# Patient Record
Sex: FEMALE | Race: WHITE | Employment: OTHER | URBAN - NONMETROPOLITAN AREA
[De-identification: names, ages, dates, MRNs, and addresses within clinical notes are randomized per-mention and may not be internally consistent; named-entity substitution may affect disease eponyms.]

---

## 2017-01-12 ENCOUNTER — DOCTOR'S OFFICE (OUTPATIENT)
Dept: URBAN - NONMETROPOLITAN AREA CLINIC 1 | Facility: CLINIC | Age: 82
Setting detail: OPHTHALMOLOGY
End: 2017-01-12
Payer: COMMERCIAL

## 2017-01-12 DIAGNOSIS — H35.3221: ICD-10-CM

## 2017-01-12 DIAGNOSIS — H35.052: ICD-10-CM

## 2017-01-12 PROCEDURE — 67028 INJECTION EYE DRUG: CPT | Performed by: OPHTHALMOLOGY

## 2017-01-12 ASSESSMENT — SPHEQUIV_DERIVED
OD_SPHEQUIV: 0.375
OS_SPHEQUIV: -0.125

## 2017-01-12 ASSESSMENT — VISUAL ACUITY
OS_BCVA: 20/40-2
OD_BCVA: 20/40-2

## 2017-01-12 ASSESSMENT — REFRACTION_AUTOREFRACTION
OS_AXIS: 26
OS_CYLINDER: -2.25
OD_SPHERE: +1.00
OS_SPHERE: +1.00
OD_AXIS: 157
OD_CYLINDER: -1.25

## 2017-02-17 ENCOUNTER — DOCTOR'S OFFICE (OUTPATIENT)
Dept: URBAN - NONMETROPOLITAN AREA CLINIC 1 | Facility: CLINIC | Age: 82
Setting detail: OPHTHALMOLOGY
End: 2017-02-17
Payer: COMMERCIAL

## 2017-02-17 DIAGNOSIS — H35.051: ICD-10-CM

## 2017-02-17 DIAGNOSIS — H04.121: ICD-10-CM

## 2017-02-17 DIAGNOSIS — H35.3231: ICD-10-CM

## 2017-02-17 DIAGNOSIS — H40.003: ICD-10-CM

## 2017-02-17 DIAGNOSIS — H35.052: ICD-10-CM

## 2017-02-17 DIAGNOSIS — H04.122: ICD-10-CM

## 2017-02-17 PROCEDURE — 99024 POSTOP FOLLOW-UP VISIT: CPT | Performed by: OPHTHALMOLOGY

## 2017-02-17 PROCEDURE — 92235 FLUORESCEIN ANGRPH MLTIFRAME: CPT | Performed by: OPHTHALMOLOGY

## 2017-02-17 PROCEDURE — 92250 FUNDUS PHOTOGRAPHY W/I&R: CPT | Performed by: OPHTHALMOLOGY

## 2017-02-17 PROCEDURE — 92134 CPTRZ OPH DX IMG PST SGM RTA: CPT | Performed by: OPHTHALMOLOGY

## 2017-02-17 ASSESSMENT — REFRACTION_AUTOREFRACTION
OS_CYLINDER: -2.25
OD_AXIS: 157
OD_CYLINDER: -1.25
OS_AXIS: 26
OS_SPHERE: +1.00
OD_SPHERE: +1.00

## 2017-02-17 ASSESSMENT — REFRACTION_MANIFEST
OD_VA2: 20/
OS_VA3: 20/
OU_VA: 20/
OS_VA2: 20/
OS_VA3: 20/
OD_VA1: 20/
OD_VA3: 20/
OS_VA2: 20/
OS_VA1: 20/
OU_VA: 20/
OS_VA1: 20/
OU_VA: 20/
OD_VA2: 20/
OS_VA1: 20/
OS_VA2: 20/
OD_VA2: 20/
OD_VA1: 20/
OS_VA3: 20/
OD_VA3: 20/
OD_VA1: 20/
OD_VA3: 20/

## 2017-02-17 ASSESSMENT — REFRACTION_CURRENTRX
OD_OVR_VA: 20/
OS_OVR_VA: 20/
OD_OVR_VA: 20/
OS_OVR_VA: 20/
OD_OVR_VA: 20/
OS_OVR_VA: 20/

## 2017-02-17 ASSESSMENT — CONFRONTATIONAL VISUAL FIELD TEST (CVF)
OS_FINDINGS: FULL
OD_FINDINGS: FULL

## 2017-02-17 ASSESSMENT — VISUAL ACUITY
OD_BCVA: 20/30
OS_BCVA: 20/40-2

## 2017-02-17 ASSESSMENT — CORNEAL DYSTROPHY: OD_DYSTROPHY: +VE ARCUS SENILIS

## 2017-02-17 ASSESSMENT — SPHEQUIV_DERIVED
OD_SPHEQUIV: 0.375
OS_SPHEQUIV: -0.125

## 2017-02-20 ENCOUNTER — DOCTOR'S OFFICE (OUTPATIENT)
Dept: URBAN - NONMETROPOLITAN AREA CLINIC 1 | Facility: CLINIC | Age: 82
Setting detail: OPHTHALMOLOGY
End: 2017-02-20
Payer: COMMERCIAL

## 2017-02-20 DIAGNOSIS — H35.3211: ICD-10-CM

## 2017-02-20 DIAGNOSIS — H35.3221: ICD-10-CM

## 2017-02-20 DIAGNOSIS — H35.051: ICD-10-CM

## 2017-02-20 DIAGNOSIS — H35.052: ICD-10-CM

## 2017-02-20 PROCEDURE — 92240 ICG ANGIOGRAPHY I&R UNI/BI: CPT | Performed by: OPHTHALMOLOGY

## 2017-02-20 PROCEDURE — 99024 POSTOP FOLLOW-UP VISIT: CPT | Performed by: OPHTHALMOLOGY

## 2017-02-20 PROCEDURE — 67028 INJECTION EYE DRUG: CPT | Performed by: OPHTHALMOLOGY

## 2017-02-20 ASSESSMENT — REFRACTION_AUTOREFRACTION
OS_CYLINDER: -2.25
OS_SPHERE: +1.00
OS_AXIS: 26
OD_CYLINDER: -1.25
OD_SPHERE: +1.00
OD_AXIS: 157

## 2017-02-20 ASSESSMENT — SPHEQUIV_DERIVED
OD_SPHEQUIV: 0.375
OS_SPHEQUIV: -0.125

## 2017-02-20 ASSESSMENT — VISUAL ACUITY
OS_BCVA: 20/40-2
OD_BCVA: 20/30

## 2017-02-23 ENCOUNTER — DOCTOR'S OFFICE (OUTPATIENT)
Dept: URBAN - NONMETROPOLITAN AREA CLINIC 1 | Facility: CLINIC | Age: 82
Setting detail: OPHTHALMOLOGY
End: 2017-02-23
Payer: COMMERCIAL

## 2017-02-23 DIAGNOSIS — H35.051: ICD-10-CM

## 2017-02-23 DIAGNOSIS — H35.3211: ICD-10-CM

## 2017-02-23 DIAGNOSIS — H35.052: ICD-10-CM

## 2017-02-23 DIAGNOSIS — H35.3221: ICD-10-CM

## 2017-02-23 PROCEDURE — 67220 TREATMENT OF CHOROID LESION: CPT | Performed by: OPHTHALMOLOGY

## 2017-02-23 PROCEDURE — 67028 INJECTION EYE DRUG: CPT | Performed by: OPHTHALMOLOGY

## 2017-02-23 ASSESSMENT — SPHEQUIV_DERIVED
OS_SPHEQUIV: -0.125
OD_SPHEQUIV: 0.375

## 2017-02-23 ASSESSMENT — REFRACTION_AUTOREFRACTION
OD_AXIS: 157
OD_SPHERE: +1.00
OS_CYLINDER: -2.25
OD_CYLINDER: -1.25
OS_AXIS: 26
OS_SPHERE: +1.00

## 2017-02-23 ASSESSMENT — VISUAL ACUITY
OS_BCVA: 20/40-2
OD_BCVA: 20/30

## 2017-03-23 ENCOUNTER — DOCTOR'S OFFICE (OUTPATIENT)
Dept: URBAN - NONMETROPOLITAN AREA CLINIC 1 | Facility: CLINIC | Age: 82
Setting detail: OPHTHALMOLOGY
End: 2017-03-23
Payer: COMMERCIAL

## 2017-03-23 DIAGNOSIS — H35.3231: ICD-10-CM

## 2017-03-23 DIAGNOSIS — H35.051: ICD-10-CM

## 2017-03-23 DIAGNOSIS — H35.052: ICD-10-CM

## 2017-03-23 PROCEDURE — 92134 CPTRZ OPH DX IMG PST SGM RTA: CPT | Performed by: OPHTHALMOLOGY

## 2017-03-23 PROCEDURE — 99024 POSTOP FOLLOW-UP VISIT: CPT | Performed by: OPHTHALMOLOGY

## 2017-03-23 ASSESSMENT — CONFRONTATIONAL VISUAL FIELD TEST (CVF)
OS_FINDINGS: FULL
OD_FINDINGS: FULL

## 2017-03-23 ASSESSMENT — REFRACTION_MANIFEST
OD_VA2: 20/
OS_VA1: 20/
OD_VA2: 20/
OS_VA2: 20/
OS_VA2: 20/
OS_VA3: 20/
OU_VA: 20/
OD_VA1: 20/
OS_VA1: 20/
OD_VA1: 20/
OD_VA3: 20/
OS_VA3: 20/
OD_VA2: 20/
OD_VA3: 20/
OD_VA1: 20/
OS_VA2: 20/
OU_VA: 20/
OD_VA3: 20/
OU_VA: 20/
OS_VA1: 20/
OS_VA3: 20/

## 2017-03-23 ASSESSMENT — REFRACTION_AUTOREFRACTION
OD_AXIS: 157
OS_AXIS: 26
OS_CYLINDER: -2.25
OD_SPHERE: +1.00
OD_CYLINDER: -1.25
OS_SPHERE: +1.00

## 2017-03-23 ASSESSMENT — REFRACTION_CURRENTRX
OD_OVR_VA: 20/
OD_OVR_VA: 20/
OS_OVR_VA: 20/
OD_OVR_VA: 20/

## 2017-03-23 ASSESSMENT — VISUAL ACUITY
OD_BCVA: 20/40+1
OS_BCVA: 20/60+1

## 2017-03-23 ASSESSMENT — SPHEQUIV_DERIVED
OD_SPHEQUIV: 0.375
OS_SPHEQUIV: -0.125

## 2017-03-23 ASSESSMENT — CORNEAL DYSTROPHY: OD_DYSTROPHY: +VE ARCUS SENILIS

## 2017-03-30 ENCOUNTER — DOCTOR'S OFFICE (OUTPATIENT)
Dept: URBAN - NONMETROPOLITAN AREA CLINIC 1 | Facility: CLINIC | Age: 82
Setting detail: OPHTHALMOLOGY
End: 2017-03-30
Payer: COMMERCIAL

## 2017-03-30 DIAGNOSIS — H35.051: ICD-10-CM

## 2017-03-30 DIAGNOSIS — H35.052: ICD-10-CM

## 2017-03-30 DIAGNOSIS — H35.3231: ICD-10-CM

## 2017-03-30 PROCEDURE — 67028 INJECTION EYE DRUG: CPT | Performed by: OPHTHALMOLOGY

## 2017-03-30 PROCEDURE — 99024 POSTOP FOLLOW-UP VISIT: CPT | Performed by: OPHTHALMOLOGY

## 2017-03-30 ASSESSMENT — REFRACTION_AUTOREFRACTION
OS_AXIS: 26
OD_SPHERE: +1.00
OS_SPHERE: +1.00
OD_AXIS: 157
OD_CYLINDER: -1.25
OS_CYLINDER: -2.25

## 2017-03-30 ASSESSMENT — VISUAL ACUITY
OS_BCVA: 20/60+1
OD_BCVA: 20/40+1

## 2017-03-30 ASSESSMENT — SPHEQUIV_DERIVED
OD_SPHEQUIV: 0.375
OS_SPHEQUIV: -0.125

## 2017-04-03 ENCOUNTER — DOCTOR'S OFFICE (OUTPATIENT)
Dept: URBAN - NONMETROPOLITAN AREA CLINIC 1 | Facility: CLINIC | Age: 82
Setting detail: OPHTHALMOLOGY
End: 2017-04-03
Payer: COMMERCIAL

## 2017-04-03 DIAGNOSIS — H35.3231: ICD-10-CM

## 2017-04-03 DIAGNOSIS — H35.052: ICD-10-CM

## 2017-04-03 PROCEDURE — 67028 INJECTION EYE DRUG: CPT | Performed by: OPHTHALMOLOGY

## 2017-04-03 ASSESSMENT — REFRACTION_AUTOREFRACTION
OS_AXIS: 26
OS_SPHERE: +1.00
OD_CYLINDER: -1.25
OS_CYLINDER: -2.25
OD_AXIS: 157
OD_SPHERE: +1.00

## 2017-04-03 ASSESSMENT — VISUAL ACUITY
OD_BCVA: 20/40+1
OS_BCVA: 20/60+1

## 2017-04-03 ASSESSMENT — SPHEQUIV_DERIVED
OS_SPHEQUIV: -0.125
OD_SPHEQUIV: 0.375

## 2017-05-15 ENCOUNTER — DOCTOR'S OFFICE (OUTPATIENT)
Dept: URBAN - NONMETROPOLITAN AREA CLINIC 1 | Facility: CLINIC | Age: 82
Setting detail: OPHTHALMOLOGY
End: 2017-05-15
Payer: COMMERCIAL

## 2017-05-15 DIAGNOSIS — H35.051: ICD-10-CM

## 2017-05-15 DIAGNOSIS — H35.3231: ICD-10-CM

## 2017-05-15 DIAGNOSIS — E11.3293: ICD-10-CM

## 2017-05-15 DIAGNOSIS — H35.052: ICD-10-CM

## 2017-05-15 PROCEDURE — 99024 POSTOP FOLLOW-UP VISIT: CPT | Performed by: OPHTHALMOLOGY

## 2017-05-15 PROCEDURE — 92134 CPTRZ OPH DX IMG PST SGM RTA: CPT | Performed by: OPHTHALMOLOGY

## 2017-05-15 ASSESSMENT — REFRACTION_AUTOREFRACTION
OS_SPHERE: +1.00
OD_CYLINDER: -1.25
OD_SPHERE: +1.00
OS_CYLINDER: -2.25
OD_AXIS: 157
OS_AXIS: 26

## 2017-05-15 ASSESSMENT — VISUAL ACUITY
OD_BCVA: 20/70-1
OS_BCVA: 20/100+1

## 2017-05-15 ASSESSMENT — REFRACTION_MANIFEST
OU_VA: 20/
OS_VA1: 20/
OS_VA3: 20/
OD_VA1: 20/
OS_VA3: 20/
OU_VA: 20/
OD_VA2: 20/
OD_VA2: 20/
OS_VA2: 20/
OS_VA3: 20/
OS_VA1: 20/
OD_VA3: 20/
OU_VA: 20/
OD_VA2: 20/
OS_VA2: 20/
OS_VA2: 20/
OD_VA1: 20/
OD_VA3: 20/
OD_VA3: 20/
OS_VA1: 20/
OD_VA1: 20/

## 2017-05-15 ASSESSMENT — CONFRONTATIONAL VISUAL FIELD TEST (CVF)
OD_FINDINGS: FULL
OS_FINDINGS: FULL

## 2017-05-15 ASSESSMENT — REFRACTION_CURRENTRX
OD_OVR_VA: 20/
OS_OVR_VA: 20/

## 2017-05-15 ASSESSMENT — SPHEQUIV_DERIVED
OS_SPHEQUIV: -0.125
OD_SPHEQUIV: 0.375

## 2017-05-15 ASSESSMENT — CORNEAL DYSTROPHY: OD_DYSTROPHY: +VE ARCUS SENILIS

## 2017-05-18 ENCOUNTER — DOCTOR'S OFFICE (OUTPATIENT)
Dept: URBAN - NONMETROPOLITAN AREA CLINIC 1 | Facility: CLINIC | Age: 82
Setting detail: OPHTHALMOLOGY
End: 2017-05-18
Payer: COMMERCIAL

## 2017-05-18 DIAGNOSIS — E11.3293: ICD-10-CM

## 2017-05-18 DIAGNOSIS — H35.052: ICD-10-CM

## 2017-05-18 DIAGNOSIS — H35.051: ICD-10-CM

## 2017-05-18 DIAGNOSIS — H35.3231: ICD-10-CM

## 2017-05-18 PROCEDURE — 92235 FLUORESCEIN ANGRPH MLTIFRAME: CPT | Performed by: OPHTHALMOLOGY

## 2017-05-18 PROCEDURE — 92250 FUNDUS PHOTOGRAPHY W/I&R: CPT | Performed by: OPHTHALMOLOGY

## 2017-05-18 PROCEDURE — 99024 POSTOP FOLLOW-UP VISIT: CPT | Performed by: OPHTHALMOLOGY

## 2017-05-18 PROCEDURE — 67028 INJECTION EYE DRUG: CPT | Performed by: OPHTHALMOLOGY

## 2017-05-18 ASSESSMENT — REFRACTION_AUTOREFRACTION
OD_AXIS: 157
OD_SPHERE: +1.00
OS_CYLINDER: -2.25
OS_SPHERE: +1.00
OD_CYLINDER: -1.25
OS_AXIS: 26

## 2017-05-18 ASSESSMENT — REFRACTION_MANIFEST
OD_VA3: 20/
OD_VA3: 20/
OU_VA: 20/
OS_VA3: 20/
OS_VA1: 20/
OS_VA2: 20/
OU_VA: 20/
OU_VA: 20/
OS_VA2: 20/
OS_VA1: 20/
OD_VA1: 20/
OD_VA3: 20/
OD_VA2: 20/
OS_VA2: 20/
OS_VA3: 20/
OS_VA3: 20/
OS_VA1: 20/
OD_VA2: 20/
OD_VA2: 20/
OD_VA1: 20/
OD_VA1: 20/

## 2017-05-18 ASSESSMENT — REFRACTION_CURRENTRX
OS_OVR_VA: 20/
OD_OVR_VA: 20/
OD_OVR_VA: 20/
OS_OVR_VA: 20/
OD_OVR_VA: 20/
OS_OVR_VA: 20/

## 2017-05-18 ASSESSMENT — VISUAL ACUITY
OS_BCVA: 20/100
OD_BCVA: 20/70

## 2017-05-18 ASSESSMENT — SPHEQUIV_DERIVED
OS_SPHEQUIV: -0.125
OD_SPHEQUIV: 0.375

## 2017-05-18 ASSESSMENT — CORNEAL DYSTROPHY: OD_DYSTROPHY: +VE ARCUS SENILIS

## 2017-05-22 ENCOUNTER — DOCTOR'S OFFICE (OUTPATIENT)
Dept: URBAN - NONMETROPOLITAN AREA CLINIC 1 | Facility: CLINIC | Age: 82
Setting detail: OPHTHALMOLOGY
End: 2017-05-22
Payer: COMMERCIAL

## 2017-05-22 ENCOUNTER — RX ONLY (RX ONLY)
Age: 82
End: 2017-05-22

## 2017-05-22 DIAGNOSIS — H35.051: ICD-10-CM

## 2017-05-22 DIAGNOSIS — H35.3231: ICD-10-CM

## 2017-05-22 DIAGNOSIS — H35.052: ICD-10-CM

## 2017-05-22 DIAGNOSIS — E11.3293: ICD-10-CM

## 2017-05-22 PROCEDURE — 67028 INJECTION EYE DRUG: CPT | Performed by: OPHTHALMOLOGY

## 2017-05-22 PROCEDURE — 67210 TREATMENT OF RETINAL LESION: CPT | Performed by: OPHTHALMOLOGY

## 2017-05-22 PROCEDURE — 92240 ICG ANGIOGRAPHY I&R UNI/BI: CPT | Performed by: OPHTHALMOLOGY

## 2017-05-22 PROCEDURE — 99024 POSTOP FOLLOW-UP VISIT: CPT | Performed by: OPHTHALMOLOGY

## 2017-05-24 ASSESSMENT — REFRACTION_MANIFEST
OS_VA1: 20/
OS_VA2: 20/
OS_VA1: 20/
OS_VA2: 20/
OD_VA2: 20/
OS_VA3: 20/
OS_VA2: 20/
OU_VA: 20/
OS_VA3: 20/
OD_VA2: 20/
OD_VA1: 20/
OD_VA1: 20/
OD_VA3: 20/
OD_VA3: 20/
OS_VA3: 20/
OS_VA1: 20/
OD_VA2: 20/
OD_VA3: 20/
OU_VA: 20/
OU_VA: 20/
OD_VA1: 20/

## 2017-05-24 ASSESSMENT — REFRACTION_AUTOREFRACTION
OS_CYLINDER: -2.25
OS_AXIS: 26
OD_CYLINDER: -1.25
OD_AXIS: 157
OS_SPHERE: +1.00
OD_SPHERE: +1.00

## 2017-05-24 ASSESSMENT — REFRACTION_CURRENTRX
OD_OVR_VA: 20/
OS_OVR_VA: 20/
OD_OVR_VA: 20/
OS_OVR_VA: 20/
OD_OVR_VA: 20/
OS_OVR_VA: 20/

## 2017-05-24 ASSESSMENT — SPHEQUIV_DERIVED
OD_SPHEQUIV: 0.375
OS_SPHEQUIV: -0.125

## 2017-05-24 ASSESSMENT — VISUAL ACUITY
OS_BCVA: 20/100
OD_BCVA: 20/70

## 2017-06-22 ENCOUNTER — DOCTOR'S OFFICE (OUTPATIENT)
Dept: URBAN - NONMETROPOLITAN AREA CLINIC 1 | Facility: CLINIC | Age: 82
Setting detail: OPHTHALMOLOGY
End: 2017-06-22
Payer: COMMERCIAL

## 2017-06-22 DIAGNOSIS — E11.3293: ICD-10-CM

## 2017-06-22 DIAGNOSIS — H35.051: ICD-10-CM

## 2017-06-22 DIAGNOSIS — H35.052: ICD-10-CM

## 2017-06-22 DIAGNOSIS — H35.3231: ICD-10-CM

## 2017-06-22 PROCEDURE — 92134 CPTRZ OPH DX IMG PST SGM RTA: CPT | Performed by: OPHTHALMOLOGY

## 2017-06-22 PROCEDURE — 99024 POSTOP FOLLOW-UP VISIT: CPT | Performed by: OPHTHALMOLOGY

## 2017-06-22 ASSESSMENT — REFRACTION_MANIFEST
OD_VA3: 20/
OD_VA1: 20/
OS_VA3: 20/
OS_VA1: 20/
OU_VA: 20/
OS_VA2: 20/
OS_VA3: 20/
OD_VA3: 20/
OS_VA2: 20/
OS_VA2: 20/
OS_VA1: 20/
OD_VA2: 20/
OU_VA: 20/
OU_VA: 20/
OS_VA3: 20/
OD_VA3: 20/
OD_VA2: 20/
OD_VA1: 20/
OS_VA1: 20/
OD_VA2: 20/
OD_VA1: 20/

## 2017-06-22 ASSESSMENT — CORNEAL DYSTROPHY: OD_DYSTROPHY: +VE ARCUS SENILIS

## 2017-06-22 ASSESSMENT — REFRACTION_AUTOREFRACTION
OD_SPHERE: +1.00
OD_CYLINDER: -1.25
OS_SPHERE: +1.00
OD_AXIS: 157
OS_CYLINDER: -2.25
OS_AXIS: 26

## 2017-06-22 ASSESSMENT — SPHEQUIV_DERIVED
OS_SPHEQUIV: -0.125
OD_SPHEQUIV: 0.375

## 2017-06-22 ASSESSMENT — REFRACTION_CURRENTRX
OS_OVR_VA: 20/
OD_OVR_VA: 20/
OS_OVR_VA: 20/
OD_OVR_VA: 20/
OS_OVR_VA: 20/
OD_OVR_VA: 20/

## 2017-06-22 ASSESSMENT — VISUAL ACUITY
OS_BCVA: 20/70-2
OD_BCVA: 20/50-1

## 2017-06-22 ASSESSMENT — CONFRONTATIONAL VISUAL FIELD TEST (CVF)
OD_FINDINGS: FULL
OS_FINDINGS: FULL

## 2017-07-10 ENCOUNTER — DOCTOR'S OFFICE (OUTPATIENT)
Dept: URBAN - NONMETROPOLITAN AREA CLINIC 1 | Facility: CLINIC | Age: 82
Setting detail: OPHTHALMOLOGY
End: 2017-07-10
Payer: COMMERCIAL

## 2017-07-10 DIAGNOSIS — H35.3231: ICD-10-CM

## 2017-07-10 DIAGNOSIS — E11.3293: ICD-10-CM

## 2017-07-10 DIAGNOSIS — H35.052: ICD-10-CM

## 2017-07-10 DIAGNOSIS — H35.051: ICD-10-CM

## 2017-07-10 PROCEDURE — 67028 INJECTION EYE DRUG: CPT | Performed by: OPHTHALMOLOGY

## 2017-07-10 PROCEDURE — 99024 POSTOP FOLLOW-UP VISIT: CPT | Performed by: OPHTHALMOLOGY

## 2017-07-10 ASSESSMENT — VISUAL ACUITY
OD_BCVA: 20/50-1
OS_BCVA: 20/70-2

## 2017-07-10 ASSESSMENT — REFRACTION_AUTOREFRACTION
OD_AXIS: 157
OS_AXIS: 26
OS_SPHERE: +1.00
OD_CYLINDER: -1.25
OD_SPHERE: +1.00
OS_CYLINDER: -2.25

## 2017-07-10 ASSESSMENT — SPHEQUIV_DERIVED
OS_SPHEQUIV: -0.125
OD_SPHEQUIV: 0.375

## 2017-07-13 ENCOUNTER — DOCTOR'S OFFICE (OUTPATIENT)
Dept: URBAN - NONMETROPOLITAN AREA CLINIC 1 | Facility: CLINIC | Age: 82
Setting detail: OPHTHALMOLOGY
End: 2017-07-13
Payer: COMMERCIAL

## 2017-07-13 DIAGNOSIS — H35.052: ICD-10-CM

## 2017-07-13 DIAGNOSIS — H35.051: ICD-10-CM

## 2017-07-13 DIAGNOSIS — H35.3231: ICD-10-CM

## 2017-07-13 PROCEDURE — 67028 INJECTION EYE DRUG: CPT | Performed by: OPHTHALMOLOGY

## 2017-07-13 PROCEDURE — 99024 POSTOP FOLLOW-UP VISIT: CPT | Performed by: OPHTHALMOLOGY

## 2017-07-13 ASSESSMENT — REFRACTION_CURRENTRX
OD_OVR_VA: 20/
OS_OVR_VA: 20/
OD_OVR_VA: 20/
OD_OVR_VA: 20/
OS_OVR_VA: 20/
OS_OVR_VA: 20/

## 2017-07-13 ASSESSMENT — REFRACTION_AUTOREFRACTION
OD_CYLINDER: -1.25
OS_CYLINDER: -2.25
OS_SPHERE: +1.00
OS_AXIS: 26
OD_AXIS: 157
OD_SPHERE: +1.00

## 2017-07-13 ASSESSMENT — VISUAL ACUITY
OS_BCVA: 20/70-2
OD_BCVA: 20/50-1

## 2017-07-13 ASSESSMENT — REFRACTION_MANIFEST
OD_VA1: 20/
OU_VA: 20/
OD_VA2: 20/
OD_VA3: 20/
OD_VA1: 20/
OS_VA3: 20/
OS_VA3: 20/
OU_VA: 20/
OD_VA2: 20/
OS_VA1: 20/
OD_VA3: 20/
OD_VA2: 20/
OS_VA2: 20/
OS_VA2: 20/
OU_VA: 20/
OS_VA2: 20/
OS_VA3: 20/
OS_VA1: 20/
OD_VA3: 20/
OD_VA1: 20/
OS_VA1: 20/

## 2017-07-13 ASSESSMENT — SPHEQUIV_DERIVED
OS_SPHEQUIV: -0.125
OD_SPHEQUIV: 0.375

## 2017-08-03 ENCOUNTER — DOCTOR'S OFFICE (OUTPATIENT)
Dept: URBAN - NONMETROPOLITAN AREA CLINIC 1 | Facility: CLINIC | Age: 82
Setting detail: OPHTHALMOLOGY
End: 2017-08-03
Payer: COMMERCIAL

## 2017-08-03 DIAGNOSIS — H35.3231: ICD-10-CM

## 2017-08-03 DIAGNOSIS — H04.123: ICD-10-CM

## 2017-08-03 DIAGNOSIS — H35.053: ICD-10-CM

## 2017-08-03 DIAGNOSIS — E11.3293: ICD-10-CM

## 2017-08-03 PROCEDURE — 99024 POSTOP FOLLOW-UP VISIT: CPT | Performed by: OPHTHALMOLOGY

## 2017-08-03 PROCEDURE — 92134 CPTRZ OPH DX IMG PST SGM RTA: CPT | Performed by: OPHTHALMOLOGY

## 2017-08-03 ASSESSMENT — REFRACTION_MANIFEST
OS_VA3: 20/
OS_VA2: 20/
OD_VA3: 20/
OD_VA2: 20/
OS_VA2: 20/
OD_VA1: 20/
OD_VA2: 20/
OS_VA1: 20/
OU_VA: 20/
OS_VA1: 20/
OD_VA3: 20/
OS_VA1: 20/
OS_VA3: 20/
OD_VA1: 20/
OS_VA2: 20/
OD_VA3: 20/
OD_VA2: 20/
OU_VA: 20/
OS_VA3: 20/
OU_VA: 20/
OD_VA1: 20/

## 2017-08-03 ASSESSMENT — REFRACTION_AUTOREFRACTION
OS_SPHERE: +1.00
OD_CYLINDER: -1.25
OS_CYLINDER: -2.25
OS_AXIS: 26
OD_SPHERE: +1.00
OD_AXIS: 157

## 2017-08-03 ASSESSMENT — REFRACTION_CURRENTRX
OD_OVR_VA: 20/
OS_OVR_VA: 20/
OD_OVR_VA: 20/
OD_OVR_VA: 20/
OS_OVR_VA: 20/
OS_OVR_VA: 20/

## 2017-08-03 ASSESSMENT — CONFRONTATIONAL VISUAL FIELD TEST (CVF)
OS_FINDINGS: FULL
OD_FINDINGS: FULL

## 2017-08-03 ASSESSMENT — SPHEQUIV_DERIVED
OS_SPHEQUIV: -0.125
OD_SPHEQUIV: 0.375

## 2017-08-03 ASSESSMENT — VISUAL ACUITY
OD_BCVA: 20/40-1
OS_BCVA: 20/40

## 2017-08-03 ASSESSMENT — CORNEAL DYSTROPHY: OD_DYSTROPHY: +VE ARCUS SENILIS

## 2017-08-04 ENCOUNTER — DOCTOR'S OFFICE (OUTPATIENT)
Dept: URBAN - NONMETROPOLITAN AREA CLINIC 1 | Facility: CLINIC | Age: 82
Setting detail: OPHTHALMOLOGY
End: 2017-08-04
Payer: COMMERCIAL

## 2017-08-04 DIAGNOSIS — H35.3231: ICD-10-CM

## 2017-08-04 DIAGNOSIS — E11.3293: ICD-10-CM

## 2017-08-04 DIAGNOSIS — H35.053: ICD-10-CM

## 2017-08-04 PROCEDURE — 92240 ICG ANGIOGRAPHY I&R UNI/BI: CPT | Performed by: OPHTHALMOLOGY

## 2017-08-04 ASSESSMENT — REFRACTION_AUTOREFRACTION
OD_CYLINDER: -1.25
OS_CYLINDER: -2.25
OD_SPHERE: +1.00
OS_AXIS: 26
OD_AXIS: 157
OS_SPHERE: +1.00

## 2017-08-04 ASSESSMENT — VISUAL ACUITY
OS_BCVA: 20/40
OD_BCVA: 20/40-1

## 2017-08-04 ASSESSMENT — SPHEQUIV_DERIVED
OS_SPHEQUIV: -0.125
OD_SPHEQUIV: 0.375

## 2017-08-10 ENCOUNTER — DOCTOR'S OFFICE (OUTPATIENT)
Dept: URBAN - NONMETROPOLITAN AREA CLINIC 1 | Facility: CLINIC | Age: 82
Setting detail: OPHTHALMOLOGY
End: 2017-08-10
Payer: COMMERCIAL

## 2017-08-10 DIAGNOSIS — H35.051: ICD-10-CM

## 2017-08-10 DIAGNOSIS — H35.052: ICD-10-CM

## 2017-08-10 PROCEDURE — 67028 INJECTION EYE DRUG: CPT | Performed by: OPHTHALMOLOGY

## 2017-08-10 PROCEDURE — 99024 POSTOP FOLLOW-UP VISIT: CPT | Performed by: OPHTHALMOLOGY

## 2017-08-10 ASSESSMENT — REFRACTION_AUTOREFRACTION
OS_SPHERE: +1.00
OD_AXIS: 157
OD_CYLINDER: -1.25
OD_SPHERE: +1.00
OS_AXIS: 26
OS_CYLINDER: -2.25

## 2017-08-10 ASSESSMENT — VISUAL ACUITY
OD_BCVA: 20/40-1
OS_BCVA: 20/40

## 2017-08-10 ASSESSMENT — SPHEQUIV_DERIVED
OD_SPHEQUIV: 0.375
OS_SPHEQUIV: -0.125

## 2017-08-18 ENCOUNTER — DOCTOR'S OFFICE (OUTPATIENT)
Dept: URBAN - NONMETROPOLITAN AREA CLINIC 1 | Facility: CLINIC | Age: 82
Setting detail: OPHTHALMOLOGY
End: 2017-08-18
Payer: COMMERCIAL

## 2017-08-18 DIAGNOSIS — H35.3221: ICD-10-CM

## 2017-08-18 DIAGNOSIS — H35.052: ICD-10-CM

## 2017-08-18 PROCEDURE — 67028 INJECTION EYE DRUG: CPT | Performed by: OPHTHALMOLOGY

## 2017-08-18 ASSESSMENT — REFRACTION_AUTOREFRACTION
OS_AXIS: 26
OS_CYLINDER: -2.25
OD_AXIS: 157
OD_SPHERE: +1.00
OS_SPHERE: +1.00
OD_CYLINDER: -1.25

## 2017-08-18 ASSESSMENT — VISUAL ACUITY
OS_BCVA: 20/40
OD_BCVA: 20/40-1

## 2017-08-18 ASSESSMENT — SPHEQUIV_DERIVED
OD_SPHEQUIV: 0.375
OS_SPHEQUIV: -0.125

## 2017-09-21 ENCOUNTER — DOCTOR'S OFFICE (OUTPATIENT)
Dept: URBAN - NONMETROPOLITAN AREA CLINIC 1 | Facility: CLINIC | Age: 82
Setting detail: OPHTHALMOLOGY
End: 2017-09-21
Payer: COMMERCIAL

## 2017-09-21 DIAGNOSIS — E11.3293: ICD-10-CM

## 2017-09-21 DIAGNOSIS — H40.003: ICD-10-CM

## 2017-09-21 DIAGNOSIS — H35.053: ICD-10-CM

## 2017-09-21 DIAGNOSIS — H04.123: ICD-10-CM

## 2017-09-21 DIAGNOSIS — H35.3231: ICD-10-CM

## 2017-09-21 PROCEDURE — 92134 CPTRZ OPH DX IMG PST SGM RTA: CPT | Performed by: OPHTHALMOLOGY

## 2017-09-21 PROCEDURE — 92014 COMPRE OPH EXAM EST PT 1/>: CPT | Performed by: OPHTHALMOLOGY

## 2017-09-21 ASSESSMENT — REFRACTION_AUTOREFRACTION
OS_SPHERE: +1.00
OS_AXIS: 26
OD_CYLINDER: -1.25
OD_SPHERE: +1.00
OD_AXIS: 157
OS_CYLINDER: -2.25

## 2017-09-21 ASSESSMENT — REFRACTION_MANIFEST
OD_VA3: 20/
OS_VA3: 20/
OD_VA1: 20/
OD_VA1: 20/
OD_VA2: 20/
OD_VA2: 20/
OS_VA1: 20/
OU_VA: 20/
OD_VA3: 20/
OS_VA3: 20/
OS_VA3: 20/
OS_VA1: 20/
OD_VA3: 20/
OU_VA: 20/
OU_VA: 20/
OS_VA2: 20/
OD_VA2: 20/
OD_VA1: 20/
OS_VA2: 20/
OS_VA2: 20/
OS_VA1: 20/

## 2017-09-21 ASSESSMENT — SPHEQUIV_DERIVED
OD_SPHEQUIV: 0.375
OS_SPHEQUIV: -0.125

## 2017-09-21 ASSESSMENT — VISUAL ACUITY
OD_BCVA: 20/50+2
OS_BCVA: 20/40+2

## 2017-09-21 ASSESSMENT — CONFRONTATIONAL VISUAL FIELD TEST (CVF)
OD_FINDINGS: FULL
OS_FINDINGS: FULL

## 2017-09-21 ASSESSMENT — REFRACTION_CURRENTRX
OS_OVR_VA: 20/
OD_OVR_VA: 20/
OS_OVR_VA: 20/
OD_OVR_VA: 20/
OS_OVR_VA: 20/
OD_OVR_VA: 20/

## 2017-09-21 ASSESSMENT — CORNEAL DYSTROPHY: OD_DYSTROPHY: +VE ARCUS SENILIS

## 2017-09-28 ENCOUNTER — DOCTOR'S OFFICE (OUTPATIENT)
Dept: URBAN - NONMETROPOLITAN AREA CLINIC 1 | Facility: CLINIC | Age: 82
Setting detail: OPHTHALMOLOGY
End: 2017-09-28
Payer: COMMERCIAL

## 2017-09-28 DIAGNOSIS — H35.3211: ICD-10-CM

## 2017-09-28 DIAGNOSIS — H35.051: ICD-10-CM

## 2017-09-28 PROCEDURE — 67028 INJECTION EYE DRUG: CPT | Performed by: OPHTHALMOLOGY

## 2017-09-28 ASSESSMENT — VISUAL ACUITY
OS_BCVA: 20/40+2
OD_BCVA: 20/50+2

## 2017-09-28 ASSESSMENT — SPHEQUIV_DERIVED
OD_SPHEQUIV: 0.375
OS_SPHEQUIV: -0.125

## 2017-09-28 ASSESSMENT — REFRACTION_AUTOREFRACTION
OS_SPHERE: +1.00
OD_AXIS: 157
OD_SPHERE: +1.00
OS_CYLINDER: -2.25
OD_CYLINDER: -1.25
OS_AXIS: 26

## 2017-10-02 ENCOUNTER — DOCTOR'S OFFICE (OUTPATIENT)
Dept: URBAN - NONMETROPOLITAN AREA CLINIC 1 | Facility: CLINIC | Age: 82
Setting detail: OPHTHALMOLOGY
End: 2017-10-02
Payer: COMMERCIAL

## 2017-10-02 DIAGNOSIS — H35.3221: ICD-10-CM

## 2017-10-02 DIAGNOSIS — H35.053: ICD-10-CM

## 2017-10-02 DIAGNOSIS — H35.3231: ICD-10-CM

## 2017-10-02 PROCEDURE — 92235 FLUORESCEIN ANGRPH MLTIFRAME: CPT | Performed by: OPHTHALMOLOGY

## 2017-10-02 PROCEDURE — 92240 ICG ANGIOGRAPHY I&R UNI/BI: CPT | Performed by: OPHTHALMOLOGY

## 2017-10-02 PROCEDURE — 67210 TREATMENT OF RETINAL LESION: CPT | Performed by: OPHTHALMOLOGY

## 2017-10-02 PROCEDURE — 92250 FUNDUS PHOTOGRAPHY W/I&R: CPT | Performed by: OPHTHALMOLOGY

## 2017-10-02 ASSESSMENT — REFRACTION_AUTOREFRACTION
OS_CYLINDER: -2.25
OD_AXIS: 157
OD_SPHERE: +1.00
OS_AXIS: 26
OS_SPHERE: +1.00
OD_CYLINDER: -1.25

## 2017-10-02 ASSESSMENT — SPHEQUIV_DERIVED
OS_SPHEQUIV: -0.125
OD_SPHEQUIV: 0.375

## 2017-10-02 ASSESSMENT — VISUAL ACUITY
OD_BCVA: 20/50
OS_BCVA: 20/40

## 2017-10-06 ENCOUNTER — DOCTOR'S OFFICE (OUTPATIENT)
Dept: URBAN - NONMETROPOLITAN AREA CLINIC 1 | Facility: CLINIC | Age: 82
Setting detail: OPHTHALMOLOGY
End: 2017-10-06
Payer: COMMERCIAL

## 2017-10-06 DIAGNOSIS — H35.3221: ICD-10-CM

## 2017-10-06 PROCEDURE — 67028 INJECTION EYE DRUG: CPT | Performed by: OPHTHALMOLOGY

## 2017-10-06 ASSESSMENT — VISUAL ACUITY
OS_BCVA: 20/40
OD_BCVA: 20/50

## 2017-10-06 ASSESSMENT — REFRACTION_AUTOREFRACTION
OS_SPHERE: +1.00
OD_SPHERE: +1.00
OS_AXIS: 26
OD_AXIS: 157
OD_CYLINDER: -1.25
OS_CYLINDER: -2.25

## 2017-10-06 ASSESSMENT — SPHEQUIV_DERIVED
OS_SPHEQUIV: -0.125
OD_SPHEQUIV: 0.375

## 2017-11-16 ENCOUNTER — DOCTOR'S OFFICE (OUTPATIENT)
Dept: URBAN - NONMETROPOLITAN AREA CLINIC 1 | Facility: CLINIC | Age: 82
Setting detail: OPHTHALMOLOGY
End: 2017-11-16
Payer: COMMERCIAL

## 2017-11-16 DIAGNOSIS — E11.3293: ICD-10-CM

## 2017-11-16 DIAGNOSIS — H43.811: ICD-10-CM

## 2017-11-16 DIAGNOSIS — H35.3231: ICD-10-CM

## 2017-11-16 DIAGNOSIS — H04.122: ICD-10-CM

## 2017-11-16 DIAGNOSIS — H43.812: ICD-10-CM

## 2017-11-16 DIAGNOSIS — H35.051: ICD-10-CM

## 2017-11-16 DIAGNOSIS — H35.052: ICD-10-CM

## 2017-11-16 DIAGNOSIS — H40.003: ICD-10-CM

## 2017-11-16 DIAGNOSIS — H04.121: ICD-10-CM

## 2017-11-16 PROCEDURE — 92134 CPTRZ OPH DX IMG PST SGM RTA: CPT | Performed by: OPHTHALMOLOGY

## 2017-11-16 PROCEDURE — 99024 POSTOP FOLLOW-UP VISIT: CPT | Performed by: OPHTHALMOLOGY

## 2017-11-16 PROCEDURE — 92225 OPHTHALMOSCOPY EXTENDED INITIAL: CPT | Performed by: OPHTHALMOLOGY

## 2017-11-16 ASSESSMENT — REFRACTION_CURRENTRX
OD_OVR_VA: 20/
OS_OVR_VA: 20/
OD_OVR_VA: 20/
OS_OVR_VA: 20/
OD_OVR_VA: 20/
OS_OVR_VA: 20/

## 2017-11-16 ASSESSMENT — REFRACTION_MANIFEST
OD_VA3: 20/
OU_VA: 20/
OS_VA1: 20/
OS_VA2: 20/
OD_VA1: 20/
OS_VA2: 20/
OS_VA1: 20/
OU_VA: 20/
OS_VA3: 20/
OD_VA1: 20/
OU_VA: 20/
OD_VA1: 20/
OD_VA3: 20/
OD_VA2: 20/
OS_VA1: 20/
OD_VA2: 20/
OS_VA3: 20/
OS_VA3: 20/
OD_VA3: 20/
OD_VA2: 20/
OS_VA2: 20/

## 2017-11-16 ASSESSMENT — VISUAL ACUITY
OS_BCVA: 20/50-2
OD_BCVA: 20/50

## 2017-11-16 ASSESSMENT — CONFRONTATIONAL VISUAL FIELD TEST (CVF)
OD_FINDINGS: FULL
OS_FINDINGS: FULL

## 2017-11-16 ASSESSMENT — SPHEQUIV_DERIVED
OD_SPHEQUIV: 0.375
OS_SPHEQUIV: -0.125

## 2017-11-16 ASSESSMENT — REFRACTION_AUTOREFRACTION
OS_CYLINDER: -2.25
OD_AXIS: 157
OD_SPHERE: +1.00
OD_CYLINDER: -1.25
OS_AXIS: 26
OS_SPHERE: +1.00

## 2017-11-16 ASSESSMENT — CORNEAL DYSTROPHY: OD_DYSTROPHY: +VE ARCUS SENILIS

## 2017-11-27 ENCOUNTER — DOCTOR'S OFFICE (OUTPATIENT)
Dept: URBAN - NONMETROPOLITAN AREA CLINIC 1 | Facility: CLINIC | Age: 82
Setting detail: OPHTHALMOLOGY
End: 2017-11-27
Payer: COMMERCIAL

## 2017-11-27 DIAGNOSIS — E11.3293: ICD-10-CM

## 2017-11-27 DIAGNOSIS — H35.3211: ICD-10-CM

## 2017-11-27 DIAGNOSIS — H35.051: ICD-10-CM

## 2017-11-27 DIAGNOSIS — H35.3221: ICD-10-CM

## 2017-11-27 DIAGNOSIS — H35.052: ICD-10-CM

## 2017-11-27 DIAGNOSIS — H04.122: ICD-10-CM

## 2017-11-27 DIAGNOSIS — H43.811: ICD-10-CM

## 2017-11-27 DIAGNOSIS — H40.003: ICD-10-CM

## 2017-11-27 DIAGNOSIS — H43.812: ICD-10-CM

## 2017-11-27 DIAGNOSIS — H04.121: ICD-10-CM

## 2017-11-27 PROCEDURE — 67028 INJECTION EYE DRUG: CPT | Performed by: OPHTHALMOLOGY

## 2017-11-27 PROCEDURE — 99024 POSTOP FOLLOW-UP VISIT: CPT | Performed by: OPHTHALMOLOGY

## 2017-11-27 ASSESSMENT — REFRACTION_AUTOREFRACTION
OS_CYLINDER: -2.25
OS_SPHERE: +1.00
OS_AXIS: 26
OD_SPHERE: +1.00
OD_AXIS: 157
OD_CYLINDER: -1.25

## 2017-11-27 ASSESSMENT — SPHEQUIV_DERIVED
OS_SPHEQUIV: -0.125
OD_SPHEQUIV: 0.375

## 2017-11-27 ASSESSMENT — VISUAL ACUITY
OS_BCVA: 20/50-2
OD_BCVA: 20/50

## 2017-11-30 ENCOUNTER — DOCTOR'S OFFICE (OUTPATIENT)
Dept: URBAN - NONMETROPOLITAN AREA CLINIC 1 | Facility: CLINIC | Age: 82
Setting detail: OPHTHALMOLOGY
End: 2017-11-30
Payer: COMMERCIAL

## 2017-11-30 DIAGNOSIS — H40.003: ICD-10-CM

## 2017-11-30 DIAGNOSIS — H43.812: ICD-10-CM

## 2017-11-30 DIAGNOSIS — H35.051: ICD-10-CM

## 2017-11-30 DIAGNOSIS — H04.121: ICD-10-CM

## 2017-11-30 DIAGNOSIS — H35.3221: ICD-10-CM

## 2017-11-30 DIAGNOSIS — H04.122: ICD-10-CM

## 2017-11-30 DIAGNOSIS — H35.3211: ICD-10-CM

## 2017-11-30 DIAGNOSIS — H35.052: ICD-10-CM

## 2017-11-30 DIAGNOSIS — E11.3293: ICD-10-CM

## 2017-11-30 DIAGNOSIS — H43.811: ICD-10-CM

## 2017-11-30 PROCEDURE — 67028 INJECTION EYE DRUG: CPT | Performed by: OPHTHALMOLOGY

## 2017-11-30 PROCEDURE — 99024 POSTOP FOLLOW-UP VISIT: CPT | Performed by: OPHTHALMOLOGY

## 2017-11-30 ASSESSMENT — REFRACTION_AUTOREFRACTION
OS_CYLINDER: -2.25
OD_SPHERE: +1.00
OD_CYLINDER: -1.25
OS_AXIS: 26
OS_SPHERE: +1.00
OD_AXIS: 157

## 2017-11-30 ASSESSMENT — SPHEQUIV_DERIVED
OS_SPHEQUIV: -0.125
OD_SPHEQUIV: 0.375

## 2017-11-30 ASSESSMENT — VISUAL ACUITY
OD_BCVA: 20/50
OS_BCVA: 20/50-2

## 2017-12-14 ENCOUNTER — DOCTOR'S OFFICE (OUTPATIENT)
Dept: URBAN - NONMETROPOLITAN AREA CLINIC 1 | Facility: CLINIC | Age: 82
Setting detail: OPHTHALMOLOGY
End: 2017-12-14
Payer: COMMERCIAL

## 2017-12-14 DIAGNOSIS — H43.811: ICD-10-CM

## 2017-12-14 DIAGNOSIS — H35.051: ICD-10-CM

## 2017-12-14 DIAGNOSIS — H35.3221: ICD-10-CM

## 2017-12-14 DIAGNOSIS — H43.812: ICD-10-CM

## 2017-12-14 DIAGNOSIS — H40.003: ICD-10-CM

## 2017-12-14 DIAGNOSIS — H35.052: ICD-10-CM

## 2017-12-14 DIAGNOSIS — H35.3211: ICD-10-CM

## 2017-12-14 DIAGNOSIS — H04.121: ICD-10-CM

## 2017-12-14 DIAGNOSIS — E11.3293: ICD-10-CM

## 2017-12-14 DIAGNOSIS — H04.122: ICD-10-CM

## 2017-12-14 PROCEDURE — 92134 CPTRZ OPH DX IMG PST SGM RTA: CPT | Performed by: OPHTHALMOLOGY

## 2017-12-14 PROCEDURE — 99024 POSTOP FOLLOW-UP VISIT: CPT | Performed by: OPHTHALMOLOGY

## 2017-12-14 ASSESSMENT — REFRACTION_MANIFEST
OS_VA2: 20/
OU_VA: 20/
OD_VA1: 20/
OD_VA3: 20/
OS_VA2: 20/
OD_VA1: 20/
OS_VA1: 20/
OS_VA3: 20/
OS_VA3: 20/
OU_VA: 20/
OD_VA2: 20/
OD_VA3: 20/
OD_VA2: 20/
OU_VA: 20/
OD_VA1: 20/
OS_VA3: 20/
OS_VA1: 20/
OS_VA2: 20/
OS_VA1: 20/
OD_VA2: 20/
OD_VA3: 20/

## 2017-12-14 ASSESSMENT — REFRACTION_CURRENTRX
OS_OVR_VA: 20/
OS_OVR_VA: 20/
OD_OVR_VA: 20/
OS_OVR_VA: 20/
OD_OVR_VA: 20/
OD_OVR_VA: 20/

## 2017-12-14 ASSESSMENT — CONFRONTATIONAL VISUAL FIELD TEST (CVF)
OS_FINDINGS: FULL
OD_FINDINGS: FULL

## 2017-12-14 ASSESSMENT — REFRACTION_AUTOREFRACTION
OD_SPHERE: +1.00
OS_CYLINDER: -2.25
OD_AXIS: 157
OS_SPHERE: +1.00
OS_AXIS: 26
OD_CYLINDER: -1.25

## 2017-12-14 ASSESSMENT — CORNEAL DYSTROPHY: OD_DYSTROPHY: +VE ARCUS SENILIS

## 2017-12-14 ASSESSMENT — SPHEQUIV_DERIVED
OD_SPHEQUIV: 0.375
OS_SPHEQUIV: -0.125

## 2017-12-14 ASSESSMENT — VISUAL ACUITY
OD_BCVA: 20/60-1
OS_BCVA: 20/50-1

## 2018-01-18 ENCOUNTER — DOCTOR'S OFFICE (OUTPATIENT)
Dept: URBAN - NONMETROPOLITAN AREA CLINIC 1 | Facility: CLINIC | Age: 83
Setting detail: OPHTHALMOLOGY
End: 2018-01-18
Payer: COMMERCIAL

## 2018-01-18 DIAGNOSIS — H43.811: ICD-10-CM

## 2018-01-18 DIAGNOSIS — H35.3231: ICD-10-CM

## 2018-01-18 DIAGNOSIS — H40.003: ICD-10-CM

## 2018-01-18 DIAGNOSIS — E11.3293: ICD-10-CM

## 2018-01-18 DIAGNOSIS — H43.812: ICD-10-CM

## 2018-01-18 PROCEDURE — 92014 COMPRE OPH EXAM EST PT 1/>: CPT | Performed by: OPHTHALMOLOGY

## 2018-01-18 PROCEDURE — 92235 FLUORESCEIN ANGRPH MLTIFRAME: CPT | Performed by: OPHTHALMOLOGY

## 2018-01-18 PROCEDURE — 92226 OPHTHALMOSCOPY EXT SUBSEQUENT: CPT | Performed by: OPHTHALMOLOGY

## 2018-01-18 PROCEDURE — 92250 FUNDUS PHOTOGRAPHY W/I&R: CPT | Performed by: OPHTHALMOLOGY

## 2018-01-18 ASSESSMENT — REFRACTION_MANIFEST
OS_VA3: 20/
OS_VA1: 20/
OD_VA3: 20/
OU_VA: 20/
OS_VA3: 20/
OS_VA3: 20/
OD_VA1: 20/
OD_VA2: 20/
OS_VA2: 20/
OU_VA: 20/
OS_VA2: 20/
OU_VA: 20/
OS_VA2: 20/
OD_VA2: 20/
OD_VA2: 20/
OD_VA1: 20/
OS_VA1: 20/
OD_VA1: 20/
OD_VA3: 20/
OD_VA3: 20/
OS_VA1: 20/

## 2018-01-18 ASSESSMENT — SPHEQUIV_DERIVED
OD_SPHEQUIV: 0.375
OS_SPHEQUIV: -0.125

## 2018-01-18 ASSESSMENT — REFRACTION_CURRENTRX
OS_OVR_VA: 20/
OD_OVR_VA: 20/
OS_OVR_VA: 20/
OS_OVR_VA: 20/

## 2018-01-18 ASSESSMENT — REFRACTION_AUTOREFRACTION
OS_SPHERE: +1.00
OS_AXIS: 26
OD_AXIS: 157
OD_CYLINDER: -1.25
OD_SPHERE: +1.00
OS_CYLINDER: -2.25

## 2018-01-18 ASSESSMENT — VISUAL ACUITY
OD_BCVA: 20/50
OS_BCVA: 20/50

## 2018-01-18 ASSESSMENT — CONFRONTATIONAL VISUAL FIELD TEST (CVF)
OD_FINDINGS: FULL
OS_FINDINGS: FULL

## 2018-01-18 ASSESSMENT — CORNEAL DYSTROPHY: OD_DYSTROPHY: +VE ARCUS SENILIS

## 2018-01-22 ENCOUNTER — DOCTOR'S OFFICE (OUTPATIENT)
Dept: URBAN - NONMETROPOLITAN AREA CLINIC 1 | Facility: CLINIC | Age: 83
Setting detail: OPHTHALMOLOGY
End: 2018-01-22
Payer: COMMERCIAL

## 2018-01-22 ENCOUNTER — RX ONLY (RX ONLY)
Age: 83
End: 2018-01-22

## 2018-01-22 DIAGNOSIS — H35.3211: ICD-10-CM

## 2018-01-22 DIAGNOSIS — H35.051: ICD-10-CM

## 2018-01-22 PROCEDURE — 67028 INJECTION EYE DRUG: CPT | Performed by: PHYSICIAN ASSISTANT

## 2018-01-25 ENCOUNTER — DOCTOR'S OFFICE (OUTPATIENT)
Dept: URBAN - NONMETROPOLITAN AREA CLINIC 1 | Facility: CLINIC | Age: 83
Setting detail: OPHTHALMOLOGY
End: 2018-01-25
Payer: COMMERCIAL

## 2018-01-25 DIAGNOSIS — H35.052: ICD-10-CM

## 2018-01-25 DIAGNOSIS — H35.3221: ICD-10-CM

## 2018-01-25 PROCEDURE — 67028 INJECTION EYE DRUG: CPT | Performed by: PHYSICIAN ASSISTANT

## 2018-02-22 ENCOUNTER — DOCTOR'S OFFICE (OUTPATIENT)
Dept: URBAN - NONMETROPOLITAN AREA CLINIC 1 | Facility: CLINIC | Age: 83
Setting detail: OPHTHALMOLOGY
End: 2018-02-22
Payer: COMMERCIAL

## 2018-02-22 DIAGNOSIS — H35.3231: ICD-10-CM

## 2018-02-22 DIAGNOSIS — H43.812: ICD-10-CM

## 2018-02-22 DIAGNOSIS — H43.813: ICD-10-CM

## 2018-02-22 DIAGNOSIS — H43.811: ICD-10-CM

## 2018-02-22 DIAGNOSIS — H40.013: ICD-10-CM

## 2018-02-22 DIAGNOSIS — E11.3293: ICD-10-CM

## 2018-02-22 PROCEDURE — 92134 CPTRZ OPH DX IMG PST SGM RTA: CPT | Performed by: OPHTHALMOLOGY

## 2018-02-22 PROCEDURE — 92014 COMPRE OPH EXAM EST PT 1/>: CPT | Performed by: OPHTHALMOLOGY

## 2018-02-22 PROCEDURE — 92226 OPHTHALMOSCOPY EXT SUBSEQUENT: CPT | Performed by: OPHTHALMOLOGY

## 2018-02-22 ASSESSMENT — REFRACTION_CURRENTRX
OS_OVR_VA: 20/
OD_OVR_VA: 20/
OS_OVR_VA: 20/
OD_OVR_VA: 20/
OD_OVR_VA: 20/
OS_OVR_VA: 20/

## 2018-02-22 ASSESSMENT — REFRACTION_MANIFEST
OD_VA3: 20/
OU_VA: 20/
OS_VA1: 20/
OD_VA1: 20/
OD_VA3: 20/
OD_VA1: 20/
OS_VA2: 20/
OS_VA3: 20/
OU_VA: 20/
OD_VA3: 20/
OU_VA: 20/
OD_VA2: 20/
OD_VA2: 20/
OS_VA3: 20/
OS_VA2: 20/
OS_VA2: 20/
OS_VA1: 20/
OD_VA1: 20/
OS_VA3: 20/
OD_VA2: 20/
OS_VA1: 20/

## 2018-02-22 ASSESSMENT — VISUAL ACUITY
OS_BCVA: 20/50
OD_BCVA: 20/40-2

## 2018-02-22 ASSESSMENT — REFRACTION_AUTOREFRACTION
OS_AXIS: 26
OD_CYLINDER: -1.25
OS_CYLINDER: -2.25
OS_SPHERE: +1.00
OD_AXIS: 157
OD_SPHERE: +1.00

## 2018-02-22 ASSESSMENT — CORNEAL DYSTROPHY: OD_DYSTROPHY: +VE ARCUS SENILIS

## 2018-02-22 ASSESSMENT — SPHEQUIV_DERIVED
OD_SPHEQUIV: 0.375
OS_SPHEQUIV: -0.125

## 2018-02-22 ASSESSMENT — CONFRONTATIONAL VISUAL FIELD TEST (CVF)
OS_FINDINGS: FULL
OD_FINDINGS: FULL

## 2018-02-27 ENCOUNTER — DOCTOR'S OFFICE (OUTPATIENT)
Dept: URBAN - NONMETROPOLITAN AREA CLINIC 1 | Facility: CLINIC | Age: 83
Setting detail: OPHTHALMOLOGY
End: 2018-02-27
Payer: COMMERCIAL

## 2018-02-27 DIAGNOSIS — H35.051: ICD-10-CM

## 2018-02-27 DIAGNOSIS — H35.3211: ICD-10-CM

## 2018-02-27 PROCEDURE — 67028 INJECTION EYE DRUG: CPT | Performed by: PHYSICIAN ASSISTANT

## 2018-03-01 ENCOUNTER — DOCTOR'S OFFICE (OUTPATIENT)
Dept: URBAN - NONMETROPOLITAN AREA CLINIC 1 | Facility: CLINIC | Age: 83
Setting detail: OPHTHALMOLOGY
End: 2018-03-01
Payer: COMMERCIAL

## 2018-03-01 DIAGNOSIS — H35.3221: ICD-10-CM

## 2018-03-01 DIAGNOSIS — H35.052: ICD-10-CM

## 2018-03-01 PROCEDURE — 67028 INJECTION EYE DRUG: CPT | Performed by: PHYSICIAN ASSISTANT

## 2018-03-11 ENCOUNTER — RX ONLY (RX ONLY)
Age: 83
End: 2018-03-11

## 2018-03-11 ASSESSMENT — REFRACTION_AUTOREFRACTION
OS_AXIS: 26
OS_SPHERE: +1.00
OD_CYLINDER: -1.25
OD_AXIS: 157
OD_CYLINDER: -1.25
OD_SPHERE: +1.00
OS_AXIS: 26
OS_CYLINDER: -2.25
OD_SPHERE: +1.00
OD_CYLINDER: -1.25
OD_CYLINDER: -1.25
OS_AXIS: 26
OS_AXIS: 26
OD_SPHERE: +1.00
OD_AXIS: 157
OD_SPHERE: +1.00
OS_SPHERE: +1.00
OS_SPHERE: +1.00
OD_AXIS: 157
OS_CYLINDER: -2.25
OS_CYLINDER: -2.25
OD_AXIS: 157
OS_SPHERE: +1.00
OS_CYLINDER: -2.25

## 2018-03-11 ASSESSMENT — VISUAL ACUITY
OS_BCVA: 20/50
OS_BCVA: 20/50
OD_BCVA: 20/40-2
OD_BCVA: 20/50
OS_BCVA: 20/50
OD_BCVA: 20/40-2
OD_BCVA: 20/50
OS_BCVA: 20/50

## 2018-03-11 ASSESSMENT — SPHEQUIV_DERIVED
OS_SPHEQUIV: -0.125
OD_SPHEQUIV: 0.375
OS_SPHEQUIV: -0.125
OD_SPHEQUIV: 0.375
OS_SPHEQUIV: -0.125
OD_SPHEQUIV: 0.375
OS_SPHEQUIV: -0.125
OD_SPHEQUIV: 0.375

## 2018-03-29 ENCOUNTER — DOCTOR'S OFFICE (OUTPATIENT)
Dept: URBAN - NONMETROPOLITAN AREA CLINIC 1 | Facility: CLINIC | Age: 83
Setting detail: OPHTHALMOLOGY
End: 2018-03-29
Payer: COMMERCIAL

## 2018-03-29 DIAGNOSIS — E11.3293: ICD-10-CM

## 2018-03-29 DIAGNOSIS — H04.123: ICD-10-CM

## 2018-03-29 DIAGNOSIS — H40.003: ICD-10-CM

## 2018-03-29 DIAGNOSIS — H43.813: ICD-10-CM

## 2018-03-29 DIAGNOSIS — H35.3231: ICD-10-CM

## 2018-03-29 DIAGNOSIS — H35.053: ICD-10-CM

## 2018-03-29 PROCEDURE — 92014 COMPRE OPH EXAM EST PT 1/>: CPT | Performed by: OPHTHALMOLOGY

## 2018-03-29 PROCEDURE — 92134 CPTRZ OPH DX IMG PST SGM RTA: CPT | Performed by: OPHTHALMOLOGY

## 2018-03-29 PROCEDURE — 92240 ICG ANGIOGRAPHY I&R UNI/BI: CPT | Performed by: OPHTHALMOLOGY

## 2018-03-29 ASSESSMENT — REFRACTION_MANIFEST
OD_VA2: 20/
OS_VA3: 20/
OS_VA3: 20/
OU_VA: 20/
OS_VA2: 20/
OD_VA2: 20/
OD_VA1: 20/
OU_VA: 20/
OS_VA2: 20/
OS_VA2: 20/
OD_VA3: 20/
OD_VA2: 20/
OS_VA3: 20/
OD_VA3: 20/
OD_VA3: 20/
OD_VA1: 20/
OS_VA1: 20/
OU_VA: 20/
OS_VA1: 20/
OS_VA1: 20/
OD_VA1: 20/

## 2018-03-29 ASSESSMENT — REFRACTION_CURRENTRX
OS_OVR_VA: 20/
OD_OVR_VA: 20/
OD_OVR_VA: 20/
OS_OVR_VA: 20/
OS_OVR_VA: 20/
OD_OVR_VA: 20/

## 2018-03-29 ASSESSMENT — REFRACTION_AUTOREFRACTION
OD_CYLINDER: -1.25
OS_AXIS: 26
OD_AXIS: 157
OS_SPHERE: +1.00
OD_SPHERE: +1.00
OS_CYLINDER: -2.25

## 2018-03-29 ASSESSMENT — CORNEAL DYSTROPHY: OD_DYSTROPHY: +VE ARCUS SENILIS

## 2018-03-29 ASSESSMENT — VISUAL ACUITY
OD_BCVA: 20/40-1
OS_BCVA: 20/70-2

## 2018-03-29 ASSESSMENT — CONFRONTATIONAL VISUAL FIELD TEST (CVF)
OD_FINDINGS: FULL
OS_FINDINGS: FULL

## 2018-03-29 ASSESSMENT — SPHEQUIV_DERIVED
OS_SPHEQUIV: -0.125
OD_SPHEQUIV: 0.375

## 2018-04-10 ENCOUNTER — DOCTOR'S OFFICE (OUTPATIENT)
Dept: URBAN - NONMETROPOLITAN AREA CLINIC 1 | Facility: CLINIC | Age: 83
Setting detail: OPHTHALMOLOGY
End: 2018-04-10
Payer: COMMERCIAL

## 2018-04-10 DIAGNOSIS — H35.051: ICD-10-CM

## 2018-04-10 DIAGNOSIS — H35.3211: ICD-10-CM

## 2018-04-10 PROCEDURE — 67028 INJECTION EYE DRUG: CPT | Performed by: PHYSICIAN ASSISTANT

## 2018-04-11 ASSESSMENT — REFRACTION_AUTOREFRACTION
OS_AXIS: 26
OD_SPHERE: +1.00
OD_AXIS: 157
OD_CYLINDER: -1.25
OS_CYLINDER: -2.25
OS_SPHERE: +1.00

## 2018-04-11 ASSESSMENT — SPHEQUIV_DERIVED
OD_SPHEQUIV: 0.375
OS_SPHEQUIV: -0.125

## 2018-04-11 ASSESSMENT — VISUAL ACUITY
OD_BCVA: 20/40-1
OS_BCVA: 20/70-2

## 2018-04-12 ENCOUNTER — DOCTOR'S OFFICE (OUTPATIENT)
Dept: URBAN - NONMETROPOLITAN AREA CLINIC 1 | Facility: CLINIC | Age: 83
Setting detail: OPHTHALMOLOGY
End: 2018-04-12
Payer: COMMERCIAL

## 2018-04-12 DIAGNOSIS — H35.3221: ICD-10-CM

## 2018-04-12 DIAGNOSIS — H35.052: ICD-10-CM

## 2018-04-12 PROCEDURE — 67028 INJECTION EYE DRUG: CPT | Performed by: PHYSICIAN ASSISTANT

## 2018-04-12 ASSESSMENT — VISUAL ACUITY
OD_BCVA: 20/40-1
OS_BCVA: 20/70-2

## 2018-04-12 ASSESSMENT — REFRACTION_AUTOREFRACTION
OD_SPHERE: +1.00
OD_CYLINDER: -1.25
OS_AXIS: 26
OS_SPHERE: +1.00
OD_AXIS: 157
OS_CYLINDER: -2.25

## 2018-04-12 ASSESSMENT — SPHEQUIV_DERIVED
OD_SPHEQUIV: 0.375
OS_SPHEQUIV: -0.125

## 2018-04-26 ENCOUNTER — DOCTOR'S OFFICE (OUTPATIENT)
Dept: URBAN - NONMETROPOLITAN AREA CLINIC 1 | Facility: CLINIC | Age: 83
Setting detail: OPHTHALMOLOGY
End: 2018-04-26
Payer: COMMERCIAL

## 2018-04-26 DIAGNOSIS — H35.053: ICD-10-CM

## 2018-04-26 DIAGNOSIS — H43.813: ICD-10-CM

## 2018-04-26 DIAGNOSIS — H04.121: ICD-10-CM

## 2018-04-26 DIAGNOSIS — H04.123: ICD-10-CM

## 2018-04-26 DIAGNOSIS — H35.3231: ICD-10-CM

## 2018-04-26 DIAGNOSIS — E11.3293: ICD-10-CM

## 2018-04-26 PROCEDURE — 92134 CPTRZ OPH DX IMG PST SGM RTA: CPT | Performed by: OPHTHALMOLOGY

## 2018-04-26 PROCEDURE — 92014 COMPRE OPH EXAM EST PT 1/>: CPT | Performed by: OPHTHALMOLOGY

## 2018-04-26 ASSESSMENT — REFRACTION_AUTOREFRACTION
OD_CYLINDER: -1.25
OS_AXIS: 26
OD_AXIS: 157
OD_SPHERE: +1.00
OS_SPHERE: +1.00
OS_CYLINDER: -2.25

## 2018-04-26 ASSESSMENT — REFRACTION_MANIFEST
OD_VA1: 20/
OU_VA: 20/
OS_VA3: 20/
OD_VA3: 20/
OD_VA2: 20/
OD_VA1: 20/
OD_VA2: 20/
OS_VA2: 20/
OD_VA3: 20/
OD_VA2: 20/
OS_VA2: 20/
OS_VA1: 20/
OS_VA2: 20/
OD_VA1: 20/
OS_VA1: 20/
OU_VA: 20/
OU_VA: 20/
OS_VA1: 20/
OS_VA3: 20/
OD_VA3: 20/
OS_VA3: 20/

## 2018-04-26 ASSESSMENT — VISUAL ACUITY
OD_BCVA: 20/50-2
OS_BCVA: 20/70-1

## 2018-04-26 ASSESSMENT — REFRACTION_CURRENTRX
OD_OVR_VA: 20/
OS_OVR_VA: 20/
OD_OVR_VA: 20/
OD_OVR_VA: 20/

## 2018-04-26 ASSESSMENT — SPHEQUIV_DERIVED
OD_SPHEQUIV: 0.375
OS_SPHEQUIV: -0.125

## 2018-04-26 ASSESSMENT — CORNEAL DYSTROPHY: OD_DYSTROPHY: +VE ARCUS SENILIS

## 2018-04-26 ASSESSMENT — CONFRONTATIONAL VISUAL FIELD TEST (CVF)
OS_FINDINGS: FULL
OD_FINDINGS: FULL

## 2018-05-15 ENCOUNTER — RX ONLY (RX ONLY)
Age: 83
End: 2018-05-15

## 2018-05-15 ENCOUNTER — DOCTOR'S OFFICE (OUTPATIENT)
Dept: URBAN - NONMETROPOLITAN AREA CLINIC 1 | Facility: CLINIC | Age: 83
Setting detail: OPHTHALMOLOGY
End: 2018-05-15
Payer: COMMERCIAL

## 2018-05-15 DIAGNOSIS — H35.3211: ICD-10-CM

## 2018-05-15 DIAGNOSIS — H35.051: ICD-10-CM

## 2018-05-15 PROCEDURE — 67028 INJECTION EYE DRUG: CPT | Performed by: PHYSICIAN ASSISTANT

## 2018-05-15 ASSESSMENT — REFRACTION_AUTOREFRACTION
OS_SPHERE: +1.00
OS_AXIS: 26
OD_CYLINDER: -1.25
OD_SPHERE: +1.00
OD_AXIS: 157
OS_CYLINDER: -2.25

## 2018-05-15 ASSESSMENT — SPHEQUIV_DERIVED
OS_SPHEQUIV: -0.125
OD_SPHEQUIV: 0.375

## 2018-05-15 ASSESSMENT — VISUAL ACUITY
OD_BCVA: 20/50-2
OS_BCVA: 20/70-1

## 2018-05-17 ENCOUNTER — DOCTOR'S OFFICE (OUTPATIENT)
Dept: URBAN - NONMETROPOLITAN AREA CLINIC 1 | Facility: CLINIC | Age: 83
Setting detail: OPHTHALMOLOGY
End: 2018-05-17
Payer: COMMERCIAL

## 2018-05-17 DIAGNOSIS — H35.3221: ICD-10-CM

## 2018-05-17 DIAGNOSIS — H35.052: ICD-10-CM

## 2018-05-17 PROCEDURE — 67028 INJECTION EYE DRUG: CPT | Performed by: PHYSICIAN ASSISTANT

## 2018-05-18 ENCOUNTER — RX ONLY (RX ONLY)
Age: 83
End: 2018-05-18

## 2018-05-18 ASSESSMENT — REFRACTION_AUTOREFRACTION
OS_SPHERE: +1.00
OD_CYLINDER: -1.25
OD_AXIS: 157
OD_SPHERE: +1.00
OS_CYLINDER: -2.25
OS_AXIS: 26

## 2018-05-18 ASSESSMENT — SPHEQUIV_DERIVED
OD_SPHEQUIV: 0.375
OS_SPHEQUIV: -0.125

## 2018-05-18 ASSESSMENT — VISUAL ACUITY
OS_BCVA: 20/70-1
OD_BCVA: 20/50-2

## 2018-05-24 ENCOUNTER — RX ONLY (RX ONLY)
Age: 83
End: 2018-05-24

## 2018-05-24 ENCOUNTER — DOCTOR'S OFFICE (OUTPATIENT)
Dept: URBAN - NONMETROPOLITAN AREA CLINIC 1 | Facility: CLINIC | Age: 83
Setting detail: OPHTHALMOLOGY
End: 2018-05-24
Payer: COMMERCIAL

## 2018-05-24 DIAGNOSIS — H35.053: ICD-10-CM

## 2018-05-24 DIAGNOSIS — H43.811: ICD-10-CM

## 2018-05-24 DIAGNOSIS — H43.813: ICD-10-CM

## 2018-05-24 DIAGNOSIS — E11.3293: ICD-10-CM

## 2018-05-24 DIAGNOSIS — H04.123: ICD-10-CM

## 2018-05-24 DIAGNOSIS — H43.812: ICD-10-CM

## 2018-05-24 DIAGNOSIS — H04.121: ICD-10-CM

## 2018-05-24 DIAGNOSIS — H35.3231: ICD-10-CM

## 2018-05-24 DIAGNOSIS — H04.122: ICD-10-CM

## 2018-05-24 PROCEDURE — 92226 OPHTHALMOSCOPY EXT SUBSEQUENT: CPT | Performed by: OPHTHALMOLOGY

## 2018-05-24 PROCEDURE — 92014 COMPRE OPH EXAM EST PT 1/>: CPT | Performed by: OPHTHALMOLOGY

## 2018-05-24 PROCEDURE — 83861 MICROFLUID ANALY TEARS: CPT | Performed by: OPHTHALMOLOGY

## 2018-05-24 PROCEDURE — 92134 CPTRZ OPH DX IMG PST SGM RTA: CPT | Performed by: OPHTHALMOLOGY

## 2018-05-24 ASSESSMENT — REFRACTION_MANIFEST
OD_VA3: 20/
OD_VA1: 20/
OS_VA2: 20/
OD_VA2: 20/
OS_VA2: 20/
OS_VA3: 20/
OS_VA1: 20/
OS_VA1: 20/
OS_VA2: 20/
OD_VA3: 20/
OD_VA2: 20/
OS_VA3: 20/
OD_VA1: 20/
OS_VA1: 20/
OU_VA: 20/
OU_VA: 20/
OD_VA1: 20/
OD_VA2: 20/
OS_VA3: 20/
OU_VA: 20/
OD_VA3: 20/

## 2018-05-24 ASSESSMENT — CONFRONTATIONAL VISUAL FIELD TEST (CVF)
OS_FINDINGS: FULL
OD_FINDINGS: FULL

## 2018-05-24 ASSESSMENT — REFRACTION_AUTOREFRACTION
OS_AXIS: 26
OD_CYLINDER: -1.25
OD_SPHERE: +1.00
OS_CYLINDER: -2.25
OD_AXIS: 157
OS_SPHERE: +1.00

## 2018-05-24 ASSESSMENT — REFRACTION_CURRENTRX
OS_OVR_VA: 20/
OD_OVR_VA: 20/
OD_OVR_VA: 20/
OS_OVR_VA: 20/
OD_OVR_VA: 20/
OS_OVR_VA: 20/

## 2018-05-24 ASSESSMENT — CORNEAL DYSTROPHY: OD_DYSTROPHY: +VE ARCUS SENILIS

## 2018-05-24 ASSESSMENT — SPHEQUIV_DERIVED
OS_SPHEQUIV: -0.125
OD_SPHEQUIV: 0.375

## 2018-05-24 ASSESSMENT — VISUAL ACUITY
OD_BCVA: 20/50+1
OS_BCVA: 20/50-2

## 2018-06-04 ENCOUNTER — DOCTOR'S OFFICE (OUTPATIENT)
Dept: URBAN - NONMETROPOLITAN AREA CLINIC 1 | Facility: CLINIC | Age: 83
Setting detail: OPHTHALMOLOGY
End: 2018-06-04
Payer: COMMERCIAL

## 2018-06-04 DIAGNOSIS — H35.3231: ICD-10-CM

## 2018-06-04 DIAGNOSIS — H35.3211: ICD-10-CM

## 2018-06-04 PROCEDURE — 92250 FUNDUS PHOTOGRAPHY W/I&R: CPT | Performed by: OPHTHALMOLOGY

## 2018-06-04 PROCEDURE — 92235 FLUORESCEIN ANGRPH MLTIFRAME: CPT | Performed by: OPHTHALMOLOGY

## 2018-06-04 PROCEDURE — 67220 TREATMENT OF CHOROID LESION: CPT | Performed by: OPHTHALMOLOGY

## 2018-06-04 ASSESSMENT — REFRACTION_MANIFEST
OU_VA: 20/
OD_VA1: 20/
OD_VA2: 20/
OD_VA1: 20/
OS_VA3: 20/
OS_VA2: 20/
OS_VA3: 20/
OD_VA2: 20/
OS_VA2: 20/
OU_VA: 20/
OS_VA1: 20/
OU_VA: 20/
OD_VA3: 20/
OS_VA1: 20/
OD_VA3: 20/
OS_VA2: 20/
OD_VA2: 20/
OS_VA3: 20/
OD_VA3: 20/
OD_VA1: 20/
OS_VA1: 20/

## 2018-06-04 ASSESSMENT — VISUAL ACUITY
OS_BCVA: 20/50-2
OD_BCVA: 20/50+1

## 2018-06-04 ASSESSMENT — REFRACTION_CURRENTRX
OD_OVR_VA: 20/
OS_OVR_VA: 20/
OD_OVR_VA: 20/
OS_OVR_VA: 20/
OD_OVR_VA: 20/
OS_OVR_VA: 20/

## 2018-06-04 ASSESSMENT — CONFRONTATIONAL VISUAL FIELD TEST (CVF)
OS_FINDINGS: FULL
OD_FINDINGS: FULL

## 2018-06-04 ASSESSMENT — SPHEQUIV_DERIVED
OS_SPHEQUIV: -0.125
OD_SPHEQUIV: 0.375

## 2018-06-04 ASSESSMENT — REFRACTION_AUTOREFRACTION
OD_SPHERE: +1.00
OS_SPHERE: +1.00
OS_CYLINDER: -2.25
OD_AXIS: 157
OS_AXIS: 26
OD_CYLINDER: -1.25

## 2018-06-25 ENCOUNTER — DOCTOR'S OFFICE (OUTPATIENT)
Dept: URBAN - NONMETROPOLITAN AREA CLINIC 1 | Facility: CLINIC | Age: 83
Setting detail: OPHTHALMOLOGY
End: 2018-06-25
Payer: COMMERCIAL

## 2018-06-25 DIAGNOSIS — H35.3211: ICD-10-CM

## 2018-06-25 DIAGNOSIS — H35.052: ICD-10-CM

## 2018-06-25 DIAGNOSIS — H35.051: ICD-10-CM

## 2018-06-25 DIAGNOSIS — H35.3221: ICD-10-CM

## 2018-06-25 DIAGNOSIS — H35.3231: ICD-10-CM

## 2018-06-25 PROCEDURE — 99024 POSTOP FOLLOW-UP VISIT: CPT | Performed by: PHYSICIAN ASSISTANT

## 2018-06-25 PROCEDURE — 67028 INJECTION EYE DRUG: CPT | Performed by: PHYSICIAN ASSISTANT

## 2018-06-26 ENCOUNTER — RX ONLY (RX ONLY)
Age: 83
End: 2018-06-26

## 2018-06-26 ASSESSMENT — VISUAL ACUITY
OS_BCVA: 20/50-2
OD_BCVA: 20/50+1

## 2018-06-26 ASSESSMENT — SPHEQUIV_DERIVED
OD_SPHEQUIV: 0.375
OS_SPHEQUIV: -0.125

## 2018-06-26 ASSESSMENT — REFRACTION_AUTOREFRACTION
OS_SPHERE: +1.00
OD_SPHERE: +1.00
OD_CYLINDER: -1.25
OS_CYLINDER: -2.25
OS_AXIS: 26
OD_AXIS: 157

## 2018-06-28 ENCOUNTER — DOCTOR'S OFFICE (OUTPATIENT)
Dept: URBAN - NONMETROPOLITAN AREA CLINIC 1 | Facility: CLINIC | Age: 83
Setting detail: OPHTHALMOLOGY
End: 2018-06-28
Payer: COMMERCIAL

## 2018-06-28 DIAGNOSIS — H35.3221: ICD-10-CM

## 2018-06-28 DIAGNOSIS — H35.052: ICD-10-CM

## 2018-06-28 DIAGNOSIS — H35.3231: ICD-10-CM

## 2018-06-28 DIAGNOSIS — H35.051: ICD-10-CM

## 2018-06-28 DIAGNOSIS — H35.3211: ICD-10-CM

## 2018-06-28 PROCEDURE — 67028 INJECTION EYE DRUG: CPT | Performed by: PHYSICIAN ASSISTANT

## 2018-06-28 PROCEDURE — 99024 POSTOP FOLLOW-UP VISIT: CPT | Performed by: PHYSICIAN ASSISTANT

## 2018-06-29 ENCOUNTER — RX ONLY (RX ONLY)
Age: 83
End: 2018-06-29

## 2018-06-29 ASSESSMENT — REFRACTION_AUTOREFRACTION
OD_CYLINDER: -1.25
OS_SPHERE: +1.00
OD_SPHERE: +1.00
OS_CYLINDER: -2.25
OD_AXIS: 157
OS_AXIS: 26

## 2018-06-29 ASSESSMENT — VISUAL ACUITY
OD_BCVA: 20/50+1
OS_BCVA: 20/50-2

## 2018-06-29 ASSESSMENT — SPHEQUIV_DERIVED
OD_SPHEQUIV: 0.375
OS_SPHEQUIV: -0.125

## 2018-08-03 ENCOUNTER — RX ONLY (RX ONLY)
Age: 83
End: 2018-08-03

## 2018-08-03 ENCOUNTER — DOCTOR'S OFFICE (OUTPATIENT)
Dept: URBAN - NONMETROPOLITAN AREA CLINIC 1 | Facility: CLINIC | Age: 83
Setting detail: OPHTHALMOLOGY
End: 2018-08-03
Payer: COMMERCIAL

## 2018-08-03 DIAGNOSIS — H35.3211: ICD-10-CM

## 2018-08-03 DIAGNOSIS — H35.3231: ICD-10-CM

## 2018-08-03 DIAGNOSIS — H43.812: ICD-10-CM

## 2018-08-03 DIAGNOSIS — H43.811: ICD-10-CM

## 2018-08-03 DIAGNOSIS — E11.3293: ICD-10-CM

## 2018-08-03 PROCEDURE — 67028 INJECTION EYE DRUG: CPT | Performed by: OPHTHALMOLOGY

## 2018-08-03 PROCEDURE — 92134 CPTRZ OPH DX IMG PST SGM RTA: CPT | Performed by: OPHTHALMOLOGY

## 2018-08-03 PROCEDURE — 99024 POSTOP FOLLOW-UP VISIT: CPT | Performed by: OPHTHALMOLOGY

## 2018-08-03 PROCEDURE — 92226 OPHTHALMOSCOPY EXT SUBSEQUENT: CPT | Performed by: OPHTHALMOLOGY

## 2018-08-03 ASSESSMENT — REFRACTION_MANIFEST
OS_VA2: 20/
OS_VA1: 20/
OD_VA2: 20/
OS_VA3: 20/
OU_VA: 20/
OU_VA: 20/
OD_VA3: 20/
OD_VA3: 20/
OS_VA1: 20/
OS_VA2: 20/
OS_VA1: 20/
OD_VA2: 20/
OS_VA2: 20/
OD_VA2: 20/
OD_VA1: 20/
OD_VA1: 20/
OS_VA3: 20/
OS_VA3: 20/
OU_VA: 20/
OD_VA3: 20/
OD_VA1: 20/

## 2018-08-03 ASSESSMENT — REFRACTION_AUTOREFRACTION
OD_SPHERE: +1.00
OS_AXIS: 26
OD_CYLINDER: -1.25
OS_CYLINDER: -2.25
OS_SPHERE: +1.00
OD_AXIS: 157

## 2018-08-03 ASSESSMENT — REFRACTION_CURRENTRX
OS_OVR_VA: 20/
OS_OVR_VA: 20/
OD_OVR_VA: 20/
OD_OVR_VA: 20/
OS_OVR_VA: 20/
OD_OVR_VA: 20/

## 2018-08-03 ASSESSMENT — SPHEQUIV_DERIVED
OS_SPHEQUIV: -0.125
OD_SPHEQUIV: 0.375

## 2018-08-03 ASSESSMENT — CONFRONTATIONAL VISUAL FIELD TEST (CVF)
OD_FINDINGS: FULL
OS_FINDINGS: FULL

## 2018-08-03 ASSESSMENT — VISUAL ACUITY
OS_BCVA: 20/50-2
OD_BCVA: 20/40

## 2018-08-03 ASSESSMENT — CORNEAL DYSTROPHY: OD_DYSTROPHY: +VE ARCUS SENILIS

## 2018-08-09 ENCOUNTER — DOCTOR'S OFFICE (OUTPATIENT)
Dept: URBAN - NONMETROPOLITAN AREA CLINIC 1 | Facility: CLINIC | Age: 83
Setting detail: OPHTHALMOLOGY
End: 2018-08-09
Payer: COMMERCIAL

## 2018-08-09 DIAGNOSIS — E11.3293: ICD-10-CM

## 2018-08-09 DIAGNOSIS — H35.3231: ICD-10-CM

## 2018-08-09 DIAGNOSIS — H35.051: ICD-10-CM

## 2018-08-09 DIAGNOSIS — H35.052: ICD-10-CM

## 2018-08-09 DIAGNOSIS — H43.811: ICD-10-CM

## 2018-08-09 DIAGNOSIS — H04.121: ICD-10-CM

## 2018-08-09 DIAGNOSIS — H43.812: ICD-10-CM

## 2018-08-09 DIAGNOSIS — H04.122: ICD-10-CM

## 2018-08-09 DIAGNOSIS — H35.3221: ICD-10-CM

## 2018-08-09 DIAGNOSIS — H35.3211: ICD-10-CM

## 2018-08-09 PROCEDURE — 67028 INJECTION EYE DRUG: CPT | Performed by: OPHTHALMOLOGY

## 2018-08-09 PROCEDURE — 99024 POSTOP FOLLOW-UP VISIT: CPT | Performed by: OPHTHALMOLOGY

## 2018-08-09 PROCEDURE — 92240 ICG ANGIOGRAPHY I&R UNI/BI: CPT | Performed by: OPHTHALMOLOGY

## 2018-08-09 ASSESSMENT — REFRACTION_AUTOREFRACTION
OS_SPHERE: +1.00
OS_CYLINDER: -2.25
OD_AXIS: 157
OS_AXIS: 26
OD_CYLINDER: -1.25
OD_SPHERE: +1.00

## 2018-08-09 ASSESSMENT — SPHEQUIV_DERIVED
OD_SPHEQUIV: 0.375
OS_SPHEQUIV: -0.125

## 2018-08-09 ASSESSMENT — VISUAL ACUITY
OS_BCVA: 20/50-2
OD_BCVA: 20/40

## 2018-08-17 ENCOUNTER — DOCTOR'S OFFICE (OUTPATIENT)
Dept: URBAN - NONMETROPOLITAN AREA CLINIC 1 | Facility: CLINIC | Age: 83
Setting detail: OPHTHALMOLOGY
End: 2018-08-17
Payer: COMMERCIAL

## 2018-08-17 DIAGNOSIS — H04.121: ICD-10-CM

## 2018-08-17 DIAGNOSIS — H35.052: ICD-10-CM

## 2018-08-17 DIAGNOSIS — H35.3231: ICD-10-CM

## 2018-08-17 DIAGNOSIS — H35.051: ICD-10-CM

## 2018-08-17 DIAGNOSIS — H35.3211: ICD-10-CM

## 2018-08-17 DIAGNOSIS — H43.811: ICD-10-CM

## 2018-08-17 DIAGNOSIS — H43.812: ICD-10-CM

## 2018-08-17 DIAGNOSIS — H35.3221: ICD-10-CM

## 2018-08-17 DIAGNOSIS — E11.3293: ICD-10-CM

## 2018-08-17 DIAGNOSIS — H04.122: ICD-10-CM

## 2018-08-17 PROCEDURE — 99024 POSTOP FOLLOW-UP VISIT: CPT | Performed by: OPHTHALMOLOGY

## 2018-08-17 PROCEDURE — 83861 MICROFLUID ANALY TEARS: CPT | Performed by: OPHTHALMOLOGY

## 2018-08-17 ASSESSMENT — REFRACTION_CURRENTRX
OS_OVR_VA: 20/
OD_OVR_VA: 20/
OS_OVR_VA: 20/
OD_OVR_VA: 20/
OS_OVR_VA: 20/
OD_OVR_VA: 20/

## 2018-08-17 ASSESSMENT — REFRACTION_MANIFEST
OD_VA3: 20/
OD_VA1: 20/
OS_VA1: 20/
OD_VA2: 20/
OS_VA3: 20/
OS_VA3: 20/
OU_VA: 20/
OU_VA: 20/
OS_VA2: 20/
OS_VA2: 20/
OD_VA3: 20/
OU_VA: 20/
OD_VA1: 20/
OS_VA1: 20/
OS_VA2: 20/
OS_VA3: 20/
OD_VA1: 20/
OD_VA2: 20/
OD_VA2: 20/
OS_VA1: 20/
OD_VA3: 20/

## 2018-08-17 ASSESSMENT — REFRACTION_AUTOREFRACTION
OS_CYLINDER: -2.25
OS_SPHERE: +1.00
OD_SPHERE: +1.00
OS_AXIS: 26
OD_CYLINDER: -1.25
OD_AXIS: 157

## 2018-08-17 ASSESSMENT — CORNEAL DYSTROPHY: OD_DYSTROPHY: +VE ARCUS SENILIS

## 2018-08-17 ASSESSMENT — VISUAL ACUITY
OS_BCVA: 20/70-1
OD_BCVA: 20/60+1

## 2018-08-17 ASSESSMENT — SPHEQUIV_DERIVED
OS_SPHEQUIV: -0.125
OD_SPHEQUIV: 0.375

## 2018-08-17 ASSESSMENT — CONFRONTATIONAL VISUAL FIELD TEST (CVF)
OD_FINDINGS: FULL
OS_FINDINGS: FULL

## 2018-09-10 ENCOUNTER — RX ONLY (RX ONLY)
Age: 83
End: 2018-09-10

## 2018-09-10 ENCOUNTER — DOCTOR'S OFFICE (OUTPATIENT)
Dept: URBAN - NONMETROPOLITAN AREA CLINIC 1 | Facility: CLINIC | Age: 83
Setting detail: OPHTHALMOLOGY
End: 2018-09-10
Payer: COMMERCIAL

## 2018-09-10 DIAGNOSIS — H35.3211: ICD-10-CM

## 2018-09-10 PROCEDURE — 67028 INJECTION EYE DRUG: CPT | Performed by: PHYSICIAN ASSISTANT

## 2018-09-10 ASSESSMENT — REFRACTION_AUTOREFRACTION
OD_SPHERE: +1.00
OS_AXIS: 26
OS_SPHERE: +1.00
OD_AXIS: 157
OD_CYLINDER: -1.25
OS_CYLINDER: -2.25

## 2018-09-10 ASSESSMENT — VISUAL ACUITY
OS_BCVA: 20/70-1
OD_BCVA: 20/60+1

## 2018-09-10 ASSESSMENT — SPHEQUIV_DERIVED
OD_SPHEQUIV: 0.375
OS_SPHEQUIV: -0.125

## 2018-09-13 ENCOUNTER — DOCTOR'S OFFICE (OUTPATIENT)
Dept: URBAN - NONMETROPOLITAN AREA CLINIC 1 | Facility: CLINIC | Age: 83
Setting detail: OPHTHALMOLOGY
End: 2018-09-13
Payer: COMMERCIAL

## 2018-09-13 DIAGNOSIS — H35.3221: ICD-10-CM

## 2018-09-13 PROCEDURE — 67028 INJECTION EYE DRUG: CPT | Performed by: PHYSICIAN ASSISTANT

## 2018-09-14 ENCOUNTER — RX ONLY (RX ONLY)
Age: 83
End: 2018-09-14

## 2018-09-14 ASSESSMENT — VISUAL ACUITY
OS_BCVA: 20/70-1
OD_BCVA: 20/60+1

## 2018-09-14 ASSESSMENT — REFRACTION_AUTOREFRACTION
OS_CYLINDER: -2.25
OS_AXIS: 26
OD_SPHERE: +1.00
OD_AXIS: 157
OD_CYLINDER: -1.25
OS_SPHERE: +1.00

## 2018-09-14 ASSESSMENT — SPHEQUIV_DERIVED
OS_SPHEQUIV: -0.125
OD_SPHEQUIV: 0.375

## 2018-09-17 ENCOUNTER — DOCTOR'S OFFICE (OUTPATIENT)
Dept: URBAN - NONMETROPOLITAN AREA CLINIC 1 | Facility: CLINIC | Age: 83
Setting detail: OPHTHALMOLOGY
End: 2018-09-17
Payer: COMMERCIAL

## 2018-09-17 ENCOUNTER — RX ONLY (RX ONLY)
Age: 83
End: 2018-09-17

## 2018-09-17 DIAGNOSIS — H43.813: ICD-10-CM

## 2018-09-17 DIAGNOSIS — H35.3231: ICD-10-CM

## 2018-09-17 DIAGNOSIS — H04.123: ICD-10-CM

## 2018-09-17 DIAGNOSIS — H43.811: ICD-10-CM

## 2018-09-17 DIAGNOSIS — H43.812: ICD-10-CM

## 2018-09-17 DIAGNOSIS — H35.053: ICD-10-CM

## 2018-09-17 PROCEDURE — 92014 COMPRE OPH EXAM EST PT 1/>: CPT | Performed by: OPHTHALMOLOGY

## 2018-09-17 PROCEDURE — 92134 CPTRZ OPH DX IMG PST SGM RTA: CPT | Performed by: OPHTHALMOLOGY

## 2018-09-17 PROCEDURE — 92226 OPHTHALMOSCOPY EXT SUBSEQUENT: CPT | Performed by: OPHTHALMOLOGY

## 2018-09-17 ASSESSMENT — REFRACTION_CURRENTRX
OD_OVR_VA: 20/
OS_OVR_VA: 20/
OD_OVR_VA: 20/
OS_OVR_VA: 20/
OS_OVR_VA: 20/
OD_OVR_VA: 20/

## 2018-09-17 ASSESSMENT — REFRACTION_MANIFEST
OU_VA: 20/
OD_VA3: 20/
OD_VA1: 20/
OD_VA3: 20/
OD_VA2: 20/
OS_VA3: 20/
OU_VA: 20/
OD_VA1: 20/
OS_VA1: 20/
OD_VA2: 20/
OS_VA3: 20/
OS_VA1: 20/
OS_VA2: 20/
OS_VA2: 20/

## 2018-09-17 ASSESSMENT — CORNEAL DYSTROPHY: OD_DYSTROPHY: +VE ARCUS SENILIS

## 2018-09-17 ASSESSMENT — VISUAL ACUITY
OS_BCVA: 20/60-2
OD_BCVA: 20/50-1

## 2018-09-17 ASSESSMENT — CONFRONTATIONAL VISUAL FIELD TEST (CVF)
OD_FINDINGS: FULL
OS_FINDINGS: FULL

## 2018-09-17 ASSESSMENT — REFRACTION_AUTOREFRACTION
OD_CYLINDER: -1.25
OS_SPHERE: +1.00
OD_SPHERE: +1.00
OS_CYLINDER: -2.25
OD_AXIS: 157
OS_AXIS: 26

## 2018-09-17 ASSESSMENT — SPHEQUIV_DERIVED
OS_SPHEQUIV: -0.125
OD_SPHEQUIV: 0.375

## 2018-10-05 ENCOUNTER — DOCTOR'S OFFICE (OUTPATIENT)
Dept: URBAN - NONMETROPOLITAN AREA CLINIC 1 | Facility: CLINIC | Age: 83
Setting detail: OPHTHALMOLOGY
End: 2018-10-05
Payer: COMMERCIAL

## 2018-10-05 DIAGNOSIS — H43.812: ICD-10-CM

## 2018-10-05 DIAGNOSIS — H43.811: ICD-10-CM

## 2018-10-05 DIAGNOSIS — H35.3211: ICD-10-CM

## 2018-10-05 DIAGNOSIS — H35.053: ICD-10-CM

## 2018-10-05 DIAGNOSIS — H35.3231: ICD-10-CM

## 2018-10-05 PROCEDURE — 92226 OPHTHALMOSCOPY EXT SUBSEQUENT: CPT | Performed by: OPHTHALMOLOGY

## 2018-10-05 PROCEDURE — 92235 FLUORESCEIN ANGRPH MLTIFRAME: CPT | Performed by: OPHTHALMOLOGY

## 2018-10-05 PROCEDURE — 92250 FUNDUS PHOTOGRAPHY W/I&R: CPT | Performed by: OPHTHALMOLOGY

## 2018-10-05 PROCEDURE — 67220 TREATMENT OF CHOROID LESION: CPT | Performed by: OPHTHALMOLOGY

## 2018-10-05 PROCEDURE — 92014 COMPRE OPH EXAM EST PT 1/>: CPT | Performed by: OPHTHALMOLOGY

## 2018-10-05 ASSESSMENT — REFRACTION_CURRENTRX
OS_OVR_VA: 20/
OD_OVR_VA: 20/

## 2018-10-05 ASSESSMENT — REFRACTION_MANIFEST
OD_VA1: 20/
OD_VA2: 20/
OD_VA1: 20/
OS_VA1: 20/
OU_VA: 20/
OS_VA2: 20/
OD_VA2: 20/
OS_VA2: 20/
OD_VA3: 20/
OS_VA3: 20/
OS_VA3: 20/
OU_VA: 20/
OS_VA1: 20/
OD_VA3: 20/

## 2018-10-05 ASSESSMENT — CONFRONTATIONAL VISUAL FIELD TEST (CVF)
OD_FINDINGS: FULL
OS_FINDINGS: FULL

## 2018-10-05 ASSESSMENT — REFRACTION_AUTOREFRACTION
OD_AXIS: 157
OS_AXIS: 26
OD_CYLINDER: -1.25
OS_SPHERE: +1.00
OD_SPHERE: +1.00
OS_CYLINDER: -2.25

## 2018-10-05 ASSESSMENT — SPHEQUIV_DERIVED
OD_SPHEQUIV: 0.375
OS_SPHEQUIV: -0.125

## 2018-10-05 ASSESSMENT — CORNEAL DYSTROPHY: OD_DYSTROPHY: +VE ARCUS SENILIS

## 2018-10-05 ASSESSMENT — VISUAL ACUITY
OS_BCVA: 20/60-2
OD_BCVA: 20/50-1

## 2018-10-15 ENCOUNTER — DOCTOR'S OFFICE (OUTPATIENT)
Dept: URBAN - NONMETROPOLITAN AREA CLINIC 1 | Facility: CLINIC | Age: 83
Setting detail: OPHTHALMOLOGY
End: 2018-10-15
Payer: COMMERCIAL

## 2018-10-15 DIAGNOSIS — H35.3211: ICD-10-CM

## 2018-10-15 DIAGNOSIS — H35.053: ICD-10-CM

## 2018-10-15 DIAGNOSIS — H35.3231: ICD-10-CM

## 2018-10-15 DIAGNOSIS — H35.051: ICD-10-CM

## 2018-10-15 DIAGNOSIS — H35.3221: ICD-10-CM

## 2018-10-15 PROCEDURE — 67028 INJECTION EYE DRUG: CPT | Performed by: PHYSICIAN ASSISTANT

## 2018-10-15 PROCEDURE — 99024 POSTOP FOLLOW-UP VISIT: CPT | Performed by: PHYSICIAN ASSISTANT

## 2018-10-16 ENCOUNTER — RX ONLY (RX ONLY)
Age: 83
End: 2018-10-16

## 2018-10-16 ASSESSMENT — REFRACTION_MANIFEST
OS_VA1: 20/
OS_VA2: 20/
OS_VA3: 20/
OD_VA3: 20/
OS_VA3: 20/
OS_VA2: 20/
OU_VA: 20/
OD_VA3: 20/
OD_VA1: 20/
OS_VA1: 20/
OU_VA: 20/
OD_VA1: 20/
OD_VA2: 20/
OD_VA2: 20/

## 2018-10-16 ASSESSMENT — REFRACTION_CURRENTRX
OD_OVR_VA: 20/
OS_OVR_VA: 20/
OS_OVR_VA: 20/
OD_OVR_VA: 20/
OD_OVR_VA: 20/
OS_OVR_VA: 20/

## 2018-10-16 ASSESSMENT — REFRACTION_AUTOREFRACTION
OD_AXIS: 157
OD_CYLINDER: -1.25
OS_CYLINDER: -2.25
OS_SPHERE: +1.00
OS_AXIS: 26
OD_SPHERE: +1.00

## 2018-10-16 ASSESSMENT — SPHEQUIV_DERIVED
OS_SPHEQUIV: -0.125
OD_SPHEQUIV: 0.375

## 2018-10-16 ASSESSMENT — VISUAL ACUITY
OD_BCVA: 20/50-1
OS_BCVA: 20/60-2

## 2018-10-19 ENCOUNTER — DOCTOR'S OFFICE (OUTPATIENT)
Dept: URBAN - NONMETROPOLITAN AREA CLINIC 1 | Facility: CLINIC | Age: 83
Setting detail: OPHTHALMOLOGY
End: 2018-10-19
Payer: COMMERCIAL

## 2018-10-19 DIAGNOSIS — H35.3221: ICD-10-CM

## 2018-10-19 DIAGNOSIS — H35.3211: ICD-10-CM

## 2018-10-19 DIAGNOSIS — H35.052: ICD-10-CM

## 2018-10-19 DIAGNOSIS — H35.3231: ICD-10-CM

## 2018-10-19 PROCEDURE — 67028 INJECTION EYE DRUG: CPT | Performed by: PHYSICIAN ASSISTANT

## 2018-10-19 PROCEDURE — 99024 POSTOP FOLLOW-UP VISIT: CPT | Performed by: PHYSICIAN ASSISTANT

## 2018-10-22 ENCOUNTER — RX ONLY (RX ONLY)
Age: 83
End: 2018-10-22

## 2018-10-22 ASSESSMENT — SPHEQUIV_DERIVED
OD_SPHEQUIV: 0.375
OS_SPHEQUIV: -0.125

## 2018-10-22 ASSESSMENT — REFRACTION_AUTOREFRACTION
OS_AXIS: 26
OD_SPHERE: +1.00
OS_CYLINDER: -2.25
OD_AXIS: 157
OS_SPHERE: +1.00
OD_CYLINDER: -1.25

## 2018-10-22 ASSESSMENT — VISUAL ACUITY
OD_BCVA: 20/50-1
OS_BCVA: 20/60-2

## 2018-11-16 ENCOUNTER — DOCTOR'S OFFICE (OUTPATIENT)
Dept: URBAN - NONMETROPOLITAN AREA CLINIC 1 | Facility: CLINIC | Age: 83
Setting detail: OPHTHALMOLOGY
End: 2018-11-16
Payer: COMMERCIAL

## 2018-11-16 DIAGNOSIS — H04.122: ICD-10-CM

## 2018-11-16 DIAGNOSIS — E11.3293: ICD-10-CM

## 2018-11-16 DIAGNOSIS — H40.003: ICD-10-CM

## 2018-11-16 DIAGNOSIS — H43.812: ICD-10-CM

## 2018-11-16 DIAGNOSIS — H35.053: ICD-10-CM

## 2018-11-16 DIAGNOSIS — H43.811: ICD-10-CM

## 2018-11-16 DIAGNOSIS — H04.121: ICD-10-CM

## 2018-11-16 DIAGNOSIS — H35.3231: ICD-10-CM

## 2018-11-16 PROCEDURE — 99024 POSTOP FOLLOW-UP VISIT: CPT | Performed by: OPHTHALMOLOGY

## 2018-11-16 PROCEDURE — 83861 MICROFLUID ANALY TEARS: CPT | Performed by: OPHTHALMOLOGY

## 2018-11-16 PROCEDURE — 92226 OPHTHALMOSCOPY EXT SUBSEQUENT: CPT | Performed by: OPHTHALMOLOGY

## 2018-11-16 PROCEDURE — 92134 CPTRZ OPH DX IMG PST SGM RTA: CPT | Performed by: OPHTHALMOLOGY

## 2018-11-16 ASSESSMENT — REFRACTION_CURRENTRX
OD_OVR_VA: 20/
OD_OVR_VA: 20/
OS_OVR_VA: 20/
OS_OVR_VA: 20/
OD_OVR_VA: 20/
OS_OVR_VA: 20/

## 2018-11-16 ASSESSMENT — REFRACTION_AUTOREFRACTION
OS_AXIS: 26
OD_SPHERE: +1.00
OS_SPHERE: +1.00
OD_CYLINDER: -1.25
OD_AXIS: 157
OS_CYLINDER: -2.25

## 2018-11-16 ASSESSMENT — REFRACTION_MANIFEST
OS_VA1: 20/
OD_VA3: 20/
OS_VA3: 20/
OS_VA1: 20/
OU_VA: 20/
OS_VA2: 20/
OD_VA3: 20/
OD_VA2: 20/
OU_VA: 20/
OS_VA3: 20/
OD_VA1: 20/
OD_VA2: 20/
OS_VA2: 20/
OD_VA1: 20/

## 2018-11-16 ASSESSMENT — CORNEAL DYSTROPHY: OD_DYSTROPHY: +VE ARCUS SENILIS

## 2018-11-16 ASSESSMENT — SPHEQUIV_DERIVED
OD_SPHEQUIV: 0.375
OS_SPHEQUIV: -0.125

## 2018-11-16 ASSESSMENT — VISUAL ACUITY
OD_BCVA: 20/50-2
OS_BCVA: 20/60-2

## 2018-11-16 ASSESSMENT — CONFRONTATIONAL VISUAL FIELD TEST (CVF)
OS_FINDINGS: FULL
OD_FINDINGS: FULL

## 2018-11-19 ENCOUNTER — DOCTOR'S OFFICE (OUTPATIENT)
Dept: URBAN - NONMETROPOLITAN AREA CLINIC 1 | Facility: CLINIC | Age: 83
Setting detail: OPHTHALMOLOGY
End: 2018-11-19
Payer: COMMERCIAL

## 2018-11-19 DIAGNOSIS — H35.3231: ICD-10-CM

## 2018-11-19 DIAGNOSIS — H35.3211: ICD-10-CM

## 2018-11-19 DIAGNOSIS — H35.3221: ICD-10-CM

## 2018-11-19 PROCEDURE — 99024 POSTOP FOLLOW-UP VISIT: CPT | Performed by: OPHTHALMOLOGY

## 2018-11-19 PROCEDURE — 67028 INJECTION EYE DRUG: CPT | Performed by: OPHTHALMOLOGY

## 2018-11-19 ASSESSMENT — VISUAL ACUITY
OS_BCVA: 20/60-2
OD_BCVA: 20/50-2

## 2018-11-19 ASSESSMENT — REFRACTION_AUTOREFRACTION
OD_SPHERE: +1.00
OD_CYLINDER: -1.25
OS_AXIS: 26
OS_SPHERE: +1.00
OS_CYLINDER: -2.25
OD_AXIS: 157

## 2018-11-19 ASSESSMENT — SPHEQUIV_DERIVED
OS_SPHEQUIV: -0.125
OD_SPHEQUIV: 0.375

## 2018-11-26 ENCOUNTER — DOCTOR'S OFFICE (OUTPATIENT)
Dept: URBAN - NONMETROPOLITAN AREA CLINIC 1 | Facility: CLINIC | Age: 83
Setting detail: OPHTHALMOLOGY
End: 2018-11-26
Payer: COMMERCIAL

## 2018-11-26 DIAGNOSIS — H35.3221: ICD-10-CM

## 2018-11-26 DIAGNOSIS — H35.3231: ICD-10-CM

## 2018-11-26 PROCEDURE — 99024 POSTOP FOLLOW-UP VISIT: CPT | Performed by: OPHTHALMOLOGY

## 2018-11-26 PROCEDURE — 67028 INJECTION EYE DRUG: CPT | Performed by: OPHTHALMOLOGY

## 2018-11-26 ASSESSMENT — REFRACTION_MANIFEST
OS_VA3: 20/
OU_VA: 20/
OD_VA3: 20/
OD_VA2: 20/
OS_VA2: 20/
OD_VA1: 20/
OS_VA3: 20/
OS_VA1: 20/
OD_VA2: 20/
OU_VA: 20/
OS_VA1: 20/
OS_VA2: 20/
OD_VA3: 20/
OD_VA1: 20/

## 2018-11-26 ASSESSMENT — REFRACTION_AUTOREFRACTION
OD_SPHERE: +1.00
OS_SPHERE: +1.00
OS_AXIS: 26
OD_AXIS: 157
OD_CYLINDER: -1.25
OS_CYLINDER: -2.25

## 2018-11-26 ASSESSMENT — REFRACTION_CURRENTRX
OD_OVR_VA: 20/
OD_OVR_VA: 20/
OS_OVR_VA: 20/
OD_OVR_VA: 20/
OS_OVR_VA: 20/
OS_OVR_VA: 20/

## 2018-11-26 ASSESSMENT — VISUAL ACUITY
OD_BCVA: 20/50-2
OS_BCVA: 20/60-2

## 2018-11-26 ASSESSMENT — SPHEQUIV_DERIVED
OS_SPHEQUIV: -0.125
OD_SPHEQUIV: 0.375

## 2018-12-17 ENCOUNTER — DOCTOR'S OFFICE (OUTPATIENT)
Dept: URBAN - NONMETROPOLITAN AREA CLINIC 1 | Facility: CLINIC | Age: 83
Setting detail: OPHTHALMOLOGY
End: 2018-12-17
Payer: COMMERCIAL

## 2018-12-17 DIAGNOSIS — H35.3221: ICD-10-CM

## 2018-12-17 DIAGNOSIS — H04.122: ICD-10-CM

## 2018-12-17 DIAGNOSIS — H35.3231: ICD-10-CM

## 2018-12-17 DIAGNOSIS — E11.3293: ICD-10-CM

## 2018-12-17 DIAGNOSIS — H40.003: ICD-10-CM

## 2018-12-17 DIAGNOSIS — H35.053: ICD-10-CM

## 2018-12-17 DIAGNOSIS — H04.121: ICD-10-CM

## 2018-12-17 DIAGNOSIS — H43.811: ICD-10-CM

## 2018-12-17 DIAGNOSIS — H35.3211: ICD-10-CM

## 2018-12-17 DIAGNOSIS — H43.812: ICD-10-CM

## 2018-12-17 PROCEDURE — 99024 POSTOP FOLLOW-UP VISIT: CPT | Performed by: OPHTHALMOLOGY

## 2018-12-17 PROCEDURE — 92226 OPHTHALMOSCOPY EXT SUBSEQUENT: CPT | Performed by: OPHTHALMOLOGY

## 2018-12-17 PROCEDURE — 92134 CPTRZ OPH DX IMG PST SGM RTA: CPT | Performed by: OPHTHALMOLOGY

## 2018-12-17 ASSESSMENT — REFRACTION_CURRENTRX
OS_OVR_VA: 20/
OD_OVR_VA: 20/
OS_OVR_VA: 20/
OS_OVR_VA: 20/
OD_OVR_VA: 20/
OD_OVR_VA: 20/

## 2018-12-17 ASSESSMENT — REFRACTION_MANIFEST
OD_VA2: 20/
OD_VA3: 20/
OU_VA: 20/
OS_VA2: 20/
OU_VA: 20/
OD_VA1: 20/
OS_VA1: 20/
OS_VA2: 20/
OS_VA3: 20/
OD_VA2: 20/
OS_VA3: 20/
OS_VA1: 20/
OD_VA1: 20/
OD_VA3: 20/

## 2018-12-17 ASSESSMENT — REFRACTION_AUTOREFRACTION
OS_AXIS: 26
OS_SPHERE: +1.00
OD_SPHERE: +1.00
OD_CYLINDER: -1.25
OD_AXIS: 157
OS_CYLINDER: -2.25

## 2018-12-17 ASSESSMENT — VISUAL ACUITY
OD_BCVA: 20/40
OS_BCVA: 20/60

## 2018-12-17 ASSESSMENT — CONFRONTATIONAL VISUAL FIELD TEST (CVF)
OS_FINDINGS: FULL
OD_FINDINGS: FULL

## 2018-12-17 ASSESSMENT — SPHEQUIV_DERIVED
OD_SPHEQUIV: 0.375
OS_SPHEQUIV: -0.125

## 2018-12-17 ASSESSMENT — CORNEAL DYSTROPHY: OD_DYSTROPHY: +VE ARCUS SENILIS

## 2018-12-26 ENCOUNTER — DOCTOR'S OFFICE (OUTPATIENT)
Dept: URBAN - NONMETROPOLITAN AREA CLINIC 1 | Facility: CLINIC | Age: 83
Setting detail: OPHTHALMOLOGY
End: 2018-12-26
Payer: COMMERCIAL

## 2018-12-26 DIAGNOSIS — H35.053: ICD-10-CM

## 2018-12-26 DIAGNOSIS — H35.3231: ICD-10-CM

## 2018-12-26 DIAGNOSIS — H35.3221: ICD-10-CM

## 2018-12-26 DIAGNOSIS — H35.3211: ICD-10-CM

## 2018-12-26 PROCEDURE — 99024 POSTOP FOLLOW-UP VISIT: CPT | Performed by: PHYSICIAN ASSISTANT

## 2018-12-26 PROCEDURE — 67028 INJECTION EYE DRUG: CPT | Performed by: PHYSICIAN ASSISTANT

## 2018-12-27 ENCOUNTER — DOCTOR'S OFFICE (OUTPATIENT)
Dept: URBAN - NONMETROPOLITAN AREA CLINIC 1 | Facility: CLINIC | Age: 83
Setting detail: OPHTHALMOLOGY
End: 2018-12-27
Payer: COMMERCIAL

## 2018-12-27 ENCOUNTER — RX ONLY (RX ONLY)
Age: 83
End: 2018-12-27

## 2018-12-27 DIAGNOSIS — H35.3231: ICD-10-CM

## 2018-12-27 DIAGNOSIS — H35.3211: ICD-10-CM

## 2018-12-27 DIAGNOSIS — H35.053: ICD-10-CM

## 2018-12-27 DIAGNOSIS — H35.3221: ICD-10-CM

## 2018-12-27 PROCEDURE — 99024 POSTOP FOLLOW-UP VISIT: CPT | Performed by: PHYSICIAN ASSISTANT

## 2018-12-27 PROCEDURE — 67028 INJECTION EYE DRUG: CPT | Performed by: PHYSICIAN ASSISTANT

## 2018-12-27 ASSESSMENT — REFRACTION_AUTOREFRACTION
OS_CYLINDER: -2.25
OS_AXIS: 26
OD_AXIS: 157
OD_SPHERE: +1.00
OD_CYLINDER: -1.25
OS_SPHERE: +1.00

## 2018-12-27 ASSESSMENT — SPHEQUIV_DERIVED
OS_SPHEQUIV: -0.125
OD_SPHEQUIV: 0.375

## 2018-12-27 ASSESSMENT — VISUAL ACUITY
OD_BCVA: 20/40
OS_BCVA: 20/60

## 2018-12-28 ENCOUNTER — RX ONLY (RX ONLY)
Age: 83
End: 2018-12-28

## 2018-12-28 ASSESSMENT — REFRACTION_AUTOREFRACTION
OD_CYLINDER: -1.25
OS_SPHERE: +1.00
OD_SPHERE: +1.00
OS_CYLINDER: -2.25
OS_AXIS: 26
OD_AXIS: 157

## 2018-12-28 ASSESSMENT — REFRACTION_MANIFEST
OD_VA1: 20/
OD_VA3: 20/
OS_VA3: 20/
OD_VA1: 20/
OU_VA: 20/
OU_VA: 20/
OS_VA3: 20/
OS_VA2: 20/
OD_VA3: 20/
OS_VA1: 20/
OS_VA1: 20/
OS_VA2: 20/
OD_VA2: 20/
OD_VA2: 20/

## 2018-12-28 ASSESSMENT — REFRACTION_CURRENTRX
OD_OVR_VA: 20/
OD_OVR_VA: 20/
OS_OVR_VA: 20/
OS_OVR_VA: 20/
OD_OVR_VA: 20/
OS_OVR_VA: 20/

## 2018-12-28 ASSESSMENT — SPHEQUIV_DERIVED
OS_SPHEQUIV: -0.125
OD_SPHEQUIV: 0.375

## 2018-12-28 ASSESSMENT — VISUAL ACUITY
OS_BCVA: 20/60
OD_BCVA: 20/40

## 2019-01-17 ENCOUNTER — DOCTOR'S OFFICE (OUTPATIENT)
Dept: URBAN - NONMETROPOLITAN AREA CLINIC 1 | Facility: CLINIC | Age: 84
Setting detail: OPHTHALMOLOGY
End: 2019-01-17
Payer: COMMERCIAL

## 2019-01-17 DIAGNOSIS — H35.053: ICD-10-CM

## 2019-01-17 DIAGNOSIS — H43.811: ICD-10-CM

## 2019-01-17 DIAGNOSIS — H35.3231: ICD-10-CM

## 2019-01-17 DIAGNOSIS — H35.3211: ICD-10-CM

## 2019-01-17 DIAGNOSIS — H43.812: ICD-10-CM

## 2019-01-17 DIAGNOSIS — E11.3293: ICD-10-CM

## 2019-01-17 PROCEDURE — 92014 COMPRE OPH EXAM EST PT 1/>: CPT | Performed by: OPHTHALMOLOGY

## 2019-01-17 PROCEDURE — 92226 OPHTHALMOSCOPY EXT SUBSEQUENT: CPT | Performed by: OPHTHALMOLOGY

## 2019-01-17 PROCEDURE — 92134 CPTRZ OPH DX IMG PST SGM RTA: CPT | Performed by: OPHTHALMOLOGY

## 2019-01-17 ASSESSMENT — REFRACTION_AUTOREFRACTION
OD_CYLINDER: -1.25
OD_AXIS: 157
OD_SPHERE: +1.00
OS_AXIS: 26
OS_SPHERE: +1.00
OS_CYLINDER: -2.25

## 2019-01-17 ASSESSMENT — REFRACTION_MANIFEST
OU_VA: 20/
OU_VA: 20/
OD_VA2: 20/
OS_VA1: 20/
OS_VA2: 20/
OS_VA1: 20/
OS_VA3: 20/
OS_VA3: 20/
OD_VA1: 20/
OD_VA3: 20/
OS_VA2: 20/
OD_VA2: 20/
OD_VA3: 20/
OD_VA1: 20/

## 2019-01-17 ASSESSMENT — REFRACTION_CURRENTRX
OS_OVR_VA: 20/
OD_OVR_VA: 20/
OD_OVR_VA: 20/
OS_OVR_VA: 20/
OD_OVR_VA: 20/
OS_OVR_VA: 20/

## 2019-01-17 ASSESSMENT — CONFRONTATIONAL VISUAL FIELD TEST (CVF)
OS_FINDINGS: FULL
OD_FINDINGS: FULL

## 2019-01-17 ASSESSMENT — SPHEQUIV_DERIVED
OD_SPHEQUIV: 0.375
OS_SPHEQUIV: -0.125

## 2019-01-17 ASSESSMENT — VISUAL ACUITY
OS_BCVA: 20/60
OD_BCVA: 20/40

## 2019-01-17 ASSESSMENT — CORNEAL DYSTROPHY: OD_DYSTROPHY: +VE ARCUS SENILIS

## 2019-02-14 ENCOUNTER — DOCTOR'S OFFICE (OUTPATIENT)
Dept: URBAN - NONMETROPOLITAN AREA CLINIC 1 | Facility: CLINIC | Age: 84
Setting detail: OPHTHALMOLOGY
End: 2019-02-14
Payer: COMMERCIAL

## 2019-02-14 DIAGNOSIS — H35.053: ICD-10-CM

## 2019-02-14 DIAGNOSIS — H04.123: ICD-10-CM

## 2019-02-14 DIAGNOSIS — E11.3293: ICD-10-CM

## 2019-02-14 DIAGNOSIS — H35.3231: ICD-10-CM

## 2019-02-14 PROCEDURE — 92235 FLUORESCEIN ANGRPH MLTIFRAME: CPT | Performed by: OPHTHALMOLOGY

## 2019-02-14 PROCEDURE — 92250 FUNDUS PHOTOGRAPHY W/I&R: CPT | Performed by: OPHTHALMOLOGY

## 2019-02-14 PROCEDURE — 92014 COMPRE OPH EXAM EST PT 1/>: CPT | Performed by: OPHTHALMOLOGY

## 2019-02-14 ASSESSMENT — SPHEQUIV_DERIVED
OD_SPHEQUIV: 0.375
OS_SPHEQUIV: -0.125

## 2019-02-14 ASSESSMENT — REFRACTION_CURRENTRX
OS_OVR_VA: 20/
OS_OVR_VA: 20/
OD_OVR_VA: 20/
OS_OVR_VA: 20/

## 2019-02-14 ASSESSMENT — REFRACTION_MANIFEST
OU_VA: 20/
OD_VA1: 20/
OS_VA1: 20/
OD_VA2: 20/
OS_VA1: 20/
OD_VA3: 20/
OS_VA2: 20/
OS_VA3: 20/
OS_VA3: 20/
OS_VA2: 20/
OU_VA: 20/
OD_VA2: 20/
OD_VA1: 20/
OD_VA3: 20/

## 2019-02-14 ASSESSMENT — REFRACTION_AUTOREFRACTION
OS_AXIS: 26
OS_CYLINDER: -2.25
OD_AXIS: 157
OD_SPHERE: +1.00
OD_CYLINDER: -1.25
OS_SPHERE: +1.00

## 2019-02-14 ASSESSMENT — CONFRONTATIONAL VISUAL FIELD TEST (CVF)
OS_FINDINGS: FULL
OD_FINDINGS: FULL

## 2019-02-14 ASSESSMENT — VISUAL ACUITY
OS_BCVA: 20/60
OD_BCVA: 20/40

## 2019-02-14 ASSESSMENT — CORNEAL DYSTROPHY: OD_DYSTROPHY: +VE ARCUS SENILIS

## 2019-02-21 ENCOUNTER — DOCTOR'S OFFICE (OUTPATIENT)
Dept: URBAN - NONMETROPOLITAN AREA CLINIC 1 | Facility: CLINIC | Age: 84
Setting detail: OPHTHALMOLOGY
End: 2019-02-21
Payer: COMMERCIAL

## 2019-02-21 ENCOUNTER — RX ONLY (RX ONLY)
Age: 84
End: 2019-02-21

## 2019-02-21 DIAGNOSIS — H35.3231: ICD-10-CM

## 2019-02-21 DIAGNOSIS — H35.053: ICD-10-CM

## 2019-02-21 DIAGNOSIS — E11.3293: ICD-10-CM

## 2019-02-21 DIAGNOSIS — H35.3211: ICD-10-CM

## 2019-02-21 DIAGNOSIS — H04.123: ICD-10-CM

## 2019-02-21 PROCEDURE — 92240 ICG ANGIOGRAPHY I&R UNI/BI: CPT | Performed by: OPHTHALMOLOGY

## 2019-02-21 PROCEDURE — 67028 INJECTION EYE DRUG: CPT | Performed by: OPHTHALMOLOGY

## 2019-02-21 PROCEDURE — 92014 COMPRE OPH EXAM EST PT 1/>: CPT | Performed by: OPHTHALMOLOGY

## 2019-02-21 ASSESSMENT — REFRACTION_MANIFEST
OU_VA: 20/
OS_VA2: 20/
OU_VA: 20/
OS_VA1: 20/
OS_VA3: 20/
OD_VA2: 20/
OS_VA3: 20/
OD_VA2: 20/
OS_VA2: 20/
OD_VA1: 20/
OD_VA3: 20/
OS_VA1: 20/
OD_VA3: 20/
OD_VA1: 20/

## 2019-02-21 ASSESSMENT — SPHEQUIV_DERIVED
OS_SPHEQUIV: -0.125
OD_SPHEQUIV: 0.375

## 2019-02-21 ASSESSMENT — REFRACTION_CURRENTRX
OD_OVR_VA: 20/
OS_OVR_VA: 20/

## 2019-02-21 ASSESSMENT — REFRACTION_AUTOREFRACTION
OS_CYLINDER: -2.25
OD_SPHERE: +1.00
OS_AXIS: 26
OD_AXIS: 157
OS_SPHERE: +1.00
OD_CYLINDER: -1.25

## 2019-02-21 ASSESSMENT — VISUAL ACUITY
OD_BCVA: 20/40
OS_BCVA: 20/60

## 2019-02-21 ASSESSMENT — CORNEAL DYSTROPHY: OD_DYSTROPHY: +VE ARCUS SENILIS

## 2019-02-21 ASSESSMENT — CONFRONTATIONAL VISUAL FIELD TEST (CVF)
OS_FINDINGS: FULL
OD_FINDINGS: FULL

## 2019-02-28 ENCOUNTER — DOCTOR'S OFFICE (OUTPATIENT)
Dept: URBAN - NONMETROPOLITAN AREA CLINIC 1 | Facility: CLINIC | Age: 84
Setting detail: OPHTHALMOLOGY
End: 2019-02-28
Payer: COMMERCIAL

## 2019-02-28 DIAGNOSIS — H35.3221: ICD-10-CM

## 2019-02-28 DIAGNOSIS — H35.053: ICD-10-CM

## 2019-02-28 PROCEDURE — 67028 INJECTION EYE DRUG: CPT | Performed by: PHYSICIAN ASSISTANT

## 2019-03-05 ENCOUNTER — RX ONLY (RX ONLY)
Age: 84
End: 2019-03-05

## 2019-03-05 ASSESSMENT — VISUAL ACUITY
OD_BCVA: 20/40
OS_BCVA: 20/60

## 2019-03-05 ASSESSMENT — REFRACTION_MANIFEST
OD_VA3: 20/
OS_VA3: 20/
OS_VA2: 20/
OS_VA1: 20/
OU_VA: 20/
OD_VA2: 20/
OD_VA2: 20/
OU_VA: 20/
OD_VA3: 20/
OS_VA2: 20/
OD_VA1: 20/
OD_VA1: 20/
OS_VA1: 20/
OS_VA3: 20/

## 2019-03-05 ASSESSMENT — REFRACTION_CURRENTRX
OS_OVR_VA: 20/
OD_OVR_VA: 20/
OD_OVR_VA: 20/
OS_OVR_VA: 20/
OS_OVR_VA: 20/
OD_OVR_VA: 20/

## 2019-03-05 ASSESSMENT — REFRACTION_AUTOREFRACTION
OD_SPHERE: +1.00
OS_SPHERE: +1.00
OD_CYLINDER: -1.25
OD_AXIS: 157
OS_CYLINDER: -2.25
OS_AXIS: 26

## 2019-03-05 ASSESSMENT — SPHEQUIV_DERIVED
OD_SPHEQUIV: 0.375
OS_SPHEQUIV: -0.125

## 2019-03-21 ENCOUNTER — DOCTOR'S OFFICE (OUTPATIENT)
Dept: URBAN - NONMETROPOLITAN AREA CLINIC 1 | Facility: CLINIC | Age: 84
Setting detail: OPHTHALMOLOGY
End: 2019-03-21
Payer: COMMERCIAL

## 2019-03-21 DIAGNOSIS — H35.3231: ICD-10-CM

## 2019-03-21 DIAGNOSIS — H35.053: ICD-10-CM

## 2019-03-21 DIAGNOSIS — H35.3211: ICD-10-CM

## 2019-03-21 DIAGNOSIS — H35.3221: ICD-10-CM

## 2019-03-21 DIAGNOSIS — E11.3293: ICD-10-CM

## 2019-03-21 PROCEDURE — 92134 CPTRZ OPH DX IMG PST SGM RTA: CPT | Performed by: OPHTHALMOLOGY

## 2019-03-21 PROCEDURE — 92014 COMPRE OPH EXAM EST PT 1/>: CPT | Performed by: OPHTHALMOLOGY

## 2019-03-21 PROCEDURE — 92226 OPHTHALMOSCOPY EXT SUBSEQUENT: CPT | Performed by: OPHTHALMOLOGY

## 2019-03-21 ASSESSMENT — REFRACTION_CURRENTRX
OS_OVR_VA: 20/
OD_OVR_VA: 20/
OD_OVR_VA: 20/
OS_OVR_VA: 20/
OD_OVR_VA: 20/
OS_OVR_VA: 20/

## 2019-03-21 ASSESSMENT — REFRACTION_MANIFEST
OD_VA2: 20/
OD_VA2: 20/
OD_VA3: 20/
OS_VA1: 20/
OU_VA: 20/
OS_VA1: 20/
OD_VA1: 20/
OD_VA3: 20/
OS_VA3: 20/
OS_VA2: 20/
OS_VA3: 20/
OD_VA1: 20/
OS_VA2: 20/
OU_VA: 20/

## 2019-03-21 ASSESSMENT — REFRACTION_AUTOREFRACTION
OS_SPHERE: +1.00
OS_AXIS: 26
OS_CYLINDER: -2.25
OD_AXIS: 157
OD_CYLINDER: -1.25
OD_SPHERE: +1.00

## 2019-03-21 ASSESSMENT — SPHEQUIV_DERIVED
OD_SPHEQUIV: 0.375
OS_SPHEQUIV: -0.125

## 2019-03-21 ASSESSMENT — VISUAL ACUITY
OS_BCVA: 20/100
OD_BCVA: 20/50

## 2019-03-21 ASSESSMENT — CORNEAL DYSTROPHY: OD_DYSTROPHY: +VE ARCUS SENILIS

## 2019-03-21 ASSESSMENT — CONFRONTATIONAL VISUAL FIELD TEST (CVF)
OD_FINDINGS: FULL
OS_FINDINGS: FULL

## 2019-04-01 ENCOUNTER — DOCTOR'S OFFICE (OUTPATIENT)
Dept: URBAN - NONMETROPOLITAN AREA CLINIC 1 | Facility: CLINIC | Age: 84
Setting detail: OPHTHALMOLOGY
End: 2019-04-01
Payer: COMMERCIAL

## 2019-04-01 DIAGNOSIS — H04.123: ICD-10-CM

## 2019-04-01 DIAGNOSIS — E11.3293: ICD-10-CM

## 2019-04-01 DIAGNOSIS — H35.3231: ICD-10-CM

## 2019-04-01 DIAGNOSIS — H35.053: ICD-10-CM

## 2019-04-01 PROCEDURE — 92014 COMPRE OPH EXAM EST PT 1/>: CPT | Performed by: OPHTHALMOLOGY

## 2019-04-01 PROCEDURE — 92250 FUNDUS PHOTOGRAPHY W/I&R: CPT | Performed by: OPHTHALMOLOGY

## 2019-04-01 PROCEDURE — 92235 FLUORESCEIN ANGRPH MLTIFRAME: CPT | Performed by: OPHTHALMOLOGY

## 2019-04-01 ASSESSMENT — REFRACTION_MANIFEST
OU_VA: 20/
OS_VA1: 20/
OS_VA1: 20/
OD_VA2: 20/
OD_VA2: 20/
OS_VA3: 20/
OS_VA2: 20/
OD_VA1: 20/
OD_VA1: 20/
OS_VA3: 20/
OU_VA: 20/
OD_VA3: 20/
OD_VA3: 20/
OS_VA2: 20/

## 2019-04-01 ASSESSMENT — REFRACTION_CURRENTRX
OS_OVR_VA: 20/
OD_OVR_VA: 20/
OD_OVR_VA: 20/
OS_OVR_VA: 20/
OD_OVR_VA: 20/
OS_OVR_VA: 20/

## 2019-04-01 ASSESSMENT — SPHEQUIV_DERIVED
OS_SPHEQUIV: -0.125
OD_SPHEQUIV: 0.375

## 2019-04-01 ASSESSMENT — REFRACTION_AUTOREFRACTION
OD_AXIS: 157
OS_CYLINDER: -2.25
OD_CYLINDER: -1.25
OS_SPHERE: +1.00
OS_AXIS: 26
OD_SPHERE: +1.00

## 2019-04-01 ASSESSMENT — CONFRONTATIONAL VISUAL FIELD TEST (CVF)
OS_FINDINGS: FULL
OD_FINDINGS: FULL

## 2019-04-01 ASSESSMENT — CORNEAL DYSTROPHY: OD_DYSTROPHY: +VE ARCUS SENILIS

## 2019-04-01 ASSESSMENT — VISUAL ACUITY
OS_BCVA: 20/100
OD_BCVA: 20/50

## 2019-04-11 ENCOUNTER — DOCTOR'S OFFICE (OUTPATIENT)
Dept: URBAN - NONMETROPOLITAN AREA CLINIC 1 | Facility: CLINIC | Age: 84
Setting detail: OPHTHALMOLOGY
End: 2019-04-11
Payer: COMMERCIAL

## 2019-04-11 DIAGNOSIS — H35.3211: ICD-10-CM

## 2019-04-11 DIAGNOSIS — H35.053: ICD-10-CM

## 2019-04-11 PROCEDURE — 67028 INJECTION EYE DRUG: CPT | Performed by: PHYSICIAN ASSISTANT

## 2019-04-12 ENCOUNTER — DOCTOR'S OFFICE (OUTPATIENT)
Dept: URBAN - NONMETROPOLITAN AREA CLINIC 1 | Facility: CLINIC | Age: 84
Setting detail: OPHTHALMOLOGY
End: 2019-04-12
Payer: COMMERCIAL

## 2019-04-12 DIAGNOSIS — H35.053: ICD-10-CM

## 2019-04-12 DIAGNOSIS — H35.3221: ICD-10-CM

## 2019-04-12 PROCEDURE — 67028 INJECTION EYE DRUG: CPT | Performed by: PHYSICIAN ASSISTANT

## 2019-04-12 ASSESSMENT — REFRACTION_MANIFEST
OS_VA1: 20/
OU_VA: 20/
OD_VA3: 20/
OS_VA2: 20/
OS_VA3: 20/
OD_VA1: 20/
OD_VA2: 20/
OS_VA1: 20/
OD_VA1: 20/
OU_VA: 20/
OD_VA2: 20/
OD_VA3: 20/
OS_VA3: 20/
OS_VA2: 20/

## 2019-04-12 ASSESSMENT — REFRACTION_CURRENTRX
OD_OVR_VA: 20/
OS_OVR_VA: 20/
OD_OVR_VA: 20/
OS_OVR_VA: 20/
OD_OVR_VA: 20/
OS_OVR_VA: 20/

## 2019-04-12 ASSESSMENT — VISUAL ACUITY
OD_BCVA: 20/50
OS_BCVA: 20/100

## 2019-04-12 ASSESSMENT — REFRACTION_AUTOREFRACTION
OD_SPHERE: +1.00
OS_AXIS: 26
OS_SPHERE: +1.00
OD_AXIS: 157
OS_CYLINDER: -2.25
OD_CYLINDER: -1.25

## 2019-04-12 ASSESSMENT — SPHEQUIV_DERIVED
OD_SPHEQUIV: 0.375
OS_SPHEQUIV: -0.125

## 2019-04-16 ASSESSMENT — REFRACTION_MANIFEST
OD_VA3: 20/
OU_VA: 20/
OS_VA1: 20/
OD_VA2: 20/
OS_VA3: 20/
OD_VA1: 20/
OS_VA3: 20/
OS_VA2: 20/
OS_VA1: 20/
OS_VA2: 20/
OD_VA3: 20/
OU_VA: 20/
OD_VA2: 20/
OD_VA1: 20/

## 2019-04-16 ASSESSMENT — REFRACTION_CURRENTRX
OS_OVR_VA: 20/
OD_OVR_VA: 20/
OD_OVR_VA: 20/
OS_OVR_VA: 20/
OD_OVR_VA: 20/
OS_OVR_VA: 20/

## 2019-04-16 ASSESSMENT — REFRACTION_AUTOREFRACTION
OS_CYLINDER: -2.25
OS_AXIS: 26
OS_SPHERE: +1.00
OD_CYLINDER: -1.25
OD_SPHERE: +1.00
OD_AXIS: 157

## 2019-04-16 ASSESSMENT — VISUAL ACUITY
OD_BCVA: 20/50
OS_BCVA: 20/100

## 2019-04-16 ASSESSMENT — SPHEQUIV_DERIVED
OD_SPHEQUIV: 0.375
OS_SPHEQUIV: -0.125

## 2019-04-22 ENCOUNTER — DOCTOR'S OFFICE (OUTPATIENT)
Dept: URBAN - NONMETROPOLITAN AREA CLINIC 1 | Facility: CLINIC | Age: 84
Setting detail: OPHTHALMOLOGY
End: 2019-04-22
Payer: COMMERCIAL

## 2019-04-22 DIAGNOSIS — H04.122: ICD-10-CM

## 2019-04-22 DIAGNOSIS — H35.053: ICD-10-CM

## 2019-04-22 DIAGNOSIS — H04.121: ICD-10-CM

## 2019-04-22 DIAGNOSIS — H35.3231: ICD-10-CM

## 2019-04-22 DIAGNOSIS — H35.3211: ICD-10-CM

## 2019-04-22 DIAGNOSIS — E11.3293: ICD-10-CM

## 2019-04-22 DIAGNOSIS — H04.123: ICD-10-CM

## 2019-04-22 PROCEDURE — 83861 MICROFLUID ANALY TEARS: CPT | Performed by: OPHTHALMOLOGY

## 2019-04-22 PROCEDURE — 92014 COMPRE OPH EXAM EST PT 1/>: CPT | Performed by: OPHTHALMOLOGY

## 2019-04-22 PROCEDURE — 92134 CPTRZ OPH DX IMG PST SGM RTA: CPT | Performed by: OPHTHALMOLOGY

## 2019-04-22 PROCEDURE — 67220 TREATMENT OF CHOROID LESION: CPT | Performed by: OPHTHALMOLOGY

## 2019-04-22 ASSESSMENT — REFRACTION_MANIFEST
OS_VA3: 20/
OS_VA2: 20/
OS_VA1: 20/
OD_VA2: 20/
OS_VA3: 20/
OU_VA: 20/
OS_VA2: 20/
OD_VA3: 20/
OD_VA2: 20/
OD_VA1: 20/
OD_VA3: 20/
OS_VA1: 20/
OU_VA: 20/
OD_VA1: 20/

## 2019-04-22 ASSESSMENT — CORNEAL DYSTROPHY: OD_DYSTROPHY: +VE ARCUS SENILIS

## 2019-04-22 ASSESSMENT — REFRACTION_CURRENTRX
OS_OVR_VA: 20/
OD_OVR_VA: 20/
OS_OVR_VA: 20/
OS_OVR_VA: 20/

## 2019-04-22 ASSESSMENT — CONFRONTATIONAL VISUAL FIELD TEST (CVF)
OD_FINDINGS: FULL
OS_FINDINGS: FULL

## 2019-04-22 ASSESSMENT — VISUAL ACUITY
OS_BCVA: 20/150+1
OD_BCVA: 20/70+2

## 2019-04-22 ASSESSMENT — SPHEQUIV_DERIVED
OS_SPHEQUIV: -0.125
OD_SPHEQUIV: 0.375

## 2019-04-22 ASSESSMENT — REFRACTION_AUTOREFRACTION
OS_AXIS: 26
OS_CYLINDER: -2.25
OD_AXIS: 157
OD_SPHERE: +1.00
OD_CYLINDER: -1.25
OS_SPHERE: +1.00

## 2019-05-13 ENCOUNTER — DOCTOR'S OFFICE (OUTPATIENT)
Dept: URBAN - NONMETROPOLITAN AREA CLINIC 1 | Facility: CLINIC | Age: 84
Setting detail: OPHTHALMOLOGY
End: 2019-05-13
Payer: COMMERCIAL

## 2019-05-13 ENCOUNTER — RX ONLY (RX ONLY)
Age: 84
End: 2019-05-13

## 2019-05-13 DIAGNOSIS — H35.3231: ICD-10-CM

## 2019-05-13 DIAGNOSIS — E11.3293: ICD-10-CM

## 2019-05-13 DIAGNOSIS — H35.053: ICD-10-CM

## 2019-05-13 PROCEDURE — 92235 FLUORESCEIN ANGRPH MLTIFRAME: CPT | Performed by: OPHTHALMOLOGY

## 2019-05-13 PROCEDURE — 92250 FUNDUS PHOTOGRAPHY W/I&R: CPT | Performed by: OPHTHALMOLOGY

## 2019-05-13 PROCEDURE — 99024 POSTOP FOLLOW-UP VISIT: CPT | Performed by: OPHTHALMOLOGY

## 2019-05-13 ASSESSMENT — REFRACTION_MANIFEST
OS_VA1: 20/
OS_VA1: 20/
OU_VA: 20/
OD_VA2: 20/
OD_VA2: 20/
OS_VA3: 20/
OS_VA2: 20/
OS_VA2: 20/
OU_VA: 20/
OD_VA1: 20/
OS_VA3: 20/
OD_VA1: 20/
OD_VA3: 20/
OD_VA3: 20/

## 2019-05-13 ASSESSMENT — CONFRONTATIONAL VISUAL FIELD TEST (CVF)
OS_FINDINGS: FULL
OD_FINDINGS: FULL

## 2019-05-13 ASSESSMENT — REFRACTION_CURRENTRX
OS_OVR_VA: 20/
OS_OVR_VA: 20/
OD_OVR_VA: 20/
OS_OVR_VA: 20/
OD_OVR_VA: 20/
OD_OVR_VA: 20/

## 2019-05-13 ASSESSMENT — SPHEQUIV_DERIVED
OS_SPHEQUIV: -0.125
OD_SPHEQUIV: 0.375

## 2019-05-13 ASSESSMENT — REFRACTION_AUTOREFRACTION
OS_SPHERE: +1.00
OD_SPHERE: +1.00
OD_AXIS: 157
OS_CYLINDER: -2.25
OD_CYLINDER: -1.25
OS_AXIS: 26

## 2019-05-13 ASSESSMENT — VISUAL ACUITY
OS_BCVA: 20/80
OD_BCVA: 20/60-1

## 2019-05-13 ASSESSMENT — CORNEAL DYSTROPHY: OD_DYSTROPHY: +VE ARCUS SENILIS

## 2019-05-20 ENCOUNTER — RX ONLY (RX ONLY)
Age: 84
End: 2019-05-20

## 2019-05-20 ENCOUNTER — DOCTOR'S OFFICE (OUTPATIENT)
Dept: URBAN - NONMETROPOLITAN AREA CLINIC 1 | Facility: CLINIC | Age: 84
Setting detail: OPHTHALMOLOGY
End: 2019-05-20
Payer: COMMERCIAL

## 2019-05-20 DIAGNOSIS — H35.3221: ICD-10-CM

## 2019-05-20 DIAGNOSIS — H35.3211: ICD-10-CM

## 2019-05-20 DIAGNOSIS — H35.3231: ICD-10-CM

## 2019-05-20 PROCEDURE — 99024 POSTOP FOLLOW-UP VISIT: CPT | Performed by: OPHTHALMOLOGY

## 2019-05-20 PROCEDURE — 67028 INJECTION EYE DRUG: CPT | Performed by: OPHTHALMOLOGY

## 2019-05-20 ASSESSMENT — REFRACTION_AUTOREFRACTION
OD_SPHERE: +1.00
OD_AXIS: 157
OD_CYLINDER: -1.25
OS_AXIS: 26
OS_CYLINDER: -2.25
OS_SPHERE: +1.00

## 2019-05-20 ASSESSMENT — REFRACTION_MANIFEST
OS_VA1: 20/
OD_VA2: 20/
OD_VA1: 20/
OD_VA3: 20/
OS_VA1: 20/
OS_VA3: 20/
OS_VA3: 20/
OU_VA: 20/
OU_VA: 20/
OS_VA2: 20/
OS_VA2: 20/
OD_VA3: 20/
OD_VA1: 20/
OD_VA2: 20/

## 2019-05-20 ASSESSMENT — SPHEQUIV_DERIVED
OS_SPHEQUIV: -0.125
OD_SPHEQUIV: 0.375

## 2019-05-20 ASSESSMENT — REFRACTION_CURRENTRX
OD_OVR_VA: 20/
OD_OVR_VA: 20/
OS_OVR_VA: 20/
OD_OVR_VA: 20/

## 2019-05-20 ASSESSMENT — VISUAL ACUITY
OS_BCVA: 20/80
OD_BCVA: 20/60-1

## 2019-05-30 ENCOUNTER — RX ONLY (RX ONLY)
Age: 84
End: 2019-05-30

## 2019-05-30 ENCOUNTER — DOCTOR'S OFFICE (OUTPATIENT)
Dept: URBAN - NONMETROPOLITAN AREA CLINIC 1 | Facility: CLINIC | Age: 84
Setting detail: OPHTHALMOLOGY
End: 2019-05-30
Payer: COMMERCIAL

## 2019-05-30 DIAGNOSIS — H35.3211: ICD-10-CM

## 2019-05-30 DIAGNOSIS — H35.3231: ICD-10-CM

## 2019-05-30 DIAGNOSIS — H35.3221: ICD-10-CM

## 2019-05-30 PROCEDURE — 67028 INJECTION EYE DRUG: CPT | Performed by: OPHTHALMOLOGY

## 2019-05-30 PROCEDURE — 99024 POSTOP FOLLOW-UP VISIT: CPT | Performed by: OPHTHALMOLOGY

## 2019-05-30 ASSESSMENT — VISUAL ACUITY
OD_BCVA: 20/60-1
OS_BCVA: 20/80

## 2019-05-30 ASSESSMENT — REFRACTION_MANIFEST
OD_VA2: 20/
OS_VA1: 20/
OS_VA2: 20/
OD_VA3: 20/
OU_VA: 20/
OD_VA1: 20/
OS_VA3: 20/
OU_VA: 20/
OD_VA3: 20/
OS_VA1: 20/
OD_VA1: 20/
OS_VA3: 20/
OS_VA2: 20/
OD_VA2: 20/

## 2019-05-30 ASSESSMENT — REFRACTION_CURRENTRX
OD_OVR_VA: 20/
OD_OVR_VA: 20/
OS_OVR_VA: 20/
OD_OVR_VA: 20/

## 2019-05-30 ASSESSMENT — SPHEQUIV_DERIVED
OS_SPHEQUIV: -0.125
OD_SPHEQUIV: 0.375

## 2019-05-30 ASSESSMENT — REFRACTION_AUTOREFRACTION
OS_CYLINDER: -2.25
OS_AXIS: 26
OS_SPHERE: +1.00
OD_SPHERE: +1.00
OD_CYLINDER: -1.25
OD_AXIS: 157

## 2019-06-10 ENCOUNTER — DOCTOR'S OFFICE (OUTPATIENT)
Dept: URBAN - NONMETROPOLITAN AREA CLINIC 1 | Facility: CLINIC | Age: 84
Setting detail: OPHTHALMOLOGY
End: 2019-06-10
Payer: COMMERCIAL

## 2019-06-10 DIAGNOSIS — H04.121: ICD-10-CM

## 2019-06-10 DIAGNOSIS — H43.813: ICD-10-CM

## 2019-06-10 DIAGNOSIS — H35.053: ICD-10-CM

## 2019-06-10 DIAGNOSIS — H43.811: ICD-10-CM

## 2019-06-10 DIAGNOSIS — H43.812: ICD-10-CM

## 2019-06-10 DIAGNOSIS — E11.3293: ICD-10-CM

## 2019-06-10 DIAGNOSIS — H04.122: ICD-10-CM

## 2019-06-10 DIAGNOSIS — H35.3231: ICD-10-CM

## 2019-06-10 PROCEDURE — 92226 OPHTHALMOSCOPY EXT SUBSEQUENT: CPT | Performed by: OPHTHALMOLOGY

## 2019-06-10 PROCEDURE — 83861 MICROFLUID ANALY TEARS: CPT | Performed by: OPHTHALMOLOGY

## 2019-06-10 PROCEDURE — 99024 POSTOP FOLLOW-UP VISIT: CPT | Performed by: OPHTHALMOLOGY

## 2019-06-10 PROCEDURE — 92134 CPTRZ OPH DX IMG PST SGM RTA: CPT | Performed by: OPHTHALMOLOGY

## 2019-06-10 ASSESSMENT — REFRACTION_MANIFEST
OD_VA2: 20/
OD_VA3: 20/
OD_VA3: 20/
OD_VA2: 20/
OD_VA1: 20/
OU_VA: 20/
OS_VA3: 20/
OS_VA1: 20/
OS_VA2: 20/
OS_VA1: 20/
OS_VA2: 20/
OU_VA: 20/
OS_VA3: 20/
OD_VA1: 20/

## 2019-06-10 ASSESSMENT — CORNEAL DYSTROPHY: OD_DYSTROPHY: +VE ARCUS SENILIS

## 2019-06-10 ASSESSMENT — REFRACTION_CURRENTRX
OD_OVR_VA: 20/
OS_OVR_VA: 20/
OS_OVR_VA: 20/
OD_OVR_VA: 20/
OD_OVR_VA: 20/
OS_OVR_VA: 20/

## 2019-06-10 ASSESSMENT — VISUAL ACUITY
OD_BCVA: 20/60-2
OS_BCVA: 20/100+1

## 2019-06-10 ASSESSMENT — SPHEQUIV_DERIVED
OS_SPHEQUIV: -0.125
OD_SPHEQUIV: 0.375

## 2019-06-10 ASSESSMENT — REFRACTION_AUTOREFRACTION
OD_CYLINDER: -1.25
OS_CYLINDER: -2.25
OD_AXIS: 157
OS_AXIS: 26
OD_SPHERE: +1.00
OS_SPHERE: +1.00

## 2019-06-10 ASSESSMENT — CONFRONTATIONAL VISUAL FIELD TEST (CVF)
OS_FINDINGS: FULL
OD_FINDINGS: FULL

## 2019-07-15 ENCOUNTER — RX ONLY (RX ONLY)
Age: 84
End: 2019-07-15

## 2019-07-15 ENCOUNTER — DOCTOR'S OFFICE (OUTPATIENT)
Dept: URBAN - NONMETROPOLITAN AREA CLINIC 1 | Facility: CLINIC | Age: 84
Setting detail: OPHTHALMOLOGY
End: 2019-07-15
Payer: COMMERCIAL

## 2019-07-15 DIAGNOSIS — H35.053: ICD-10-CM

## 2019-07-15 DIAGNOSIS — H35.3231: ICD-10-CM

## 2019-07-15 DIAGNOSIS — H35.3211: ICD-10-CM

## 2019-07-15 DIAGNOSIS — H43.812: ICD-10-CM

## 2019-07-15 DIAGNOSIS — E11.3293: ICD-10-CM

## 2019-07-15 DIAGNOSIS — H43.811: ICD-10-CM

## 2019-07-15 PROCEDURE — 92226 OPHTHALMOSCOPY EXT SUBSEQUENT: CPT | Performed by: OPHTHALMOLOGY

## 2019-07-15 PROCEDURE — 99024 POSTOP FOLLOW-UP VISIT: CPT | Performed by: OPHTHALMOLOGY

## 2019-07-15 PROCEDURE — 67028 INJECTION EYE DRUG: CPT | Performed by: OPHTHALMOLOGY

## 2019-07-15 PROCEDURE — 92240 ICG ANGIOGRAPHY I&R UNI/BI: CPT | Performed by: OPHTHALMOLOGY

## 2019-07-15 ASSESSMENT — REFRACTION_MANIFEST
OD_VA1: 20/
OD_VA1: 20/
OS_VA1: 20/
OS_VA2: 20/
OD_VA2: 20/
OD_VA2: 20/
OD_VA3: 20/
OS_VA1: 20/
OS_VA2: 20/
OD_VA3: 20/
OS_VA3: 20/
OU_VA: 20/
OS_VA3: 20/
OU_VA: 20/

## 2019-07-15 ASSESSMENT — REFRACTION_CURRENTRX
OS_OVR_VA: 20/
OD_OVR_VA: 20/
OS_OVR_VA: 20/
OS_OVR_VA: 20/

## 2019-07-15 ASSESSMENT — REFRACTION_AUTOREFRACTION
OD_SPHERE: +1.00
OD_CYLINDER: -1.25
OD_AXIS: 157
OS_AXIS: 26
OS_CYLINDER: -2.25
OS_SPHERE: +1.00

## 2019-07-15 ASSESSMENT — CONFRONTATIONAL VISUAL FIELD TEST (CVF)
OD_FINDINGS: FULL
OS_FINDINGS: FULL

## 2019-07-15 ASSESSMENT — SPHEQUIV_DERIVED
OS_SPHEQUIV: -0.125
OD_SPHEQUIV: 0.375

## 2019-07-15 ASSESSMENT — VISUAL ACUITY
OS_BCVA: 20/150+1
OD_BCVA: 20/30-2

## 2019-07-15 ASSESSMENT — CORNEAL DYSTROPHY: OD_DYSTROPHY: +VE ARCUS SENILIS

## 2019-07-22 ENCOUNTER — DOCTOR'S OFFICE (OUTPATIENT)
Dept: URBAN - NONMETROPOLITAN AREA CLINIC 1 | Facility: CLINIC | Age: 84
Setting detail: OPHTHALMOLOGY
End: 2019-07-22
Payer: COMMERCIAL

## 2019-07-22 DIAGNOSIS — H35.3221: ICD-10-CM

## 2019-07-22 PROCEDURE — 67028 INJECTION EYE DRUG: CPT | Performed by: OPHTHALMOLOGY

## 2019-07-22 ASSESSMENT — REFRACTION_AUTOREFRACTION
OD_CYLINDER: -1.25
OS_AXIS: 26
OD_AXIS: 157
OS_SPHERE: +1.00
OD_SPHERE: +1.00
OS_CYLINDER: -2.25

## 2019-07-22 ASSESSMENT — REFRACTION_MANIFEST
OS_VA2: 20/
OD_VA2: 20/
OD_VA3: 20/
OS_VA1: 20/
OD_VA1: 20/
OS_VA3: 20/
OU_VA: 20/
OD_VA3: 20/
OS_VA3: 20/
OU_VA: 20/
OD_VA1: 20/
OD_VA2: 20/
OS_VA2: 20/
OS_VA1: 20/

## 2019-07-22 ASSESSMENT — SPHEQUIV_DERIVED
OD_SPHEQUIV: 0.375
OS_SPHEQUIV: -0.125

## 2019-07-22 ASSESSMENT — REFRACTION_CURRENTRX
OD_OVR_VA: 20/
OS_OVR_VA: 20/
OD_OVR_VA: 20/
OD_OVR_VA: 20/

## 2019-07-22 ASSESSMENT — VISUAL ACUITY
OS_BCVA: 20/150+1
OD_BCVA: 20/30-2

## 2019-08-15 ENCOUNTER — DOCTOR'S OFFICE (OUTPATIENT)
Dept: URBAN - NONMETROPOLITAN AREA CLINIC 1 | Facility: CLINIC | Age: 84
Setting detail: OPHTHALMOLOGY
End: 2019-08-15
Payer: COMMERCIAL

## 2019-08-15 DIAGNOSIS — H35.053: ICD-10-CM

## 2019-08-15 DIAGNOSIS — H43.811: ICD-10-CM

## 2019-08-15 DIAGNOSIS — H35.3231: ICD-10-CM

## 2019-08-15 DIAGNOSIS — H43.813: ICD-10-CM

## 2019-08-15 DIAGNOSIS — H43.812: ICD-10-CM

## 2019-08-15 DIAGNOSIS — E11.3293: ICD-10-CM

## 2019-08-15 PROCEDURE — 92226 OPHTHALMOSCOPY EXT SUBSEQUENT: CPT | Performed by: OPHTHALMOLOGY

## 2019-08-15 PROCEDURE — 92014 COMPRE OPH EXAM EST PT 1/>: CPT | Performed by: OPHTHALMOLOGY

## 2019-08-15 PROCEDURE — 92134 CPTRZ OPH DX IMG PST SGM RTA: CPT | Performed by: OPHTHALMOLOGY

## 2019-08-15 ASSESSMENT — CONFRONTATIONAL VISUAL FIELD TEST (CVF)
OS_FINDINGS: FULL
OD_FINDINGS: FULL

## 2019-08-15 ASSESSMENT — REFRACTION_MANIFEST
OD_VA1: 20/
OS_VA1: 20/
OD_VA2: 20/
OD_VA3: 20/
OD_VA1: 20/
OS_VA2: 20/
OS_VA1: 20/
OS_VA3: 20/
OD_VA3: 20/
OU_VA: 20/
OS_VA2: 20/
OS_VA3: 20/
OU_VA: 20/
OD_VA2: 20/

## 2019-08-15 ASSESSMENT — REFRACTION_CURRENTRX
OS_OVR_VA: 20/
OD_OVR_VA: 20/
OS_OVR_VA: 20/
OD_OVR_VA: 20/
OD_OVR_VA: 20/
OS_OVR_VA: 20/

## 2019-08-15 ASSESSMENT — REFRACTION_AUTOREFRACTION
OD_SPHERE: +1.00
OS_AXIS: 26
OS_CYLINDER: -2.25
OS_SPHERE: +1.00
OD_CYLINDER: -1.25
OD_AXIS: 157

## 2019-08-15 ASSESSMENT — SPHEQUIV_DERIVED
OS_SPHEQUIV: -0.125
OD_SPHEQUIV: 0.375

## 2019-08-15 ASSESSMENT — VISUAL ACUITY
OS_BCVA: 20/150+1
OD_BCVA: 20/30-2

## 2019-08-15 ASSESSMENT — CORNEAL DYSTROPHY: OD_DYSTROPHY: +VE ARCUS SENILIS

## 2019-08-26 ENCOUNTER — DOCTOR'S OFFICE (OUTPATIENT)
Dept: URBAN - NONMETROPOLITAN AREA CLINIC 1 | Facility: CLINIC | Age: 84
Setting detail: OPHTHALMOLOGY
End: 2019-08-26
Payer: COMMERCIAL

## 2019-08-26 DIAGNOSIS — H35.3211: ICD-10-CM

## 2019-08-26 PROCEDURE — 67028 INJECTION EYE DRUG: CPT | Performed by: PHYSICIAN ASSISTANT

## 2019-08-27 ENCOUNTER — RX ONLY (RX ONLY)
Age: 84
End: 2019-08-27

## 2019-08-27 ASSESSMENT — REFRACTION_CURRENTRX
OS_OVR_VA: 20/
OD_OVR_VA: 20/
OS_OVR_VA: 20/
OS_OVR_VA: 20/
OD_OVR_VA: 20/
OD_OVR_VA: 20/

## 2019-08-27 ASSESSMENT — REFRACTION_MANIFEST
OS_VA3: 20/
OS_VA2: 20/
OD_VA1: 20/
OD_VA3: 20/
OU_VA: 20/
OS_VA2: 20/
OS_VA3: 20/
OD_VA2: 20/
OU_VA: 20/
OS_VA1: 20/
OS_VA1: 20/
OD_VA2: 20/
OD_VA3: 20/
OD_VA1: 20/

## 2019-08-27 ASSESSMENT — SPHEQUIV_DERIVED
OS_SPHEQUIV: -0.125
OD_SPHEQUIV: 0.375

## 2019-08-27 ASSESSMENT — VISUAL ACUITY
OD_BCVA: 20/30-2
OS_BCVA: 20/150+1

## 2019-08-27 ASSESSMENT — REFRACTION_AUTOREFRACTION
OD_CYLINDER: -1.25
OD_SPHERE: +1.00
OS_CYLINDER: -2.25
OS_SPHERE: +1.00
OD_AXIS: 157
OS_AXIS: 26

## 2019-08-29 ENCOUNTER — DOCTOR'S OFFICE (OUTPATIENT)
Dept: URBAN - NONMETROPOLITAN AREA CLINIC 1 | Facility: CLINIC | Age: 84
Setting detail: OPHTHALMOLOGY
End: 2019-08-29
Payer: COMMERCIAL

## 2019-08-29 DIAGNOSIS — H35.3221: ICD-10-CM

## 2019-08-29 PROCEDURE — 67028 INJECTION EYE DRUG: CPT | Performed by: PHYSICIAN ASSISTANT

## 2019-09-03 ENCOUNTER — RX ONLY (RX ONLY)
Age: 84
End: 2019-09-03

## 2019-09-03 ASSESSMENT — REFRACTION_CURRENTRX
OS_OVR_VA: 20/
OD_OVR_VA: 20/
OS_OVR_VA: 20/
OS_OVR_VA: 20/

## 2019-09-03 ASSESSMENT — REFRACTION_MANIFEST
OS_VA1: 20/
OS_VA2: 20/
OU_VA: 20/
OD_VA2: 20/
OS_VA3: 20/
OS_VA1: 20/
OS_VA3: 20/
OD_VA1: 20/
OD_VA1: 20/
OD_VA3: 20/
OS_VA2: 20/
OU_VA: 20/
OD_VA3: 20/
OD_VA2: 20/

## 2019-09-03 ASSESSMENT — VISUAL ACUITY
OS_BCVA: 20/150+1
OD_BCVA: 20/30-2

## 2019-09-03 ASSESSMENT — REFRACTION_AUTOREFRACTION
OD_CYLINDER: -1.25
OS_SPHERE: +1.00
OS_CYLINDER: -2.25
OD_AXIS: 157
OS_AXIS: 26
OD_SPHERE: +1.00

## 2019-09-03 ASSESSMENT — SPHEQUIV_DERIVED
OS_SPHEQUIV: -0.125
OD_SPHEQUIV: 0.375

## 2019-09-19 ENCOUNTER — DOCTOR'S OFFICE (OUTPATIENT)
Dept: URBAN - NONMETROPOLITAN AREA CLINIC 1 | Facility: CLINIC | Age: 84
Setting detail: OPHTHALMOLOGY
End: 2019-09-19
Payer: COMMERCIAL

## 2019-09-19 DIAGNOSIS — H04.121: ICD-10-CM

## 2019-09-19 DIAGNOSIS — H43.812: ICD-10-CM

## 2019-09-19 DIAGNOSIS — H35.3231: ICD-10-CM

## 2019-09-19 DIAGNOSIS — H04.122: ICD-10-CM

## 2019-09-19 DIAGNOSIS — H35.053: ICD-10-CM

## 2019-09-19 DIAGNOSIS — H43.811: ICD-10-CM

## 2019-09-19 DIAGNOSIS — H43.813: ICD-10-CM

## 2019-09-19 PROCEDURE — 83861 MICROFLUID ANALY TEARS: CPT | Performed by: OPHTHALMOLOGY

## 2019-09-19 PROCEDURE — 92014 COMPRE OPH EXAM EST PT 1/>: CPT | Performed by: OPHTHALMOLOGY

## 2019-09-19 PROCEDURE — 92134 CPTRZ OPH DX IMG PST SGM RTA: CPT | Performed by: OPHTHALMOLOGY

## 2019-09-19 PROCEDURE — 92226 OPHTHALMOSCOPY EXT SUBSEQUENT: CPT | Performed by: OPHTHALMOLOGY

## 2019-09-19 ASSESSMENT — REFRACTION_CURRENTRX
OD_OVR_VA: 20/
OD_OVR_VA: 20/
OS_OVR_VA: 20/
OS_OVR_VA: 20/
OD_OVR_VA: 20/
OS_OVR_VA: 20/

## 2019-09-19 ASSESSMENT — CONFRONTATIONAL VISUAL FIELD TEST (CVF)
OD_FINDINGS: FULL
OS_FINDINGS: FULL

## 2019-09-19 ASSESSMENT — REFRACTION_MANIFEST
OD_VA3: 20/
OS_VA1: 20/
OU_VA: 20/
OS_VA1: 20/
OD_VA1: 20/
OD_VA1: 20/
OS_VA3: 20/
OS_VA2: 20/
OD_VA2: 20/
OU_VA: 20/
OD_VA2: 20/
OS_VA2: 20/
OD_VA3: 20/
OS_VA3: 20/

## 2019-09-19 ASSESSMENT — REFRACTION_AUTOREFRACTION
OD_CYLINDER: -1.25
OD_AXIS: 157
OD_SPHERE: +1.00
OS_CYLINDER: -2.25
OS_AXIS: 26
OS_SPHERE: +1.00

## 2019-09-19 ASSESSMENT — VISUAL ACUITY
OD_BCVA: 20/402
OS_BCVA: 20/100-1

## 2019-09-19 ASSESSMENT — SPHEQUIV_DERIVED
OS_SPHEQUIV: -0.125
OD_SPHEQUIV: 0.375

## 2019-09-19 ASSESSMENT — CORNEAL DYSTROPHY: OD_DYSTROPHY: +VE ARCUS SENILIS

## 2019-09-23 ENCOUNTER — DOCTOR'S OFFICE (OUTPATIENT)
Dept: URBAN - NONMETROPOLITAN AREA CLINIC 1 | Facility: CLINIC | Age: 84
Setting detail: OPHTHALMOLOGY
End: 2019-09-23
Payer: COMMERCIAL

## 2019-09-23 DIAGNOSIS — H35.3211: ICD-10-CM

## 2019-09-23 PROCEDURE — 67028 INJECTION EYE DRUG: CPT | Performed by: OPHTHALMOLOGY

## 2019-09-23 ASSESSMENT — REFRACTION_MANIFEST
OS_VA1: 20/
OU_VA: 20/
OS_VA2: 20/
OU_VA: 20/
OD_VA2: 20/
OD_VA3: 20/
OS_VA2: 20/
OS_VA3: 20/
OD_VA1: 20/
OD_VA2: 20/
OS_VA3: 20/
OS_VA1: 20/
OD_VA3: 20/
OD_VA1: 20/

## 2019-09-23 ASSESSMENT — VISUAL ACUITY
OS_BCVA: 20/100-1
OD_BCVA: 20/402

## 2019-09-23 ASSESSMENT — SPHEQUIV_DERIVED
OD_SPHEQUIV: 0.375
OS_SPHEQUIV: -0.125

## 2019-09-23 ASSESSMENT — REFRACTION_AUTOREFRACTION
OS_AXIS: 26
OS_CYLINDER: -2.25
OD_CYLINDER: -1.25
OD_SPHERE: +1.00
OS_SPHERE: +1.00
OD_AXIS: 157

## 2019-09-23 ASSESSMENT — REFRACTION_CURRENTRX
OD_OVR_VA: 20/
OS_OVR_VA: 20/
OD_OVR_VA: 20/
OD_OVR_VA: 20/

## 2019-09-30 ENCOUNTER — DOCTOR'S OFFICE (OUTPATIENT)
Dept: URBAN - NONMETROPOLITAN AREA CLINIC 1 | Facility: CLINIC | Age: 84
Setting detail: OPHTHALMOLOGY
End: 2019-09-30
Payer: COMMERCIAL

## 2019-09-30 ENCOUNTER — RX ONLY (RX ONLY)
Age: 84
End: 2019-09-30

## 2019-09-30 DIAGNOSIS — H35.3221: ICD-10-CM

## 2019-09-30 PROCEDURE — 67028 INJECTION EYE DRUG: CPT | Performed by: OPHTHALMOLOGY

## 2019-09-30 ASSESSMENT — SPHEQUIV_DERIVED
OS_SPHEQUIV: -0.125
OD_SPHEQUIV: 0.375

## 2019-09-30 ASSESSMENT — REFRACTION_CURRENTRX
OS_OVR_VA: 20/
OD_OVR_VA: 20/
OS_OVR_VA: 20/
OS_OVR_VA: 20/
OD_OVR_VA: 20/
OD_OVR_VA: 20/

## 2019-09-30 ASSESSMENT — REFRACTION_MANIFEST
OD_VA3: 20/
OS_VA2: 20/
OD_VA3: 20/
OU_VA: 20/
OS_VA1: 20/
OD_VA2: 20/
OS_VA3: 20/
OD_VA2: 20/
OD_VA1: 20/
OU_VA: 20/
OD_VA1: 20/
OS_VA2: 20/
OS_VA1: 20/
OS_VA3: 20/

## 2019-09-30 ASSESSMENT — VISUAL ACUITY
OD_BCVA: 20/402
OS_BCVA: 20/100-1

## 2019-09-30 ASSESSMENT — REFRACTION_AUTOREFRACTION
OD_AXIS: 157
OD_SPHERE: +1.00
OS_SPHERE: +1.00
OD_CYLINDER: -1.25
OS_AXIS: 26
OS_CYLINDER: -2.25

## 2019-10-17 ENCOUNTER — DOCTOR'S OFFICE (OUTPATIENT)
Dept: URBAN - NONMETROPOLITAN AREA CLINIC 1 | Facility: CLINIC | Age: 84
Setting detail: OPHTHALMOLOGY
End: 2019-10-17
Payer: COMMERCIAL

## 2019-10-17 DIAGNOSIS — H35.3231: ICD-10-CM

## 2019-10-17 DIAGNOSIS — H04.122: ICD-10-CM

## 2019-10-17 DIAGNOSIS — H43.813: ICD-10-CM

## 2019-10-17 DIAGNOSIS — H04.121: ICD-10-CM

## 2019-10-17 DIAGNOSIS — H04.123: ICD-10-CM

## 2019-10-17 DIAGNOSIS — H40.013: ICD-10-CM

## 2019-10-17 DIAGNOSIS — E11.3293: ICD-10-CM

## 2019-10-17 PROCEDURE — 83861 MICROFLUID ANALY TEARS: CPT | Performed by: OPHTHALMOLOGY

## 2019-10-17 PROCEDURE — 92014 COMPRE OPH EXAM EST PT 1/>: CPT | Performed by: OPHTHALMOLOGY

## 2019-10-17 PROCEDURE — 92134 CPTRZ OPH DX IMG PST SGM RTA: CPT | Performed by: OPHTHALMOLOGY

## 2019-10-17 ASSESSMENT — REFRACTION_MANIFEST
OD_VA2: 20/
OU_VA: 20/
OS_VA1: 20/
OD_VA2: 20/
OS_VA1: 20/
OD_VA1: 20/
OS_VA3: 20/
OS_VA2: 20/
OS_VA3: 20/
OS_VA2: 20/
OU_VA: 20/
OD_VA3: 20/
OD_VA3: 20/
OD_VA1: 20/

## 2019-10-17 ASSESSMENT — SPHEQUIV_DERIVED
OD_SPHEQUIV: 0.375
OS_SPHEQUIV: -0.125

## 2019-10-17 ASSESSMENT — CORNEAL DYSTROPHY: OD_DYSTROPHY: +VE ARCUS SENILIS

## 2019-10-17 ASSESSMENT — REFRACTION_AUTOREFRACTION
OS_CYLINDER: -2.25
OS_AXIS: 26
OD_CYLINDER: -1.25
OD_SPHERE: +1.00
OD_AXIS: 157
OS_SPHERE: +1.00

## 2019-10-17 ASSESSMENT — REFRACTION_CURRENTRX
OS_OVR_VA: 20/
OS_OVR_VA: 20/
OD_OVR_VA: 20/
OS_OVR_VA: 20/
OD_OVR_VA: 20/
OD_OVR_VA: 20/

## 2019-10-17 ASSESSMENT — VISUAL ACUITY
OD_BCVA: 20/50-2
OS_BCVA: 20/150+1

## 2019-10-17 ASSESSMENT — CONFRONTATIONAL VISUAL FIELD TEST (CVF)
OD_FINDINGS: FULL
OS_FINDINGS: FULL

## 2019-10-21 ENCOUNTER — DOCTOR'S OFFICE (OUTPATIENT)
Dept: URBAN - NONMETROPOLITAN AREA CLINIC 1 | Facility: CLINIC | Age: 84
Setting detail: OPHTHALMOLOGY
End: 2019-10-21
Payer: COMMERCIAL

## 2019-10-21 DIAGNOSIS — H35.3211: ICD-10-CM

## 2019-10-21 DIAGNOSIS — H35.3231: ICD-10-CM

## 2019-10-21 PROCEDURE — 92250 FUNDUS PHOTOGRAPHY W/I&R: CPT | Performed by: OPHTHALMOLOGY

## 2019-10-21 PROCEDURE — 67028 INJECTION EYE DRUG: CPT | Performed by: OPHTHALMOLOGY

## 2019-10-21 PROCEDURE — 92235 FLUORESCEIN ANGRPH MLTIFRAME: CPT | Performed by: OPHTHALMOLOGY

## 2019-10-21 ASSESSMENT — REFRACTION_MANIFEST
OS_VA2: 20/
OD_VA3: 20/
OD_VA3: 20/
OD_VA1: 20/
OS_VA1: 20/
OS_VA2: 20/
OU_VA: 20/
OS_VA3: 20/
OD_VA2: 20/
OS_VA3: 20/
OU_VA: 20/
OD_VA1: 20/
OD_VA2: 20/
OS_VA1: 20/

## 2019-10-21 ASSESSMENT — VISUAL ACUITY
OS_BCVA: 20/150+1
OD_BCVA: 20/50-2

## 2019-10-21 ASSESSMENT — REFRACTION_AUTOREFRACTION
OS_SPHERE: +1.00
OS_CYLINDER: -2.25
OS_AXIS: 26
OD_AXIS: 157
OD_SPHERE: +1.00
OD_CYLINDER: -1.25

## 2019-10-21 ASSESSMENT — REFRACTION_CURRENTRX
OS_OVR_VA: 20/
OD_OVR_VA: 20/
OD_OVR_VA: 20/
OS_OVR_VA: 20/
OD_OVR_VA: 20/
OS_OVR_VA: 20/

## 2019-10-21 ASSESSMENT — SPHEQUIV_DERIVED
OS_SPHEQUIV: -0.125
OD_SPHEQUIV: 0.375

## 2019-10-28 ENCOUNTER — DOCTOR'S OFFICE (OUTPATIENT)
Dept: URBAN - NONMETROPOLITAN AREA CLINIC 1 | Facility: CLINIC | Age: 84
Setting detail: OPHTHALMOLOGY
End: 2019-10-28
Payer: COMMERCIAL

## 2019-10-28 DIAGNOSIS — H35.3221: ICD-10-CM

## 2019-10-28 PROCEDURE — 67028 INJECTION EYE DRUG: CPT | Performed by: PHYSICIAN ASSISTANT

## 2019-10-29 ASSESSMENT — REFRACTION_CURRENTRX
OS_OVR_VA: 20/
OD_OVR_VA: 20/
OS_OVR_VA: 20/
OS_OVR_VA: 20/

## 2019-10-29 ASSESSMENT — REFRACTION_AUTOREFRACTION
OD_SPHERE: +1.00
OS_CYLINDER: -2.25
OS_SPHERE: +1.00
OD_AXIS: 157
OS_AXIS: 26
OD_CYLINDER: -1.25

## 2019-10-29 ASSESSMENT — VISUAL ACUITY
OD_BCVA: 20/50-2
OS_BCVA: 20/150+1

## 2019-10-29 ASSESSMENT — REFRACTION_MANIFEST
OS_VA2: 20/
OD_VA1: 20/
OD_VA3: 20/
OD_VA2: 20/
OS_VA1: 20/
OS_VA2: 20/
OS_VA3: 20/
OD_VA2: 20/
OD_VA3: 20/
OD_VA1: 20/
OU_VA: 20/
OU_VA: 20/
OS_VA3: 20/
OS_VA1: 20/

## 2019-10-29 ASSESSMENT — SPHEQUIV_DERIVED
OD_SPHEQUIV: 0.375
OS_SPHEQUIV: -0.125

## 2019-11-14 ENCOUNTER — DOCTOR'S OFFICE (OUTPATIENT)
Dept: URBAN - NONMETROPOLITAN AREA CLINIC 1 | Facility: CLINIC | Age: 84
Setting detail: OPHTHALMOLOGY
End: 2019-11-14
Payer: COMMERCIAL

## 2019-11-14 DIAGNOSIS — H35.3231: ICD-10-CM

## 2019-11-14 DIAGNOSIS — H04.122: ICD-10-CM

## 2019-11-14 DIAGNOSIS — H35.053: ICD-10-CM

## 2019-11-14 DIAGNOSIS — E11.3293: ICD-10-CM

## 2019-11-14 DIAGNOSIS — H04.121: ICD-10-CM

## 2019-11-14 PROCEDURE — 92014 COMPRE OPH EXAM EST PT 1/>: CPT | Performed by: OPHTHALMOLOGY

## 2019-11-14 PROCEDURE — 83861 MICROFLUID ANALY TEARS: CPT | Performed by: OPHTHALMOLOGY

## 2019-11-14 PROCEDURE — 92134 CPTRZ OPH DX IMG PST SGM RTA: CPT | Performed by: OPHTHALMOLOGY

## 2019-11-14 ASSESSMENT — REFRACTION_AUTOREFRACTION
OD_SPHERE: +1.00
OD_AXIS: 157
OS_AXIS: 26
OS_SPHERE: +1.00
OS_CYLINDER: -2.25
OD_CYLINDER: -1.25

## 2019-11-14 ASSESSMENT — VISUAL ACUITY
OS_BCVA: 20/70-2
OD_BCVA: 20/40-1

## 2019-11-14 ASSESSMENT — CONFRONTATIONAL VISUAL FIELD TEST (CVF)
OD_FINDINGS: FULL
OS_FINDINGS: FULL

## 2019-11-14 ASSESSMENT — REFRACTION_MANIFEST
OD_VA2: 20/
OD_VA3: 20/
OS_VA1: 20/
OD_VA1: 20/
OD_VA2: 20/
OS_VA3: 20/
OS_VA2: 20/
OD_VA1: 20/
OD_VA3: 20/
OU_VA: 20/
OS_VA3: 20/
OS_VA2: 20/
OU_VA: 20/
OS_VA1: 20/

## 2019-11-14 ASSESSMENT — REFRACTION_CURRENTRX
OD_OVR_VA: 20/
OS_OVR_VA: 20/
OD_OVR_VA: 20/
OD_OVR_VA: 20/

## 2019-11-14 ASSESSMENT — SPHEQUIV_DERIVED
OS_SPHEQUIV: -0.125
OD_SPHEQUIV: 0.375

## 2019-11-14 ASSESSMENT — CORNEAL DYSTROPHY: OD_DYSTROPHY: +VE ARCUS SENILIS

## 2019-11-18 ENCOUNTER — DOCTOR'S OFFICE (OUTPATIENT)
Dept: URBAN - NONMETROPOLITAN AREA CLINIC 1 | Facility: CLINIC | Age: 84
Setting detail: OPHTHALMOLOGY
End: 2019-11-18
Payer: COMMERCIAL

## 2019-11-18 DIAGNOSIS — H35.3211: ICD-10-CM

## 2019-11-18 PROCEDURE — 67028 INJECTION EYE DRUG: CPT | Performed by: PHYSICIAN ASSISTANT

## 2019-11-19 ASSESSMENT — REFRACTION_MANIFEST
OS_VA3: 20/
OS_VA1: 20/
OU_VA: 20/
OU_VA: 20/
OD_VA2: 20/
OS_VA2: 20/
OD_VA3: 20/
OD_VA1: 20/
OS_VA2: 20/
OD_VA2: 20/
OD_VA3: 20/
OS_VA1: 20/
OS_VA3: 20/
OD_VA1: 20/

## 2019-11-19 ASSESSMENT — VISUAL ACUITY
OD_BCVA: 20/40-1
OS_BCVA: 20/70-2

## 2019-11-19 ASSESSMENT — REFRACTION_CURRENTRX
OS_OVR_VA: 20/
OD_OVR_VA: 20/
OD_OVR_VA: 20/
OS_OVR_VA: 20/
OD_OVR_VA: 20/
OS_OVR_VA: 20/

## 2019-11-19 ASSESSMENT — REFRACTION_AUTOREFRACTION
OS_SPHERE: +1.00
OD_CYLINDER: -1.25
OD_AXIS: 157
OD_SPHERE: +1.00
OS_AXIS: 26
OS_CYLINDER: -2.25

## 2019-11-19 ASSESSMENT — SPHEQUIV_DERIVED
OD_SPHEQUIV: 0.375
OS_SPHEQUIV: -0.125

## 2019-11-25 ENCOUNTER — DOCTOR'S OFFICE (OUTPATIENT)
Dept: URBAN - NONMETROPOLITAN AREA CLINIC 1 | Facility: CLINIC | Age: 84
Setting detail: OPHTHALMOLOGY
End: 2019-11-25
Payer: COMMERCIAL

## 2019-11-25 DIAGNOSIS — H35.3221: ICD-10-CM

## 2019-11-25 PROCEDURE — 67028 INJECTION EYE DRUG: CPT | Performed by: PHYSICIAN ASSISTANT

## 2019-11-26 ASSESSMENT — REFRACTION_MANIFEST
OS_VA2: 20/
OS_VA1: 20/
OD_VA3: 20/
OD_VA2: 20/
OD_VA1: 20/
OD_VA1: 20/
OD_VA3: 20/
OS_VA3: 20/
OS_VA1: 20/
OU_VA: 20/
OS_VA3: 20/
OD_VA2: 20/
OU_VA: 20/
OS_VA2: 20/

## 2019-11-26 ASSESSMENT — REFRACTION_CURRENTRX
OS_OVR_VA: 20/
OS_OVR_VA: 20/
OD_OVR_VA: 20/
OD_OVR_VA: 20/
OS_OVR_VA: 20/
OD_OVR_VA: 20/

## 2019-11-26 ASSESSMENT — REFRACTION_AUTOREFRACTION
OS_AXIS: 26
OD_SPHERE: +1.00
OS_SPHERE: +1.00
OD_CYLINDER: -1.25
OD_AXIS: 157
OS_CYLINDER: -2.25

## 2019-11-26 ASSESSMENT — VISUAL ACUITY
OS_BCVA: 20/70-2
OD_BCVA: 20/40-1

## 2019-11-26 ASSESSMENT — SPHEQUIV_DERIVED
OS_SPHEQUIV: -0.125
OD_SPHEQUIV: 0.375

## 2020-01-09 ENCOUNTER — DOCTOR'S OFFICE (OUTPATIENT)
Dept: URBAN - NONMETROPOLITAN AREA CLINIC 1 | Facility: CLINIC | Age: 85
Setting detail: OPHTHALMOLOGY
End: 2020-01-09
Payer: COMMERCIAL

## 2020-01-09 VITALS — HEIGHT: 55 IN

## 2020-01-09 DIAGNOSIS — H04.121: ICD-10-CM

## 2020-01-09 DIAGNOSIS — E11.3293: ICD-10-CM

## 2020-01-09 DIAGNOSIS — H35.053: ICD-10-CM

## 2020-01-09 DIAGNOSIS — H35.3231: ICD-10-CM

## 2020-01-09 DIAGNOSIS — H04.122: ICD-10-CM

## 2020-01-09 DIAGNOSIS — H43.813: ICD-10-CM

## 2020-01-09 PROCEDURE — 92134 CPTRZ OPH DX IMG PST SGM RTA: CPT | Performed by: OPHTHALMOLOGY

## 2020-01-09 PROCEDURE — 92014 COMPRE OPH EXAM EST PT 1/>: CPT | Performed by: OPHTHALMOLOGY

## 2020-01-09 PROCEDURE — 83861 MICROFLUID ANALY TEARS: CPT | Performed by: OPHTHALMOLOGY

## 2020-01-09 ASSESSMENT — REFRACTION_AUTOREFRACTION
OD_AXIS: 157
OS_CYLINDER: -2.25
OS_SPHERE: +1.00
OD_SPHERE: +1.00
OS_AXIS: 26
OD_CYLINDER: -1.25

## 2020-01-09 ASSESSMENT — CORNEAL DYSTROPHY: OD_DYSTROPHY: +VE ARCUS SENILIS

## 2020-01-09 ASSESSMENT — SPHEQUIV_DERIVED
OS_SPHEQUIV: -0.125
OD_SPHEQUIV: 0.375

## 2020-01-09 ASSESSMENT — VISUAL ACUITY
OD_BCVA: 20/30-2
OS_BCVA: 20/100

## 2020-01-09 ASSESSMENT — CONFRONTATIONAL VISUAL FIELD TEST (CVF)
OS_FINDINGS: FULL
OD_FINDINGS: FULL

## 2020-01-16 ENCOUNTER — DOCTOR'S OFFICE (OUTPATIENT)
Dept: URBAN - NONMETROPOLITAN AREA CLINIC 1 | Facility: CLINIC | Age: 85
Setting detail: OPHTHALMOLOGY
End: 2020-01-16
Payer: COMMERCIAL

## 2020-01-16 DIAGNOSIS — H35.3211: ICD-10-CM

## 2020-01-16 DIAGNOSIS — H35.3231: ICD-10-CM

## 2020-01-16 PROCEDURE — 92240 ICG ANGIOGRAPHY I&R UNI/BI: CPT | Performed by: OPHTHALMOLOGY

## 2020-01-16 PROCEDURE — 67028 INJECTION EYE DRUG: CPT | Performed by: OPHTHALMOLOGY

## 2020-01-16 ASSESSMENT — REFRACTION_AUTOREFRACTION
OD_AXIS: 157
OS_AXIS: 26
OS_SPHERE: +1.00
OD_SPHERE: +1.00
OD_CYLINDER: -1.25
OS_CYLINDER: -2.25

## 2020-01-16 ASSESSMENT — SPHEQUIV_DERIVED
OD_SPHEQUIV: 0.375
OS_SPHEQUIV: -0.125

## 2020-01-16 ASSESSMENT — VISUAL ACUITY
OD_BCVA: 20/30-2
OS_BCVA: 20/100

## 2020-01-17 ENCOUNTER — DOCTOR'S OFFICE (OUTPATIENT)
Dept: URBAN - NONMETROPOLITAN AREA CLINIC 1 | Facility: CLINIC | Age: 85
Setting detail: OPHTHALMOLOGY
End: 2020-01-17
Payer: COMMERCIAL

## 2020-01-17 DIAGNOSIS — H35.3221: ICD-10-CM

## 2020-01-17 PROCEDURE — 67028 INJECTION EYE DRUG: CPT | Performed by: OPHTHALMOLOGY

## 2020-01-17 ASSESSMENT — VISUAL ACUITY
OD_BCVA: 20/30-2
OS_BCVA: 20/100

## 2020-01-17 ASSESSMENT — SPHEQUIV_DERIVED
OD_SPHEQUIV: 0.375
OS_SPHEQUIV: -0.125

## 2020-01-17 ASSESSMENT — REFRACTION_AUTOREFRACTION
OD_AXIS: 157
OD_SPHERE: +1.00
OS_AXIS: 26
OS_CYLINDER: -2.25
OD_CYLINDER: -1.25
OS_SPHERE: +1.00

## 2020-02-10 ENCOUNTER — DOCTOR'S OFFICE (OUTPATIENT)
Dept: URBAN - NONMETROPOLITAN AREA CLINIC 1 | Facility: CLINIC | Age: 85
Setting detail: OPHTHALMOLOGY
End: 2020-02-10
Payer: COMMERCIAL

## 2020-02-10 VITALS — HEIGHT: 55 IN

## 2020-02-10 DIAGNOSIS — H35.053: ICD-10-CM

## 2020-02-10 DIAGNOSIS — H35.3231: ICD-10-CM

## 2020-02-10 DIAGNOSIS — H04.122: ICD-10-CM

## 2020-02-10 DIAGNOSIS — H43.813: ICD-10-CM

## 2020-02-10 DIAGNOSIS — H04.121: ICD-10-CM

## 2020-02-10 DIAGNOSIS — E11.3293: ICD-10-CM

## 2020-02-10 PROCEDURE — 92134 CPTRZ OPH DX IMG PST SGM RTA: CPT | Performed by: OPHTHALMOLOGY

## 2020-02-10 PROCEDURE — 83861 MICROFLUID ANALY TEARS: CPT | Performed by: OPHTHALMOLOGY

## 2020-02-10 PROCEDURE — 92201 OPSCPY EXTND RTA DRAW UNI/BI: CPT | Performed by: OPHTHALMOLOGY

## 2020-02-10 PROCEDURE — 92014 COMPRE OPH EXAM EST PT 1/>: CPT | Performed by: OPHTHALMOLOGY

## 2020-02-10 ASSESSMENT — CONFRONTATIONAL VISUAL FIELD TEST (CVF)
OS_FINDINGS: FULL
OD_FINDINGS: FULL

## 2020-02-10 ASSESSMENT — REFRACTION_AUTOREFRACTION
OS_CYLINDER: -2.25
OS_SPHERE: +1.00
OD_SPHERE: +1.00
OD_CYLINDER: -1.25
OS_AXIS: 26
OD_AXIS: 157

## 2020-02-10 ASSESSMENT — VISUAL ACUITY
OS_BCVA: 20/150-1
OD_BCVA: 20/50-1

## 2020-02-10 ASSESSMENT — SPHEQUIV_DERIVED
OD_SPHEQUIV: 0.375
OS_SPHEQUIV: -0.125

## 2020-02-10 ASSESSMENT — CORNEAL DYSTROPHY: OD_DYSTROPHY: +VE ARCUS SENILIS

## 2020-02-13 ENCOUNTER — DOCTOR'S OFFICE (OUTPATIENT)
Dept: URBAN - NONMETROPOLITAN AREA CLINIC 1 | Facility: CLINIC | Age: 85
Setting detail: OPHTHALMOLOGY
End: 2020-02-13
Payer: COMMERCIAL

## 2020-02-13 DIAGNOSIS — H35.3211: ICD-10-CM

## 2020-02-13 DIAGNOSIS — H35.3221: ICD-10-CM

## 2020-02-13 PROCEDURE — 92235 FLUORESCEIN ANGRPH MLTIFRAME: CPT | Performed by: OPHTHALMOLOGY

## 2020-02-13 PROCEDURE — 92250 FUNDUS PHOTOGRAPHY W/I&R: CPT | Performed by: OPHTHALMOLOGY

## 2020-02-13 PROCEDURE — 67028 INJECTION EYE DRUG: CPT | Performed by: OPHTHALMOLOGY

## 2020-02-13 ASSESSMENT — SPHEQUIV_DERIVED
OS_SPHEQUIV: -0.125
OD_SPHEQUIV: 0.375

## 2020-02-13 ASSESSMENT — REFRACTION_AUTOREFRACTION
OS_SPHERE: +1.00
OD_AXIS: 157
OD_CYLINDER: -1.25
OS_CYLINDER: -2.25
OD_SPHERE: +1.00
OS_AXIS: 26

## 2020-02-13 ASSESSMENT — VISUAL ACUITY
OS_BCVA: 20/150-1
OD_BCVA: 20/50-1

## 2020-02-21 ENCOUNTER — DOCTOR'S OFFICE (OUTPATIENT)
Dept: URBAN - NONMETROPOLITAN AREA CLINIC 1 | Facility: CLINIC | Age: 85
Setting detail: OPHTHALMOLOGY
End: 2020-02-21
Payer: COMMERCIAL

## 2020-02-21 DIAGNOSIS — H35.3221: ICD-10-CM

## 2020-02-21 PROCEDURE — 67028 INJECTION EYE DRUG: CPT | Performed by: OPHTHALMOLOGY

## 2020-02-21 ASSESSMENT — REFRACTION_AUTOREFRACTION
OS_AXIS: 26
OS_SPHERE: +1.00
OD_AXIS: 157
OD_CYLINDER: -1.25
OD_SPHERE: +1.00
OS_CYLINDER: -2.25

## 2020-02-21 ASSESSMENT — SPHEQUIV_DERIVED
OS_SPHEQUIV: -0.125
OD_SPHEQUIV: 0.375

## 2020-02-21 ASSESSMENT — VISUAL ACUITY
OS_BCVA: 20/150-1
OD_BCVA: 20/50-1

## 2020-03-12 ENCOUNTER — DOCTOR'S OFFICE (OUTPATIENT)
Dept: URBAN - NONMETROPOLITAN AREA CLINIC 1 | Facility: CLINIC | Age: 85
Setting detail: OPHTHALMOLOGY
End: 2020-03-12
Payer: COMMERCIAL

## 2020-03-12 DIAGNOSIS — H35.053: ICD-10-CM

## 2020-03-12 DIAGNOSIS — H40.003: ICD-10-CM

## 2020-03-12 DIAGNOSIS — H04.122: ICD-10-CM

## 2020-03-12 DIAGNOSIS — H04.121: ICD-10-CM

## 2020-03-12 DIAGNOSIS — H43.813: ICD-10-CM

## 2020-03-12 DIAGNOSIS — H35.3231: ICD-10-CM

## 2020-03-12 DIAGNOSIS — E11.3293: ICD-10-CM

## 2020-03-12 PROCEDURE — 92014 COMPRE OPH EXAM EST PT 1/>: CPT | Performed by: OPHTHALMOLOGY

## 2020-03-12 PROCEDURE — 92134 CPTRZ OPH DX IMG PST SGM RTA: CPT | Performed by: OPHTHALMOLOGY

## 2020-03-12 PROCEDURE — 83861 MICROFLUID ANALY TEARS: CPT | Performed by: OPHTHALMOLOGY

## 2020-03-12 PROCEDURE — 92201 OPSCPY EXTND RTA DRAW UNI/BI: CPT | Performed by: OPHTHALMOLOGY

## 2020-03-12 ASSESSMENT — REFRACTION_AUTOREFRACTION
OS_SPHERE: +1.00
OS_AXIS: 26
OD_AXIS: 157
OD_CYLINDER: -1.25
OS_CYLINDER: -2.25
OD_SPHERE: +1.00

## 2020-03-12 ASSESSMENT — VISUAL ACUITY
OD_BCVA: 20/70
OS_BCVA: 20/70-2

## 2020-03-12 ASSESSMENT — CORNEAL DYSTROPHY: OD_DYSTROPHY: +VE ARCUS SENILIS

## 2020-03-12 ASSESSMENT — SPHEQUIV_DERIVED
OS_SPHEQUIV: -0.125
OD_SPHEQUIV: 0.375

## 2020-03-12 ASSESSMENT — CONFRONTATIONAL VISUAL FIELD TEST (CVF)
OS_FINDINGS: FULL
OD_FINDINGS: FULL

## 2020-03-19 ENCOUNTER — DOCTOR'S OFFICE (OUTPATIENT)
Dept: URBAN - NONMETROPOLITAN AREA CLINIC 1 | Facility: CLINIC | Age: 85
Setting detail: OPHTHALMOLOGY
End: 2020-03-19
Payer: COMMERCIAL

## 2020-03-19 DIAGNOSIS — H35.3211: ICD-10-CM

## 2020-03-19 PROCEDURE — 67028 INJECTION EYE DRUG: CPT | Performed by: PHYSICIAN ASSISTANT

## 2020-03-20 ASSESSMENT — REFRACTION_AUTOREFRACTION
OS_AXIS: 26
OD_AXIS: 157
OS_SPHERE: +1.00
OD_CYLINDER: -1.25
OD_SPHERE: +1.00
OS_CYLINDER: -2.25

## 2020-03-20 ASSESSMENT — SPHEQUIV_DERIVED
OD_SPHEQUIV: 0.375
OS_SPHEQUIV: -0.125

## 2020-03-20 ASSESSMENT — VISUAL ACUITY
OS_BCVA: 20/70-2
OD_BCVA: 20/70

## 2020-03-26 ENCOUNTER — RX ONLY (RX ONLY)
Age: 85
End: 2020-03-26

## 2020-03-26 ENCOUNTER — DOCTOR'S OFFICE (OUTPATIENT)
Dept: URBAN - NONMETROPOLITAN AREA CLINIC 1 | Facility: CLINIC | Age: 85
Setting detail: OPHTHALMOLOGY
End: 2020-03-26
Payer: COMMERCIAL

## 2020-03-26 DIAGNOSIS — H35.3221: ICD-10-CM

## 2020-03-26 PROCEDURE — 67028 INJECTION EYE DRUG: CPT | Performed by: OPHTHALMOLOGY

## 2020-03-26 ASSESSMENT — REFRACTION_AUTOREFRACTION
OD_CYLINDER: -1.25
OS_CYLINDER: -2.25
OD_AXIS: 157
OS_AXIS: 26
OS_SPHERE: +1.00
OD_SPHERE: +1.00

## 2020-03-26 ASSESSMENT — VISUAL ACUITY
OD_BCVA: 20/70
OS_BCVA: 20/70-2

## 2020-03-26 ASSESSMENT — SPHEQUIV_DERIVED
OD_SPHEQUIV: 0.375
OS_SPHEQUIV: -0.125

## 2020-04-09 ENCOUNTER — DOCTOR'S OFFICE (OUTPATIENT)
Dept: URBAN - NONMETROPOLITAN AREA CLINIC 1 | Facility: CLINIC | Age: 85
Setting detail: OPHTHALMOLOGY
End: 2020-04-09
Payer: COMMERCIAL

## 2020-04-09 DIAGNOSIS — H04.121: ICD-10-CM

## 2020-04-09 DIAGNOSIS — H40.003: ICD-10-CM

## 2020-04-09 DIAGNOSIS — E11.3292: ICD-10-CM

## 2020-04-09 DIAGNOSIS — H04.122: ICD-10-CM

## 2020-04-09 DIAGNOSIS — H43.812: ICD-10-CM

## 2020-04-09 DIAGNOSIS — H35.3231: ICD-10-CM

## 2020-04-09 DIAGNOSIS — H35.3211: ICD-10-CM

## 2020-04-09 DIAGNOSIS — H35.053: ICD-10-CM

## 2020-04-09 DIAGNOSIS — H43.811: ICD-10-CM

## 2020-04-09 DIAGNOSIS — E11.3291: ICD-10-CM

## 2020-04-09 DIAGNOSIS — H35.3221: ICD-10-CM

## 2020-04-09 PROCEDURE — 92201 OPSCPY EXTND RTA DRAW UNI/BI: CPT | Performed by: OPHTHALMOLOGY

## 2020-04-09 PROCEDURE — 92134 CPTRZ OPH DX IMG PST SGM RTA: CPT | Performed by: OPHTHALMOLOGY

## 2020-04-09 PROCEDURE — 92014 COMPRE OPH EXAM EST PT 1/>: CPT | Performed by: OPHTHALMOLOGY

## 2020-04-09 ASSESSMENT — REFRACTION_AUTOREFRACTION
OD_CYLINDER: -1.25
OS_SPHERE: +1.00
OS_CYLINDER: -2.25
OD_SPHERE: +1.00
OS_AXIS: 26
OD_AXIS: 157

## 2020-04-09 ASSESSMENT — CONFRONTATIONAL VISUAL FIELD TEST (CVF)
OS_FINDINGS: FULL
OD_FINDINGS: FULL

## 2020-04-09 ASSESSMENT — SPHEQUIV_DERIVED
OS_SPHEQUIV: -0.125
OD_SPHEQUIV: 0.375

## 2020-04-09 ASSESSMENT — CORNEAL DYSTROPHY: OD_DYSTROPHY: +VE ARCUS SENILIS

## 2020-04-09 ASSESSMENT — VISUAL ACUITY
OS_BCVA: 20/150-1
OD_BCVA: 20/70+1

## 2020-04-16 ENCOUNTER — DOCTOR'S OFFICE (OUTPATIENT)
Dept: URBAN - NONMETROPOLITAN AREA CLINIC 1 | Facility: CLINIC | Age: 85
Setting detail: OPHTHALMOLOGY
End: 2020-04-16
Payer: COMMERCIAL

## 2020-04-16 ENCOUNTER — RX ONLY (RX ONLY)
Age: 85
End: 2020-04-16

## 2020-04-16 DIAGNOSIS — H35.3221: ICD-10-CM

## 2020-04-16 DIAGNOSIS — H35.3231: ICD-10-CM

## 2020-04-16 DIAGNOSIS — H35.3211: ICD-10-CM

## 2020-04-16 PROCEDURE — 92240 ICG ANGIOGRAPHY I&R UNI/BI: CPT | Performed by: OPHTHALMOLOGY

## 2020-04-16 PROCEDURE — 67028 INJECTION EYE DRUG: CPT | Performed by: OPHTHALMOLOGY

## 2020-04-16 ASSESSMENT — SPHEQUIV_DERIVED
OS_SPHEQUIV: -0.125
OD_SPHEQUIV: 0.375

## 2020-04-16 ASSESSMENT — VISUAL ACUITY
OS_BCVA: 20/150-1
OD_BCVA: 20/70+1

## 2020-04-16 ASSESSMENT — REFRACTION_AUTOREFRACTION
OD_SPHERE: +1.00
OD_AXIS: 157
OS_AXIS: 26
OS_SPHERE: +1.00
OD_CYLINDER: -1.25
OS_CYLINDER: -2.25

## 2020-04-23 ENCOUNTER — DOCTOR'S OFFICE (OUTPATIENT)
Dept: URBAN - NONMETROPOLITAN AREA CLINIC 1 | Facility: CLINIC | Age: 85
Setting detail: OPHTHALMOLOGY
End: 2020-04-23
Payer: COMMERCIAL

## 2020-04-23 DIAGNOSIS — H35.3221: ICD-10-CM

## 2020-04-23 PROCEDURE — 67028 INJECTION EYE DRUG: CPT | Performed by: OPHTHALMOLOGY

## 2020-04-23 ASSESSMENT — VISUAL ACUITY
OS_BCVA: 20/150-1
OD_BCVA: 20/70+1

## 2020-04-23 ASSESSMENT — REFRACTION_AUTOREFRACTION
OS_AXIS: 26
OS_SPHERE: +1.00
OD_AXIS: 157
OS_CYLINDER: -2.25
OD_SPHERE: +1.00
OD_CYLINDER: -1.25

## 2020-04-23 ASSESSMENT — SPHEQUIV_DERIVED
OD_SPHEQUIV: 0.375
OS_SPHEQUIV: -0.125

## 2020-06-05 ENCOUNTER — DOCTOR'S OFFICE (OUTPATIENT)
Dept: URBAN - NONMETROPOLITAN AREA CLINIC 1 | Facility: CLINIC | Age: 85
Setting detail: OPHTHALMOLOGY
End: 2020-06-05
Payer: COMMERCIAL

## 2020-06-05 VITALS — HEIGHT: 55 IN

## 2020-06-05 DIAGNOSIS — H35.3231: ICD-10-CM

## 2020-06-05 DIAGNOSIS — E11.3211: ICD-10-CM

## 2020-06-05 DIAGNOSIS — H35.053: ICD-10-CM

## 2020-06-05 DIAGNOSIS — H43.813: ICD-10-CM

## 2020-06-05 PROCEDURE — 92014 COMPRE OPH EXAM EST PT 1/>: CPT | Performed by: OPHTHALMOLOGY

## 2020-06-05 PROCEDURE — 92134 CPTRZ OPH DX IMG PST SGM RTA: CPT | Performed by: OPHTHALMOLOGY

## 2020-06-05 PROCEDURE — 92201 OPSCPY EXTND RTA DRAW UNI/BI: CPT | Performed by: OPHTHALMOLOGY

## 2020-06-05 ASSESSMENT — REFRACTION_AUTOREFRACTION
OD_AXIS: 157
OS_AXIS: 26
OD_SPHERE: +1.00
OS_SPHERE: +1.00
OS_CYLINDER: -2.25
OD_CYLINDER: -1.25

## 2020-06-05 ASSESSMENT — CORNEAL DYSTROPHY: OD_DYSTROPHY: +VE ARCUS SENILIS

## 2020-06-05 ASSESSMENT — SPHEQUIV_DERIVED
OS_SPHEQUIV: -0.125
OD_SPHEQUIV: 0.375

## 2020-06-05 ASSESSMENT — VISUAL ACUITY
OS_BCVA: 20/70
OD_BCVA: 20/60-2

## 2020-06-05 ASSESSMENT — CONFRONTATIONAL VISUAL FIELD TEST (CVF)
OD_FINDINGS: FULL
OS_FINDINGS: FULL

## 2020-06-15 ENCOUNTER — RX ONLY (RX ONLY)
Age: 85
End: 2020-06-15

## 2020-06-15 ENCOUNTER — DOCTOR'S OFFICE (OUTPATIENT)
Dept: URBAN - NONMETROPOLITAN AREA CLINIC 1 | Facility: CLINIC | Age: 85
Setting detail: OPHTHALMOLOGY
End: 2020-06-15
Payer: COMMERCIAL

## 2020-06-15 DIAGNOSIS — H35.053: ICD-10-CM

## 2020-06-15 DIAGNOSIS — H35.3211: ICD-10-CM

## 2020-06-15 DIAGNOSIS — H43.813: ICD-10-CM

## 2020-06-15 DIAGNOSIS — H35.3221: ICD-10-CM

## 2020-06-15 PROCEDURE — 92235 FLUORESCEIN ANGRPH MLTIFRAME: CPT | Performed by: OPHTHALMOLOGY

## 2020-06-15 PROCEDURE — 92250 FUNDUS PHOTOGRAPHY W/I&R: CPT | Performed by: OPHTHALMOLOGY

## 2020-06-15 PROCEDURE — 67028 INJECTION EYE DRUG: CPT | Performed by: OPHTHALMOLOGY

## 2020-06-15 ASSESSMENT — SPHEQUIV_DERIVED
OS_SPHEQUIV: -0.125
OD_SPHEQUIV: 0.375

## 2020-06-15 ASSESSMENT — REFRACTION_AUTOREFRACTION
OS_AXIS: 26
OS_CYLINDER: -2.25
OD_AXIS: 157
OD_CYLINDER: -1.25
OD_SPHERE: +1.00
OS_SPHERE: +1.00

## 2020-06-15 ASSESSMENT — VISUAL ACUITY
OS_BCVA: 20/80-1
OD_BCVA: 20/60-2

## 2020-06-18 ENCOUNTER — RX ONLY (RX ONLY)
Age: 85
End: 2020-06-18

## 2020-06-18 ENCOUNTER — DOCTOR'S OFFICE (OUTPATIENT)
Dept: URBAN - NONMETROPOLITAN AREA CLINIC 1 | Facility: CLINIC | Age: 85
Setting detail: OPHTHALMOLOGY
End: 2020-06-18
Payer: COMMERCIAL

## 2020-06-18 DIAGNOSIS — H35.3221: ICD-10-CM

## 2020-06-18 PROBLEM — H04.122 DRY EYE OU; RIGHT EYE, LEFT EYE: Status: ACTIVE | Noted: 2017-04-03

## 2020-06-18 PROBLEM — H27.8: Status: ACTIVE | Noted: 2017-04-03

## 2020-06-18 PROBLEM — H35.3231: Status: ACTIVE | Noted: 2020-03-26

## 2020-06-18 PROBLEM — H04.121 DRY EYE OU; RIGHT EYE, LEFT EYE: Status: ACTIVE | Noted: 2017-04-03

## 2020-06-18 PROBLEM — H43.813 POSTERIOR VITREOUS DETACHMENT; BOTH EYES: Status: ACTIVE | Noted: 2018-10-05

## 2020-06-18 PROBLEM — E11.3211 DM TYPE 2; RIGHT MILD WITH ME, LEFT MILD WITH ME: Status: ACTIVE | Noted: 2020-04-09

## 2020-06-18 PROBLEM — H35.3211: Status: ACTIVE | Noted: 2020-03-26

## 2020-06-18 PROBLEM — H40.003 GLAUCOMA SUSPECT OU ; BOTH EYES: Status: ACTIVE | Noted: 2017-04-03

## 2020-06-18 PROBLEM — H43.812 POST VITREOUS DETACHMENT; RIGHT EYE, LEFT EYE: Status: ACTIVE | Noted: 2017-11-16

## 2020-06-18 PROBLEM — H43.811 POST VITREOUS DETACHMENT; RIGHT EYE, LEFT EYE: Status: ACTIVE | Noted: 2017-11-16

## 2020-06-18 PROBLEM — E11.3212 DM TYPE 2; RIGHT MILD WITH ME, LEFT MILD WITH ME: Status: ACTIVE | Noted: 2020-04-09

## 2020-06-18 PROBLEM — H35.053 CHOROIDAL NEOVASCULARIZATION; ,, BOTH EYES: Status: ACTIVE | Noted: 2018-09-17

## 2020-06-18 PROCEDURE — 67028 INJECTION EYE DRUG: CPT | Performed by: OPHTHALMOLOGY

## 2020-06-18 ASSESSMENT — VISUAL ACUITY
OS_BCVA: 20/80-1
OD_BCVA: 20/60-2

## 2020-06-18 ASSESSMENT — REFRACTION_AUTOREFRACTION
OS_CYLINDER: -2.25
OD_AXIS: 157
OD_SPHERE: +1.00
OS_AXIS: 26
OS_SPHERE: +1.00
OD_CYLINDER: -1.25

## 2020-06-18 ASSESSMENT — SPHEQUIV_DERIVED
OD_SPHEQUIV: 0.375
OS_SPHEQUIV: -0.125

## 2022-12-23 ENCOUNTER — HOSPITAL ENCOUNTER (INPATIENT)
Facility: HOSPITAL | Age: 87
LOS: 2 days | Discharge: HOME | End: 2022-12-25
Attending: EMERGENCY MEDICINE | Admitting: INTERNAL MEDICINE

## 2022-12-23 ENCOUNTER — APPOINTMENT (EMERGENCY)
Dept: RADIOLOGY | Facility: HOSPITAL | Age: 87
End: 2022-12-23

## 2022-12-23 DIAGNOSIS — N30.90 CYSTITIS: ICD-10-CM

## 2022-12-23 DIAGNOSIS — R53.1 GENERALIZED WEAKNESS: ICD-10-CM

## 2022-12-23 DIAGNOSIS — B33.8 RSV (RESPIRATORY SYNCYTIAL VIRUS INFECTION): Primary | ICD-10-CM

## 2022-12-23 DIAGNOSIS — R06.02 SOB (SHORTNESS OF BREATH): ICD-10-CM

## 2022-12-23 LAB
ABO GROUP BLD: NORMAL
ABO GROUP BLD: NORMAL
ALBUMIN SERPL BCP-MCNC: 3.4 G/DL (ref 3.5–5)
ALP SERPL-CCNC: 42 U/L (ref 46–116)
ALT SERPL W P-5'-P-CCNC: 18 U/L (ref 12–78)
ANION GAP SERPL CALCULATED.3IONS-SCNC: 10 MMOL/L (ref 4–13)
APTT PPP: 30 SECONDS (ref 23–37)
AST SERPL W P-5'-P-CCNC: 17 U/L (ref 5–45)
BASE EX.OXY STD BLDV CALC-SCNC: 89.3 % (ref 60–80)
BASE EXCESS BLDV CALC-SCNC: 3.9 MMOL/L
BASOPHILS # BLD AUTO: 0.03 THOUSANDS/ÂΜL (ref 0–0.1)
BASOPHILS NFR BLD AUTO: 1 % (ref 0–1)
BILIRUB SERPL-MCNC: 0.57 MG/DL (ref 0.2–1)
BLD GP AB SCN SERPL QL: NEGATIVE
BUN SERPL-MCNC: 16 MG/DL (ref 5–25)
CALCIUM ALBUM COR SERPL-MCNC: 9.1 MG/DL (ref 8.3–10.1)
CALCIUM SERPL-MCNC: 8.6 MG/DL (ref 8.3–10.1)
CARDIAC TROPONIN I PNL SERPL HS: 12 NG/L
CHLORIDE SERPL-SCNC: 99 MMOL/L (ref 96–108)
CO2 SERPL-SCNC: 25 MMOL/L (ref 21–32)
CREAT SERPL-MCNC: 0.77 MG/DL (ref 0.6–1.3)
EOSINOPHIL # BLD AUTO: 0.02 THOUSAND/ÂΜL (ref 0–0.61)
EOSINOPHIL NFR BLD AUTO: 0 % (ref 0–6)
ERYTHROCYTE [DISTWIDTH] IN BLOOD BY AUTOMATED COUNT: 13.7 % (ref 11.6–15.1)
FLUAV RNA RESP QL NAA+PROBE: NEGATIVE
FLUBV RNA RESP QL NAA+PROBE: NEGATIVE
GFR SERPL CREATININE-BSD FRML MDRD: 69 ML/MIN/1.73SQ M
GLUCOSE SERPL-MCNC: 113 MG/DL (ref 65–140)
GLUCOSE SERPL-MCNC: 138 MG/DL (ref 65–140)
HCO3 BLDV-SCNC: 25.3 MMOL/L (ref 24–30)
HCT VFR BLD AUTO: 35.1 % (ref 34.8–46.1)
HGB BLD-MCNC: 11.6 G/DL (ref 11.5–15.4)
IMM GRANULOCYTES # BLD AUTO: 0.02 THOUSAND/UL (ref 0–0.2)
IMM GRANULOCYTES NFR BLD AUTO: 0 % (ref 0–2)
INR PPP: 1.26 (ref 0.84–1.19)
LACTATE SERPL-SCNC: 0.9 MMOL/L (ref 0.5–2)
LYMPHOCYTES # BLD AUTO: 1.18 THOUSANDS/ÂΜL (ref 0.6–4.47)
LYMPHOCYTES NFR BLD AUTO: 20 % (ref 14–44)
MAGNESIUM SERPL-MCNC: 1.4 MG/DL (ref 1.6–2.6)
MCH RBC QN AUTO: 30.1 PG (ref 26.8–34.3)
MCHC RBC AUTO-ENTMCNC: 33 G/DL (ref 31.4–37.4)
MCV RBC AUTO: 91 FL (ref 82–98)
MONOCYTES # BLD AUTO: 0.74 THOUSAND/ÂΜL (ref 0.17–1.22)
MONOCYTES NFR BLD AUTO: 12 % (ref 4–12)
NEUTROPHILS # BLD AUTO: 4.02 THOUSANDS/ÂΜL (ref 1.85–7.62)
NEUTS SEG NFR BLD AUTO: 67 % (ref 43–75)
NRBC BLD AUTO-RTO: 0 /100 WBCS
NT-PROBNP SERPL-MCNC: 754 PG/ML
O2 CT BLDV-SCNC: 16 ML/DL
PCO2 BLDV: 28.4 MM HG (ref 42–50)
PH BLDV: 7.57 [PH] (ref 7.3–7.4)
PLATELET # BLD AUTO: 168 THOUSANDS/UL (ref 149–390)
PMV BLD AUTO: 9.3 FL (ref 8.9–12.7)
PO2 BLDV: 60.9 MM HG (ref 35–45)
POTASSIUM SERPL-SCNC: 3.8 MMOL/L (ref 3.5–5.3)
PROCALCITONIN SERPL-MCNC: 0.12 NG/ML
PROT SERPL-MCNC: 6.3 G/DL (ref 6.4–8.4)
PROTHROMBIN TIME: 15.9 SECONDS (ref 11.6–14.5)
RBC # BLD AUTO: 3.85 MILLION/UL (ref 3.81–5.12)
RH BLD: POSITIVE
RH BLD: POSITIVE
RSV RNA RESP QL NAA+PROBE: POSITIVE
SARS-COV-2 RNA RESP QL NAA+PROBE: NEGATIVE
SODIUM SERPL-SCNC: 134 MMOL/L (ref 135–147)
SPECIMEN EXPIRATION DATE: NORMAL
WBC # BLD AUTO: 6.01 THOUSAND/UL (ref 4.31–10.16)

## 2022-12-23 RX ORDER — PANTOPRAZOLE SODIUM 20 MG/1
20 TABLET, DELAYED RELEASE ORAL
Status: DISCONTINUED | OUTPATIENT
Start: 2022-12-24 | End: 2022-12-25 | Stop reason: HOSPADM

## 2022-12-23 RX ORDER — SENNA PLUS 8.6 MG/1
1 TABLET ORAL 2 TIMES DAILY
COMMUNITY

## 2022-12-23 RX ORDER — PROPRANOLOL HYDROCHLORIDE 20 MG/1
TABLET ORAL EVERY 12 HOURS SCHEDULED
COMMUNITY

## 2022-12-23 RX ORDER — LORATADINE 10 MG/1
10 TABLET ORAL DAILY
COMMUNITY

## 2022-12-23 RX ORDER — MAGNESIUM SULFATE HEPTAHYDRATE 40 MG/ML
2 INJECTION, SOLUTION INTRAVENOUS ONCE
Status: COMPLETED | OUTPATIENT
Start: 2022-12-23 | End: 2022-12-23

## 2022-12-23 RX ORDER — INSULIN LISPRO 100 [IU]/ML
1-5 INJECTION, SOLUTION INTRAVENOUS; SUBCUTANEOUS
Status: DISCONTINUED | OUTPATIENT
Start: 2022-12-23 | End: 2022-12-25 | Stop reason: HOSPADM

## 2022-12-23 RX ORDER — CALCIUM CARBONATE 500(1250)
1 TABLET ORAL 2 TIMES DAILY WITH MEALS
Status: DISCONTINUED | OUTPATIENT
Start: 2022-12-24 | End: 2022-12-25 | Stop reason: HOSPADM

## 2022-12-23 RX ORDER — PROPRANOLOL HYDROCHLORIDE 20 MG/1
20 TABLET ORAL EVERY 12 HOURS SCHEDULED
Status: DISCONTINUED | OUTPATIENT
Start: 2022-12-24 | End: 2022-12-25 | Stop reason: HOSPADM

## 2022-12-23 RX ORDER — CHOLECALCIFEROL (VITAMIN D3) 125 MCG
2000 CAPSULE ORAL DAILY
COMMUNITY

## 2022-12-23 RX ORDER — ACETAMINOPHEN 325 MG/1
650 TABLET ORAL EVERY 6 HOURS PRN
Status: DISCONTINUED | OUTPATIENT
Start: 2022-12-23 | End: 2022-12-25 | Stop reason: HOSPADM

## 2022-12-23 RX ORDER — ATORVASTATIN CALCIUM 20 MG/1
20 TABLET, FILM COATED ORAL
Status: DISCONTINUED | OUTPATIENT
Start: 2022-12-24 | End: 2022-12-25 | Stop reason: HOSPADM

## 2022-12-23 RX ORDER — LORATADINE 10 MG/1
10 TABLET ORAL DAILY
Status: DISCONTINUED | OUTPATIENT
Start: 2022-12-24 | End: 2022-12-25 | Stop reason: HOSPADM

## 2022-12-23 RX ORDER — GABAPENTIN 300 MG/1
300 CAPSULE ORAL 2 TIMES DAILY
Status: DISCONTINUED | OUTPATIENT
Start: 2022-12-24 | End: 2022-12-25 | Stop reason: HOSPADM

## 2022-12-23 RX ORDER — MELATONIN
2000 DAILY
Status: DISCONTINUED | OUTPATIENT
Start: 2022-12-24 | End: 2022-12-25 | Stop reason: HOSPADM

## 2022-12-23 RX ORDER — ATORVASTATIN CALCIUM 20 MG/1
20 TABLET, FILM COATED ORAL DAILY
COMMUNITY

## 2022-12-23 RX ORDER — SENNOSIDES 8.6 MG
1 TABLET ORAL
Status: DISCONTINUED | OUTPATIENT
Start: 2022-12-24 | End: 2022-12-25 | Stop reason: HOSPADM

## 2022-12-23 RX ORDER — PANTOPRAZOLE SODIUM 20 MG/1
20 TABLET, DELAYED RELEASE ORAL DAILY
COMMUNITY

## 2022-12-23 RX ORDER — INSULIN LISPRO 100 [IU]/ML
1-5 INJECTION, SOLUTION INTRAVENOUS; SUBCUTANEOUS
Status: DISCONTINUED | OUTPATIENT
Start: 2022-12-24 | End: 2022-12-25 | Stop reason: HOSPADM

## 2022-12-23 RX ORDER — IBUPROFEN 200 MG
1 CAPSULE ORAL 2 TIMES DAILY
COMMUNITY

## 2022-12-23 RX ORDER — GABAPENTIN 300 MG/1
300 CAPSULE ORAL 2 TIMES DAILY
COMMUNITY

## 2022-12-23 RX ADMIN — MAGNESIUM SULFATE HEPTAHYDRATE 2 G: 40 INJECTION, SOLUTION INTRAVENOUS at 21:36

## 2022-12-24 PROBLEM — R53.1 GENERALIZED WEAKNESS: Status: ACTIVE | Noted: 2022-12-24

## 2022-12-24 PROBLEM — I48.0 PAF (PAROXYSMAL ATRIAL FIBRILLATION) (HCC): Status: ACTIVE | Noted: 2022-12-24

## 2022-12-24 PROBLEM — I10 PRIMARY HYPERTENSION: Status: ACTIVE | Noted: 2022-12-24

## 2022-12-24 PROBLEM — J20.5 RSV BRONCHITIS: Status: ACTIVE | Noted: 2022-12-24

## 2022-12-24 LAB
ANION GAP SERPL CALCULATED.3IONS-SCNC: 11 MMOL/L (ref 4–13)
BACTERIA UR QL AUTO: ABNORMAL /HPF
BASOPHILS # BLD AUTO: 0.02 THOUSANDS/ÂΜL (ref 0–0.1)
BASOPHILS NFR BLD AUTO: 0 % (ref 0–1)
BILIRUB UR QL STRIP: NEGATIVE
BUN SERPL-MCNC: 13 MG/DL (ref 5–25)
CALCIUM SERPL-MCNC: 8 MG/DL (ref 8.3–10.1)
CHLORIDE SERPL-SCNC: 100 MMOL/L (ref 96–108)
CLARITY UR: ABNORMAL
CO2 SERPL-SCNC: 25 MMOL/L (ref 21–32)
COLOR UR: YELLOW
CREAT SERPL-MCNC: 0.7 MG/DL (ref 0.6–1.3)
EOSINOPHIL # BLD AUTO: 0.01 THOUSAND/ÂΜL (ref 0–0.61)
EOSINOPHIL NFR BLD AUTO: 0 % (ref 0–6)
ERYTHROCYTE [DISTWIDTH] IN BLOOD BY AUTOMATED COUNT: 13.7 % (ref 11.6–15.1)
EST. AVERAGE GLUCOSE BLD GHB EST-MCNC: 117 MG/DL
GFR SERPL CREATININE-BSD FRML MDRD: 78 ML/MIN/1.73SQ M
GLUCOSE SERPL-MCNC: 121 MG/DL (ref 65–140)
GLUCOSE SERPL-MCNC: 150 MG/DL (ref 65–140)
GLUCOSE SERPL-MCNC: 176 MG/DL (ref 65–140)
GLUCOSE SERPL-MCNC: 267 MG/DL (ref 65–140)
GLUCOSE SERPL-MCNC: 96 MG/DL (ref 65–140)
GLUCOSE UR STRIP-MCNC: NEGATIVE MG/DL
HBA1C MFR BLD: 5.7 %
HCT VFR BLD AUTO: 34 % (ref 34.8–46.1)
HGB BLD-MCNC: 10.6 G/DL (ref 11.5–15.4)
HGB UR QL STRIP.AUTO: ABNORMAL
IMM GRANULOCYTES # BLD AUTO: 0.03 THOUSAND/UL (ref 0–0.2)
IMM GRANULOCYTES NFR BLD AUTO: 1 % (ref 0–2)
KETONES UR STRIP-MCNC: NEGATIVE MG/DL
LEUKOCYTE ESTERASE UR QL STRIP: ABNORMAL
LYMPHOCYTES # BLD AUTO: 1.61 THOUSANDS/ÂΜL (ref 0.6–4.47)
LYMPHOCYTES NFR BLD AUTO: 29 % (ref 14–44)
MAGNESIUM SERPL-MCNC: 1.9 MG/DL (ref 1.6–2.6)
MCH RBC QN AUTO: 29.4 PG (ref 26.8–34.3)
MCHC RBC AUTO-ENTMCNC: 31.2 G/DL (ref 31.4–37.4)
MCV RBC AUTO: 94 FL (ref 82–98)
MONOCYTES # BLD AUTO: 0.72 THOUSAND/ÂΜL (ref 0.17–1.22)
MONOCYTES NFR BLD AUTO: 13 % (ref 4–12)
NEUTROPHILS # BLD AUTO: 3.18 THOUSANDS/ÂΜL (ref 1.85–7.62)
NEUTS SEG NFR BLD AUTO: 57 % (ref 43–75)
NITRITE UR QL STRIP: POSITIVE
NON-SQ EPI CELLS URNS QL MICRO: ABNORMAL /HPF
NRBC BLD AUTO-RTO: 0 /100 WBCS
PH UR STRIP.AUTO: 6 [PH]
PLATELET # BLD AUTO: 128 THOUSANDS/UL (ref 149–390)
PMV BLD AUTO: 10.4 FL (ref 8.9–12.7)
POTASSIUM SERPL-SCNC: 3.8 MMOL/L (ref 3.5–5.3)
PROT UR STRIP-MCNC: NEGATIVE MG/DL
RBC # BLD AUTO: 3.61 MILLION/UL (ref 3.81–5.12)
RBC #/AREA URNS AUTO: ABNORMAL /HPF
SODIUM SERPL-SCNC: 136 MMOL/L (ref 135–147)
SP GR UR STRIP.AUTO: 1.01 (ref 1–1.03)
UROBILINOGEN UR QL STRIP.AUTO: 0.2 E.U./DL
WBC # BLD AUTO: 5.57 THOUSAND/UL (ref 4.31–10.16)
WBC #/AREA URNS AUTO: ABNORMAL /HPF

## 2022-12-24 RX ADMIN — INSULIN LISPRO 1 UNITS: 100 INJECTION, SOLUTION INTRAVENOUS; SUBCUTANEOUS at 21:33

## 2022-12-24 RX ADMIN — Medication 2000 UNITS: at 08:41

## 2022-12-24 RX ADMIN — SENNOSIDES 8.6 MG: 8.6 TABLET, FILM COATED ORAL at 21:34

## 2022-12-24 RX ADMIN — APIXABAN 5 MG: 5 TABLET, FILM COATED ORAL at 08:41

## 2022-12-24 RX ADMIN — GABAPENTIN 300 MG: 300 CAPSULE ORAL at 08:41

## 2022-12-24 RX ADMIN — GABAPENTIN 300 MG: 300 CAPSULE ORAL at 16:49

## 2022-12-24 RX ADMIN — PROPRANOLOL HYDROCHLORIDE 20 MG: 20 TABLET ORAL at 21:33

## 2022-12-24 RX ADMIN — CALCIUM 1 TABLET: 500 TABLET ORAL at 16:49

## 2022-12-24 RX ADMIN — LORATADINE 10 MG: 10 TABLET ORAL at 08:41

## 2022-12-24 RX ADMIN — ATORVASTATIN CALCIUM 20 MG: 20 TABLET, FILM COATED ORAL at 16:49

## 2022-12-24 RX ADMIN — PANTOPRAZOLE SODIUM 20 MG: 20 TABLET, DELAYED RELEASE ORAL at 08:41

## 2022-12-24 RX ADMIN — APIXABAN 5 MG: 5 TABLET, FILM COATED ORAL at 16:49

## 2022-12-24 RX ADMIN — CALCIUM 1 TABLET: 500 TABLET ORAL at 08:41

## 2022-12-24 RX ADMIN — PROPRANOLOL HYDROCHLORIDE 20 MG: 20 TABLET ORAL at 08:41

## 2022-12-24 RX ADMIN — INSULIN LISPRO 1 UNITS: 100 INJECTION, SOLUTION INTRAVENOUS; SUBCUTANEOUS at 11:44

## 2022-12-24 NOTE — PROGRESS NOTES
[address]  Progress Note Sweetie Edmond 1935, 80 y o  female MRN: [id number]  Unit/Bed#: -01 Encounter: [id number]  Primary Care Provider: Desiree Guy MD   Date and time admitted to hospital: 12/23/2022  7:52 PM    Primary hypertension  Assessment & Plan  · Blood pressure controlled  · Continue PTA propanolol 20 mg twice daily  · Trend blood pressures    PAF (paroxysmal atrial fibrillation) ([facility])  Assessment & Plan  · Sinus rhythm on admission EKG  · Continue PTA propanolol  · Continue PTA Eliquis    RSV bronchitis  Assessment & Plan  · Flulike symptoms, cough, respiratory congestion  · RSV positive  · No respiratory failure  No wheezing on exam   Not requiring any supplemental oxygen  · Monitor off of antibiotics  · No sepsis criteria    * Generalized weakness  Assessment & Plan  · Presents from [facility] independent living with RSV bronchitis  · Significantly generalized weakness unable to perform ADLs  ·  also admitted with RSV respiratory failure  · PT/OT/case management consulted   · encourage out of bed and mobilization  PT and OT input appreciated  Likely can return to facility in the next 24 hours  VTE Pharmacologic Prophylaxis: VTE Score: 7 High Risk (Score >/= 5) - Pharmacological DVT Prophylaxis Ordered: apixaban (Eliquis)  Sequential Compression Devices Ordered  Patient Centered Rounds: I performed bedside rounds with nursing staff today  Discussions with Specialists or Other Care Team Provider: Discussed with nursing and case management    Education and Discussions with Family / Patient: Updated  (daughter) via phone  Time Spent for Care: 35 minutes    More than 50% of total time spent on counseling and coordination of care as described above      Current Length of Stay: 1 day(s)  Current Patient Status: Inpatient   Certification Statement: The patient will continue to require additional inpatient hospital stay due to Ongoing monitoring of respiratory symptoms  Discharge Plan: Anticipate discharge tomorrow to prior assisted or independent living facility  Code Status: Level 1 - Full Code    Subjective:   Patient seen and examined at bedside  Denies any shortness of breath or cough  No acute events overnight  Does not require any supplemental oxygen  Objective:     Vitals:   Temp (24hrs), Av 4 °F (36 9 °C), Min:97 9 °F (36 6 °C), Max:99 °F (37 2 °C)    Temp:  [97 9 °F (36 6 °C)-99 °F (37 2 °C)] 97 9 °F (36 6 °C)  HR:  [80-89] 80  Resp:  [14-20] 14  BP: (126-156)/(64-73) 137/71  SpO2:  [91 %-95 %] 92 %  Body mass index is 27 78 kg/m²  Input and Output Summary (last 24 hours): Intake/Output Summary (Last 24 hours) at 2022 1233  Last data filed at 2022 0915  Gross per 24 hour   Intake 240 ml   Output 125 ml   Net 115 ml       Physical Exam:   Physical Exam  HENT:      Head: Normocephalic  Nose: Nose normal       Mouth/Throat:      Mouth: Mucous membranes are moist    Eyes:      Pupils: Pupils are equal, round, and reactive to light  Cardiovascular:      Rate and Rhythm: Normal rate  Rhythm irregular  Pulmonary:      Comments: No tachypnea  Breath sounds bilaterally symmetrical  Abdominal:      General: Abdomen is flat  Bowel sounds are normal       Palpations: Abdomen is soft  Musculoskeletal:      Cervical back: Neck supple  Left lower leg: No edema  Skin:     General: Skin is warm  Neurological:      General: No focal deficit present  Mental Status: She is alert and oriented to person, place, and time  Mental status is at baseline     Psychiatric:         Mood and Affect: Mood normal          Additional Data:     Labs:  Results from last 7 days   Lab Units 22  0438   WBC Thousand/uL 5 57   HEMOGLOBIN g/dL 10 6*   HEMATOCRIT % 34 0*   PLATELETS Thousands/uL 128*   NEUTROS PCT % 57   LYMPHS PCT % 29   MONOS PCT % 13*   EOS PCT % 0     Results from last 7 days   Lab Units 12/24/22  0438 12/23/22 2024   SODIUM mmol/L 136 134*   POTASSIUM mmol/L 3 8 3 8   CHLORIDE mmol/L 100 99   CO2 mmol/L 25 25   BUN mg/dL 13 16   CREATININE mg/dL 0 70 0 77   ANION GAP mmol/L 11 10   CALCIUM mg/dL 8 0* 8 6   ALBUMIN g/dL  --  3 4*   TOTAL BILIRUBIN mg/dL  --  0 57   ALK PHOS U/L  --  42*   ALT U/L  --  18   AST U/L  --  17   GLUCOSE RANDOM mg/dL 121 138     Results from last 7 days   Lab Units 12/23/22 2024   INR  1 26*     Results from last 7 days   Lab Units 12/24/22  1056 12/24/22  0837 12/23/22  2346   POC GLUCOSE mg/dl 176* 267* 113         Results from last 7 days   Lab Units 12/23/22 2024   LACTIC ACID mmol/L 0 9   PROCALCITONIN ng/ml 0 12       Lines/Drains:  Invasive Devices     Peripheral Intravenous Line  Duration           Peripheral IV 12/23/22 Right Antecubital <1 day    Peripheral IV 12/24/22 Left;Ventral (anterior) Forearm <1 day          Drain  Duration           External Urinary Catheter <1 day                      Imaging: No pertinent imaging reviewed      Recent Cultures (last 7 days):         Last 24 Hours Medication List:   Current Facility-Administered Medications   Medication Dose Route Frequency Provider Last Rate   • acetaminophen  650 mg Oral Q6H PRN Netta S Juliet, CRNP     • apixaban  5 mg Oral BID Netta S Juliet, CRNP     • atorvastatin  20 mg Oral Daily With Dinner Netta S Juliet, CRNP     • calcium carbonate  1 tablet Oral BID With Meals Netta S Juliet, CRNP     • cholecalciferol  2,000 Units Oral Daily Netta S Juliet, CRNP     • gabapentin  300 mg Oral BID Netta S Juliet, CRNP     • insulin lispro  1-5 Units Subcutaneous TID AC Netta S Juliet, CRNP     • insulin lispro  1-5 Units Subcutaneous HS Netta S Juliet, CRNP     • loratadine  10 mg Oral Daily Netta S Juliet, CRNP     • pantoprazole  20 mg Oral Early Morning Netta S Juliet, CRNP     • propranolol  20 mg Oral Q12H [address] Netta S Juliet, CRNP     • senna  1 tablet Oral HS Netta S Juliet, CRNP          Today, Patient Was Seen By: Karely Pabon MD    **Please Note: This note may have been constructed using a voice recognition system  **

## 2022-12-24 NOTE — ASSESSMENT & PLAN NOTE
· Presents from [facility] independent living with RSV bronchitis  · Significantly generalized weakness unable to perform ADLs  ·  also admitted with RSV respiratory failure  · PT/OT/case management consulted   · encourage out of bed and mobilization  PT and OT input appreciated  Likely can return to facility in the next 24 hours

## 2022-12-24 NOTE — CASE MANAGEMENT
Case Management Assessment & Discharge Planning Note    Patient name Rafita Rashid  Location /-70 MRN [id number]  : 1935 Date 2022       Current Admission Date: 2022  Current Admission Diagnosis:Generalized weakness   Patient Active Problem List    Diagnosis Date Noted   • Generalized weakness 2022   • RSV bronchitis 2022   • PAF (paroxysmal atrial fibrillation) ([address] ) 2022   • Primary hypertension 2022      LOS (days): 1  Geometric Mean LOS (GMLOS) (days):   Days to GMLOS:     OBJECTIVE:    Risk of Unplanned Readmission Score: 13 06         Current admission status: Inpatient       Preferred Pharmacy:   PATIENT/FAMILY REPORTS NO PREFERRED PHARMACY  No address on file      Primary Care Provider: Radha Covington MD    Primary Insurance: MEDICARE  Secondary Insurance:  FOR LIFE    ASSESSMENT:  [address] Proxies    There are no active Health Care Proxies on file  Advance Directives  Does patient have a [address] [place] [address] Avenue?: Yes (daughter, Fabricio Baldwin)  Primary Contact: micheal, daughter              Patient Information  Admitted from[de-identified] Facility ([address] [place] [address], Independent Living)  Mental Status: Alert  During Assessment patient was accompanied by: Not accompanied during assessment  Assessment information provided by[de-identified] Patient, Other - please comment (staff at [facility])  Primary Caregiver: Self  Support Systems: Spouse/significant other, Family members  Home entry access options   Select all that apply : No steps to enter home  Type of Current Residence: Facility ([facility], [address] [place] [address])  Upon entering residence, is there a bedroom on the main floor (no further steps)?: Yes  Upon entering residence, is there a bathroom on the main floor (no further steps)?: Yes  In the last 12 months, was there a time when you were not able to pay the mortgage or rent on time?: No  In the last 12 months, how many places have you lived?: 1  In the last 12 months, was there a time when you did not have a steady place to sleep or slept in a shelter (including now)?: No  Living Arrangements: Other (Comment) ([facility])    Activities of Daily Living Prior to Admission  Functional Status: Independent  Completes ADLs independently?: Yes  Ambulates independently?: Yes  Does patient use assisted devices?: Yes  Assisted Devices (DME) used: [employer]  Does patient currently own DME?: Yes  What DME does the patient currently own?: [employer]  Does patient have a history of Outpatient Therapy (PT/OT)?: No  Does the patient have a history of Short-Term Rehab?: No  Does patient have a history of HHC?: No  Does patient currently have [facility]?: No         Patient Information Continued  Does patient have prescription coverage?: Yes  Within the past 12 months, you worried that your food would run out before you got the money to buy more : Never true  Within the past 12 months, the food you bought just didn't last and you didn't have money to get more : Never true  Food insecurity resource given?: No  Does patient receive dialysis treatments?: No  Does patient have a history of substance abuse?: No  Does patient have a history of Mental Health Diagnosis?: No         Means of Transportation  Means of Transport to Appts[de-identified] Family transport  In the past 12 months, has lack of transportation kept you from medical appointments or from getting medications?: No  In the past 12 months, has lack of transportation kept you from meetings, work, or from getting things needed for daily living?: No        DISCHARGE DETAILS:       Freedom of Choice: Yes                   Contacts  Patient Contacts: daughter, Geri Silvestre  Relationship to Patient[de-identified] Family                   Would you like to participate in our [address] [place] [address] service program?  : No - Declined              CM spoke with Ab Nino at [facility], who sts pt may return at anytime once medically stable     CM spoke with pts daughter, who sts, family will not be able to transport pt back to [facility] at time of discharge    Requesting CM arrange for transport at time of discharge

## 2022-12-24 NOTE — PHYSICAL THERAPY NOTE
PHYSICAL THERAPY EVALUATION  NAME:  Kathia Dupree  DATE: 12/24/22    AGE:   80 y o  Mrn:   [id number]  ADMIT DX:  SOB (shortness of breath) [R06 02]  RSV (respiratory syncytial virus infection) [B33 8]    Past Medical History:   Diagnosis Date   • Arthritis    • Atrial fibrillation ([facility] )    • CHF (congestive heart failure) ([facility] )    • Diabetes mellitus ([facility] )    • Hypertension      Length Of Stay: 1  Performed at least 2 patient identifiers during session: Name and Birthday  PHYSICAL THERAPY EVALUATION :    12/24/22 0915   PT Last Visit   PT Visit Date 12/24/22   Note Type   Note type Evaluation   Pain Assessment   Pain Assessment Tool 0-10   Pain Score No Pain   Restrictions/Precautions   Other Precautions Chair Alarm; Bed Alarm; Fall Risk   Home Living   Type of Home Assisted living  ([facility])   Home Layout One level;Performs ADLs on one level; Able to live on main level with bedroom/bathroom   Bathroom Shower/Tub Walk-in shower   Bathroom Toilet Standard   Bathroom Equipment Grab bars in shower; Shower chair;Grab bars around toilet   [address] [facility]   Additional Comments Reports living in an [facility] and using RW for mobility   Prior Function   Level of Julian Independent with ADLs; Independent with functional mobility; Needs assistance with [address] [place] [address] Help From Family  (facility staff)   IADLs Independent with medication management; Family/Friend/Other provides transportation; Family/Friend/Other provides meals   Falls in the last 6 months 0   Comments Reports being independent with mobility, ADLs and has assistance for IADLs, but can microwave meals and takes her medication herself that her dtr sets up for her     General   Additional Pertinent History + RSV   Cognition   Orientation Level Oriented X4   Following Commands Follows one step commands without difficulty   Subjective   Subjective "I walk with a walker "   RLE Assessment   RLE Assessment WFL  (3+/5) LLE Assessment   LLE Assessment WFL  (3+/5 except knee extension 3/5)   Coordination   Rapid Alternating Movements Intact   Light Touch   RLE Light Touch Grossly intact   LLE Light Touch Grossly intact   Bed Mobility   Additional Comments Pt sitting OOB in recliner chair upon arrival to room and at end of session   Transfers   Sit to Stand   (steadying assistance)   Additional items Increased time required;Verbal cues   Stand to Sit   (steadying assistance)   Additional items Impulsive;Verbal cues   Stand pivot   (steadying to miNAx1-->steadying assistance)   Additional items Impulsive;Verbal cues   Additional Comments use of RW  sit<>stand with steadying assistance  min cues for hand palcement and saftey with RW  pt required inc time to stand, but was impulsive to sit  Requires verbal cues for safety  spt with RW with steadying to minAx1 with pt attempting to sit prior to turning completely  verbal cues for safety  progressed spt to steadying assistance then with verbal cues for safe completion throughout  standing dynamic balance with 1 UE support/no UE support with steadying assistance reaching anteriorly  Ambulation/Elevation   Gait pattern Narrow NADINE; Improper Weight shift;Decreased foot clearance; Short stride; Excessively slow; Foward flexed   Gait Assistance   (steadying assistance)   Additional items Assist x 1;Verbal cues   Assistive Device Rolling walker   Distance ambulated 24'x1 and 58'x1 with RW with steadying assistance with mod verbal cues to stay wihtin NADINE of RW and inc NADINE and step length, min cues for hand placement on RW  slow sharon with NBOS and decreased foot clearance, inc left knee flexion   Balance   Static Sitting Good   Dynamic Sitting Fair +   Static Standing Fair   Dynamic Standing Fair -   Ambulatory Fair -   Endurance Deficit   Endurance Deficit Description fatigue   Activity Tolerance   Activity Tolerance Patient limited by fatigue   Medical Staff Made Aware Mike ONEILL [place]   Nurse Made Aware RNPatricia   Assessment   Prognosis Good   Problem List Decreased strength;Decreased endurance; Impaired balance;Decreased mobility; Decreased safety awareness   Barriers to Discharge Decreased caregiver support   Barriers to Discharge Comments per CM, patient lives in independent living at [facility]  would benefit form increased support form staff with mobility at this time   Goals   Patient Goals "Go home"   STG Expiration Date 01/07/23   PT Treatment Day 0   Plan   Treatment/Interventions ADL retraining;Functional transfer training;LE strengthening/ROM; Elevations; Therapeutic exercise; Endurance training;Patient/family training;Equipment eval/education;Gait training;Bed mobility; Compensatory technique education;Spoke to nursing;Spoke to case management;OT   PT Frequency 3-5x/wk   Recommendation   PT Discharge Recommendation Home with home health rehabilitation  (at [facility] [place] support from staff for mobility  Should [facility] not be able to provide current level of assistance, would need to consider post acute rehab )   Equipment Recommended   (pt has RW)   Additional Comments increased support from staff with all mobility to safely return to [facility]  Should [facility] not be able to provide level of assistance, consider post acute rehab   AM-PAC Basic Mobility Inpatient   Turning in Bed Without Bedrails 3   Lying on Back to Sitting on Edge of Flat Bed 3   Moving Bed to Chair 3   Standing Up From Chair 3   Walk in Room 3   Climb 3-5 Stairs 2   Basic Mobility Inpatient Raw Score 17   Basic Mobility Standardized Score 39 67   Highest Level Of Mobility   JH-HLM Goal 5: Stand one or more mins   JH-HLM Achieved 7: Walk 25 feet or more   End of Consult   Patient Position at End of Consult Bedside chair;Bed/Chair alarm activated; All needs within reach     (Please find full objective findings from PT assessment regarding body systems outlined above)       Assessment: Pt is a 80 y o  female seen for PT evaluation s/p admission to [facility] on 12/23/2022 with Generalized weakness  Order placed for PT services  Upon evaluation: Pt is presenting with impaired functional mobility due to decreased strength, decreased endurance, impaired balance, gait deviations, decreased safety awareness, and fall risk requiring  steadying to minimal assistance for transfers and steadying assistance for ambulation with RW   Pt's clinical presentation is currently unpredictable given the functional mobility deficits above, especially weakness, decreased endurance, gait deviations, decreased activity tolerance, decreased functional mobility tolerance, and decreased safety awareness, coupled with fall risks as indicated by AM-PAC 6-Clicks: 46/02 as well as impulsivity, impaired balance, polypharmacy and decreased safety awareness and combined with medical complications of abnormal H&H, abnormal blood sugars, abnormal sodium values, impulsivity during admission, need for input for mobility technique/safety and + RSV bronchitis  Pt's PMHx and comorbidities that may affect physical performance and progress include: CHF, A fib, DM, HTN and arthritis  Personal factors affecting pt at time of IE include: limited home support, advanced age, inability to perform IADLs, inability to perform ADLs, inability to navigate level surfaces without external assistance and limited insight into impairments as patient states "I'll be fine when I go back home"  Pt will benefit from continued skilled PT services to address deficits as defined above and to maximize level of functional mobility to facilitate return toward PLOF and improved QOL  From PT/mobility standpoint, recommendation at time of d/c would be Home PT, with walker and with facility support pending progress in order to reduce fall risk and maximize pt's functional independence and consistency with mobility in order to facilitate return to PLOF    Recommend trial with walker next 1-2 sessions and ther ex next 1-2 sessions  The patient's AM-PAC Basic Mobility Inpatient Short Form Raw Score is 17  A Raw score of greater than 16 suggests the patient may benefit from discharge to home  Please also refer to the recommendation of the Physical Therapist for safe discharge planning  Goals: Pt will: Perform bed mobility tasks with modified I to reposition in bed and prepare for transfers  Pt will perform transfers with modified I to decrease burden of care, decrease risk for falls and improve activity tolerance and prepare for ambulation  Pt will ambulate with RW for >/= 150' with  modified I  to decrease burden of care, decrease risk for falls, improve activity tolerance and improve gait quality and to access home environment  Pt will complete >/= 4 steps with with bilateral handrails with Supervision to decrease risk for falls and improve activity tolerance  Pt will participate in objective balance assessment to determine baseline fall risk  Pt will increase B LE strength >/= 1/2 MMT grade to facilitate functional mobility        Ryder Gowers, PT,DPT

## 2022-12-24 NOTE — ED PROVIDER NOTES
History  Chief Complaint   Patient presents with   • Shortness of Breath     [place] [facility] for four days  Reports increased SOB, weakness today  EMS rpeorts pt O2 sat was 89% RA PTA  Given duoneb x1 PTA  HPI  80F presenting with hypoxia  [place] [facility]  None       No past medical history on file  No past surgical history on file  No family history on file  I have reviewed and agree with the history as documented  No existing history information found  No existing history information found  Review of Systems    Physical Exam  Physical Exam    Vital Signs  ED Triage Vitals   Temp Pulse Resp BP SpO2   -- -- -- -- --      Temp src Heart Rate Source Patient Position - Orthostatic VS BP Location FiO2 (%)   -- -- -- -- --      Pain Score       --           There were no vitals filed for this visit  Visual Acuity      ED Medications  Medications - No data to display    Diagnostic Studies  Results Reviewed     None                 No orders to display              Procedures  Procedures         ED Course                                             MDM    Disposition  Final diagnoses:   None     ED Disposition     None      Follow-up Information    None         Patient's Medications    No medications on file       No discharge procedures on file      PDMP Review     None          ED Provider  Electronically Signed by

## 2022-12-24 NOTE — PLAN OF CARE
Problem: PHYSICAL THERAPY ADULT  Goal: Performs mobility at highest level of function for planned discharge setting  See evaluation for individualized goals  Description: Treatment/Interventions: ADL retraining, Functional transfer training, LE strengthening/ROM, Elevations, Therapeutic exercise, Endurance training, Patient/family training, Equipment eval/education, Gait training, Bed mobility, Compensatory technique education, Spoke to nursing, Spoke to case management, OT  Equipment Recommended:  (pt has RW)       See flowsheet documentation for full assessment, interventions and recommendations  Note: Prognosis: Good  Problem List: Decreased strength, Decreased endurance, Impaired balance, Decreased mobility, Decreased safety awareness  Assessment: Pt is a 80 y o  female seen for PT evaluation s/p admission to [address] [place] [address] on 12/23/2022 with Generalized weakness  Order placed for PT services  Upon evaluation: Pt is presenting with impaired functional mobility due to decreased strength, decreased endurance, impaired balance, gait deviations, decreased safety awareness, and fall risk requiring  steadying to minimal assistance for transfers and steadying assistance for ambulation with RW   Pt's clinical presentation is currently unpredictable given the functional mobility deficits above, especially weakness, decreased endurance, gait deviations, decreased activity tolerance, decreased functional mobility tolerance, and decreased safety awareness, coupled with fall risks as indicated by AM-PAC 6-Clicks: 21/42 as well as impulsivity, impaired balance, polypharmacy and decreased safety awareness and combined with medical complications of abnormal H&H, abnormal blood sugars, abnormal sodium values, impulsivity during admission, need for input for mobility technique/safety and + RSV bronchitis    Pt's PMHx and comorbidities that may affect physical performance and progress include: CHF, A fib, DM, HTN and arthritis  Personal factors affecting pt at time of IE include: limited home support, advanced age, inability to perform IADLs, inability to perform ADLs, inability to navigate level surfaces without external assistance and limited insight into impairments as patient states "I'll be fine when I go back home"  Pt will benefit from continued skilled PT services to address deficits as defined above and to maximize level of functional mobility to facilitate return toward PLOF and improved QOL  From PT/mobility standpoint, recommendation at time of d/c would be Home PT, with walker and with facility support pending progress in order to reduce fall risk and maximize pt's functional independence and consistency with mobility in order to facilitate return to PLOF  Recommend trial with walker next 1-2 sessions and ther ex next 1-2 sessions  Barriers to Discharge: Decreased caregiver support  Barriers to Discharge Comments: per CM, patient lives in independent living at [facility]  would benefit form increased support form staff with mobility at this time  PT Discharge Recommendation: Home with home health rehabilitation (at [facility] increased support from staff for mobility  Should [facility] not be able to provide current level of assistance, would need to consider post acute rehab )    See flowsheet documentation for full assessment

## 2022-12-24 NOTE — ASSESSMENT & PLAN NOTE
· Presents from [facility] assisted living with RSV bronchitis  · Significantly generalized weakness unable to perform ADLs  ·  also admitted with RSV respiratory failure  · PT/OT/case management intervention appreciated

## 2022-12-24 NOTE — ASSESSMENT & PLAN NOTE
· Flulike symptoms, cough, respiratory congestion  · RSV positive  · No respiratory failure  · Monitor off of antibiotics  · No sepsis criteria

## 2022-12-24 NOTE — H&P
[address]  H&P- Wendi Leblanc 1935, 80 y o  female MRN: [id number]  Unit/Bed#: -01 Encounter: [id number]  Primary Care Provider: Lin Oliver MD   Date and time admitted to hospital: 12/23/2022  7:52 PM    * Generalized weakness  Assessment & Plan  · Presents from [facility] assisted living with RSV bronchitis  · Significantly generalized weakness unable to perform ADLs  ·  also admitted with RSV respiratory failure  · PT/OT/case management intervention appreciated    Primary hypertension  Assessment & Plan  · Blood pressure controlled  · Continue PTA propanolol 20 mg twice daily  · Trend blood pressures    PAF (paroxysmal atrial fibrillation) (HCC)  Assessment & Plan  · Sinus rhythm on admission EKG  · Continue PTA propanolol  · Continue PTA Eliquis    RSV bronchitis  Assessment & Plan  · Flulike symptoms, cough, respiratory congestion  · RSV positive  · No respiratory failure  · Monitor off of antibiotics  · No sepsis criteria    VTE Pharmacologic Prophylaxis: VTE Score: 7 High Risk (Score >/= 5) - Pharmacological DVT Prophylaxis Ordered: apixaban (Eliquis)  Sequential Compression Devices Ordered  Code Status: Level 1 - Full Code z  Discussion with family: Patient declined call to   Anticipated Length of Stay: Patient will be admitted on an inpatient basis with an anticipated length of stay of greater than 2 midnights secondary to Weakness  Total Time for Visit, including Counseling / Coordination of Care: 30 minutes Greater than 50% of this total time spent on direct patient counseling and coordination of care      Chief Complaint: Dyspnea    History of Present Illness:  Dewayne Mac is a 80 y o  female with a PMH of PAF maintained on Eliquis, arthritis, diet-controlled diabetes mellitus, CHF, hypertension who resides at assisted living [facility] and her  is currently admitted to the hospital with RSV pneumonia and respiratory failure  Patient has been ill over the past few days with worsening dyspnea nasal congestion and sore throat, cough  RSV positive at facility  Presented to the ED originally was 89% on room air by EMS but has not been hypoxic during hospital visit  Presented to the medical service for further evaluation of generalized weakness as patient lives alone and cannot care for herself at this time  Review of Systems:  Review of Systems   Constitutional: Positive for activity change, appetite change and chills  Negative for fever  HENT: Positive for congestion and sore throat  Negative for ear pain  Eyes: Negative for pain and visual disturbance  Respiratory: Positive for cough, shortness of breath and wheezing  Cardiovascular: Negative for chest pain and palpitations  Gastrointestinal: Negative for abdominal pain and vomiting  Genitourinary: Negative for dysuria and hematuria  Musculoskeletal: Negative for arthralgias and back pain  Skin: Negative for color change and rash  Neurological: Positive for dizziness and weakness  Negative for seizures and syncope  All other systems reviewed and are negative  Past Medical and Surgical History:   Past Medical History:   Diagnosis Date   • Arthritis    • Atrial fibrillation ([facility] )    • CHF (congestive heart failure) ([facility] )    • Diabetes mellitus ([place] [address] )    • Hypertension        History reviewed  No pertinent surgical history  Meds/Allergies:  Prior to Admission medications    Medication Sig Start Date End Date Taking?  Authorizing Provider   apixaban (Eliquis) 5 mg Take 5 mg by mouth 2 (two) times a day   Yes Historical Provider, MD   atorvastatin (LIPITOR) 20 mg tablet Take 20 mg by mouth daily   Yes Historical Provider, MD   calcium carbonate (OS-ADIS) 1250 (500 Ca) MG tablet Take 1 tablet by mouth 2 (two) times a day   Yes Historical Provider, MD   Cholecalciferol (Vitamin D3) 50 MCG (2000 UT) TABS Take 2,000 Units by mouth daily   Yes Historical Provider, MD   gabapentin (NEURONTIN) 300 mg capsule Take 300 mg by mouth 2 (two) times a day   Yes Historical Provider, MD   loratadine (CLARITIN) 10 mg tablet Take 10 mg by mouth daily   Yes Historical Provider, MD   metFORMIN (GLUCOPHAGE) 1000 MG tablet Take 1,000 mg by mouth 2 (two) times a day with meals   Yes Historical Provider, MD   Multiple Vitamins-Minerals (OCUVITE PRESERVISION PO) Take by mouth in the morning   Yes Historical Provider, MD   pantoprazole (PROTONIX) 20 mg tablet Take 20 mg by mouth daily   Yes Historical Provider, MD   propranolol (INDERAL) 20 mg tablet Take by mouth every 12 (twelve) hours   Yes Historical Provider, MD   senna (SENOKOT) 8 6 MG tablet Take 1 tablet by mouth 2 (two) times a day   Yes Historical Provider, MD     I have reveiwed home medications using records provided by [facility]  Allergies: Allergies   Allergen Reactions   • Amiodarone Other (See Comments)     Affected thyroid        Social History:  Marital Status: /Civil Union   Occupation: Retired  Patient Pre-hospital Living Situation: Assisted Living  Patient Pre-hospital Level of Mobility: walks  Patient Pre-hospital Diet Restrictions: None  Substance Use History:   Social History     Substance and Sexual Activity   Alcohol Use Never     Social History     Tobacco Use   Smoking Status Never   Smokeless Tobacco Never     Social History     Substance and Sexual Activity   Drug Use Never       Family History:  History reviewed  No pertinent family history  Physical Exam:     Vitals:   Blood Pressure: 126/65 (12/24/22 0636)  Pulse: 85 (12/24/22 0636)  Temperature: 97 9 °F (36 6 °C) (12/24/22 0636)  Temp Source: Oral (12/23/22 1954)  Respirations: 14 (12/24/22 0636)  Height: 5' 2" (157 5 cm) (12/23/22 2251)  Weight - Scale: 68 9 kg (151 lb 14 4 oz) (12/23/22 1954)  SpO2: 92 % (12/24/22 0636)    Physical Exam  Vitals and nursing note reviewed     Constitutional:       General: She is not in acute distress  Appearance: She is well-developed  She is ill-appearing  HENT:      Head: Normocephalic and atraumatic  Mouth/Throat:      Mouth: Mucous membranes are dry  Eyes:      Conjunctiva/sclera: Conjunctivae normal    Cardiovascular:      Rate and Rhythm: Normal rate and regular rhythm  Heart sounds: No murmur heard  Pulmonary:      Effort: Pulmonary effort is normal  No respiratory distress  Breath sounds: Rhonchi present  Abdominal:      Palpations: Abdomen is soft  Tenderness: There is no abdominal tenderness  Musculoskeletal:         General: No swelling  Cervical back: Neck supple  Comments: Generalized weakness   Skin:     General: Skin is warm and dry  Capillary Refill: Capillary refill takes less than 2 seconds  Neurological:      General: No focal deficit present  Mental Status: She is alert and oriented to person, place, and time     Psychiatric:         Mood and Affect: Mood normal          Behavior: Behavior normal         Additional Data:     Lab Results:  Results from last 7 days   Lab Units 12/24/22 0438   WBC Thousand/uL 5 57   HEMOGLOBIN g/dL 10 6*   HEMATOCRIT % 34 0*   PLATELETS Thousands/uL 128*   NEUTROS PCT % 57   LYMPHS PCT % 29   MONOS PCT % 13*   EOS PCT % 0     Results from last 7 days   Lab Units 12/24/22 0438 12/23/22 2024   SODIUM mmol/L 136 134*   POTASSIUM mmol/L 3 8 3 8   CHLORIDE mmol/L 100 99   CO2 mmol/L 25 25   BUN mg/dL 13 16   CREATININE mg/dL 0 70 0 77   ANION GAP mmol/L 11 10   CALCIUM mg/dL 8 0* 8 6   ALBUMIN g/dL  --  3 4*   TOTAL BILIRUBIN mg/dL  --  0 57   ALK PHOS U/L  --  42*   ALT U/L  --  18   AST U/L  --  17   GLUCOSE RANDOM mg/dL 121 138     Results from last 7 days   Lab Units 12/23/22 2024   INR  1 26*     Results from last 7 days   Lab Units 12/23/22  2346   POC GLUCOSE mg/dl 113         Results from last 7 days   Lab Units 12/23/22 2024   LACTIC ACID mmol/L 0 9   PROCALCITONIN ng/ml 0 12 Lines/Drains:  Invasive Devices     Peripheral Intravenous Line  Duration           Peripheral IV 12/23/22 Right Antecubital <1 day    Peripheral IV 12/24/22 Left;Ventral (anterior) Forearm <1 day          Drain  Duration           External Urinary Catheter <1 day                    Imaging: Reviewed radiology reports from this admission including: chest xray  XR chest 1 view portable    (Results Pending)   Chest x-ray reviewed by me personally reveals no acute infiltrate or effusion  EKG and Other Studies Reviewed on Admission:   · EKG: Sinus rhythm without ST elevation  ** Please Note: This note has been constructed using a voice recognition system   **

## 2022-12-24 NOTE — ED PROVIDER NOTES
History  Chief Complaint   Patient presents with   • Shortness of Breath     Pt from [facility], RSV positive for four days  Reports increased SOB, weakness today  EMS rpeorts pt O2 sat was 89% RA PTA  Given duoneb x1 PTA  Patient is an 49-year-old female, with medical history of A  fib on Eliquis, who presents emergency department today via EMS from assisted living facility for complaints of this of breath  The patient 3 days ago began having nasal congestion rhinorrhea, sore throat  Patient had slight cough  Today had viral swab performed and was found to have RSV  Patient's spouse is also hospitalized for RSV/pneumonia  Overnight into today began  Short of breath, increased coughing  She has felt generalized weakness  She does not require oxygen at home  EMS arrived, dated that the patient was 89% on room air, given DuoNeb in route here  Sputum production with cough  Denies any chest pain, abdominal pain, nausea vomiting diarrhea, back pain, falls or traumas  History provided by:  Patient, EMS personnel and relative  Shortness of Breath  Severity:  Moderate  Timing:  Constant  Progression:  Worsening  Chronicity:  New  Context: URI    Relieved by:  Nothing  Worsened by:  Coughing and exertion  Ineffective treatments:  Rest  Associated symptoms: cough    Associated symptoms: no abdominal pain, no chest pain, no claudication, no ear pain, no fever, no headaches, no hemoptysis, no PND, no rash, no sore throat, no swollen glands, no vomiting and no wheezing    Cough:     Cough characteristics:  Non-productive    Sputum characteristics:  Nondescript    Severity:  Moderate    Onset quality:  Gradual    Timing:  Constant    Progression:  Worsening    Chronicity:  New      Prior to Admission Medications   Prescriptions Last Dose Informant Patient Reported? Taking?    Cholecalciferol (Vitamin D3) 50 MCG (2000 UT) TABS 12/23/2022  Yes Yes   Sig: Take 2,000 Units by mouth daily   Multiple Vitamins-Minerals (OCUVITE PRESERVISION PO) 12/23/2022  Yes Yes   Sig: Take by mouth in the morning   apixaban (Eliquis) 5 mg 12/23/2022  Yes Yes   Sig: Take 5 mg by mouth 2 (two) times a day   atorvastatin (LIPITOR) 20 mg tablet 12/22/2022  Yes Yes   Sig: Take 20 mg by mouth daily   calcium carbonate (OS-ADIS) 1250 (500 Ca) MG tablet 12/23/2022  Yes Yes   Sig: Take 1 tablet by mouth 2 (two) times a day   gabapentin (NEURONTIN) 300 mg capsule 12/23/2022  Yes Yes   Sig: Take 300 mg by mouth 2 (two) times a day   loratadine (CLARITIN) 10 mg tablet 12/23/2022  Yes Yes   Sig: Take 10 mg by mouth daily   metFORMIN (GLUCOPHAGE) 1000 MG tablet 12/23/2022  Yes Yes   Sig: Take 1,000 mg by mouth 2 (two) times a day with meals   pantoprazole (PROTONIX) 20 mg tablet 12/23/2022  Yes Yes   Sig: Take 20 mg by mouth daily   propranolol (INDERAL) 20 mg tablet 12/23/2022  Yes Yes   Sig: Take by mouth every 12 (twelve) hours   senna (SENOKOT) 8 6 MG tablet 12/23/2022  Yes Yes   Sig: Take 1 tablet by mouth 2 (two) times a day      Facility-Administered Medications: None       Past Medical History:   Diagnosis Date   • Arthritis    • Atrial fibrillation (HCC)    • [facility]cong[facility] ([facility] )    • Diabetes mellitus ([facility] )    • Hypertension        History reviewed  No pertinent surgical history  History reviewed  No pertinent family history  I have reviewed and agree with the history as documented  E-Cigarette/Vaping     E-Cigarette/Vaping Substances     Social History     Tobacco Use   • Smoking status: Never   • Smokeless tobacco: Never   Substance Use Topics   • Alcohol use: Never   • Drug use: Never       Review of Systems   Constitutional: Negative for chills and fever  HENT: Negative for ear pain and sore throat  Eyes: Negative for pain and visual disturbance  Respiratory: Positive for cough and shortness of breath  Negative for hemoptysis and wheezing      Cardiovascular: Negative for chest pain, palpitations, claudication, leg swelling and PND  Gastrointestinal: Negative for abdominal pain and vomiting  Genitourinary: Negative for dysuria and hematuria  Musculoskeletal: Negative for arthralgias and back pain  Skin: Negative for color change and rash  Neurological: Negative for dizziness, seizures, syncope and headaches  All other systems reviewed and are negative  Physical Exam  Physical Exam  Vitals and nursing note reviewed  Constitutional:       General: She is not in acute distress  Appearance: She is well-developed  HENT:      Head: Normocephalic and atraumatic  Mouth/Throat:      Pharynx: Oropharynx is clear  Eyes:      Extraocular Movements: Extraocular movements intact  Conjunctiva/sclera: Conjunctivae normal       Pupils: Pupils are equal, round, and reactive to light  Cardiovascular:      Rate and Rhythm: Normal rate and regular rhythm  Pulses: Normal pulses  Heart sounds: No murmur heard  Pulmonary:      Effort: Pulmonary effort is normal  Tachypnea present  No respiratory distress  Breath sounds: Rhonchi present  Abdominal:      Palpations: Abdomen is soft  Tenderness: There is no abdominal tenderness  Musculoskeletal:         General: No swelling  Cervical back: Normal range of motion and neck supple  Right lower leg: No tenderness  No edema  Left lower leg: No tenderness  No edema  Skin:     General: Skin is warm and dry  Capillary Refill: Capillary refill takes less than 2 seconds  Neurological:      General: No focal deficit present  Mental Status: She is alert and oriented to person, place, and time     Psychiatric:         Mood and Affect: Mood normal          Vital Signs  ED Triage Vitals   Temperature Pulse Respirations Blood Pressure SpO2   12/23/22 1954 12/23/22 1954 12/23/22 1954 12/23/22 2010 12/23/22 1954   98 9 °F (37 2 °C) 89 20 146/67 95 %      Temp Source Heart Rate Source Patient Position - Orthostatic VS BP Location FiO2 (%)   12/23/22 1954 12/23/22 2115 12/23/22 1954 12/23/22 1954 --   Oral Monitor Lying Right arm       Pain Score       12/23/22 1954       No Pain           Vitals:    12/23/22 2247 12/24/22 0636 12/24/22 0636 12/24/22 0841   BP: 130/64 126/65 126/65 137/71   Pulse: 85  85 80   Patient Position - Orthostatic VS:             Visual Acuity      ED Medications  Medications   acetaminophen (TYLENOL) tablet 650 mg (has no administration in time range)   insulin lispro (HumaLOG) 100 units/mL subcutaneous injection 1-5 Units (1 Units Subcutaneous Given 12/24/22 1144)   insulin lispro (HumaLOG) 100 units/mL subcutaneous injection 1-5 Units (0 Units Subcutaneous Not Given 12/23/22 2351)   apixaban (ELIQUIS) tablet 5 mg (5 mg Oral Given 12/24/22 0841)   atorvastatin (LIPITOR) tablet 20 mg (has no administration in time range)   calcium carbonate (OYSTER SHELL,OSCAL) 500 mg tablet 1 tablet (1 tablet Oral Given 12/24/22 0841)   cholecalciferol (VITAMIN D3) tablet 2,000 Units (2,000 Units Oral Given 12/24/22 0841)   gabapentin (NEURONTIN) capsule 300 mg (300 mg Oral Given 12/24/22 0841)   loratadine (CLARITIN) tablet 10 mg (10 mg Oral Given 12/24/22 0841)   pantoprazole (PROTONIX) EC tablet 20 mg (20 mg Oral Given 12/24/22 0841)   propranolol (INDERAL) tablet 20 mg (20 mg Oral Given 12/24/22 0841)   senna (SENOKOT) tablet 8 6 mg (has no administration in time range)   magnesium sulfate 2 g/50 mL IVPB (premix) 2 g (0 g Intravenous Stopped 12/23/22 2255)       Diagnostic Studies  Results Reviewed     Procedure Component Value Units Date/Time    Blood culture #1 [[id number]] Collected: 12/23/22 2116    Lab Status:  In process Specimen: Blood from Arm, Right Updated: 12/23/22 2120    Comprehensive metabolic panel [[id number]]  (Abnormal) Collected: 12/23/22 2024    Lab Status: Final result Specimen: Blood from Arm, Right Updated: 12/23/22 2118     Sodium 134 mmol/L      Potassium 3 8 mmol/L      Chloride 99 mmol/L      CO2 25 mmol/L      ANION GAP 10 mmol/L      BUN 16 mg/dL      Creatinine 0 77 mg/dL      Glucose 138 mg/dL      Calcium 8 6 mg/dL      Corrected Calcium 9 1 mg/dL      AST 17 U/L      ALT 18 U/L      Alkaline Phosphatase 42 U/L      Total Protein 6 3 g/dL      Albumin 3 4 g/dL      Total Bilirubin 0 57 mg/dL      eGFR 69 ml/min/1 73sq m     Narrative:      [facility] guidelines for Chronic Kidney Disease (CKD):   •  Stage 1 with normal or high GFR (GFR > 90 mL/min/1 73 square meters)  •  Stage 2 Mild CKD (GFR = 60-89 mL/min/1 73 square meters)  •  Stage 3A Moderate CKD (GFR = 45-59 mL/min/1 73 square meters)  •  Stage 3B Moderate CKD (GFR = 30-44 mL/min/1 73 square meters)  •  Stage 4 Severe CKD (GFR = 15-29 mL/min/1 73 square meters)  •  Stage 5 End Stage CKD (GFR <15 mL/min/1 73 square meters)  Note: GFR calculation is accurate only with a steady state creatinine    NT-BNP PRO [[id number]]  (Abnormal) Collected: 12/23/22 2024    Lab Status: Final result Specimen: Blood from Arm, Right Updated: 12/23/22 2118     NT-proBNP 754 pg/mL     Magnesium [[id number]]  (Abnormal) Collected: 12/23/22 2024    Lab Status: Final result Specimen: Blood from Arm, Right Updated: 12/23/22 2118     Magnesium 1 4 mg/dL     FLU/RSV/COVID - if FLU/RSV clinically relevant [[id number]]  (Abnormal) Collected: 12/23/22 2024    Lab Status: Final result Specimen: Nares from Nose Updated: 12/23/22 2110     SARS-CoV-2 Negative     INFLUENZA A PCR Negative     INFLUENZA B PCR Negative     RSV PCR Positive    Narrative:      FOR PEDIATRIC PATIENTS - copy/paste COVID Guidelines URL to browser: [email or web address]    SARS-CoV-2 assay is a Nucleic Acid Amplification assay intended for the  qualitative detection of nucleic acid from SARS-CoV-2 in nasopharyngeal  swabs  Results are for the presumptive identification of SARS-CoV-2 RNA      Positive results are indicative of infection with SARS-CoV-2, the virus  causing COVID-19, but do not rule out bacterial infection or co-infection  with other viruses  Laboratories within the [place] and its  territories are required to report all positive results to the appropriate  public health authorities  Negative results do not preclude SARS-CoV-2  infection and should not be used as the sole basis for treatment or other  patient management decisions  Negative results must be combined with  clinical observations, patient history, and epidemiological information  This test has not been FDA cleared or approved  This test has been authorized by FDA under an Emergency Use Authorization  (EUA)  This test is only authorized for the duration of time the  declaration that circumstances exist justifying the authorization of the  emergency use of an in vitro diagnostic tests for detection of SARS-CoV-2  virus and/or diagnosis of COVID-19 infection under section 564(b)(1) of  the Act, 21 U  S C  963LIK-2(K)(0), unless the authorization is terminated  or revoked sooner  The test has been validated but independent review by FDA  and CLIA is pending  Test performed using [employer] GeneXpert: This RT-PCR assay targets N2,  a region unique to SARS-CoV-2  A conserved region in the E-gene was chosen  for pan-Sarbecovirus detection which includes SARS-CoV-2  According to CMS-2020-01-R, this platform meets the definition of high-throughput technology      Procalcitonin [[id number]]  (Normal) Collected: 12/23/22 2024    Lab Status: Final result Specimen: Blood from Arm, Right Updated: 12/23/22 2100     Procalcitonin 0 12 ng/ml     HS Troponin 0hr (reflex protocol) [[id number]]  (Normal) Collected: 12/23/22 2024    Lab Status: Final result Specimen: Blood from Arm, Right Updated: 12/23/22 2057     hs TnI 0hr 12 ng/L     Lactic acid [[id number]]  (Normal) Collected: 12/23/22 2024    Lab Status: Final result Specimen: Blood from Arm, Right Updated: 12/23/22 2056     LACTIC ACID 0 9 mmol/L     Narrative:      Result may be elevated if tourniquet was used during collection  Protime-INR [[id number]]  (Abnormal) Collected: 12/23/22 2024    Lab Status: Final result Specimen: Blood from Arm, Right Updated: 12/23/22 2054     Protime 15 9 seconds      INR 1 26    APTT [[id number]]  (Normal) Collected: 12/23/22 2024    Lab Status: Final result Specimen: Blood from Arm, Right Updated: 12/23/22 2054     PTT 30 seconds     CBC and differential [[id number]] Collected: 12/23/22 2024    Lab Status: Final result Specimen: Blood from Arm, Right Updated: 12/23/22 2035     WBC 6 01 Thousand/uL      RBC 3 85 Million/uL      Hemoglobin 11 6 g/dL      Hematocrit 35 1 %      MCV 91 fL      MCH 30 1 pg      MCHC 33 0 g/dL      RDW 13 7 %      MPV 9 3 fL      Platelets 446 Thousands/uL      nRBC 0 /100 WBCs      Neutrophils Relative 67 %      Immat GRANS % 0 %      Lymphocytes Relative 20 %      Monocytes Relative 12 %      Eosinophils Relative 0 %      Basophils Relative 1 %      Neutrophils Absolute 4 02 Thousands/µL      Immature Grans Absolute 0 02 Thousand/uL      Lymphocytes Absolute 1 18 Thousands/µL      Monocytes Absolute 0 74 Thousand/µL      Eosinophils Absolute 0 02 Thousand/µL      Basophils Absolute 0 03 Thousands/µL     Blood culture #2 [[id number]] Collected: 12/23/22 2024    Lab Status:  In process Specimen: Blood from Arm, Right Updated: 12/23/22 2033    Blood gas, Venous [[id number]]  (Abnormal) Collected: 12/23/22 2024    Lab Status: Final result Specimen: Blood from Arm, Right Updated: 12/23/22 2033     pH, Gaston 7 567     pCO2, Gaston 28 4 mm Hg      pO2, Gaston 60 9 mm Hg      HCO3, Gaston 25 3 mmol/L      Base Excess, Gaston 3 9 mmol/L      O2 Content, Gaston 16 0 ml/dL      O2 HGB, VENOUS 89 3 %     UA w Reflex to Microscopic w Reflex to Culture [[id number]]     Lab Status: No result Specimen: Urine                  XR chest 1 view portable   Final Result by Lexi Aburto Mariam Franz MD (12/24 1105)      Questionable mild pulmonary interstitial edema with possible superimposed right basilar pneumonia/pneumonitis  Workstation performed: [id number]                    Procedures  ECG 12 Lead Documentation Only    Date/Time: 12/23/2022 8:37 PM  Performed by: Olivia Gerardo PA-C  Authorized by: Olivia Gerardo PA-C     Indications / Diagnosis:  Sob  ECG reviewed by me, the ED Provider: yes    Patient location:  ED  Previous ECG:     Previous ECG:  Unavailable    Comparison to cardiac monitor: Yes    Interpretation:     Interpretation: abnormal    Rate:     ECG rate:  85    ECG rate assessment: normal    Rhythm:     Rhythm: sinus rhythm    Ectopy:     Ectopy: PAC    ST segments:     ST segments:  Non-specific  T waves:     T waves: non-specific               ED Course                               SBIRT 22yo+    Flowsheet Row Most Recent Value   SBIRT (23 yo +)    In order to provide better care to our patients, we are screening all of our patients for alcohol and drug use  Would it be okay to ask you these screening questions? Yes Filed at: 12/23/2022 2106   Initial Alcohol Screen: US AUDIT-C     1  How often do you have a drink containing alcohol? 0 Filed at: 12/23/2022 2106   2  How many drinks containing alcohol do you have on a typical day you are drinking? 0 Filed at: 12/23/2022 2106   3a  Male UNDER 65: How often do you have five or more drinks on one occasion? 0 Filed at: 12/23/2022 2106   3b  FEMALE Any Age, or MALE 65+: How often do you have 4 or more drinks on one occassion? 0 Filed at: 12/23/2022 2106   Audit-C Score 0 Filed at: 12/23/2022 2106   ROXANNE: How many times in the past year have you    Used an illegal drug or used a prescription medication for non-medical reasons?  Never Filed at: 12/23/2022 2106                    MDM  Number of Diagnoses or Management Options     Amount and/or Complexity of Data Reviewed  Clinical lab tests: ordered and reviewed  Tests in the radiology section of CPT®: ordered and reviewed  Decide to obtain previous medical records or to obtain history from someone other than the patient: yes  Obtain history from someone other than the patient: yes  Review and summarize past medical records: yes  Independent visualization of images, tracings, or specimens: yes    Risk of Complications, Morbidity, and/or Mortality  Presenting problems: moderate  Diagnostic procedures: moderate  Management options: moderate        Disposition  Final diagnoses:   SOB (shortness of breath)   RSV (respiratory syncytial virus infection)     Time reflects when diagnosis was documented in both MDM as applicable and the Disposition within this note     Time User Action Codes Description Comment    12/23/2022  8:39 PM Devota Setter Add [R06 02] SOB (shortness of breath)     12/23/2022  9:26 PM Yang Deeds Add [B33 8] RSV (respiratory syncytial virus infection)     12/23/2022  9:26 PM Carmel Deeds Modify [R06 02] SOB (shortness of breath)     12/23/2022  9:26 PM Yang Deeds Modify [B33 8] RSV (respiratory syncytial virus infection)       ED Disposition     ED Disposition   Admit    Condition   Stable    Date/Time   Fri Dec 23, 2022  9:43 PM    Comment   Case was discussed with Netta Chung and the patient's admission status was agreed to be Admission Status: inpatient status to the service of Dr Cari Wilson             Follow-up Information    None         Current Discharge Medication List      CONTINUE these medications which have NOT CHANGED    Details   apixaban (Eliquis) 5 mg Take 5 mg by mouth 2 (two) times a day      atorvastatin (LIPITOR) 20 mg tablet Take 20 mg by mouth daily      calcium carbonate (OS-ADIS) 1250 (500 Ca) MG tablet Take 1 tablet by mouth 2 (two) times a day      Cholecalciferol (Vitamin D3) 50 MCG (2000 UT) TABS Take 2,000 Units by mouth daily      gabapentin (NEURONTIN) 300 mg capsule Take 300 mg by mouth 2 (two) times a day      loratadine (CLARITIN) 10 mg tablet Take 10 mg by mouth daily      metFORMIN (GLUCOPHAGE) 1000 MG tablet Take 1,000 mg by mouth 2 (two) times a day with meals      Multiple Vitamins-Minerals (OCUVITE PRESERVISION PO) Take by mouth in the morning      pantoprazole (PROTONIX) 20 mg tablet Take 20 mg by mouth daily      propranolol (INDERAL) 20 mg tablet Take by mouth every 12 (twelve) hours      senna (SENOKOT) 8 6 MG tablet Take 1 tablet by mouth 2 (two) times a day             No discharge procedures on file      PDMP Review     None          ED Provider  Electronically Signed by           Samy Kline PA-C  12/24/22 5546

## 2022-12-24 NOTE — ASSESSMENT & PLAN NOTE
· Flulike symptoms, cough, respiratory congestion  · RSV positive  · No respiratory failure  No wheezing on exam   Not requiring any supplemental oxygen    · Monitor off of antibiotics  · No sepsis criteria

## 2022-12-24 NOTE — PLAN OF CARE
Problem: Potential for Falls  Goal: Patient will remain free of falls  Description: INTERVENTIONS:  - Educate patient/family on patient safety including physical limitations  - Instruct patient to call for assistance with activity   - Consult OT/PT to assist with strengthening/mobility   - Keep Call bell within reach  - Keep bed low and locked with side rails adjusted as appropriate  - Keep care items and personal belongings within reach  - Initiate and maintain comfort rounds  - Make Fall Risk Sign visible to staff  - Offer Toileting every 2 Hours, in advance of need  - Initiate/Maintain bed/chair alarm  - Obtain necessary fall risk management equipment:   - Apply yellow socks and bracelet for high fall risk patients  - Consider moving patient to room near nurses station  Outcome: Progressing     Problem: PAIN - ADULT  Goal: Verbalizes/displays adequate comfort level or baseline comfort level  Description: Interventions:  - Encourage patient to monitor pain and request assistance  - Assess pain using appropriate pain scale  - Administer analgesics based on type and severity of pain and evaluate response  - Implement non-pharmacological measures as appropriate and evaluate response  - Consider cultural and social influences on pain and pain management  - Notify physician/advanced practitioner if interventions unsuccessful or patient reports new pain  Outcome: Progressing     Problem: INFECTION - ADULT  Goal: Absence or prevention of progression during hospitalization  Description: INTERVENTIONS:  - Assess and monitor for signs and symptoms of infection  - Monitor lab/diagnostic results  - Monitor all insertion sites, i e  indwelling lines, tubes, and drains  - Monitor endotracheal if appropriate and nasal secretions for changes in amount and color  - [place] appropriate cooling/warming therapies per order  - Administer medications as ordered  - Instruct and encourage patient and family to use good hand hygiene technique  - Identify and instruct in appropriate isolation precautions for identified infection/condition  Outcome: Progressing  Goal: Absence of fever/infection during neutropenic period  Description: INTERVENTIONS:  - Monitor WBC    Outcome: Progressing     Problem: SAFETY ADULT  Goal: Patient will remain free of falls  Description: INTERVENTIONS:  - Educate patient/family on patient safety including physical limitations  - Instruct patient to call for assistance with activity   - Consult OT/PT to assist with strengthening/mobility   - Keep Call bell within reach  - Keep bed low and locked with side rails adjusted as appropriate  - Keep care items and personal belongings within reach  - Initiate and maintain comfort rounds  - Make Fall Risk Sign visible to staff  - Offer Toileting every 2 Hours, in advance of need  - Initiate/Maintain bed/chair alarm  - Obtain necessary fall risk management equipment:   - Apply yellow socks and bracelet for high fall risk patients  - Consider moving patient to room near nurses station  Outcome: Progressing  Goal: Maintain or return to baseline ADL function  Description: INTERVENTIONS:  -  Assess patient's ability to carry out ADLs; assess patient's baseline for ADL function and identify physical deficits which impact ability to perform ADLs (bathing, care of mouth/teeth, toileting, grooming, dressing, etc )  - Assess/evaluate cause of self-care deficits   - Assess range of motion  - Assess patient's mobility; develop plan if impaired  - Assess patient's need for assistive devices and provide as appropriate  - Encourage maximum independence but intervene and supervise when necessary  - Involve family in performance of ADLs  - Assess for home care needs following discharge   - Consider OT consult to assist with ADL evaluation and planning for discharge  - Provide patient education as appropriate  Outcome: Progressing  Goal: Maintains/Returns to pre admission functional level  Description: INTERVENTIONS:  - Perform BMAT or MOVE assessment daily    - Set and communicate daily mobility goal to care team and patient/family/caregiver  - Collaborate with rehabilitation services on mobility goals if consulted  - Perform Range of Motion 3 times a day  - Reposition patient every 2 hours  - Dangle patient 3 times a day  - Stand patient 3 times a day  - Ambulate patient 3 times a day  - Out of bed to chair 3 times a day   - Out of bed for meals 3 times a day  - Out of bed for toileting  - Record patient progress and toleration of activity level   Outcome: Progressing     Problem: DISCHARGE PLANNING  Goal: Discharge to home or other facility with appropriate resources  Description: INTERVENTIONS:  - Identify barriers to discharge w/patient and caregiver  - Arrange for needed discharge resources and transportation as appropriate  - Identify discharge learning needs (meds, wound care, etc )  - Arrange for interpretive services to assist at discharge as needed  - Refer to Case Management Department for coordinating discharge planning if the patient needs post-hospital services based on physician/advanced practitioner order or complex needs related to functional status, cognitive ability, or social support system  Outcome: Progressing     Problem: Knowledge Deficit  Goal: Patient/family/caregiver demonstrates understanding of disease process, treatment plan, medications, and discharge instructions  Description: Complete learning assessment and assess knowledge base    Interventions:  - Provide teaching at level of understanding  - Provide teaching via preferred learning methods  Outcome: Progressing     Problem: RESPIRATORY - ADULT  Goal: Achieves optimal ventilation and oxygenation  Description: INTERVENTIONS:  - Assess for changes in respiratory status  - Assess for changes in mentation and behavior  - Position to facilitate oxygenation and minimize respiratory effort  - Oxygen administered by appropriate delivery if ordered  - Initiate smoking cessation education as indicated  - Encourage broncho-pulmonary hygiene including cough, deep breathe, Incentive Spirometry  - Assess the need for suctioning and aspirate as needed  - Assess and instruct to report SOB or any respiratory difficulty  - Respiratory Therapy support as indicated  Outcome: Progressing

## 2022-12-24 NOTE — PLAN OF CARE
Problem: OCCUPATIONAL THERAPY ADULT  Goal: Performs self-care activities at highest level of function for planned discharge setting  See evaluation for individualized goals  Description: Treatment Interventions: ADL retraining, Functional transfer training, UE strengthening/ROM, Endurance training, Patient/family training, Neuromuscular reeducation, Energy conservation, Activityengagement          See flowsheet documentation for full assessment, interventions and recommendations  Note: Limitation: Decreased ADL status, Decreased UE strength, Decreased Safe judgement during ADL, Decreased endurance, Decreased self-care trans, Decreased high-level ADLs  Prognosis: Fair  Assessment: Pt is a 80 y o  female, admitted to [address] [place] [address] 12/23/2022 d/t experiencing generalized weakness, nasal congestion, dyspnea, sore throat and cough  Dx: generalized weakness and RSV brochitis  Pt with PMHx impacting their performance during ADL tasks, including: PAF, arthritis, DM, CHF and HTN  Prior to admission to the hospital Pt was performing ADLs without physical assistance  IADLs without physical assistance  Functional transfers/ambulation without physical assistance  Cognitive status was PTA was [facility]  OT order placed to assess Pt's ADLs, cognitive status, and performance during functional tasks in order to maximize safety and independence while making most appropriate d/c recommendations   Pt's clinical presentation is currently unstable/unpredictable given new onset deficits that effect Pt's occupational performance and ability to safely return to [facility] including decrease activity tolerance, decrease standing balance, decrease performance during ADL tasks, decrease safety awareness , increase impulsiveness, decrease UB MS, decrease generalized strength, decrease activity engagement, decrease performance during functional transfers and high fall risk combined with medical complications of hypertension , A-fib, abnormal H&H and incontinence  Personal factors affecting Pt at time of initial evaluation include: advanced age, inability to perform ADLs, inability to ambulate household distances, inability to navigate community distances and decreased initiation and engagement  Pt will benefit from continued skilled OT services to address deficits as defined above and to maximize level independence/participation during ADLs and functional tasks to facilitate return toward PLOF and improved quality of life  From an occupational therapy standpoint, recommendation at time of d/c would be [facility] with [address] [place] [address] and increased staff support       OT Discharge Recommendation: Home with home health rehabilitation (Return to [facility] with increased support from staff and [address] [place] [address] services)

## 2022-12-24 NOTE — OCCUPATIONAL THERAPY NOTE
Occupational Therapy Evaluation     Patient Name: Alonzo Gonzales  OTZGY'Z Date: 12/24/2022  Problem List  Principal Problem:    Generalized weakness  Active Problems:    RSV bronchitis    PAF (paroxysmal atrial fibrillation) (HCC)    Primary hypertension    Past Medical History  Past Medical History:   Diagnosis Date    Arthritis     Atrial fibrillation (HCC)     CHF (congestive heart failure) ([facility])     Diabetes mellitus ([facility] )     Hypertension         12/24/22 0908   Note Type   Note type Evaluation   Pain Assessment   Pain Assessment Tool 0-10   Pain Score No Pain   Restrictions/Precautions   Other Precautions Chair Alarm; Bed Alarm; Fall Risk   Home Living   Type of Home Assisted living  ([address])   Home Layout One level   Bathroom Shower/Tub Walk-in shower   [address] bars in shower; Shower chair;Grab bars around toilet   [facility]   Additional Comments Pt lives in [address] [place] [address]  Pt previously using RW for mobility  Prior Function   Level of [place] Independent with ADLs; Independent with functional mobility; Needs assistance with [facility] staff;Significant other   Receives Help From Family  (Facility staff)   IADLs Independent with medication management; Family/Friend/Other provides meals; Family/Friend/Other provides transportation   Falls in the last 6 months 0   Comments Pt reporting completing ADLs IND and IADLs with assistance  No reported falls  ADL   Grooming Assistance   (SBA)   Grooming Deficit Setup;Verbal cueing; Increased time to complete;Supervision/safety;Standing with assistive device; Wash/dry hands   LB Dressing Assistance   (Steadying assist)   LB Dressing Deficit Setup;Steadying; Requires assistive device for steadying;Verbal cueing;Supervision/safety; Increased time to complete; Don/doff R sock; Thread RLE into pants   Toileting Assistance    (Steadying assist)   Toileting Deficit Setup;Steadying;Verbal cueing;Supervison/safety; Increased time to complete;Grab bar use;Clothing management up;Clothing management down;Perineal hygiene   Additional Comments Pt able to don socks sitting in recliner with steadying assist  Based on functional abilities, pt demo ability to complete UB ADLs with SUP/setup and LB ADLs with steadying assist  Toileting completed with steadying assist secondary to decreased balance and impulsivity while standing  Bed Mobility   Additional Comments Pt OOB in recliner prior to/following session  Bed mobility not assessed this date  Transfers   Sit to Stand   (steadying assist)   Additional items Assist x 1; Increased time required;Verbal cues; Impulsive   Stand to Sit   (Steadying assist)   Additional items Assist x 1; Increased time required; Impulsive;Verbal cues   Stand pivot   (Steadying assist-Bailey)   Additional items Assist x 1; Increased time required; Impulsive;Verbal cues   Toilet transfer   (Steadying assist-Bailey)   Additional items Assist x 1; Increased time required;Verbal cues;Standard toilet; Impulsive   Additional Comments Pt completed all transfers using RW  Pt requiring steadying assist for STS transfers due to impulsivity and cuing required for safe hand placement  Pt completing SPT and toilet transfers with steadying assist progressing to [place] secondary to premature sitting  Cuing required for safe RW management and sequencing  Pt impulsive during transfers     Balance   Static Sitting Fair +   Dynamic Sitting Fair   Static Standing Fair   Dynamic Standing Fair -   Activity Tolerance   Activity Tolerance Patient limited by fatigue   Medical Staff Made Aware Martina WETZEL   Nurse Made Aware Yes   RUE Assessment   RUE Assessment WFL  (3+/5 MMT)   LUE Assessment   LUE Assessment   (75% ROM; 3+/5 MMT)   Hand Function   Gross Motor Coordination Functional   Fine Motor Coordination Functional   Sensation   Light Touch No apparent deficits   Cognition   Overall Cognitive Status [facility] Arousal/Participation Alert; Responsive; Cooperative   Attention Attends with cues to redirect   Orientation Level Oriented X4   Memory Within functional limits   Following Commands Follows one step commands with increased time or repetition   Comments Pt impulsive with decreased safety awareness throughout  Assessment   Limitation Decreased ADL status; Decreased UE strength;Decreased Safe judgement during ADL;Decreased endurance;Decreased self-care trans;Decreased high-level ADLs   Prognosis Fair   Assessment Pt is a 80 y o  female, admitted to [address] [place] [address] 12/23/2022 d/t experiencing generalized weakness, nasal congestion, dyspnea, sore throat and cough  Dx: generalized weakness and RSV brochitis  Pt with PMHx impacting their performance during ADL tasks, including: PAF, arthritis, DM, CHF and HTN  Prior to admission to the hospital Pt was performing ADLs without physical assistance  IADLs without physical assistance  Functional transfers/ambulation without physical assistance  Cognitive status was PTA was [facility]  OT order placed to assess Pt's ADLs, cognitive status, and performance during functional tasks in order to maximize safety and independence while making most appropriate d/c recommendations  Pt's clinical presentation is currently unstable/unpredictable given new onset deficits that effect Pt's occupational performance and ability to safely return to [facility] including decrease activity tolerance, decrease standing balance, decrease performance during ADL tasks, decrease safety awareness , increase impulsiveness, decrease UB MS, decrease generalized strength, decrease activity engagement, decrease performance during functional transfers and high fall risk combined with medical complications of hypertension , A-fib, abnormal H&H and incontinence   Personal factors affecting Pt at time of initial evaluation include: advanced age, inability to perform ADLs, inability to ambulate household distances, inability to navigate community distances and decreased initiation and engagement  Pt will benefit from continued skilled OT services to address deficits as defined above and to maximize level independence/participation during ADLs and functional tasks to facilitate return toward PLOF and improved quality of life  From an occupational therapy standpoint, recommendation at time of d/c would be return to [facility] and increased staff support  Plan   Treatment Interventions ADL retraining;Functional transfer training;UE strengthening/ROM; Endurance training;Patient/family training;Neuromuscular reeducation; Energy conservation; Activityengagement   Goal Expiration Date 01/07/23   OT Frequency 3-5x/wk   Recommendation   OT Discharge Recommendation Home with home health rehabilitation  (Return to [facility] with increased support from staff and OT services)   AM-PAC Daily Activity Inpatient   Lower Body Dressing 3   Bathing 3   Toileting 3   Upper Body Dressing 3   Grooming 3   Eating 4   Daily Activity Raw Score 19   Daily Activity Standardized Score (Calc for Raw Score >=11) 40 22   AM-PAC Applied Cognition Inpatient   Following a Speech/Presentation 3   Understanding Ordinary Conversation 4   Taking Medications 3   Remembering Where Things Are Placed or Put Away 3   Remembering List of 4-5 Errands 3   Taking Care of Complicated Tasks 3   Applied Cognition Raw Score 19   Applied Cognition Standardized Score 39 77     The patient's raw score on the AM-PAC Daily Activity inpatient short form is 19, standardized score is 40 22, greater than 39 4  Patients at this level are likely to benefit from DC to home  Please refer to the recommendation of the Occupational Therapist for safe DC planning  Pt goals to be met by 1/7/2023  Pt will demonstrate ability to complete grooming/hygiene tasks @ [place] after set-up    Pt will demonstrate ability to complete supine<>sit @ [place] in order to increase safety and independence during ADL tasks  Pt will demonstrate ability to complete UB ADLs including washing/dressing @ [facility] in order to increase performance and participation during meaningful tasks  Pt will demonstrate ability to complete LB dressing @ [place] in order to increase safety and independence during meaningful tasks  Pt will demonstrate ability to derrek/doff socks/shoes while sitting EOB @ [place] in order to increase safety and independence during meaningful tasks  Pt will demonstrate ability to complete toileting tasks including CM and pericare @ [place] in order to increase safety and independence during meaningful tasks  Pt will demonstrate ability to complete EOB, chair, toilet/commode transfers @ [place] in order to increase performance and participation during functional tasks  Pt will demonstrate ability to stand for 8 minutes while maintaining F+ balance with use of RW for UB support PRN  Pt will demonstrate ability to tolerate 30-35 minute OT session with no vc'ing for deep breathing or use of energy conservation techniques in order to increase activity tolerance during functional tasks  Pt will demonstrate Good carryover of use of energy conservation/compensatory strategies during ADLs and functional tasks in order to increase safety and reduce risk for falls  Pt will demonstrate Good attention and participation in continued evaluation of functional ambulation house hold distances in order to assist with safe d/c planning  Pt will follow 100% simple 2-step commands and be A&O x4 consistently with environmental cues to increase participation in functional activities  Pt will demonstrate 100% carryover of BUE HEP in order to increase BUE MS and increase performance during functional tasks upon d/c home      Isela Sawant OTR/L

## 2022-12-24 NOTE — PLAN OF CARE
Problem: Potential for Falls  Goal: Patient will remain free of falls  Description: INTERVENTIONS:  - Educate patient/family on patient safety including physical limitations  - Instruct patient to call for assistance with activity   - Consult OT/PT to assist with strengthening/mobility   - Keep Call bell within reach  - Keep bed low and locked with side rails adjusted as appropriate  - Keep care items and personal belongings within reach  - Initiate and maintain comfort rounds  - Make Fall Risk Sign visible to staff  - Offer Toileting every 2 Hours, in advance of need  - Initiate/Maintain bed/chair alarm  - Obtain necessary fall risk management equipment:   - Apply yellow socks and bracelet for high fall risk patients  - Consider moving patient to room near nurses station  Outcome: Progressing     Problem: PAIN - ADULT  Goal: Verbalizes/displays adequate comfort level or baseline comfort level  Description: Interventions:  - Encourage patient to monitor pain and request assistance  - Assess pain using appropriate pain scale  - Administer analgesics based on type and severity of pain and evaluate response  - Implement non-pharmacological measures as appropriate and evaluate response  - Consider cultural and social influences on pain and pain management  - Notify physician/advanced practitioner if interventions unsuccessful or patient reports new pain  Outcome: Progressing     Problem: SAFETY ADULT  Goal: Patient will remain free of falls  Description: INTERVENTIONS:  - Educate patient/family on patient safety including physical limitations  - Instruct patient to call for assistance with activity   - Consult OT/PT to assist with strengthening/mobility   - Keep Call bell within reach  - Keep bed low and locked with side rails adjusted as appropriate  - Keep care items and personal belongings within reach  - Initiate and maintain comfort rounds  - Make Fall Risk Sign visible to staff  - Offer Toileting every 2 Hours, in advance of need  - Initiate/Maintain bed/chair alarm  - Obtain necessary fall risk management equipment:   - Apply yellow socks and bracelet for high fall risk patients  - Consider moving patient to room near nurses station  Outcome: Progressing  Goal: Maintain or return to baseline ADL function  Description: INTERVENTIONS:  -  Assess patient's ability to carry out ADLs; assess patient's baseline for ADL function and identify physical deficits which impact ability to perform ADLs (bathing, care of mouth/teeth, toileting, grooming, dressing, etc )  - Assess/evaluate cause of self-care deficits   - Assess range of motion  - Assess patient's mobility; develop plan if impaired  - Assess patient's need for assistive devices and provide as appropriate  - Encourage maximum independence but intervene and supervise when necessary  - Involve family in performance of ADLs  - Assess for home care needs following discharge   - Consider OT consult to assist with ADL evaluation and planning for discharge  - Provide patient education as appropriate  Outcome: Progressing  Goal: Maintains/Returns to pre admission functional level  Description: INTERVENTIONS:  - Perform BMAT or MOVE assessment daily    - Set and communicate daily mobility goal to care team and patient/family/caregiver  - Collaborate with rehabilitation services on mobility goals if consulted  - Perform Range of Motion 3 times a day  - Reposition patient every 2 hours    - Dangle patient 3 times a day  - Stand patient 3 times a day  - Ambulate patient 3 times a day  - Out of bed to chair 3 times a day   - Out of bed for meals 3 times a day  - Out of bed for toileting  - Record patient progress and toleration of activity level   Outcome: Progressing     Problem: INFECTION - ADULT  Goal: Absence or prevention of progression during hospitalization  Description: INTERVENTIONS:  - Assess and monitor for signs and symptoms of infection  - Monitor lab/diagnostic results  - Monitor all insertion sites, i e  indwelling lines, tubes, and drains  - Monitor endotracheal if appropriate and nasal secretions for changes in amount and color  - [place] appropriate cooling/warming therapies per order  - Administer medications as ordered  - Instruct and encourage patient and family to use good hand hygiene technique  - Identify and instruct in appropriate isolation precautions for identified infection/condition  Outcome: Progressing  Goal: Absence of fever/infection during neutropenic period  Description: INTERVENTIONS:  - Monitor WBC    Outcome: Progressing     Problem: Knowledge Deficit  Goal: Patient/family/caregiver demonstrates understanding of disease process, treatment plan, medications, and discharge instructions  Description: Complete learning assessment and assess knowledge base    Interventions:  - Provide teaching at level of understanding  - Provide teaching via preferred learning methods  Outcome: Progressing     Problem: DISCHARGE PLANNING  Goal: Discharge to home or other facility with appropriate resources  Description: INTERVENTIONS:  - Identify barriers to discharge w/patient and caregiver  - Arrange for needed discharge resources and transportation as appropriate  - Identify discharge learning needs (meds, wound care, etc )  - Arrange for interpretive services to assist at discharge as needed  - Refer to Case Management Department for coordinating discharge planning if the patient needs post-hospital services based on physician/advanced practitioner order or complex needs related to functional status, cognitive ability, or social support system  Outcome: Progressing     Problem: RESPIRATORY - ADULT  Goal: Achieves optimal ventilation and oxygenation  Description: INTERVENTIONS:  - Assess for changes in respiratory status  - Assess for changes in mentation and behavior  - Position to facilitate oxygenation and minimize respiratory effort  - Oxygen administered by appropriate delivery if ordered  - Initiate smoking cessation education as indicated  - Encourage broncho-pulmonary hygiene including cough, deep breathe, Incentive Spirometry  - Assess the need for suctioning and aspirate as needed  - Assess and instruct to report SOB or any respiratory difficulty  - Respiratory Therapy support as indicated  Outcome: Progressing

## 2022-12-25 VITALS
TEMPERATURE: 97.9 F | OXYGEN SATURATION: 98 % | HEART RATE: 73 BPM | BODY MASS INDEX: 27.95 KG/M2 | HEIGHT: 62 IN | RESPIRATION RATE: 18 BRPM | WEIGHT: 151.9 LBS | SYSTOLIC BLOOD PRESSURE: 132 MMHG | DIASTOLIC BLOOD PRESSURE: 63 MMHG

## 2022-12-25 PROBLEM — N30.90 CYSTITIS: Status: ACTIVE | Noted: 2022-12-25

## 2022-12-25 LAB
GLUCOSE SERPL-MCNC: 114 MG/DL (ref 65–140)
GLUCOSE SERPL-MCNC: 127 MG/DL (ref 65–140)
MRSA NOSE QL CULT: NORMAL

## 2022-12-25 RX ORDER — GUAIFENESIN 600 MG/1
600 TABLET, EXTENDED RELEASE ORAL EVERY 12 HOURS SCHEDULED
Status: DISCONTINUED | OUTPATIENT
Start: 2022-12-25 | End: 2022-12-25 | Stop reason: HOSPADM

## 2022-12-25 RX ORDER — CEPHALEXIN 500 MG/1
500 CAPSULE ORAL EVERY 8 HOURS SCHEDULED
Qty: 9 CAPSULE | Refills: 0 | Status: SHIPPED | OUTPATIENT
Start: 2022-12-25 | End: 2022-12-28

## 2022-12-25 RX ORDER — GUAIFENESIN/DEXTROMETHORPHAN 100-10MG/5
10 SYRUP ORAL EVERY 4 HOURS PRN
Status: DISCONTINUED | OUTPATIENT
Start: 2022-12-25 | End: 2022-12-25 | Stop reason: HOSPADM

## 2022-12-25 RX ORDER — CEPHALEXIN 500 MG/1
500 CAPSULE ORAL EVERY 8 HOURS SCHEDULED
Status: DISCONTINUED | OUTPATIENT
Start: 2022-12-25 | End: 2022-12-25 | Stop reason: HOSPADM

## 2022-12-25 RX ORDER — ACETAMINOPHEN 325 MG/1
650 TABLET ORAL EVERY 6 HOURS PRN
Qty: 20 TABLET | Refills: 0 | Status: SHIPPED | OUTPATIENT
Start: 2022-12-25

## 2022-12-25 RX ORDER — GUAIFENESIN 600 MG/1
600 TABLET, EXTENDED RELEASE ORAL EVERY 12 HOURS SCHEDULED
Qty: 20 TABLET | Refills: 0 | Status: SHIPPED | OUTPATIENT
Start: 2022-12-25

## 2022-12-25 RX ORDER — GUAIFENESIN/DEXTROMETHORPHAN 100-10MG/5
10 SYRUP ORAL EVERY 4 HOURS PRN
Qty: 237 ML | Refills: 0 | Status: SHIPPED | OUTPATIENT
Start: 2022-12-25

## 2022-12-25 RX ADMIN — PROPRANOLOL HYDROCHLORIDE 20 MG: 20 TABLET ORAL at 08:02

## 2022-12-25 RX ADMIN — ACETAMINOPHEN 650 MG: 325 TABLET ORAL at 09:34

## 2022-12-25 RX ADMIN — Medication 2000 UNITS: at 08:02

## 2022-12-25 RX ADMIN — APIXABAN 5 MG: 5 TABLET, FILM COATED ORAL at 08:02

## 2022-12-25 RX ADMIN — CALCIUM 1 TABLET: 500 TABLET ORAL at 07:56

## 2022-12-25 RX ADMIN — GUAIFENESIN AND DEXTROMETHORPHAN 10 ML: 100; 10 SYRUP ORAL at 07:56

## 2022-12-25 RX ADMIN — PANTOPRAZOLE SODIUM 20 MG: 20 TABLET, DELAYED RELEASE ORAL at 07:56

## 2022-12-25 RX ADMIN — LORATADINE 10 MG: 10 TABLET ORAL at 08:03

## 2022-12-25 RX ADMIN — GUAIFENESIN 600 MG: 600 TABLET ORAL at 08:02

## 2022-12-25 RX ADMIN — GABAPENTIN 300 MG: 300 CAPSULE ORAL at 08:02

## 2022-12-25 NOTE — CASE MANAGEMENT
Case Management Discharge Planning Note    Patient name Olivia May  Location /-41 MRN [id number]  : 1935 Date 2022       Current Admission Date: 2022  Current Admission Diagnosis:Generalized weakness   Patient Active Problem List    Diagnosis Date Noted   • Cystitis 2022   • Generalized weakness 2022   • RSV bronchitis 2022   • PAF (paroxysmal atrial fibrillation) ([facility] [address] ) 2022   • Primary hypertension 2022      LOS (days): 2  Geometric Mean LOS (GMLOS) (days):   Days to GMLOS:     OBJECTIVE:  Risk of Unplanned Readmission Score: 13 55         Current admission status: Inpatient   Preferred Pharmacy:   PATIENT/FAMILY REPORTS NO PREFERRED PHARMACY  No address on file      [address] [place] [address],[address]  [address] [place] [id number]  Phone: [phone] Fax: [phone]    Primary Care Provider: Ralph Turner MD    Primary Insurance: MEDICARE  Secondary Insurance:  FOR LIFE    DISCHARGE DETAILS:                 as per Glo Hernandez will transport pt to [facility], today at 1400 via [address]

## 2022-12-25 NOTE — ASSESSMENT & PLAN NOTE
· Flulike symptoms, cough, respiratory congestion  · RSV positive  · No respiratory failure  No wheezing on exam   Not requiring any supplemental oxygen  · Monitor off  antibiotics  · No sepsis criteria  · Continue with Mucinex and as needed Robitussin for cough  · Stable for return to independent living facility

## 2022-12-25 NOTE — DISCHARGE SUMMARY
[address]  Discharge- Sarah Hunger 1935, 80 y o  female MRN: [id number]  Unit/Bed#: -Sandeep Encounter: [id number]  Primary Care Provider: Xiomara Valentino MD   Date and time admitted to hospital: 12/23/2022  7:52 PM    * Generalized weakness  Assessment & Plan  · Presents from [facility] independent living with RSV bronchitis  · Significantly generalized weakness unable to perform ADLs  ·  also admitted with RSV respiratory failure  · PT/OT/case management consulted   ·  PT and OT input appreciated  Likely can return to facility in the next 24 hours with outpatient physical therapy  Cystitis  Assessment & Plan  Abnormal urinalysis noted  Patient complains of intermittent urinary frequency  Will complete 3 days of antibiotics with Keflex  Primary hypertension  Assessment & Plan  · Blood pressure controlled  · Continue PTA propanolol 20 mg twice daily  · Trend blood pressures    PAF (paroxysmal atrial fibrillation) ([facility])  Assessment & Plan  · Sinus rhythm on admission EKG  · Continue PTA propanolol  · Continue PTA Eliquis    RSV bronchitis  Assessment & Plan  · Flulike symptoms, cough, respiratory congestion  · RSV positive  · No respiratory failure  No wheezing on exam   Not requiring any supplemental oxygen  · Monitor off  antibiotics  · No sepsis criteria  · Continue with Mucinex and as needed Robitussin for cough  · Stable for return to independent living facility        Medical Problems      Discharging Physician / Practitioner: Shreya Richardson MD  PCP: Xiomara Valentino MD  Admission Date:   Admission Orders (From admission, onward)     Ordered        12/23/22 2143  INPATIENT ADMISSION  Once                      Discharge Date: 12/25/22    Consultations During Hospital Stay:  · None    Procedures Performed:   · Chest x-ray    Significant Findings / Test Results:   · RSV positive    Incidental Findings: NA  Test Results Pending at Discharge (will require follow up):   · NA     Outpatient Tests Requested:  · Physical therapy    Complications: None  Reason for Admission: Weakness  Hospital Course:   Katherine Jo is a 80 y o  female patient who originally presented to the hospital on 12/23/2022 due to generalized weakness  With tested positive for RSV  Did not have any respiratory complaints other than cough  Did not require any supplemental oxygen or no wheezing requiring nebulization treatment or steroids  Was seen by physical therapy recommended outpatient therapy at her independent living facility upon discharge  This was set up by case management  Patient was discharged home in a stable condition  Please see above list of diagnoses and related plan for additional information  Condition at Discharge: stable    Discharge Day Visit / Exam:   Subjective: Patient seen and examined  Complains of minimal cough  Denies any shortness of breath  No acute events overnight  Vitals: Blood Pressure: 132/63 (12/25/22 0732)  Pulse: 73 (12/25/22 0732)  Temperature: 97 9 °F (36 6 °C) (12/25/22 0732)  Temp Source: Oral (12/23/22 1954)  Respirations: 18 (12/24/22 2202)  Height: 5' 2" (157 5 cm) (12/23/22 2251)  Weight - Scale: 68 9 kg (151 lb 14 4 oz) (12/23/22 1954)  SpO2: 98 % (12/25/22 0732)  Exam:   Physical Exam  Constitutional:       General: She is not in acute distress  HENT:      Nose: Nose normal       Mouth/Throat:      Mouth: Mucous membranes are moist    Eyes:      Extraocular Movements: Extraocular movements intact  Pupils: Pupils are equal, round, and reactive to light  Cardiovascular:      Rate and Rhythm: Normal rate and regular rhythm  Pulses: Normal pulses  Pulmonary:      Effort: Pulmonary effort is normal    Abdominal:      General: Abdomen is flat  Bowel sounds are normal       Palpations: Abdomen is soft  Musculoskeletal:         General: No swelling  Cervical back: Neck supple  Skin:     General: Skin is warm  Neurological:      General: No focal deficit present  Mental Status: She is alert and oriented to person, place, and time  Mental status is at baseline  Psychiatric:         Mood and Affect: Mood normal          Discussion with Family: Updated  (daughter) via phone  Discharge instructions/Information to patient and family:   See after visit summary for information provided to patient and family  Provisions for Follow-Up Care:  See after visit summary for information related to follow-up care and any pertinent home health orders  Disposition:   Other: Independent living facility at [facility] Readmission: NO     Discharge Statement:  I spent 40 minutes discharging the patient  This time was spent on the day of discharge  I had direct contact with the patient on the day of discharge  Greater than 50% of the total time was spent examining patient, answering all patient questions, arranging and discussing plan of care with patient as well as directly providing post-discharge instructions  Additional time then spent on discharge activities  Discharge Medications:  See after visit summary for reconciled discharge medications provided to patient and/or family        **Please Note: This note may have been constructed using a voice recognition system**

## 2022-12-25 NOTE — ASSESSMENT & PLAN NOTE
Abnormal urinalysis noted  Patient complains of intermittent urinary frequency  Will complete 3 days of antibiotics with Keflex

## 2022-12-25 NOTE — NURSING NOTE
Patient left with all of her personal belongings  Patient given discharge paperwork and verbalized understanding  Patient PIV removed  Patient discharged with transport

## 2022-12-25 NOTE — DISCHARGE INSTR - AVS FIRST PAGE
Dear Paul Guillen,     It was our pleasure to care for you here at [facility]  It is our hope that we were always able to exceed the expected standards for your care during your stay  You were hospitalized due to RSV infection  You were cared for on the medical floor under the service of Martha Daly MD with the [facility] who covers for your primary care physician (PCP), Aura Gomez MD, while you were hospitalized  If you have any questions or concerns related to this hospitalization, you may contact us at [phone]  For follow up as well as medication refills, we recommend that you follow up with your primary care physician  A registered nurse will reach out to you by phone within a few days after your discharge to answer any additional questions that you may have after going home  However, at this time we provide for you here, the most important instructions / recommendations at discharge:     Notable Medication Adjustments -   Completed 2 days of antibiotics as prescribed for urinary tract infection  Continue to take cough medication as prescribed  Testing Required after Discharge -   You will be scheduled to get physical therapy at your independent living facility  Important follow up information -   Follow-up with your family doctor within 1 week of discharge  Please review this entire after visit summary as additional general instructions including medication list, appointments, activity, diet, any pertinent wound care, and other additional recommendations from your care team that may be provided for you        Sincerely,     Martha Daly MD

## 2022-12-25 NOTE — PLAN OF CARE
Problem: Potential for Falls  Goal: Patient will remain free of falls  Description: INTERVENTIONS:  - Educate patient/family on patient safety including physical limitations  - Instruct patient to call for assistance with activity   - Consult OT/PT to assist with strengthening/mobility   - Keep Call bell within reach  - Keep bed low and locked with side rails adjusted as appropriate  - Keep care items and personal belongings within reach  - Initiate and maintain comfort rounds  - Make Fall Risk Sign visible to staff  - Offer Toileting every 2 Hours, in advance of need  - Initiate/Maintain bed/chair alarm  - Obtain necessary fall risk management equipment:   - Apply yellow socks and bracelet for high fall risk patients  - Consider moving patient to room near nurses station  Outcome: Progressing     Problem: PAIN - ADULT  Goal: Verbalizes/displays adequate comfort level or baseline comfort level  Description: Interventions:  - Encourage patient to monitor pain and request assistance  - Assess pain using appropriate pain scale  - Administer analgesics based on type and severity of pain and evaluate response  - Implement non-pharmacological measures as appropriate and evaluate response  - Consider cultural and social influences on pain and pain management  - Notify physician/advanced practitioner if interventions unsuccessful or patient reports new pain  Outcome: Progressing

## 2022-12-26 LAB
ATRIAL RATE: 85 BPM
BACTERIA UR CULT: ABNORMAL
P AXIS: 88 DEGREES
PR INTERVAL: 158 MS
QRS AXIS: 60 DEGREES
QRSD INTERVAL: 84 MS
QT INTERVAL: 366 MS
QTC INTERVAL: 435 MS
T WAVE AXIS: 65 DEGREES
VENTRICULAR RATE: 85 BPM

## 2022-12-29 LAB
BACTERIA BLD CULT: NORMAL
BACTERIA BLD CULT: NORMAL

## 2023-08-08 ENCOUNTER — OFFICE VISIT (OUTPATIENT)
Dept: PRIMARY CARE | Facility: CLINIC | Age: 87
End: 2023-08-08
Payer: MEDICARE

## 2023-08-08 ENCOUNTER — TRANSCRIBE ORDERS (OUTPATIENT)
Dept: SCHEDULING | Age: 87
End: 2023-08-08

## 2023-08-08 VITALS
HEIGHT: 61 IN | DIASTOLIC BLOOD PRESSURE: 68 MMHG | TEMPERATURE: 97.5 F | WEIGHT: 146 LBS | SYSTOLIC BLOOD PRESSURE: 128 MMHG | OXYGEN SATURATION: 94 % | RESPIRATION RATE: 16 BRPM | BODY MASS INDEX: 27.56 KG/M2 | HEART RATE: 57 BPM

## 2023-08-08 DIAGNOSIS — E78.2 MIXED HYPERLIPIDEMIA: ICD-10-CM

## 2023-08-08 DIAGNOSIS — E04.2 NONTOXIC MULTINODULAR GOITER: ICD-10-CM

## 2023-08-08 DIAGNOSIS — I48.0 PAROXYSMAL ATRIAL FIBRILLATION (CMS/HCC): ICD-10-CM

## 2023-08-08 DIAGNOSIS — E05.90 HYPERTHYROIDISM: ICD-10-CM

## 2023-08-08 DIAGNOSIS — Z76.89 ENCOUNTER TO ESTABLISH CARE WITH NEW DOCTOR: ICD-10-CM

## 2023-08-08 DIAGNOSIS — I50.32 CHRONIC DIASTOLIC CHF (CONGESTIVE HEART FAILURE) (CMS/HCC): Primary | ICD-10-CM

## 2023-08-08 DIAGNOSIS — Z12.31 SCREENING MAMMOGRAM FOR BREAST CANCER: ICD-10-CM

## 2023-08-08 DIAGNOSIS — E05.90 THYROTOXICOSIS, UNSPECIFIED WITHOUT THYROTOXIC CRISIS OR STORM: Primary | ICD-10-CM

## 2023-08-08 DIAGNOSIS — E04.2 MULTIPLE THYROID NODULES: ICD-10-CM

## 2023-08-08 DIAGNOSIS — E11.9 TYPE 2 DIABETES MELLITUS WITHOUT COMPLICATION, WITHOUT LONG-TERM CURRENT USE OF INSULIN (CMS/HCC): ICD-10-CM

## 2023-08-08 DIAGNOSIS — Z23 NEED FOR VACCINATION: ICD-10-CM

## 2023-08-08 DIAGNOSIS — M81.0 AGE-RELATED OSTEOPOROSIS WITHOUT CURRENT PATHOLOGICAL FRACTURE: ICD-10-CM

## 2023-08-08 PROBLEM — B02.29 POSTHERPETIC NEURALGIA: Status: ACTIVE | Noted: 2021-10-05

## 2023-08-08 PROBLEM — K21.9 GERD (GASTROESOPHAGEAL REFLUX DISEASE): Status: ACTIVE | Noted: 2023-08-08

## 2023-08-08 PROBLEM — R53.1 GENERALIZED WEAKNESS: Status: ACTIVE | Noted: 2022-12-24

## 2023-08-08 PROBLEM — T46.2X5A AMIODARONE-INDUCED THYROIDITIS: Status: ACTIVE | Noted: 2021-06-01

## 2023-08-08 PROBLEM — E06.4 AMIODARONE-INDUCED THYROIDITIS: Status: ACTIVE | Noted: 2021-06-01

## 2023-08-08 PROBLEM — D64.9 NORMOCHROMIC NORMOCYTIC ANEMIA: Status: ACTIVE | Noted: 2022-02-01

## 2023-08-08 PROBLEM — J30.1 NON-SEASONAL ALLERGIC RHINITIS DUE TO POLLEN: Status: ACTIVE | Noted: 2023-02-12

## 2023-08-08 PROBLEM — I10 PRIMARY HYPERTENSION: Status: ACTIVE | Noted: 2022-12-24

## 2023-08-08 PROCEDURE — 99204 OFFICE O/P NEW MOD 45 MIN: CPT | Performed by: FAMILY MEDICINE

## 2023-08-08 RX ORDER — DENOSUMAB 60 MG/ML
60 INJECTION SUBCUTANEOUS
COMMUNITY

## 2023-08-08 RX ORDER — ACETAMINOPHEN 500 MG
500 TABLET ORAL EVERY 6 HOURS PRN
COMMUNITY
End: 2023-08-08

## 2023-08-08 RX ORDER — GABAPENTIN 300 MG/1
300 CAPSULE ORAL 2 TIMES DAILY
COMMUNITY
Start: 2023-03-13 | End: 2023-09-07 | Stop reason: SDUPTHER

## 2023-08-08 RX ORDER — ACETAMINOPHEN 325 MG/1
650 TABLET ORAL EVERY 6 HOURS PRN
Status: ON HOLD | COMMUNITY
Start: 2022-12-25 | End: 2024-04-08

## 2023-08-08 RX ORDER — TRAMADOL HYDROCHLORIDE 50 MG/1
50 TABLET ORAL 2 TIMES DAILY
COMMUNITY
End: 2023-10-09 | Stop reason: SDUPTHER

## 2023-08-08 RX ORDER — PANTOPRAZOLE SODIUM 20 MG/1
20 TABLET, DELAYED RELEASE ORAL DAILY
COMMUNITY
Start: 2023-06-15 | End: 2023-09-07 | Stop reason: SDUPTHER

## 2023-08-08 RX ORDER — FUROSEMIDE 20 MG/1
20 TABLET ORAL DAILY
COMMUNITY
Start: 2023-02-15 | End: 2023-09-07 | Stop reason: SDUPTHER

## 2023-08-08 RX ORDER — LANCETS
1 EACH MISCELLANEOUS AS NEEDED
COMMUNITY
End: 2023-08-23 | Stop reason: SDUPTHER

## 2023-08-08 RX ORDER — LIDOCAINE 50 MG/G
1 PATCH TOPICAL
COMMUNITY
Start: 2023-06-15 | End: 2023-09-07 | Stop reason: SDUPTHER

## 2023-08-08 RX ORDER — PROPRANOLOL HYDROCHLORIDE 20 MG/1
20 TABLET ORAL 2 TIMES DAILY
COMMUNITY
Start: 2023-02-15 | End: 2023-09-07 | Stop reason: SDUPTHER

## 2023-08-08 RX ORDER — SENNOSIDES 8.6 MG/1
1 TABLET ORAL 2 TIMES DAILY PRN
COMMUNITY
End: 2023-09-07

## 2023-08-08 RX ORDER — BLOOD-GLUCOSE METER
1 KIT MISCELLANEOUS
COMMUNITY
Start: 2023-06-15 | End: 2023-09-07 | Stop reason: SDUPTHER

## 2023-08-08 RX ORDER — METFORMIN HYDROCHLORIDE 500 MG/1
1000 TABLET ORAL 2 TIMES DAILY WITH MEALS
COMMUNITY
Start: 2023-02-15 | End: 2023-09-07 | Stop reason: SDUPTHER

## 2023-08-08 RX ORDER — ATORVASTATIN CALCIUM 20 MG/1
20 TABLET, FILM COATED ORAL NIGHTLY
COMMUNITY
Start: 2023-02-15 | End: 2023-09-07 | Stop reason: SDUPTHER

## 2023-08-08 ASSESSMENT — PAIN SCALES - GENERAL: PAINLEVEL: 0-NO PAIN

## 2023-08-08 NOTE — PROGRESS NOTES
Subjective      No chief complaint on file.     Patient ID: Rissa Erickson is a 88 y.o. female presents today with her daughter Cristina c/o:    Needs to establish care, her  passed away about 6 months ago, so she has moved out to [facility] for independent living and live nearby her daughter/grandchildren.    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 Mendelson    Hyperthyroid (methimazole 5 and tapering down as tolerated): nodules growing inwards almost impacting the airway  - Following with Dr. Perez  T2DM controlled A1c 6.2 (MFM 1000 BID)    GERD: pantoprazole 20 mg daily    Osteoporosis (Ca-vitD/Prolia), next Prolia due in October  - DEXA last year    Post-herpetic neuralgia: Left side wrapping around her back to her breast s/p Shippable pain stimulator with Dr. Frazier, battery replaced this year, due to replace battery 6 years (martell 300 BID, Tramadol 50 BID, sennakot prn), gen weakness    Macular degeneration, injections with ophthalmologist      [facility] goes [facility], everything else goes to [place]. A short term antibiotic would go to local [facility]    The following have been reviewed and updated as appropriate in this visit:   Allergies  Meds  Problems         Current Outpatient Medications   Medication Sig Dispense Refill   • acetaminophen (TYLENOL) 325 mg tablet Take 650 mg by mouth every 6 hours as needed.     • apixaban (ELIQUIS) 5 mg tablet Take 5 mg by mouth 2 times daily.     • atorvastatin (LIPITOR) 20 mg tablet Take 20 mg by mouth nightly.     • furosemide (LASIX) 20 mg tablet Take 20 mg by mouth daily.     • gabapentin (NEURONTIN) 300 mg capsule Take 300 mg by mouth 2  "times daily.     • lancets misc 1 each as needed. To check blood glucose     • metFORMIN (GLUCOPHAGE) 500 mg tablet Take 1,000 mg by mouth 2 (two) times a day with meals.     • propranoloL (INDERAL) 20 mg tablet Take 20 mg by mouth 2 times daily.     • traMADoL (ULTRAM) 50 mg tablet Take 50 mg by mouth 2 (two) times a day.     • denosumab (PROLIA) 60 mg/mL syringe Inject 60 mg under the skin every 6 (six) months.     • FREESTYLE LITE STRIPS strip 1 strip.     • lidocaine (LIDODERM) 5 % patch Place 1 patch on the skin.     • pantoprazole (PROTONIX) 20 mg EC tablet Take 20 mg by mouth daily.     • senna (SENOKOT) 8.6 mg tablet Take 1 tablet by mouth 2 (two) times a day as needed for constipation.        Current Facility-Administered Medications   Medication Dose Route Frequency Provider Last Rate Last Admin   • [START ON 10/9/2023] denosumab (PROLIA) syringe 60 mg  60 mg subcutaneous Once Sloan Maher MD         No past medical history on file.  No family history on file.  Allergies   Allergen Reactions   • Amiodarone Other (see comments)     Affected thyroid   thyroiditis       No past surgical history on file.  Social History     Socioeconomic History   • Marital status:             Objective     Vitals:   Vitals:    08/08/23 1050   BP: 128/68   BP Location: Right upper arm   Patient Position: Sitting   Pulse: (!) 57   Resp: 16   Temp: 36.4 °C (97.5 °F)   TempSrc: Temporal   SpO2: 94%   Weight: 66.2 kg (146 lb)   Height: 1.549 m (5' 1\")       Physical Exam  Vitals reviewed.   Constitutional:       General: She is not in acute distress.     Appearance: Normal appearance. She is not diaphoretic.      Comments: Trouble hearing, using hearing aids   HENT:      Head: Normocephalic and atraumatic.      Mouth/Throat:      Mouth: Mucous membranes are moist.      Pharynx: Oropharynx is clear.   Eyes:      Extraocular Movements: Extraocular movements intact.      Pupils: Pupils are equal, round, and reactive to " light.   Cardiovascular:      Rate and Rhythm: Regular rhythm. Bradycardia present.      Heart sounds: Murmur (holosystolic murmur) heard.   Pulmonary:      Effort: Pulmonary effort is normal.      Breath sounds: Normal breath sounds.   Abdominal:      General: Bowel sounds are normal. There is no distension.      Palpations: Abdomen is soft.      Tenderness: There is no abdominal tenderness.   Musculoskeletal:      Cervical back: Neck supple.      Right lower leg: Edema (1+) present.      Left lower leg: Edema (1+) present.   Lymphadenopathy:      Cervical: No cervical adenopathy.   Skin:     General: Skin is warm and dry.   Neurological:      General: No focal deficit present.      Mental Status: She is alert and oriented to person, place, and time.   Psychiatric:         Behavior: Behavior normal.         Thought Content: Thought content normal.         Judgment: Judgment normal.         Labs  No results found for: WBC, HGB, HCT, PLT, CHOL, TRIG, HDL, LDLCALC, ALT, AST, NA, K, GLUCOSE, CL, CREATININE, BUN, CO2, TSH, T4F, HGBA1C, MCRALBCREAT, EGFR, PSA           Assessment/Plan   Diagnoses and all orders for this visit:    Chronic diastolic CHF (congestive heart failure) (CMS/HCC)    Paroxysmal atrial fibrillation (CMS/HCC)  -     CBC; Future    Age-related osteoporosis without current pathological fracture  -     denosumab (PROLIA) syringe 60 mg    Hyperthyroidism    Type 2 diabetes mellitus without complication, without long-term current use of insulin (CMS/HCC)  -     Hemoglobin A1c; Future  -     Comprehensive metabolic panel; Future    Multiple thyroid nodules    Mixed hyperlipidemia    Encounter to establish care with new doctor    Screening mammogram for breast cancer  -     BI SCREENING MAMMOGRAM BILATERAL(TOMOSYNTHESIS); Future    Need for vaccination  -     Cancel: COVID-19 vaccine, Moderna, BIVALENT         Follow-up in 6 months, sooner as needed  - Prolia injection due in October, due for mammogram  around that time  Advised for COVID vaccine although no vaccines available in office at this time, would advise updating COVID booster and flu vaccine by October as well.  Would advise RSV vaccine as well.    Patient verbalizes understanding and agrees with plan of care today.               Sloan Maher MD  8/8/2023

## 2023-08-21 ENCOUNTER — TELEPHONE (OUTPATIENT)
Dept: PRIMARY CARE | Facility: CLINIC | Age: 87
End: 2023-08-21
Payer: MEDICARE

## 2023-08-21 NOTE — TELEPHONE ENCOUNTER
[facility] Appointment Request   Provider: Nurse  Appointment Type: diagnostic   Reason for Visit: prolia injection  Available Day and Time: 10/11/23  Best Contact Number: [phone]    The practice will reach out to schedule your appointment within the next 2 business days.

## 2023-08-23 DIAGNOSIS — Z12.39 ENCOUNTER FOR SCREENING FOR MALIGNANT NEOPLASM OF BREAST, UNSPECIFIED SCREENING MODALITY: Primary | ICD-10-CM

## 2023-08-23 RX ORDER — LANCETS
1 EACH MISCELLANEOUS AS NEEDED
Qty: 100 EACH | Refills: 3 | Status: SHIPPED | OUTPATIENT
Start: 2023-08-23 | End: 2023-08-24 | Stop reason: SDUPTHER

## 2023-08-23 NOTE — TELEPHONE ENCOUNTER
Medicine Refill Request    Last Office Visit: 8/8/2023   Last Consult Visit: Visit date not found  Last Telemedicine Visit: Visit date not found    Next Appointment: 2/8/2024      Current Outpatient Medications:   •  acetaminophen (TYLENOL) 325 mg tablet, Take 650 mg by mouth every 6 hours as needed., Disp: , Rfl:   •  apixaban (ELIQUIS) 5 mg tablet, Take 5 mg by mouth 2 times daily., Disp: , Rfl:   •  atorvastatin (LIPITOR) 20 mg tablet, Take 20 mg by mouth nightly., Disp: , Rfl:   •  denosumab (PROLIA) 60 mg/mL syringe, Inject 60 mg under the skin every 6 (six) months., Disp: , Rfl:   •  FREESTYLE LITE STRIPS strip, 1 strip., Disp: , Rfl:   •  furosemide (LASIX) 20 mg tablet, Take 20 mg by mouth daily., Disp: , Rfl:   •  gabapentin (NEURONTIN) 300 mg capsule, Take 300 mg by mouth 2 times daily., Disp: , Rfl:   •  lancets misc, 1 each as needed. To check blood glucose, Disp: , Rfl:   •  lidocaine (LIDODERM) 5 % patch, Place 1 patch on the skin., Disp: , Rfl:   •  metFORMIN (GLUCOPHAGE) 500 mg tablet, Take 1,000 mg by mouth 2 (two) times a day with meals., Disp: , Rfl:   •  pantoprazole (PROTONIX) 20 mg EC tablet, Take 20 mg by mouth daily., Disp: , Rfl:   •  propranoloL (INDERAL) 20 mg tablet, Take 20 mg by mouth 2 times daily., Disp: , Rfl:   •  senna (SENOKOT) 8.6 mg tablet, Take 1 tablet by mouth 2 (two) times a day as needed for constipation. , Disp: , Rfl:   •  traMADoL (ULTRAM) 50 mg tablet, Take 50 mg by mouth 2 (two) times a day., Disp: , Rfl:     Current Facility-Administered Medications:   •  [START ON 10/9/2023] denosumab (PROLIA) syringe 60 mg, 60 mg, subcutaneous, Once, Sloan Maher MD      BP Readings from Last 3 Encounters:   08/08/23 128/68       Recent Lab results:  No results found for: CHOL, No results found for: HDL, No results found for: LDLCALC, No results found for: TRIG     No results found for: GLUCOSE, No results found for: HGBA1C      No results found for: CREATININE    No results  found for: TSH      No results found for: HGBA1C

## 2023-08-24 DIAGNOSIS — Z12.31 SCREENING MAMMOGRAM FOR BREAST CANCER: Primary | ICD-10-CM

## 2023-08-24 DIAGNOSIS — Z13.820 SCREENING FOR OSTEOPOROSIS: ICD-10-CM

## 2023-08-24 DIAGNOSIS — Z78.0 POSTMENOPAUSAL: ICD-10-CM

## 2023-08-25 ENCOUNTER — TRANSCRIBE ORDERS (OUTPATIENT)
Dept: LAB | Age: 87
End: 2023-08-25

## 2023-08-25 ENCOUNTER — TELEPHONE (OUTPATIENT)
Dept: PRIMARY CARE | Facility: CLINIC | Age: 87
End: 2023-08-25

## 2023-08-25 ENCOUNTER — HOSPITAL ENCOUNTER (OUTPATIENT)
Dept: RADIOLOGY | Age: 87
Discharge: HOME | End: 2023-08-25
Attending: INTERNAL MEDICINE
Payer: MEDICARE

## 2023-08-25 ENCOUNTER — APPOINTMENT (OUTPATIENT)
Dept: LAB | Age: 87
End: 2023-08-25
Attending: FAMILY MEDICINE
Payer: MEDICARE

## 2023-08-25 DIAGNOSIS — E04.2 NONTOXIC MULTINODULAR GOITER: ICD-10-CM

## 2023-08-25 DIAGNOSIS — I48.0 PAROXYSMAL ATRIAL FIBRILLATION (CMS/HCC): ICD-10-CM

## 2023-08-25 DIAGNOSIS — E11.9 TYPE 2 DIABETES MELLITUS WITHOUT COMPLICATION, WITHOUT LONG-TERM CURRENT USE OF INSULIN (CMS/HCC): ICD-10-CM

## 2023-08-25 DIAGNOSIS — E05.90 THYROTOXICOSIS, UNSPECIFIED WITHOUT THYROTOXIC CRISIS OR STORM: ICD-10-CM

## 2023-08-25 DIAGNOSIS — E05.90 THYROTOXICOSIS, UNSPECIFIED WITHOUT THYROTOXIC CRISIS OR STORM: Primary | ICD-10-CM

## 2023-08-25 PROCEDURE — 36415 COLL VENOUS BLD VENIPUNCTURE: CPT

## 2023-08-25 PROCEDURE — 85027 COMPLETE CBC AUTOMATED: CPT

## 2023-08-25 PROCEDURE — 76536 US EXAM OF HEAD AND NECK: CPT

## 2023-08-25 PROCEDURE — 84443 ASSAY THYROID STIM HORMONE: CPT

## 2023-08-25 PROCEDURE — 83036 HEMOGLOBIN GLYCOSYLATED A1C: CPT

## 2023-08-25 PROCEDURE — 80053 COMPREHEN METABOLIC PANEL: CPT

## 2023-08-26 LAB
ALBUMIN SERPL-MCNC: 4.2 G/DL (ref 3.5–5.7)
ALP SERPL-CCNC: 28 IU/L (ref 34–125)
ALT SERPL-CCNC: 16 IU/L (ref 7–52)
ANION GAP SERPL CALC-SCNC: 7 MEQ/L (ref 3–15)
AST SERPL-CCNC: 20 IU/L (ref 13–39)
BILIRUB SERPL-MCNC: 0.6 MG/DL (ref 0.3–1.2)
BUN SERPL-MCNC: 12 MG/DL (ref 7–25)
CALCIUM SERPL-MCNC: 9.6 MG/DL (ref 8.6–10.3)
CHLORIDE SERPL-SCNC: 96 MEQ/L (ref 98–107)
CO2 SERPL-SCNC: 30 MEQ/L (ref 21–31)
CREAT SERPL-MCNC: 0.7 MG/DL (ref 0.6–1.2)
ERYTHROCYTE [DISTWIDTH] IN BLOOD BY AUTOMATED COUNT: 14.8 % (ref 11.7–14.4)
EST. AVERAGE GLUCOSE BLD GHB EST-MCNC: 117 MG/DL
GFR SERPL CREATININE-BSD FRML MDRD: >60 ML/MIN/1.73M*2
GLUCOSE SERPL-MCNC: 136 MG/DL (ref 70–99)
HBA1C MFR BLD: 5.7 %
HCT VFR BLDCO AUTO: 35.9 % (ref 35–45)
HGB BLD-MCNC: 11.1 G/DL (ref 11.8–15.7)
MCH RBC QN AUTO: 29.4 PG (ref 28–33.2)
MCHC RBC AUTO-ENTMCNC: 30.9 G/DL (ref 32.2–35.5)
MCV RBC AUTO: 95.2 FL (ref 83–98)
PDW BLD AUTO: 10 FL (ref 9.4–12.3)
PLATELET # BLD AUTO: 258 K/UL (ref 150–369)
POTASSIUM SERPL-SCNC: 4.5 MEQ/L (ref 3.5–5.1)
PROT SERPL-MCNC: 6.1 G/DL (ref 6–8.2)
RBC # BLD AUTO: 3.77 M/UL (ref 3.93–5.22)
SODIUM SERPL-SCNC: 133 MEQ/L (ref 136–145)
TSH SERPL DL<=0.05 MIU/L-ACNC: 0.39 MIU/L (ref 0.34–5.6)
WBC # BLD AUTO: 5.63 K/UL (ref 3.8–10.5)

## 2023-09-07 ENCOUNTER — OFFICE VISIT (OUTPATIENT)
Dept: ENDOCRINOLOGY | Facility: CLINIC | Age: 87
End: 2023-09-07
Payer: MEDICARE

## 2023-09-07 VITALS
SYSTOLIC BLOOD PRESSURE: 108 MMHG | HEART RATE: 56 BPM | BODY MASS INDEX: 26.62 KG/M2 | WEIGHT: 141 LBS | HEIGHT: 61 IN | OXYGEN SATURATION: 95 % | DIASTOLIC BLOOD PRESSURE: 66 MMHG

## 2023-09-07 DIAGNOSIS — Z86.39 HISTORY OF HYPERTHYROIDISM: ICD-10-CM

## 2023-09-07 DIAGNOSIS — E04.2 MULTIPLE THYROID NODULES: ICD-10-CM

## 2023-09-07 DIAGNOSIS — M81.0 AGE-RELATED OSTEOPOROSIS WITHOUT CURRENT PATHOLOGICAL FRACTURE: ICD-10-CM

## 2023-09-07 DIAGNOSIS — E11.9 TYPE 2 DIABETES MELLITUS WITHOUT COMPLICATION, WITHOUT LONG-TERM CURRENT USE OF INSULIN (CMS/HCC): Primary | ICD-10-CM

## 2023-09-07 LAB
EXPIRATION DATE: NORMAL
GLUCOSE BLOOD, POC: 114
Lab: NORMAL
POCT MANUFACTURER: NORMAL

## 2023-09-07 PROCEDURE — 99204 OFFICE O/P NEW MOD 45 MIN: CPT | Performed by: INTERNAL MEDICINE

## 2023-09-07 PROCEDURE — 82962 GLUCOSE BLOOD TEST: CPT | Performed by: INTERNAL MEDICINE

## 2023-09-07 RX ORDER — EPINEPHRINE 1 MG/ML
0.5 INJECTION, SOLUTION INTRAMUSCULAR; SUBCUTANEOUS ONCE AS NEEDED
Status: CANCELLED | OUTPATIENT
Start: 2023-09-07

## 2023-09-07 RX ORDER — FAMOTIDINE 10 MG/ML
20 INJECTION INTRAVENOUS ONCE AS NEEDED
Status: CANCELLED | OUTPATIENT
Start: 2023-09-07

## 2023-09-07 NOTE — PATIENT INSTRUCTIONS
Infusion Center at [facility]: [phone]. Please call to schedule the prolia shot. You will need to have a basic metabolic panel within 1 month before the prolia shot.

## 2023-09-07 NOTE — PROGRESS NOTES
HPI  88 y.o. female with PMH amiodarone-induced hyperthyroidism, MNG, A fib s/p cardioversion, DM2, osteoporosis, GERD, HFpEF, HTN, HLD, post herpetic neuralgia s/p pain stimulator presenting for evaluation and management of thyroid nodules, DM2  Referred by: self    Recent history summarized as per review of records below:  - Former pt of Dr. Stephens and Dr. Rowe at [facility], last visit 6/12/23. 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 Treated with high dose decadron and MMI with rapid improvement in symptoms, discharged on MMI + prednisone, then re-admitted for ADHF 6/2021.  Referred for total thyroidectomy 6/2021, then readmitted 1 week later for falls + hyponatremia. During this hospitalization she was seen by general surgery who felt she was too high risk for thyroidectomy. Off amiodarone since 7/2021. Stopped MMI 9/2022. Also with DM2 being managed by PCP with metformin monotherapy. Also with osteoporosis on prolia as per PCP  - Established care with PCP Sloan Maher MD 8/8. Treatment plan continued   - 8/30 Dr. Stephens contacted pt that thyroid US was stable. Recommended 1 year US    Pt's daughter, Cristina,  was present for the entire visit and helped provide elements of the \[facility]\""     Established care with cardiology Dr. Mendelson at [facility]      Off amiodarone since 7/2021  Off MMI since 9/2022.     Dysphagia/odynophagia: denies  Change in neck size/new neck masses: denies    Personal history of head/neck radiation: denies  Family history of thyroid disease or cancer: daughter - hypothyroidism/postpartum thyroiditis    Last US: 8/2023  Prior biopsy: never    DM2  Feels that prior PCP was not looking at sugar records.     Diagnosed: > 10 years ago    Medications:   1. Metformin 1000 mg BID    Previous medications: none    Other relevant medications:   Atorvastatin 20 mg daily  No ACEI/ARB  No  steroids    SMBG per memory:   FBG today 99, usually in 90s    Today (random - ate orange + nectarine + granola bar 2.5 hours prior)  Lab Results   Component Value Date    POCGLU 114 2023       Exercise: walking    Weight: stable per pt  Wt Readings from Last 3 Encounters:   23 64 kg (141 lb)   23 66.2 kg (146 lb)       Diabetic Complications:   Microvascular:   - Retinopathy? no; Last ophtho visit: q2 mo, getting injections for macular degeneration. Has appt in November. Can't remember who she sees  - Neuropathy? no; Last podiatry visit: 2023  - Nephropathy? Negative UACR 2021    Macrovascular:  - CAD? no  - CHF? no  - CVA? no  - PVD? no  - Smoking? never    + DM in son    Osteoporosis  - Last DEXA: ~2021, scheduled for next month as ordered by PCP  - Fractures: spine fracture at 57 yo after falling in the shower. Thinks this was time of initial diagnosis  - Falls: none recently  - Vitamin D intake: 5000 IU daily  - Dietary Ca intake: 1-2 servings per day  - Ca supplements: once a day, not sure of the dose  - Exercise: walks, stationary bike  - Loss of height: thinks ~5 inches  - Dental history: full dentures, went to see dentist about new dentures but wanted to do bone X ray to determine if there was too much bone loss to give her new dentures. Found out the process would be lengthy and costly so decided not to do it    - Prior treatment: denies despite diagnosis > 20 years ago  - Current treatment: prolia q6 mo for past 2-3 years, last dose 3/2023    Lives at [facility] independent living    2023  -------------------------------------------------------------------------  [facility]  No past medical history on file.  [facility]  No past surgical history on file.  Social  Social History     Socioeconomic History    Marital status:      Spouse name: Not on file    Number of children: Not on file    Years of education: Not on file    Highest education level: Not on file   Occupational  History    Not on file   Tobacco Use    Smoking status: Never    Smokeless tobacco: Never   Substance and Sexual Activity    Alcohol use: Not on file    Drug use: Not on file    Sexual activity: Not on file   Other Topics Concern    Not on file   Social History Narrative    Not on file     Social Determinants of Health     Financial Resource Strain: Not on file   Food Insecurity: Not on file   Transportation Needs: Not on file   Physical Activity: Not on file   Stress: Not on file   Social Connections: Not on file   Intimate Partner Violence: Not on file   Housing Stability: Not on file     Family hx  No family history on file.  Medications    Current Outpatient Medications:     acetaminophen (TYLENOL) 325 mg tablet, Take 650 mg by mouth every 6 hours as needed., Disp: , Rfl:     apixaban (ELIQUIS) 5 mg tablet, Take 5 mg by mouth 2 times daily., Disp: , Rfl:     atorvastatin (LIPITOR) 20 mg tablet, Take 20 mg by mouth nightly., Disp: , Rfl:     denosumab (PROLIA) 60 mg/mL syringe, Inject 60 mg under the skin every 6 (six) months., Disp: , Rfl:     FREESTYLE LITE STRIPS strip, 1 strip., Disp: , Rfl:     furosemide (LASIX) 20 mg tablet, Take 20 mg by mouth daily., Disp: , Rfl:     gabapentin (NEURONTIN) 300 mg capsule, Take 300 mg by mouth 2 times daily., Disp: , Rfl:     lancets misc, 1 Lancet as needed (Diabetes). To check blood glucose, Disp: 100 each, Rfl: 3    lidocaine (LIDODERM) 5 % patch, Place 1 patch on the skin., Disp: , Rfl:     metFORMIN (GLUCOPHAGE) 500 mg tablet, Take 1,000 mg by mouth 2 (two) times a day with meals., Disp: , Rfl:     pantoprazole (PROTONIX) 20 mg EC tablet, Take 20 mg by mouth daily., Disp: , Rfl:     propranoloL (INDERAL) 20 mg tablet, Take 20 mg by mouth 2 times daily., Disp: , Rfl:     traMADoL (ULTRAM) 50 mg tablet, Take 50 mg by mouth 2 (two) times a day., Disp: , Rfl:     senna (SENOKOT) 8.6 mg tablet, Take 1 tablet by mouth 2 (two) times a day as needed for  "constipation. , Disp: , Rfl:     Current Facility-Administered Medications:     [START ON 10/9/2023] denosumab (PROLIA) syringe 60 mg, 60 mg, subcutaneous, Once, Sloan Maher MD   Allergies  Allergies   Allergen Reactions    Amiodarone Other (see comments)     Affected thyroid   thyroiditis       -------------------------------------------------------------------------  ROS: All other 10 point systems reviewed and negative except as mentioned in HPI    PHYSICAL EXAM  Visit Vitals  /66 (BP Location: Left upper arm, Patient Position: Sitting)   Pulse (!) 56   Ht 1.549 m (5' 1\")   Wt 64 kg (141 lb) Comment: patient reported weight   SpO2 95%   BMI 26.64 kg/m²     Wt Readings from Last 3 Encounters:   09/07/23 64 kg (141 lb)   08/08/23 66.2 kg (146 lb)     Gen: well nourished, no acute distress  Eyes: no proptosis, normal conjunctiva  Neck: enlarged irregular thyroid with no discrete palpable nodules  Pulm: no use of accessory muscles, on room air  Abd: non distended  Neuro: AAOx3  MSK: ambulating with walker, no tremor of outstretched hands  Psych: normal mood, affect    LABS REVIEWED  Lab Results   Component Value Date    TSH 0.39 08/25/2023           Lab Results   Component Value Date    GLUCOSE 136 (H) 08/25/2023    CREATININE 0.7 08/25/2023    EGFR >60.0 08/25/2023     (L) 08/25/2023    K 4.5 08/25/2023    CL 96 (L) 08/25/2023    CO2 30 08/25/2023    ALBUMIN 4.2 08/25/2023    BILITOT 0.6 08/25/2023    ALKPHOS 28 (L) 08/25/2023    ALT 16 08/25/2023    AST 20 08/25/2023     Lab Results   Component Value Date    WBC 5.63 08/25/2023    HGB 11.1 (L) 08/25/2023     08/25/2023     Hemoglobin A1C   Date Value Ref Range Status   08/25/2023 5.7 (H) <5.7 % Final     Comment:     In the absence of an established diagnosis of Diabetes Mellitus, HbA1c levels between 5.7% and 6.4% are indicative of increased risk for developing Diabetes(Pre-Diabetes). Levels of 6.5% or greater are diagnostic for " Diabetes Mellitus.          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    Findings:  Right Lobe:  Size: Measures 5.1 x 2.1 x 1.7 cm.  Echotexture:  Heterogeneous.  Nodules: Present, with numerous colloid cysts and colloid nodules. Largest detailed below.    Nodule #right 1  Location: Lower pole  Size: 1 x 0.7 x 1 cm  Composition: Mixed cystic and solid  (1 point)  Echogenicity: isoechoic (1 point)  Shape: Wider-than-tall (0 points)  Margin: Ill-defined (0 points)  Echogenic foci: Punctate echogenic foci (3 points) (probably colloid but too small to generate ringdown)  Comparison: None available  TI-RADS category: TR4 (4-6 points): Moderately suspicious., Does not meet  criteria for biopsy. In one year recommended.    Nodule #right 2  Location: upper pole only seen on cine series  Size: 1.2 x 0.4 x 0.5 cm  Composition: Mixed cystic and solid  (1 point)  Echogenicity:  isoechoic (1 point)  Shape: Wider-than-tall (0 points)  Margin: Smooth (0 points)  Echogenic foci:  large comet-tail artifacts (0 points)  Comparison: None available  TI-RADS category: TR2 (2 points): Not suspicious.    Other smaller cysts and colloid nodules noted elsewhere.    -    Left Lobe:  Size: Measures 9.3 x 3.7 x 4.2 cm, enlarged  Echotexture:  Heterogeneous.  Nodules: Present    Nodule #left 1  Location: Upper pole  Size: 1.2 x 1.2 x 1.4 cm  Composition: Mixed cystic and solid  (1 point)  Echogenicity: isoechoic (1 point)  Shape: Wider-than-tall (0 points)  Margin: Smooth (0 points)  Echogenic foci: None or large comet-tail artifacts (0 points)  Comparison: None available  TI-RADS category: TR2 (2 points): Not suspicious.    Nodule  #left 2  Location: Interpolar/upper pole  Size:  2 x 1.4 x 2.2cm  Composition: Spongiform (0 points)  Echogenicity: isoechoic (1 point)  Shape: Wider-than-tall (0 points)  Margin: Smooth (0 points)  Echogenic foci: None or large comet-tail artifacts (0 points)  Comparison: None available  TI-RADS category: TR2 (2 points): Not suspicious.    Nodule #left 3  Location: Interpolar lower pole  Size: 4.9 x 3.6 x 4.3 cm  Composition: Spongiform (0 points)  Echogenicity:  isoechoic (1 point)  Shape: Wider-than-tall (0 points)  Margin: Ill-defined (0 points)  Echogenic foci: None or large comet-tail artifacts (0 points)  Comparison: None available  TI-RADS category: TR2 (2 points): Not suspicious.      -    Isthmus:  Mildly heterogeneous, measuring 0.4 cm. No nodules.    -  Other:  No suspicious lymph nodes in the visualized neck.    --    Impression  Enlarged multinodular goiter with multiple left-sided spongiform colloid nodules  and smaller right-sided nodules as discussed above.  Follow up in one year to document stability.    I have personally reviewed the images and agree with the radiology report. The following is my interpretation:   Heterogeneous gland  R lower pole nodule measures 1.01 x 0.88 x 0.69 cm. There are no calcifications. Agree with remaining description as per radiology report   R upper pole nodule not seen on still images  L upper pole nodule measures 1.19 x 1.41 x 1.15 cm. Agree with description as per radiology report   L upper/mid pole nodule measures 1.95 x 2.17 x 1.39 cm. Agree with description as per radiology report   L mid/lower pole nodule measures 2.8 x 4.6 x 3.57 cm. Agree with description as per radiology report      DXA 1/12/21  History: Postmenopausal. L1 compression fracture with spinal cord stimulator.   Left hip prosthesis..     Comparison: 10/18/2018. 6/28/2016.     Results: Evaluation of the right hip demonstrates a total bone mineral density   of 0.924 g/cm2 with a T-score of -0.7  (priors -0.8 and -1.1 in 2018 and 2016   respectively).. The W.H.O. classification is normal. The right femoral neck   demonstrates a total bone mineral density of 0.778 g/cm2 with a T-score of -1.9   (priors -2.0 and -2.5). The W.H.O. classification is osteopenia.     Evaluation of the total left radius demonstrates a total bone mineral density of   0.409 g/cm2 with a T score of -4.4 (priors -4.2 and -4.1). The W.H.O.   classification is osteoporosis. The total bone mineral density of the most   distal left radius is 0.249 g/cm2 with a T score of -4.8 (priors -4.9 and -5.3).   The W.H.O. classification is osteoporosis.     Based on the clinical information provided to the technical staff and the above   described measurements, the 10 year probability of a major osteoporotic fracture   is 21.3%. The 10 year probability of a hip fracture is 5.8%    Impression: Analysis of the right femoral neck and left radius demonstrate   diminished bone mineral density with evidence for increased fracture risk.       ASSESMENT AND PLAN    1. Amiodarone induced hyperthyroidism  - Now resolved off amiodarone since 7/2021 and off MMI since 9/2022  - Remains clinically and biochemically euthyroid  - Monitor TFTs q6-12 mo    2. MNG  - No prior biopsies  - Most recent US 8/2023 shows no nodules meeting criteria for biopsy and per prior endocrinologist is stable from prior  - Given advanced age and pt previously deemed by surgeon to be poor surgical candidate for thyroidectomy would recommend stopping US monitoring, unless pt develops new local neck sx, as it will not change medical management  - Pt and daughter want to continue with one additional US 8/2024. Will discuss again at next visit    3. DM2, controlled, without known complications  - A1C 5.7% 8/2023, at goal < 8.0%.   - Counseled on importance of dietary changes such as eliminating sugar sweetened beverages, reducing intake of processed foods, and increasing intake of whole  foods such as fruits and veggies, nuts, seeds, and legumes  - Recommend increasing physical activity to at least 150 min moderate aerobic exercise per week + resistance training 2x/week  - Continue metformin 1000 mg BID for now. Trial of downtitration at next visit if A1C remains <6.5%  - Counseled on risk of hypoglycemia and instructed pt to call for BG < 80  - Many elements of diabetes self management were reviewed including, but not limited to, the importance of blood sugar monitoring, symptoms and treatment of hypoglycemia, blood sugar/A1C goals, adherence with medications, lifestyle management, and surveillance of eyes and feet and need for routine follow-up with ophthalmology and podiatry.   - Pt is aware of alternatives of therapy, risks and benefits, and accepts risk of default.  - Retinopathy screening: up to date, sees retinal specialist q2 mo for macular degeneration  - Nephropathy screening: due, check UAR  - Check A1C, CMP prior to next visit for monitoring     4. Osteoporosis  - S/p L1 compression fracture after falling in shower at 57 yo. Reportedly not treated until started prolia ~2021  - Continue prolia q6 mo, due for next dose 10/2023.   - Continue vitamin D and Ca supplementation  - Continue with weight bearing exercise  - Counseled on importance of fall prevention  - Repeat DXA due, ordered by PCP. Will f/u  - Check CMP, 25 vitamin D prior to next visit for monitoring     Letter sent to referring provider  RTC 5-6 mo

## 2023-09-08 ENCOUNTER — TELEPHONE (OUTPATIENT)
Dept: ENDOCRINOLOGY | Facility: CLINIC | Age: 87
End: 2023-09-08
Payer: MEDICARE

## 2023-09-08 NOTE — TELEPHONE ENCOUNTER
Called patient and lvm to call the office to schedule a 5 month f/u with Dr. Perez for DOS 09/07/2023

## 2023-09-22 ENCOUNTER — APPOINTMENT (OUTPATIENT)
Dept: LAB | Age: 87
End: 2023-09-22
Attending: INTERNAL MEDICINE
Payer: MEDICARE

## 2023-09-22 DIAGNOSIS — M81.0 AGE-RELATED OSTEOPOROSIS WITHOUT CURRENT PATHOLOGICAL FRACTURE: ICD-10-CM

## 2023-09-22 LAB
ANION GAP SERPL CALC-SCNC: 7 MEQ/L (ref 3–15)
BUN SERPL-MCNC: 15 MG/DL (ref 7–25)
CALCIUM SERPL-MCNC: 9.4 MG/DL (ref 8.6–10.3)
CHLORIDE SERPL-SCNC: 99 MEQ/L (ref 98–107)
CO2 SERPL-SCNC: 29 MEQ/L (ref 21–31)
CREAT SERPL-MCNC: 0.8 MG/DL (ref 0.6–1.2)
GFR SERPL CREATININE-BSD FRML MDRD: >60 ML/MIN/1.73M*2
GLUCOSE SERPL-MCNC: 101 MG/DL (ref 70–99)
POTASSIUM SERPL-SCNC: 4.3 MEQ/L (ref 3.5–5.1)
SODIUM SERPL-SCNC: 135 MEQ/L (ref 136–145)

## 2023-09-22 PROCEDURE — 36415 COLL VENOUS BLD VENIPUNCTURE: CPT

## 2023-09-22 PROCEDURE — 80048 BASIC METABOLIC PNL TOTAL CA: CPT

## 2023-10-09 ENCOUNTER — HOSPITAL ENCOUNTER (OUTPATIENT)
Dept: RADIOLOGY | Age: 87
Discharge: HOME | End: 2023-10-09
Attending: FAMILY MEDICINE
Payer: MEDICARE

## 2023-10-09 DIAGNOSIS — Z12.31 SCREENING MAMMOGRAM FOR BREAST CANCER: ICD-10-CM

## 2023-10-09 DIAGNOSIS — Z78.0 POSTMENOPAUSAL: ICD-10-CM

## 2023-10-09 DIAGNOSIS — Z13.820 SCREENING FOR OSTEOPOROSIS: ICD-10-CM

## 2023-10-09 PROCEDURE — 77063 BREAST TOMOSYNTHESIS BI: CPT

## 2023-10-09 PROCEDURE — 77080 DXA BONE DENSITY AXIAL: CPT

## 2023-10-09 RX ORDER — TRAMADOL HYDROCHLORIDE 50 MG/1
50 TABLET ORAL 2 TIMES DAILY
Qty: 60 TABLET | Refills: 0 | Status: SHIPPED | OUTPATIENT
Start: 2023-10-09 | End: 2023-10-18 | Stop reason: SDUPTHER

## 2023-10-09 NOTE — TELEPHONE ENCOUNTER
"Medicine Refill Request    Last Office Visit: 8/8/2023   Last Consult Visit: Visit date not found  Last Telemedicine Visit: Visit date not found    Next Appointment: 2/8/2024      Current Outpatient Medications:     acetaminophen (TYLENOL) 325 mg tablet, Take 650 mg by mouth every 6 hours as needed., Disp: , Rfl:     apixaban (ELIQUIS) 5 mg tablet, Take 1 tablet (5 mg total) by mouth 2 (two) times a day., Disp: 180 tablet, Rfl: 3    atorvastatin (LIPITOR) 20 mg tablet, Take 1 tablet (20 mg total) by mouth nightly., Disp: 90 tablet, Rfl: 3    denosumab (PROLIA) 60 mg/mL syringe, Inject 60 mg under the skin every 6 (six) months., Disp: , Rfl:     FREESTYLE LITE STRIPS strip, 1 strip 2 (two) times a day., Disp: 200 strip, Rfl: 1    furosemide (LASIX) 20 mg tablet, Take 1 tablet (20 mg total) by mouth daily., Disp: 90 tablet, Rfl: 1    gabapentin (NEURONTIN) 300 mg capsule, Take 1 capsule (300 mg total) by mouth 2 (two) times a day., Disp: 180 capsule, Rfl: 1    lancets misc, 1 Lancet as needed (Diabetes). To check blood glucose, Disp: 100 each, Rfl: 3    lidocaine (LIDODERM) 5 % patch, Place 1 patch on the skin daily as needed for pain., Disp: 30 patch, Rfl: 3    metFORMIN (GLUCOPHAGE) 500 mg tablet, Take 2 tablets (1,000 mg total) by mouth 2 (two) times a day with meals., Disp: 360 tablet, Rfl: 3    pantoprazole (PROTONIX) 20 mg EC tablet, Take 1 tablet (20 mg total) by mouth daily., Disp: 90 tablet, Rfl: 1    propranoloL (INDERAL) 20 mg tablet, Take 1 tablet (20 mg total) by mouth 2 (two) times a day., Disp: 180 tablet, Rfl: 0    traMADoL (ULTRAM) 50 mg tablet, Take 50 mg by mouth 2 (two) times a day., Disp: , Rfl:       BP Readings from Last 3 Encounters:   09/07/23 108/66   08/08/23 128/68       Recent Lab results:  No results found for: \"CHOL\", No results found for: \"HDL\", No results found for: \"LDLCALC\", No results found for: \"TRIG\"     Lab Results   Component Value Date    GLUCOSE 101 (H) 09/22/2023 "   ,   Lab Results   Component Value Date    HGBA1C 5.7 (H) 08/25/2023         Lab Results   Component Value Date    CREATININE 0.8 09/22/2023       Lab Results   Component Value Date    TSH 0.39 08/25/2023           Lab Results   Component Value Date    HGBA1C 5.7 (H) 08/25/2023

## 2023-10-13 ENCOUNTER — HOSPITAL ENCOUNTER (OUTPATIENT)
Dept: PREOP | Facility: HOSPITAL | Age: 87
Discharge: HOME | End: 2023-10-13
Attending: INTERNAL MEDICINE
Payer: MEDICARE

## 2023-10-13 VITALS
BODY MASS INDEX: 26.62 KG/M2 | HEIGHT: 61 IN | WEIGHT: 141 LBS | OXYGEN SATURATION: 96 % | TEMPERATURE: 97.3 F | HEART RATE: 66 BPM | RESPIRATION RATE: 20 BRPM | SYSTOLIC BLOOD PRESSURE: 126 MMHG | DIASTOLIC BLOOD PRESSURE: 50 MMHG

## 2023-10-13 DIAGNOSIS — M81.0 AGE-RELATED OSTEOPOROSIS WITHOUT CURRENT PATHOLOGICAL FRACTURE: Primary | ICD-10-CM

## 2023-10-13 PROCEDURE — 63600000 HC DRUGS/DETAIL CODE: Mod: JZ | Performed by: INTERNAL MEDICINE

## 2023-10-13 PROCEDURE — 3E0130M INTRODUCTION OF MONOCLONAL ANTIBODY INTO SUBCUTANEOUS TISSUE, PERCUTANEOUS APPROACH: ICD-10-PCS | Performed by: INTERNAL MEDICINE

## 2023-10-13 PROCEDURE — 96372 THER/PROPH/DIAG INJ SC/IM: CPT

## 2023-10-13 RX ORDER — FAMOTIDINE 10 MG/ML
20 INJECTION INTRAVENOUS ONCE AS NEEDED
Status: DISCONTINUED | OUTPATIENT
Start: 2023-10-13 | End: 2023-10-14 | Stop reason: HOSPADM

## 2023-10-13 RX ORDER — EPINEPHRINE 1 MG/ML
0.5 INJECTION, SOLUTION INTRAMUSCULAR; SUBCUTANEOUS ONCE AS NEEDED
Status: CANCELLED | OUTPATIENT
Start: 2024-04-10

## 2023-10-13 RX ORDER — EPINEPHRINE 1 MG/ML
0.5 INJECTION, SOLUTION INTRAMUSCULAR; SUBCUTANEOUS ONCE AS NEEDED
Status: DISCONTINUED | OUTPATIENT
Start: 2023-10-13 | End: 2023-10-14 | Stop reason: HOSPADM

## 2023-10-13 RX ORDER — DIPHENHYDRAMINE HCL 50 MG/ML
25 VIAL (ML) INJECTION ONCE AS NEEDED
Status: CANCELLED | OUTPATIENT
Start: 2024-04-10

## 2023-10-13 RX ORDER — DIPHENHYDRAMINE HCL 50 MG/ML
25 VIAL (ML) INJECTION ONCE AS NEEDED
Status: DISCONTINUED | OUTPATIENT
Start: 2023-10-13 | End: 2023-10-14 | Stop reason: HOSPADM

## 2023-10-13 RX ORDER — FAMOTIDINE 10 MG/ML
20 INJECTION INTRAVENOUS ONCE AS NEEDED
Status: CANCELLED | OUTPATIENT
Start: 2024-04-10

## 2023-10-13 RX ADMIN — DENOSUMAB 60 MG: 60 INJECTION SUBCUTANEOUS at 14:21

## 2023-10-16 ENCOUNTER — TELEPHONE (OUTPATIENT)
Dept: PRIMARY CARE | Facility: CLINIC | Age: 87
End: 2023-10-16
Payer: MEDICARE

## 2023-10-16 RX ORDER — TRAMADOL HYDROCHLORIDE 50 MG/1
50 TABLET ORAL 2 TIMES DAILY
Qty: 60 TABLET | Refills: 0 | Status: CANCELLED | OUTPATIENT
Start: 2023-10-16

## 2023-10-16 NOTE — TELEPHONE ENCOUNTER
Cristina ( daughter) calling on Rissa's behalf. RX request from 10/9 Tramadol 50 mg tablet was sent to the wrong pharmacy. Please resend to [facility], phone # [phone] and fax # [phone]. Rissa has two weeks left of Tramadol and it will take one week for [facility] to process refill request per Cristina.

## 2023-10-18 NOTE — TELEPHONE ENCOUNTER
"Medicine Refill Request    Last Office Visit: 8/8/2023   Last Consult Visit: Visit date not found  Last Telemedicine Visit: Visit date not found    Next Appointment: 2/8/2024      Current Outpatient Medications:     acetaminophen (TYLENOL) 325 mg tablet, Take 650 mg by mouth every 6 hours as needed., Disp: , Rfl:     apixaban (ELIQUIS) 5 mg tablet, Take 1 tablet (5 mg total) by mouth 2 (two) times a day., Disp: 180 tablet, Rfl: 3    atorvastatin (LIPITOR) 20 mg tablet, Take 1 tablet (20 mg total) by mouth nightly., Disp: 90 tablet, Rfl: 3    denosumab (PROLIA) 60 mg/mL syringe, Inject 60 mg under the skin every 6 (six) months., Disp: , Rfl:     FREESTYLE LITE STRIPS strip, 1 strip 2 (two) times a day., Disp: 200 strip, Rfl: 1    furosemide (LASIX) 20 mg tablet, Take 1 tablet (20 mg total) by mouth daily., Disp: 90 tablet, Rfl: 1    gabapentin (NEURONTIN) 300 mg capsule, Take 1 capsule (300 mg total) by mouth 2 (two) times a day., Disp: 180 capsule, Rfl: 1    lancets misc, 1 Lancet as needed (Diabetes). To check blood glucose, Disp: 100 each, Rfl: 3    lidocaine (LIDODERM) 5 % patch, Place 1 patch on the skin daily as needed for pain., Disp: 30 patch, Rfl: 3    metFORMIN (GLUCOPHAGE) 500 mg tablet, Take 2 tablets (1,000 mg total) by mouth 2 (two) times a day with meals., Disp: 360 tablet, Rfl: 3    pantoprazole (PROTONIX) 20 mg EC tablet, Take 1 tablet (20 mg total) by mouth daily., Disp: 90 tablet, Rfl: 1    propranoloL (INDERAL) 20 mg tablet, Take 1 tablet (20 mg total) by mouth 2 (two) times a day., Disp: 180 tablet, Rfl: 0    traMADoL (ULTRAM) 50 mg tablet, Take 1 tablet (50 mg total) by mouth 2 (two) times a day., Disp: 60 tablet, Rfl: 0      BP Readings from Last 3 Encounters:   10/13/23 (!) 126/50   09/07/23 108/66   08/08/23 128/68       Recent Lab results:  No results found for: \"CHOL\", No results found for: \"HDL\", No results found for: \"LDLCALC\", No results found for: \"TRIG\"     Lab Results "   Component Value Date    GLUCOSE 101 (H) 09/22/2023   ,   Lab Results   Component Value Date    HGBA1C 5.7 (H) 08/25/2023         Lab Results   Component Value Date    CREATININE 0.8 09/22/2023       Lab Results   Component Value Date    TSH 0.39 08/25/2023           Lab Results   Component Value Date    HGBA1C 5.7 (H) 08/25/2023

## 2023-10-19 RX ORDER — TRAMADOL HYDROCHLORIDE 50 MG/1
50 TABLET ORAL 2 TIMES DAILY
Qty: 60 TABLET | Refills: 0 | Status: SHIPPED | OUTPATIENT
Start: 2023-10-19 | End: 2024-03-26 | Stop reason: HOSPADM

## 2023-11-15 ENCOUNTER — TRANSCRIBE ORDERS (OUTPATIENT)
Dept: SCHEDULING | Age: 87
End: 2023-11-15

## 2023-11-15 DIAGNOSIS — M54.16 RADICULOPATHY, LUMBAR REGION: Primary | ICD-10-CM

## 2023-11-24 ENCOUNTER — APPOINTMENT (OUTPATIENT)
Dept: RADIOLOGY | Age: 87
End: 2023-11-24
Attending: NURSE PRACTITIONER
Payer: MEDICARE

## 2023-11-24 ENCOUNTER — HOSPITAL ENCOUNTER (OUTPATIENT)
Facility: CLINIC | Age: 87
Discharge: HOME | End: 2023-11-24
Attending: INTERNAL MEDICINE
Payer: MEDICARE

## 2023-11-24 VITALS
DIASTOLIC BLOOD PRESSURE: 81 MMHG | RESPIRATION RATE: 16 BRPM | OXYGEN SATURATION: 94 % | SYSTOLIC BLOOD PRESSURE: 124 MMHG | HEART RATE: 68 BPM | TEMPERATURE: 97.7 F

## 2023-11-24 DIAGNOSIS — J06.9 UPPER RESPIRATORY TRACT INFECTION, UNSPECIFIED TYPE: Primary | ICD-10-CM

## 2023-11-24 PROCEDURE — 99214 OFFICE O/P EST MOD 30 MIN: CPT | Mod: 25 | Performed by: NURSE PRACTITIONER

## 2023-11-24 PROCEDURE — S9083 URGENT CARE CENTER GLOBAL: HCPCS | Performed by: NURSE PRACTITIONER

## 2023-11-24 PROCEDURE — 71046 X-RAY EXAM CHEST 2 VIEWS: CPT | Mod: 26 | Performed by: NURSE PRACTITIONER

## 2023-11-24 RX ORDER — AZITHROMYCIN 250 MG/1
TABLET, FILM COATED ORAL
Qty: 6 TABLET | Refills: 0 | Status: SHIPPED | OUTPATIENT
Start: 2023-11-24 | End: 2023-11-29

## 2023-11-24 RX ORDER — BENZONATATE 100 MG/1
100 CAPSULE ORAL 3 TIMES DAILY PRN
Qty: 30 CAPSULE | Refills: 0 | Status: SHIPPED | OUTPATIENT
Start: 2023-11-24 | End: 2023-12-04

## 2023-11-24 RX ORDER — METHYLPREDNISOLONE 4 MG/1
TABLET ORAL
Qty: 21 TABLET | Refills: 0 | Status: SHIPPED | OUTPATIENT
Start: 2023-11-24 | End: 2024-02-08

## 2023-11-24 NOTE — DISCHARGE INSTRUCTIONS
Your x-ray is negative for any pneumonia.  Based on your symptoms however will treat you with an antibiotic and a steroid course.  Also treating with some Tessalon Perles to help with cough.  Start the antibiotic take, take with food as directed.  Good idea to take a probiotic while on this course.  Spaced 2 hours apart from the antibiotic.  Start steroid course today take with meals, no NSAIDs while on this course but you can take Tylenol.  Push fluids lots of rest lots humidity.  Please follow-up with the PCP if not improving ED if symptoms worsen

## 2023-11-25 ASSESSMENT — ENCOUNTER SYMPTOMS
CHILLS: 0
SHORTNESS OF BREATH: 1
FEVER: 0
COUGH: 1
RHINORRHEA: 1

## 2023-11-25 NOTE — ED PROVIDER NOTES
Emergency Medicine Note  HPI   HISTORY OF PRESENT ILLNESS     Patient is an 88-year-old female presents with cough that she has had for 5 days.  She feels like is getting worse in her chest.  Her daughter is with her today.  Daughter says she feels like she has been getting a little bit more winded and weak.  They denies she had any fevers or chills.  She has some mild sinus congestion but the main concern is her cough.            Patient History   PAST HISTORY     Reviewed from Nursing Triage:       Past Medical History:   Diagnosis Date    A-fib (CMS/[facility])     Accelerated hypertension     CHF (congestive heart failure) (CMS/[facility])     Macular degeneration     Type 2 diabetes mellitus (CMS/[facility])        Past Surgical History:   Procedure Laterality Date    APPENDECTOMY      HYSTERECTOMY      JOINT REPLACEMENT      KNEE ARTHROPLASTY         No family history on file.    Social History     Tobacco Use    Smoking status: Never    Smokeless tobacco: Never   Substance Use Topics    Alcohol use: Never    Drug use: Never         Review of Systems   REVIEW OF SYSTEMS     Review of Systems   Constitutional: Negative for chills and fever.   HENT: Positive for congestion and rhinorrhea.    Respiratory: Positive for cough and shortness of breath.    All other systems reviewed and are negative.        VITALS     ED Vitals    Date/Time Temp Pulse Resp BP SpO2 [facility]   11/24/23 1602 36.5 °C (97.7 °F) 68 16 124/81 94 % MM                       Physical Exam   PHYSICAL EXAM     Physical Exam  Vitals and nursing note reviewed.   HENT:      Head: Normocephalic.      Right Ear: Tympanic membrane normal.      Left Ear: Tympanic membrane normal.      Nose: No congestion or rhinorrhea.      Mouth/Throat:      Pharynx: Uvula midline. Posterior oropharyngeal erythema present. No pharyngeal swelling or oropharyngeal exudate.      Tonsils: No tonsillar exudate or tonsillar abscesses.   Eyes:      Conjunctiva/sclera: Conjunctivae normal.    Cardiovascular:      Rate and Rhythm: Normal rate and regular rhythm.      Heart sounds: Normal heart sounds.   Pulmonary:      Effort: Pulmonary effort is normal.      Breath sounds: Examination of the right-lower field reveals decreased breath sounds. Examination of the left-lower field reveals decreased breath sounds. Decreased breath sounds present.   Musculoskeletal:      Cervical back: Normal range of motion.   Lymphadenopathy:      Cervical: No cervical adenopathy.   Skin:     General: Skin is warm and dry.   Neurological:      Mental Status: She is alert.   Psychiatric:         Mood and Affect: Mood normal.         Behavior: Behavior normal.           PROCEDURES     Procedures     DATA     Results     None              No orders to display       Scoring tools                                  ED Course & MDM   MDM / ED COURSE / CLINICAL IMPRESSION / DISPO     Medical Decision Making  Chest x-ray was negative for pneumonia.  However based on her age history and symptoms we will go ahead and treat with an antibiotic.  Stressed close monitoring of her symptoms and ED parameters if she is not improving.  Treating with azithromycin steroid course Tessalon Perles.  Reviewed supportive care measures she can also do at home.    I reviewed all the discharge instructions with her and her daughter.  We were having issues with the printer today so daughter took a picture of the discharge summary to review.  They verbalized understanding agreement the plan  Narrative & Impression  CLINICAL HISTORY: cough shortness of breath     COMPARISON: None.     COMMENT: 2 radiographic views of the chest.        The lungs are clear.  There is no pneumothorax or pleural effusion.  Enlarged cardiac silhouette.  Mediastinal contours are within normal limits for age.  No acute osseous abnormality.  Compression deformities in the upper lumbar spine  Spinal stimulator leads are noted.     --  IMPRESSION:     No active pulmonary disease.  Cardiomegaly.           Clinical Impression      Upper respiratory tract infection, unspecified type     _________________     ED Disposition   Discharge                   Kalani Grajeda CRNP  11/25/23 0949

## 2023-11-27 NOTE — ED ATTESTATION NOTE
I was immediately available to provide supervision and direction for the care of the patient.     Moe Lopes DO  11/27/23 0947

## 2023-12-06 ENCOUNTER — TRANSCRIBE ORDERS (OUTPATIENT)
Dept: SCHEDULING | Age: 87
End: 2023-12-06

## 2023-12-06 DIAGNOSIS — M51.9 UNSPECIFIED THORACIC, THORACOLUMBAR AND LUMBOSACRAL INTERVERTEBRAL DISC DISORDER: Primary | ICD-10-CM

## 2023-12-13 ENCOUNTER — HOSPITAL ENCOUNTER (OUTPATIENT)
Dept: RADIOLOGY | Age: 87
Discharge: HOME | End: 2023-12-13
Attending: ANESTHESIOLOGY
Payer: MEDICARE

## 2023-12-13 DIAGNOSIS — M51.9 UNSPECIFIED THORACIC, THORACOLUMBAR AND LUMBOSACRAL INTERVERTEBRAL DISC DISORDER: ICD-10-CM

## 2023-12-13 PROCEDURE — 72131 CT LUMBAR SPINE W/O DYE: CPT

## 2024-01-16 RX ORDER — GABAPENTIN 300 MG/1
300 CAPSULE ORAL 2 TIMES DAILY
Qty: 180 CAPSULE | Refills: 1 | Status: SHIPPED | OUTPATIENT
Start: 2024-01-16 | End: 2024-03-26 | Stop reason: HOSPADM

## 2024-01-16 RX ORDER — BLOOD-GLUCOSE METER
1 KIT MISCELLANEOUS 2 TIMES DAILY
Qty: 200 STRIP | Refills: 1 | Status: SHIPPED | OUTPATIENT
Start: 2024-01-16 | End: 2024-02-22 | Stop reason: SDUPTHER

## 2024-01-16 RX ORDER — METFORMIN HYDROCHLORIDE 500 MG/1
1000 TABLET ORAL 2 TIMES DAILY WITH MEALS
Qty: 360 TABLET | Refills: 3 | Status: SHIPPED | OUTPATIENT
Start: 2024-01-16 | End: 2024-02-22 | Stop reason: SDUPTHER

## 2024-01-16 RX ORDER — ATORVASTATIN CALCIUM 20 MG/1
20 TABLET, FILM COATED ORAL NIGHTLY
Qty: 90 TABLET | Refills: 3 | Status: SHIPPED | OUTPATIENT
Start: 2024-01-16

## 2024-01-16 RX ORDER — FUROSEMIDE 20 MG/1
20 TABLET ORAL DAILY
Qty: 90 TABLET | Refills: 1 | Status: SHIPPED | OUTPATIENT
Start: 2024-01-16 | End: 2024-11-19 | Stop reason: ENTERED-IN-ERROR

## 2024-01-16 RX ORDER — PROPRANOLOL HYDROCHLORIDE 20 MG/1
20 TABLET ORAL 2 TIMES DAILY
Qty: 180 TABLET | Refills: 0 | Status: SHIPPED | OUTPATIENT
Start: 2024-01-16 | End: 2024-06-14

## 2024-01-16 NOTE — TELEPHONE ENCOUNTER
Sent pt pm to confirm the sending of RX refills to  Wanted to change pharmacys to [facility], completed.

## 2024-01-16 NOTE — TELEPHONE ENCOUNTER
Pt scheduled to come in on 2/8/2024    Medicine Refill Request    Last Office Visit: 8/8/2023   Last Consult Visit: Visit date not found  Last Telemedicine Visit: Visit date not found    Next Appointment: 2/8/2024      Current Outpatient Medications:   •  acetaminophen (TYLENOL) 325 mg tablet, Take 650 mg by mouth every 6 hours as needed., Disp: , Rfl:   •  apixaban (ELIQUIS) 5 mg tablet, Take 1 tablet (5 mg total) by mouth 2 (two) times a day., Disp: 180 tablet, Rfl: 3  •  atorvastatin (LIPITOR) 20 mg tablet, Take 1 tablet (20 mg total) by mouth nightly., Disp: 90 tablet, Rfl: 3  •  denosumab (PROLIA) 60 mg/mL syringe, Inject 60 mg under the skin every 6 (six) months., Disp: , Rfl:   •  FREESTYLE LITE STRIPS strip, 1 strip 2 (two) times a day., Disp: 200 strip, Rfl: 1  •  furosemide (LASIX) 20 mg tablet, Take 1 tablet (20 mg total) by mouth daily., Disp: 90 tablet, Rfl: 1  •  gabapentin (NEURONTIN) 300 mg capsule, Take 1 capsule (300 mg total) by mouth 2 (two) times a day., Disp: 180 capsule, Rfl: 1  •  lancets misc, 1 Lancet as needed (Diabetes). To check blood glucose, Disp: 100 each, Rfl: 3  •  lidocaine (LIDODERM) 5 % patch, Place 1 patch on the skin daily as needed for pain., Disp: 30 patch, Rfl: 3  •  metFORMIN (GLUCOPHAGE) 500 mg tablet, Take 2 tablets (1,000 mg total) by mouth 2 (two) times a day with meals., Disp: 360 tablet, Rfl: 3  •  methylPREDNISolone (MEDROL DOSEPACK) 4 mg tablet, Follow package directions., Disp: 21 tablet, Rfl: 0  •  pantoprazole (PROTONIX) 20 mg EC tablet, Take 1 tablet (20 mg total) by mouth daily., Disp: 90 tablet, Rfl: 1  •  propranoloL (INDERAL) 20 mg tablet, Take 1 tablet (20 mg total) by mouth 2 (two) times a day., Disp: 180 tablet, Rfl: 0  •  traMADoL (ULTRAM) 50 mg tablet, Take 1 tablet (50 mg total) by mouth 2 (two) times a day., Disp: 60 tablet, Rfl: 0      BP Readings from Last 3 Encounters:   11/24/23 124/81   10/13/23 (!) 126/50   09/07/23 108/66       Recent Lab  "results:  No results found for: \"CHOL\", No results found for: \"HDL\", No results found for: \"LDLCALC\", No results found for: \"TRIG\"     Lab Results   Component Value Date    GLUCOSE 101 (H) 09/22/2023   ,   Lab Results   Component Value Date    HGBA1C 5.7 (H) 08/25/2023         Lab Results   Component Value Date    CREATININE 0.8 09/22/2023       Lab Results   Component Value Date    TSH 0.39 08/25/2023           Lab Results   Component Value Date    HGBA1C 5.7 (H) 08/25/2023     "

## 2024-02-04 ENCOUNTER — HOSPITAL ENCOUNTER (EMERGENCY)
Facility: HOSPITAL | Age: 88
Discharge: HOME | End: 2024-02-04
Attending: EMERGENCY MEDICINE
Payer: MEDICARE

## 2024-02-04 ENCOUNTER — APPOINTMENT (EMERGENCY)
Dept: RADIOLOGY | Facility: HOSPITAL | Age: 88
End: 2024-02-04
Attending: EMERGENCY MEDICINE
Payer: MEDICARE

## 2024-02-04 VITALS
TEMPERATURE: 97.2 F | BODY MASS INDEX: 26.7 KG/M2 | HEART RATE: 65 BPM | OXYGEN SATURATION: 95 % | WEIGHT: 136 LBS | SYSTOLIC BLOOD PRESSURE: 160 MMHG | DIASTOLIC BLOOD PRESSURE: 72 MMHG | HEIGHT: 60 IN | RESPIRATION RATE: 18 BRPM

## 2024-02-04 DIAGNOSIS — G89.29 CHRONIC LOW BACK PAIN, UNSPECIFIED BACK PAIN LATERALITY, UNSPECIFIED WHETHER SCIATICA PRESENT: Primary | ICD-10-CM

## 2024-02-04 DIAGNOSIS — M54.50 CHRONIC LOW BACK PAIN, UNSPECIFIED BACK PAIN LATERALITY, UNSPECIFIED WHETHER SCIATICA PRESENT: Primary | ICD-10-CM

## 2024-02-04 PROCEDURE — 63700000 HC SELF-ADMINISTRABLE DRUG: Performed by: EMERGENCY MEDICINE

## 2024-02-04 PROCEDURE — 73502 X-RAY EXAM HIP UNI 2-3 VIEWS: CPT | Mod: LT

## 2024-02-04 PROCEDURE — 99283 EMERGENCY DEPT VISIT LOW MDM: CPT | Mod: 25

## 2024-02-04 PROCEDURE — 72100 X-RAY EXAM L-S SPINE 2/3 VWS: CPT

## 2024-02-04 RX ORDER — PREDNISONE 20 MG/1
40 TABLET ORAL ONCE
Status: COMPLETED | OUTPATIENT
Start: 2024-02-04 | End: 2024-02-04

## 2024-02-04 RX ORDER — PREDNISONE 50 MG/1
50 TABLET ORAL DAILY
Qty: 5 TABLET | Refills: 0 | Status: SHIPPED | OUTPATIENT
Start: 2024-02-04 | End: 2024-03-15

## 2024-02-04 RX ADMIN — PREDNISONE 40 MG: 20 TABLET ORAL at 12:58

## 2024-02-04 ASSESSMENT — ENCOUNTER SYMPTOMS
SORE THROAT: 0
VOMITING: 0
FEVER: 0
EYE PAIN: 0
SEIZURES: 0
BACK PAIN: 1
DYSURIA: 0
PALPITATIONS: 0
COLOR CHANGE: 0
HEMATURIA: 0
CHILLS: 0
SHORTNESS OF BREATH: 0
ABDOMINAL PAIN: 0
COUGH: 0
ARTHRALGIAS: 0

## 2024-02-04 NOTE — ED ATTESTATION NOTE
I have personally seen and examined the patient.  I personally performed the key components of the encounter and provided medical decision making for this patient. I reviewed and agree with the PA/NP/Resident's assessment and plan of care, with any exceptions as documented below.    My focused history, examination, assessment, and plan of care of Rissa Erickson is as follows:    HPI: The patient is a 88 y.o. who comes to the ED for bilateral low back pain. Onset ~ 2 days. Has chronic back pain, with neurostimulator placed from an outside facility but recently moved here to an independent living facility in [place] and now following with Dr Quiñonez. Last saw 3 days ago and had stimulator adjusted and a steroid injection done. Pain free then 2 days ago moved an electronic jamila litter and now with bilateral SI joint pain. Radiates around left hip. Pain worse with movement. No fall. No loss of bladder/bowel dysunction. No saddle anesthesia. No fever, unintentional weight loss.     Pain today worse to the point where she felt she couldn't ambulate. Pt does admit she ambulated with a walker to the bathroom today however.     Getting PT/OT at the facility she resides at- last got this 3 days ago without any new particular exercise.     No trauma.         I was provided additional history from: Daughter.    Pertinent past medical history includes: Chronic back pain, pAF, HTN, DM, CHF      Exam: Awake, alert, in no distress. Afebrile. Mildly hypertensive 160/72. HR regular. Facial movements symmetric. Speech clear.     Minimal point tenderness bilateral SI joint area. No focal hip tenderness. No deformity. Not shortened or rotated.   Minimal;y positive straight leg bilaterally, able to lift to nearly 90 degrees without pain. Able to lift both legs against resistance 4+/5 bilat. Distal sensation intact bilat. No erythema, warmth.    Vital Signs Review: Vital signs have been reviewed. The oxygen saturation is SpO2: 96 %   which is normal.          Impression/Plan/Medical decision making/ED course: Acute on chronic low back pain. Already follows with chronic pain specialist. Sounds like MSK etiology given recent relative heavy lifting and pain with truncal movement. No red flag symptoms. Pt with pretty reassuring straight leg and resistive strength on exam.     Analgesic options, goals of care and expectations discussed with pt and daughter. Daughter wants her in a rehab however pt adamant she does not want that as she gets PT/OT at her facility already. Regarding analgesia- pt took sedative med 5 hours ago, does not tolerate lidocaine patch and daughter does not want any further sedating meds. Will try steroids, which has helped in the past.          Imaging:  I independently reviewed imaging done in the ED as a wet read.    Disposition: DC - symptoms improved, pt ambulatory. Rx steroids provided.         Procedures              NOTE: Patient seen during a time of significantly increased volumes, increased boarding in ED, decreased capacity and staff which may have contributed to lengthened stay in ED. Portion of management and initial evaluation may have been done while in the waiting room because of this. This document was created using dragon dictation software.  There might be some typographical errors due to this technology.         Monica June MD  02/05/24 8525

## 2024-02-04 NOTE — ED PROVIDER NOTES
Emergency Medicine Note  HPI   HISTORY OF PRESENT ILLNESS     Patient is an 88-year-old female with a past medical history of type 2 diabetes, CHF, A-fib who presents the emergency department with a chief complaint of lower back pain.  Patient reports that she has had chronic lower back pain and follows with a pain specialist.  Patient reports that she has had a pain stimulator implanted in her back that has helped her in the past.  Patient reports that this morning, the pain was severe despite getting a recent epidural injection on February 1, 2024.  Patient reports that her symptoms were improved until yesterday where the pain started to come back.  Patient reports that today the symptoms were to the point where she had difficulty ambulating and so she came to the emergency department to be evaluated.  Patient denies any recent trauma.  Patient endorses that she recently moved a heavy kitten litter that may have exacerbated her back symptoms.  Patient denies any urinary, bowel symptoms.  Patient denies any fevers, IV drug use.            Patient History   PAST HISTORY     Reviewed from Nursing Triage:       Past Medical History:   Diagnosis Date   • A-fib (CMS/[facility])    • Accelerated hypertension    • CHF (congestive heart failure) (CMS/[facility])    • Macular degeneration    • Type 2 diabetes mellitus (CMS/[facility])        Past Surgical History:   Procedure Laterality Date   • APPENDECTOMY     • HYSTERECTOMY     • JOINT REPLACEMENT     • KNEE ARTHROPLASTY         No family history on file.    Social History     Tobacco Use   • Smoking status: Never   • Smokeless tobacco: Never   Substance Use Topics   • Alcohol use: Never   • Drug use: Never         Review of Systems   REVIEW OF SYSTEMS     Review of Systems   Constitutional: Negative for chills and fever.   HENT: Negative for ear pain and sore throat.    Eyes: Negative for pain and visual disturbance.   Respiratory: Negative for cough and shortness of breath.     Cardiovascular: Negative for chest pain and palpitations.   Gastrointestinal: Negative for abdominal pain and vomiting.   Genitourinary: Negative for dysuria and hematuria.   Musculoskeletal: Positive for back pain. Negative for arthralgias.   Skin: Negative for color change and rash.   Neurological: Negative for seizures and syncope.   All other systems reviewed and are negative.        VITALS     ED Vitals    None                      Physical Exam   PHYSICAL EXAM     Physical Exam  Vitals and nursing note reviewed.   Constitutional:       General: She is not in acute distress.     Appearance: She is well-developed. She is not ill-appearing, toxic-appearing or diaphoretic.   HENT:      Head: Normocephalic and atraumatic.      Nose: No congestion or rhinorrhea.      Mouth/Throat:      Mouth: Mucous membranes are moist.   Eyes:      Extraocular Movements: Extraocular movements intact.      Conjunctiva/sclera: Conjunctivae normal.      Pupils: Pupils are equal, round, and reactive to light.   Cardiovascular:      Rate and Rhythm: Normal rate and regular rhythm.      Heart sounds: Normal heart sounds.   Pulmonary:      Effort: Pulmonary effort is normal. No respiratory distress.      Breath sounds: Normal breath sounds.   Abdominal:      Palpations: Abdomen is soft.      Tenderness: There is no abdominal tenderness.   Musculoskeletal:         General: No tenderness.      Right lower leg: No edema.      Left lower leg: No edema.   Skin:     General: Skin is warm and dry.      Capillary Refill: Capillary refill takes less than 2 seconds.   Neurological:      Mental Status: She is alert and oriented to person, place, and time.      Sensory: No sensory deficit (2/2 sensation in bilateral upper and lower extremities, no saddle anesthesia).      Motor: No weakness (5/5 strength in bilateral upper and lower extremities).           PROCEDURES     Procedures     DATA     Results     None          Imaging Results    None          No orders to display       Scoring tools                                  ED Course & MDM   MDM / ED COURSE / CLINICAL IMPRESSION / DISPO     Medical Decision Making  Patient is an 88-year-old female with a past medical history of type 2 diabetes, CHF, A-fib who presents the emergency department with a chief complaint of lower back pain.  Patient does not have any red flag symptoms for lower back pain.  Patient is already being followed closely with pain specialist and has a nerve stimulator.  Patient is getting injections for her pain.  Plan is to get x-rays to evaluate for possible occult fractures.  Discussed plan with patient and patient agrees to trial prednisone for pain.    Amount and/or Complexity of Data Reviewed  Radiology: ordered.      Risk  Prescription drug management.          ED Course as of 02/04/24 1437   Sun Feb 04, 2024   1437 X-RAY LUMBAR SPINE 2 OR 3 VIEWS  There are postsurgical changes of left total hip arthroplasty. Components  project in the expected position. There are mild degenerative changes of the  right hip joint with joint space narrowing subchondral sclerosis. No acute  fracture.        There is a spinal stimulator device in place with leads extending superiorly to  the field-of-view. There is age indeterminate superior plate compression  deformity of L1, correlate for point tenderness. There are multilevel  degenerative changes in the spine including disc height loss, endplate sclerosis  and osteophyte formation. There are vascular calcifications noted. [EC]   1437 No point tenderness in lumbar midline spine [EC]      ED Course User Index  [EC] Renzo Pruitt MD     Clinical Impression      None               Renzo Pruitt MD  Resident  02/04/24 1520

## 2024-02-05 ENCOUNTER — TELEPHONE (OUTPATIENT)
Dept: PRIMARY CARE | Facility: CLINIC | Age: 88
End: 2024-02-05
Payer: MEDICARE

## 2024-02-05 DIAGNOSIS — E78.2 MIXED HYPERLIPIDEMIA: ICD-10-CM

## 2024-02-05 DIAGNOSIS — E11.9 TYPE 2 DIABETES MELLITUS WITHOUT COMPLICATION, WITHOUT LONG-TERM CURRENT USE OF INSULIN (CMS/HCC): Primary | ICD-10-CM

## 2024-02-08 ENCOUNTER — APPOINTMENT (OUTPATIENT)
Dept: LAB | Age: 88
End: 2024-02-08
Attending: INTERNAL MEDICINE
Payer: MEDICARE

## 2024-02-08 ENCOUNTER — OFFICE VISIT (OUTPATIENT)
Dept: PRIMARY CARE | Facility: CLINIC | Age: 88
End: 2024-02-08
Payer: MEDICARE

## 2024-02-08 VITALS
TEMPERATURE: 97.7 F | SYSTOLIC BLOOD PRESSURE: 118 MMHG | DIASTOLIC BLOOD PRESSURE: 64 MMHG | HEART RATE: 59 BPM | RESPIRATION RATE: 16 BRPM | OXYGEN SATURATION: 97 %

## 2024-02-08 DIAGNOSIS — E11.9 TYPE 2 DIABETES MELLITUS WITHOUT COMPLICATION, WITHOUT LONG-TERM CURRENT USE OF INSULIN (CMS/HCC): ICD-10-CM

## 2024-02-08 DIAGNOSIS — R79.0 LOW MAGNESIUM LEVELS: Primary | ICD-10-CM

## 2024-02-08 DIAGNOSIS — E78.2 MIXED HYPERLIPIDEMIA: ICD-10-CM

## 2024-02-08 DIAGNOSIS — E05.90 HYPERTHYROIDISM: ICD-10-CM

## 2024-02-08 DIAGNOSIS — M54.50 CHRONIC BILATERAL LOW BACK PAIN, UNSPECIFIED WHETHER SCIATICA PRESENT: ICD-10-CM

## 2024-02-08 DIAGNOSIS — I50.32 CHRONIC DIASTOLIC CHF (CONGESTIVE HEART FAILURE) (CMS/HCC): ICD-10-CM

## 2024-02-08 DIAGNOSIS — E11.9 TYPE 2 DIABETES MELLITUS WITHOUT COMPLICATION, WITHOUT LONG-TERM CURRENT USE OF INSULIN (CMS/HCC): Primary | ICD-10-CM

## 2024-02-08 DIAGNOSIS — Z86.39 HISTORY OF HYPERTHYROIDISM: ICD-10-CM

## 2024-02-08 DIAGNOSIS — G89.29 CHRONIC BILATERAL LOW BACK PAIN, UNSPECIFIED WHETHER SCIATICA PRESENT: ICD-10-CM

## 2024-02-08 DIAGNOSIS — R79.0 LOW MAGNESIUM LEVELS: ICD-10-CM

## 2024-02-08 DIAGNOSIS — I48.0 PAROXYSMAL ATRIAL FIBRILLATION (CMS/HCC): ICD-10-CM

## 2024-02-08 DIAGNOSIS — M81.0 AGE-RELATED OSTEOPOROSIS WITHOUT CURRENT PATHOLOGICAL FRACTURE: ICD-10-CM

## 2024-02-08 DIAGNOSIS — M25.551 RIGHT HIP PAIN: ICD-10-CM

## 2024-02-08 LAB
25(OH)D3 SERPL-MCNC: 53 NG/ML (ref 30–100)
ALBUMIN SERPL-MCNC: 4.1 G/DL (ref 3.5–5.7)
ALP SERPL-CCNC: 30 IU/L (ref 34–125)
ALT SERPL-CCNC: 20 IU/L (ref 7–52)
ANION GAP SERPL CALC-SCNC: 7 MEQ/L (ref 3–15)
AST SERPL-CCNC: 17 IU/L (ref 13–39)
BASOPHILS # BLD: 0.02 K/UL (ref 0.01–0.1)
BASOPHILS NFR BLD: 0.2 %
BILIRUB SERPL-MCNC: 0.6 MG/DL (ref 0.3–1.2)
BUN SERPL-MCNC: 22 MG/DL (ref 7–25)
CALCIUM SERPL-MCNC: 9.3 MG/DL (ref 8.6–10.3)
CHLORIDE SERPL-SCNC: 100 MEQ/L (ref 98–107)
CHOLEST SERPL-MCNC: 110 MG/DL
CO2 SERPL-SCNC: 28 MEQ/L (ref 21–31)
CREAT SERPL-MCNC: 0.8 MG/DL (ref 0.6–1.2)
DIFFERENTIAL METHOD BLD: ABNORMAL
EGFRCR SERPLBLD CKD-EPI 2021: >60 ML/MIN/1.73M*2
EOSINOPHIL # BLD: 0.02 K/UL (ref 0.04–0.36)
EOSINOPHIL NFR BLD: 0.2 %
ERYTHROCYTE [DISTWIDTH] IN BLOOD BY AUTOMATED COUNT: 14 % (ref 11.7–14.4)
GLUCOSE SERPL-MCNC: 154 MG/DL (ref 70–99)
HCT VFR BLD AUTO: 35.8 % (ref 35–45)
HDLC SERPL-MCNC: 56 MG/DL
HDLC SERPL: 2 {RATIO}
HGB BLD-MCNC: 11.2 G/DL (ref 11.8–15.7)
IMM GRANULOCYTES # BLD AUTO: 0.05 K/UL (ref 0–0.08)
IMM GRANULOCYTES NFR BLD AUTO: 0.5 %
LDLC SERPL CALC-MCNC: 39 MG/DL
LYMPHOCYTES # BLD: 2.02 K/UL (ref 1.2–3.5)
LYMPHOCYTES NFR BLD: 18.5 %
MAGNESIUM SERPL-MCNC: 1.8 MG/DL (ref 1.8–2.5)
MCH RBC QN AUTO: 30.3 PG (ref 28–33.2)
MCHC RBC AUTO-ENTMCNC: 31.3 G/DL (ref 32.2–35.5)
MCV RBC AUTO: 96.8 FL (ref 83–98)
MONOCYTES # BLD: 0.48 K/UL (ref 0.28–0.8)
MONOCYTES NFR BLD: 4.4 %
NEUTROPHILS # BLD: 8.35 K/UL (ref 1.7–7)
NEUTS SEG NFR BLD: 76.2 %
NONHDLC SERPL-MCNC: 54 MG/DL
NRBC BLD-RTO: 0 %
PDW BLD AUTO: 10.1 FL (ref 9.4–12.3)
PLATELET # BLD AUTO: 312 K/UL (ref 150–369)
POTASSIUM SERPL-SCNC: 4.5 MEQ/L (ref 3.5–5.1)
PROT SERPL-MCNC: 6.2 G/DL (ref 6–8.2)
RBC # BLD AUTO: 3.7 M/UL (ref 3.93–5.22)
SODIUM SERPL-SCNC: 135 MEQ/L (ref 136–145)
T3 SERPL-MCNC: 47 NG/DL (ref 87–178)
T4 FREE SERPL-MCNC: 1.16 NG/DL (ref 0.58–1.64)
TRIGL SERPL-MCNC: 74 MG/DL
TSH SERPL DL<=0.05 MIU/L-ACNC: 0.85 MIU/L (ref 0.34–5.6)
WBC # BLD AUTO: 10.94 K/UL (ref 3.8–10.5)

## 2024-02-08 PROCEDURE — 80053 COMPREHEN METABOLIC PANEL: CPT

## 2024-02-08 PROCEDURE — 85025 COMPLETE CBC W/AUTO DIFF WBC: CPT

## 2024-02-08 PROCEDURE — 99214 OFFICE O/P EST MOD 30 MIN: CPT | Performed by: FAMILY MEDICINE

## 2024-02-08 PROCEDURE — 82306 VITAMIN D 25 HYDROXY: CPT

## 2024-02-08 PROCEDURE — G2211 COMPLEX E/M VISIT ADD ON: HCPCS | Performed by: FAMILY MEDICINE

## 2024-02-08 PROCEDURE — 84480 ASSAY TRIIODOTHYRONINE (T3): CPT

## 2024-02-08 PROCEDURE — 84439 ASSAY OF FREE THYROXINE: CPT

## 2024-02-08 PROCEDURE — 36415 COLL VENOUS BLD VENIPUNCTURE: CPT

## 2024-02-08 PROCEDURE — 84443 ASSAY THYROID STIM HORMONE: CPT

## 2024-02-08 PROCEDURE — 83735 ASSAY OF MAGNESIUM: CPT

## 2024-02-08 PROCEDURE — 80061 LIPID PANEL: CPT

## 2024-02-08 PROCEDURE — 83036 HEMOGLOBIN GLYCOSYLATED A1C: CPT

## 2024-02-08 PROCEDURE — 82374 ASSAY BLOOD CARBON DIOXIDE: CPT

## 2024-02-08 NOTE — PROGRESS NOTES
Subjective      Follow-up (Needs a new walker, referral for diabetic shoes, ER for chronic back pain, would like for you to review the xrays)     Patient ID: Rissa Erickson is a 88 y.o. female presents today c/o:    Following with pain management  - recent injection but since she had been improving, she had moved a cat litter box  - Prednisone prescribed and has had some improvement  - Pain now primarily R hip almost to the knee different from sciatica  Tramadol BID at baseline but up to TID during flares, also on gabapentin  Improving with PT but she had a flare after using weights for the legs    Foot pain left foot, meets with podiatrist regularly    Follows with endocrinologist appointment upcoming with Dr. Sauceda    Mammogram in October completed here  DEXA scan completed here in October as well  Goes every 8 weeks to follow with [facility] retina specialist  Also following with podiatrist Dr. Orourke regularly           Objective   Vitals:   Vitals:    02/08/24 1009   BP: 118/64   BP Location: Left upper arm   Patient Position: Sitting   Pulse: (!) 59   Resp: 16   Temp: 36.5 °C (97.7 °F)   TempSrc: Temporal   SpO2: 97%       Physical Exam  Vitals reviewed.   Constitutional:       General: She is not in acute distress.     Comments: Frail appearing   HENT:      Right Ear: Tympanic membrane normal.      Left Ear: Tympanic membrane normal.      Mouth/Throat:      Mouth: Mucous membranes are moist.      Pharynx: Oropharynx is clear.   Eyes:      Extraocular Movements: Extraocular movements intact.      Pupils: Pupils are equal, round, and reactive to light.   Cardiovascular:      Rate and Rhythm: Normal rate and regular rhythm.      Heart sounds: Normal heart sounds.   Pulmonary:      Effort: Pulmonary effort is normal.      Breath sounds: Normal breath sounds.   Neurological:      Mental Status: She is alert. Mental status is at baseline.      Comments: Ambulating with rolling walker, requiring 2 assist to get on/off  exam table              Assessment/Plan   Rissa was seen today for follow-up.    Diagnoses and all orders for this visit:    Type 2 diabetes mellitus without complication, without long-term current use of insulin (CMS/HCC)    Paroxysmal atrial fibrillation (CMS/HCC)    Chronic diastolic CHF (congestive heart failure) (CMS/HCC)    Hyperthyroidism    Age-related osteoporosis without current pathological fracture    Mixed hyperlipidemia    Chronic bilateral low back pain, unspecified whether sciatica present    Right hip pain  -     Ambulatory referral to Orthopedic Surgery; Future    Other orders  -     walker misc; 1 each once for 1 dose.       Labs obtained this morning and currently pending.    Patient verbalizes understanding and agrees with plan of care today.         I attest that this visit supports the complexity inherent to evaluation and management associated with medical care services that serve as the continuing focal point for all needed health care services and/or medical care services that are part of ongoing care related to this patient's single, serious condition or a complex condition.      Sloan Maher MD  2/8/2024

## 2024-02-09 LAB
EST. AVERAGE GLUCOSE BLD GHB EST-MCNC: 128 MG/DL
HBA1C MFR BLD: 6.1 %

## 2024-02-22 ENCOUNTER — OFFICE VISIT (OUTPATIENT)
Dept: ENDOCRINOLOGY | Facility: CLINIC | Age: 88
End: 2024-02-22
Payer: MEDICARE

## 2024-02-22 VITALS
DIASTOLIC BLOOD PRESSURE: 66 MMHG | OXYGEN SATURATION: 99 % | SYSTOLIC BLOOD PRESSURE: 116 MMHG | BODY MASS INDEX: 26.31 KG/M2 | HEART RATE: 72 BPM | HEIGHT: 60 IN | RESPIRATION RATE: 18 BRPM | WEIGHT: 134 LBS

## 2024-02-22 DIAGNOSIS — E04.2 MULTIPLE THYROID NODULES: Primary | ICD-10-CM

## 2024-02-22 DIAGNOSIS — E11.9 TYPE 2 DIABETES MELLITUS WITHOUT COMPLICATION, WITHOUT LONG-TERM CURRENT USE OF INSULIN (CMS/HCC): ICD-10-CM

## 2024-02-22 DIAGNOSIS — Z86.39 HISTORY OF HYPERTHYROIDISM: ICD-10-CM

## 2024-02-22 DIAGNOSIS — M81.0 AGE-RELATED OSTEOPOROSIS WITHOUT CURRENT PATHOLOGICAL FRACTURE: ICD-10-CM

## 2024-02-22 PROCEDURE — 99214 OFFICE O/P EST MOD 30 MIN: CPT | Performed by: INTERNAL MEDICINE

## 2024-02-22 RX ORDER — BLOOD-GLUCOSE METER
1 KIT MISCELLANEOUS 2 TIMES DAILY
Qty: 200 STRIP | Refills: 1 | Status: SHIPPED | OUTPATIENT
Start: 2024-02-22 | End: 2024-03-26 | Stop reason: HOSPADM

## 2024-02-22 RX ORDER — METFORMIN HYDROCHLORIDE 500 MG/1
1000 TABLET ORAL 2 TIMES DAILY WITH MEALS
Qty: 360 TABLET | Refills: 1 | Status: SHIPPED | OUTPATIENT
Start: 2024-02-22 | End: 2024-10-14 | Stop reason: SDUPTHER

## 2024-02-22 NOTE — PATIENT INSTRUCTIONS
PMH amiodarone-induced hyperthyroidism, MNG, A fib s/p cardioversion, DM2, osteoporosis, GERD, HFpEF, HTN, HLD, post herpetic neuralgia s/p pain stimulator.    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 Treated with high dose decadron and MMI with rapid improvement in symptoms, discharged on MMI + prednisone, then re-admitted for ADHF 6/2021.  Referred for total thyroidectomy 6/2021, then readmitted 1 week later for falls + hyponatremia. During this hospitalization she was seen by general surgery who felt she was too high risk for thyroidectomy. Off amiodarone since 7/2021. Stopped MMI 9/2022. Also with DM2 being managed by PCP with metformin monotherapy. Also with osteoporosis on prolia as per PCP.     ASSESMENT AND PLAN     1. Amiodarone induced hyperthyroidism  - Now resolved off amiodarone since 7/2021 and off MMI since 9/2022  - Remains clinically and biochemically euthyroid  - Monitor TFTs q6-12 mo.  Ordered lab work     2. MNG  - No prior biopsies  - US 8/2023: Enlarged multinodular goiter with multiple left-sided spongiform colloid nodules  and smaller right-sided nodules. Follow up in one year to document stability.   - Given advanced age and pt previously deemed by surgeon to be poor surgical candidate for thyroidectomy would recommend stopping US monitoring, unless pt develops new local neck sx, as it will not change medical management  - Pt and daughter want to continue with one additional US 8/2024.   Ordered ultrasound.     3. DM2, controlled, without known complications  - A1C 5.7% 8/2023  - A1c 6.3% in 2/0204.  - Continue metformin 1000 mg BID for now.   - Counseled on risk of hypoglycemia and instructed pt to call for BG < 80  - Reports no low blood sugars.  -Counseled on diet and physical activity    4. Osteoporosis  - S/p L1 compression fracture after falling in shower at 55 yo. Reportedly not treated until  started prolia ~2021  - Continue prolia q6 mo.   Last dose 10/2023.   Next dose 4/2024.  Patient has no teeth.  - Continue vitamin D and Ca supplementation  - Continue with weight bearing exercise  - Counseled on importance of fall prevention  -DEXA 10/2023: AP imaging of the non-dominant left forearm, 33% radius, demonstrates the  T-score to measure -2.8.  - Repeat DXA 10/2025.

## 2024-02-22 NOTE — PROGRESS NOTES
HPI/ SUBJECTIVE      Patient ID: Rissa Erickson 6/7/1935  Referring provider: No ref. provider found   Reason for consult: Diabetes    Diabetes, thyroid nodule, osteoporosis, h/o hyperthyroidism:     Rissa Erickson is referred to us for managment of Type 2 diabetes, hyperthyroidism, thyroid nodule and osteoprois. Reviewed medical records. PMH amiodarone-induced hyperthyroidism, MNG, A fib s/p cardioversion, DM2, osteoporosis, GERD, HFpEF, HTN, HLD, post herpetic neuralgia s/p pain stimulator.     Diabetes: Diabetes is a chronic condition, moderate in severity and uncontrolled in quality. Associated symptoms include: Patient denies polyuria, polydipsia. Patient denies low blood sugar episodes recently. She is taking metformin 1000 mg BID. Taking regularly. Eating healthy diet. Physical activity is limited. A1C was 5.7% 8/2023.  A1c 6.3% in 2/2024. Current diabetic medication list reviewed. Compliance with diabetic treatment has been good. Compliance with diabetic diet has been fair. Blood sugar monitoring: fair. Reviewed glucose readings. Blood sugar control: good.     Osteoporosis: S/p L1 compression fracture after falling in shower at 55 yo. Reportedly not treated until started Prolia ~2021  Taking prolia q6 mo. Last dose 10/2023. Next dose 4/2024. Taking vitamin D and Ca supplementation. Patient has no teeth. DEXA 10/2023: AP imaging of the non-dominant left forearm, 33% radius, demonstrates the T-score to measure -2.8. No recent fractures.    Hyperthyroidism : Diagnosed with amiodarone induced thyroiditis requiring hospitalization for hyperthyroidism/tracheal compression 2/2 MNG 5/2021. Treated with high dose decadron and MMI with rapid improvement in symptoms, discharged on MMI + prednisone, then re-admitted for ADHF 6/2021.  Referred for total thyroidectomy 6/2021, then readmitted 1 week later for falls + hyponatremia. During this hospitalization she was seen by general surgery who felt she was too high risk for  thyroidectomy. Off amiodarone since 7/2021. Stopped MMI 9/2022. Recent thyroid labs are good. Pt asymptomatic.    Thyroid nodule: No prior biopsies. US 8/2023: Enlarged multinodular goiter with multiple left-sided spongiform colloid nodules  and smaller right-sided nodules. Follow up in one year to document stability. Given advanced age and pt previously deemed by surgeon to be poor surgical candidate for thyroidectomy would recommend stopping US monitoring, unless pt develops new local neck sx, as it will not change medical management. Pt and daughter want to continue with one additional US 8/2024. Reviewed lab work. Review of systems as per HPI.      History  Patient Active Problem List   Diagnosis   • Amiodarone-induced thyroiditis   • Chronic diastolic CHF (congestive heart failure) (CMS/HCC)   • Generalized weakness   • GERD (gastroesophageal reflux disease)   • Hyperthyroidism   • Mixed hyperlipidemia   • Multiple thyroid nodules   • Non-seasonal allergic rhinitis due to pollen   • Normochromic normocytic anemia   • Osteoporosis   • Paroxysmal atrial fibrillation (CMS/HCC)   • Postherpetic neuralgia   • Primary hypertension   • Type 2 diabetes mellitus without complication, without long-term current use of insulin (CMS/HCC)     Allergies   Allergen Reactions   • Amiodarone Other (see comments)     Affected thyroid   thyroiditis       Current Outpatient Medications   Medication Sig Dispense Refill   • acetaminophen (TYLENOL) 325 mg tablet Take 650 mg by mouth every 6 hours as needed.     • apixaban (ELIQUIS) 5 mg tablet Take 1 tablet (5 mg total) by mouth 2 (two) times a day. 180 tablet 3   • atorvastatin (LIPITOR) 20 mg tablet Take 1 tablet (20 mg total) by mouth nightly. 90 tablet 3   • denosumab (PROLIA) 60 mg/mL syringe Inject 60 mg under the skin every 6 (six) months.     • FREESTYLE LITE STRIPS strip 1 strip 2 (two) times a day. 200 strip 1   • furosemide (LASIX) 20 mg tablet Take 1 tablet (20 mg total)  "by mouth daily. 90 tablet 1   • gabapentin (NEURONTIN) 300 mg capsule Take 1 capsule (300 mg total) by mouth 2 (two) times a day. 180 capsule 1   • lancets misc 1 Lancet as needed (Diabetes). To check blood glucose 100 each 3   • metFORMIN (GLUCOPHAGE) 500 mg tablet Take 2 tablets (1,000 mg total) by mouth 2 (two) times a day with meals. 360 tablet 1   • pantoprazole (PROTONIX) 20 mg EC tablet Take 1 tablet (20 mg total) by mouth daily. 90 tablet 1   • propranoloL (INDERAL) 20 mg tablet Take 1 tablet (20 mg total) by mouth 2 (two) times a day. 180 tablet 0   • traMADoL (ULTRAM) 50 mg tablet Take 1 tablet (50 mg total) by mouth 2 (two) times a day. 60 tablet 0   • lidocaine (LIDODERM) 5 % patch Place 1 patch on the skin daily as needed for pain. (Patient not taking: Reported on 2/22/2024) 30 patch 3   • predniSONE (DELTASONE) 50 mg tablet Take 1 tablet (50 mg total) by mouth daily for 5 days. (Patient not taking: Reported on 2/22/2024) 5 tablet 0     No current facility-administered medications for this visit.     Allergies, current medications, history and problem list reviewed and updated.     PHYSICAL EXAM/ OBJECTIVE      Visit Vitals  /66 (BP Location: Left upper arm, Patient Position: Sitting)   Pulse 72   Resp 18   Ht 1.524 m (5')   Wt 60.8 kg (134 lb)   SpO2 99%   BMI 26.17 kg/m²     General:  No distress  ENT/ thyroid: Thyromegaly  Eyes: Conjunctiva normal  CVS:  S1S2+  Respiratory:  Normal effort  Abdomen:  BS active  Neuro:  No gross focal deficits   Psych: Normal mood.      Labs:  Lab Results   Component Value Date    HGBA1C 6.1 (H) 02/08/2024    HGBA1C 5.7 (H) 08/25/2023     No components found for: \"MICROCRERAT\"  No results found for: \"LDL\"  Lab Results   Component Value Date    EGFR >60.0 02/08/2024    EGFR >60.0 09/22/2023    EGFR >60.0 08/25/2023    CREATININE 0.8 02/08/2024    CREATININE 0.8 09/22/2023    CREATININE 0.7 08/25/2023        ASSESSMENT       Diagnosis Plan   1. Multiple thyroid " nodules  ULTRASOUND THYROID      2. History of hyperthyroidism  T4, free    TSH      3. Type 2 diabetes mellitus without complication, without long-term current use of insulin (CMS/[facility])        4. Age-related osteoporosis without current pathological fracture            PLAN     DM2, controlled, without known complications  - A1C 5.7% 8/2023  A1c 6.3% in 2/0204.  Continue metformin 1000 mg BID for now.   - Counseled on risk of hypoglycemia and instructed pt to call for BG < 80  - Reports no low blood sugars.  -Counseled on diet and physical activity    Osteoporosis  - S/p L1 compression fracture after falling in shower at 55 yo. Reportedly not treated until started prolia ~2021  - Continue prolia q6 mo.   Last dose 10/2023.   Next dose 4/2024.  Patient has no teeth.  - Continue vitamin D and Ca supplementation  - Continue with weight bearing exercise  - Counseled on importance of fall prevention  -DEXA 10/2023: AP imaging of the non-dominant left forearm, 33% radius, demonstrates the  T-score to measure -2.8.  - Repeat DXA 10/2025.    Amiodarone induced hyperthyroidism  - Now resolved off amiodarone since 7/2021 and off MMI since 9/2022  - Remains clinically and biochemically euthyroid  - Monitor TFTs q6-12 mo.  Ordered lab work     MNG  - No prior biopsies  - US 8/2023: Enlarged multinodular goiter with multiple left-sided spongiform colloid nodules  and smaller right-sided nodules. Follow up in one year to document stability.   - Given advanced age and pt previously deemed by surgeon to be poor surgical candidate for thyroidectomy would recommend stopping US monitoring, unless pt develops new local neck sx, as it will not change medical management  - Pt and daughter want to continue with one additional US 8/2024.   Ordered ultrasound.       Orders Placed This Encounter   Procedures   • ULTRASOUND THYROID   • T4, free   • TSH     Risks, benefits, and alternatives of the medications and treatment plan prescribed  today were discussed, and patient expressed understanding and agreement with the plan. Pt indicated understanding that if condition worsening or not improving with plan above that they should report to an Urgent Care or Emergency Room immediately.    -------------------

## 2024-03-12 ENCOUNTER — NURSE TRIAGE (OUTPATIENT)
Dept: PRIMARY CARE | Facility: CLINIC | Age: 88
End: 2024-03-12
Payer: MEDICARE

## 2024-03-12 NOTE — TELEPHONE ENCOUNTER
RN spoke with patient's daughter-Cristina (on PHI form)  -states she is concerned about patient as she has been feeling SOB the last few days or so  -she is not sure if some of this is due to anxiety but she also has history of CHF  -patient had to euthanize cat yesterday- she was very SOB and did not take her Lasix because she was out all day- once she got home she took her Lasix and she was fine  -today was at PT and became SOB- Pulse Ox was 91% but then went up to 95%  -Patient has been telling her daughter she is not able to lay flat- daughter bought her a wedge pillow  -patient takes Lasix 20mg but was asking if maybe this needs to be increased  -Daughter is not with patient at this time- she is on her way to see her- she will evaluate her and decide if she feels like she needs to be seen today- upon quick check no appts left in office for today- Cristina is aware of this  -Cristina will call office back

## 2024-03-12 NOTE — TELEPHONE ENCOUNTER
Regarding: Shortness Breath  ----- Message from Ariane Hernandez sent at 3/12/2024  2:43 PM EDT -----  Red Flag: Patient's daughter called, patient is having shortness of breath.

## 2024-03-13 NOTE — TELEPHONE ENCOUNTER
RN called to f/u with pt's daughter Cristina (PHI), Cristina is a retired nurse practitioner. Per Cristina, when she got to pt's house yesterday, she heard crackles in the bilateral lung bases. Cristina called pt's Cardiologist, they adjusted her Lasix to 20mg BID until her scheduled Echo and Stress test on 4/4. Pt was also seen in Cardiologist office today 3/13/24. Pt does not need anything from PCP office at this time, Cristina will call if ever needed.

## 2024-03-15 ENCOUNTER — HOSPITAL ENCOUNTER (EMERGENCY)
Facility: HOSPITAL | Age: 88
Discharge: HOME | End: 2024-03-15
Attending: STUDENT IN AN ORGANIZED HEALTH CARE EDUCATION/TRAINING PROGRAM | Admitting: STUDENT IN AN ORGANIZED HEALTH CARE EDUCATION/TRAINING PROGRAM
Payer: MEDICARE

## 2024-03-15 ENCOUNTER — APPOINTMENT (OUTPATIENT)
Dept: RADIOLOGY | Age: 88
End: 2024-03-15
Attending: FAMILY MEDICINE
Payer: MEDICARE

## 2024-03-15 ENCOUNTER — HOSPITAL ENCOUNTER (OUTPATIENT)
Facility: CLINIC | Age: 88
Discharge: ANOTHER HOSPITAL | End: 2024-03-15
Attending: FAMILY MEDICINE
Payer: MEDICARE

## 2024-03-15 ENCOUNTER — TELEPHONE (OUTPATIENT)
Dept: PRIMARY CARE | Facility: CLINIC | Age: 88
End: 2024-03-15
Payer: MEDICARE

## 2024-03-15 VITALS
OXYGEN SATURATION: 96 % | DIASTOLIC BLOOD PRESSURE: 67 MMHG | HEART RATE: 74 BPM | TEMPERATURE: 98.5 F | SYSTOLIC BLOOD PRESSURE: 123 MMHG

## 2024-03-15 VITALS
SYSTOLIC BLOOD PRESSURE: 136 MMHG | HEART RATE: 76 BPM | WEIGHT: 131 LBS | OXYGEN SATURATION: 94 % | TEMPERATURE: 98.8 F | BODY MASS INDEX: 25.58 KG/M2 | DIASTOLIC BLOOD PRESSURE: 58 MMHG | RESPIRATION RATE: 20 BRPM

## 2024-03-15 DIAGNOSIS — R06.02 SHORTNESS OF BREATH: Primary | ICD-10-CM

## 2024-03-15 DIAGNOSIS — I50.9 CONGESTIVE HEART FAILURE, UNSPECIFIED HF CHRONICITY, UNSPECIFIED HEART FAILURE TYPE (CMS/HCC): ICD-10-CM

## 2024-03-15 LAB
ALBUMIN SERPL-MCNC: 4 G/DL (ref 3.5–5.7)
ALP SERPL-CCNC: 43 IU/L (ref 34–125)
ALT SERPL-CCNC: 14 IU/L (ref 7–52)
ANION GAP SERPL CALC-SCNC: 9 MEQ/L (ref 3–15)
AST SERPL-CCNC: 18 IU/L (ref 13–39)
BACTERIA URNS QL MICRO: ABNORMAL /HPF
BASOPHILS # BLD: 0.05 K/UL (ref 0.01–0.1)
BASOPHILS NFR BLD: 0.7 %
BILIRUB SERPL-MCNC: 0.3 MG/DL (ref 0.3–1.2)
BILIRUB UR QL STRIP.AUTO: NEGATIVE MG/DL
BNP SERPL-MCNC: 280 PG/ML
BUN SERPL-MCNC: 15 MG/DL (ref 7–25)
CALCIUM SERPL-MCNC: 9.2 MG/DL (ref 8.6–10.3)
CHLORIDE SERPL-SCNC: 98 MEQ/L (ref 98–107)
CLARITY UR REFRACT.AUTO: CLEAR
CO2 SERPL-SCNC: 29 MEQ/L (ref 21–31)
COLOR UR AUTO: YELLOW
CREAT SERPL-MCNC: 0.7 MG/DL (ref 0.6–1.2)
DIFFERENTIAL METHOD BLD: ABNORMAL
EGFRCR SERPLBLD CKD-EPI 2021: >60 ML/MIN/1.73M*2
EOSINOPHIL # BLD: 0.17 K/UL (ref 0.04–0.36)
EOSINOPHIL NFR BLD: 2.4 %
ERYTHROCYTE [DISTWIDTH] IN BLOOD BY AUTOMATED COUNT: 13.6 % (ref 11.7–14.4)
GLUCOSE SERPL-MCNC: 106 MG/DL (ref 70–99)
GLUCOSE UR STRIP.AUTO-MCNC: NEGATIVE MG/DL
HCT VFR BLD AUTO: 32.4 % (ref 35–45)
HGB BLD-MCNC: 10.4 G/DL (ref 11.8–15.7)
HGB UR QL STRIP.AUTO: NEGATIVE
HYALINE CASTS #/AREA URNS LPF: ABNORMAL /LPF
IMM GRANULOCYTES # BLD AUTO: 0.03 K/UL (ref 0–0.08)
IMM GRANULOCYTES NFR BLD AUTO: 0.4 %
KETONES UR STRIP.AUTO-MCNC: NEGATIVE MG/DL
LEUKOCYTE ESTERASE UR QL STRIP.AUTO: NEGATIVE
LYMPHOCYTES # BLD: 2.4 K/UL (ref 1.2–3.5)
LYMPHOCYTES NFR BLD: 34 %
MCH RBC QN AUTO: 29.6 PG (ref 28–33.2)
MCHC RBC AUTO-ENTMCNC: 32.1 G/DL (ref 32.2–35.5)
MCV RBC AUTO: 92.3 FL (ref 83–98)
MONOCYTES # BLD: 0.83 K/UL (ref 0.28–0.8)
MONOCYTES NFR BLD: 11.8 %
MUCOUS THREADS URNS QL MICRO: ABNORMAL /LPF
NEUTROPHILS # BLD: 3.58 K/UL (ref 1.7–7)
NEUTS SEG NFR BLD: 50.7 %
NITRITE UR QL STRIP.AUTO: NEGATIVE
NRBC BLD-RTO: 0 %
PDW BLD AUTO: 9.1 FL (ref 9.4–12.3)
PH UR STRIP.AUTO: 6.5 [PH]
PLATELET # BLD AUTO: 316 K/UL (ref 150–369)
POTASSIUM SERPL-SCNC: 3.5 MEQ/L (ref 3.5–5.1)
PROT SERPL-MCNC: 6.7 G/DL (ref 6–8.2)
PROT UR QL STRIP.AUTO: ABNORMAL
RBC # BLD AUTO: 3.51 M/UL (ref 3.93–5.22)
RBC #/AREA URNS HPF: ABNORMAL /HPF
SODIUM SERPL-SCNC: 136 MEQ/L (ref 136–145)
SP GR UR REFRACT.AUTO: 1.01
SQUAMOUS URNS QL MICRO: ABNORMAL /HPF
TROPONIN I SERPL HS-MCNC: 12 PG/ML
TROPONIN I SERPL HS-MCNC: 12.1 PG/ML
UROBILINOGEN UR STRIP-ACNC: 0.2 EU/DL
WBC # BLD AUTO: 7.06 K/UL (ref 3.8–10.5)
WBC #/AREA URNS HPF: ABNORMAL /HPF

## 2024-03-15 PROCEDURE — 71046 X-RAY EXAM CHEST 2 VIEWS: CPT | Performed by: FAMILY MEDICINE

## 2024-03-15 PROCEDURE — 93005 ELECTROCARDIOGRAM TRACING: CPT

## 2024-03-15 PROCEDURE — 99283 EMERGENCY DEPT VISIT LOW MDM: CPT

## 2024-03-15 PROCEDURE — 84484 ASSAY OF TROPONIN QUANT: CPT | Performed by: STUDENT IN AN ORGANIZED HEALTH CARE EDUCATION/TRAINING PROGRAM

## 2024-03-15 PROCEDURE — 93005 ELECTROCARDIOGRAM TRACING: CPT | Performed by: STUDENT IN AN ORGANIZED HEALTH CARE EDUCATION/TRAINING PROGRAM

## 2024-03-15 PROCEDURE — 80053 COMPREHEN METABOLIC PANEL: CPT | Performed by: STUDENT IN AN ORGANIZED HEALTH CARE EDUCATION/TRAINING PROGRAM

## 2024-03-15 PROCEDURE — 84484 ASSAY OF TROPONIN QUANT: CPT | Mod: 91 | Performed by: STUDENT IN AN ORGANIZED HEALTH CARE EDUCATION/TRAINING PROGRAM

## 2024-03-15 PROCEDURE — 85025 COMPLETE CBC W/AUTO DIFF WBC: CPT | Performed by: STUDENT IN AN ORGANIZED HEALTH CARE EDUCATION/TRAINING PROGRAM

## 2024-03-15 PROCEDURE — 36415 COLL VENOUS BLD VENIPUNCTURE: CPT

## 2024-03-15 PROCEDURE — 83880 ASSAY OF NATRIURETIC PEPTIDE: CPT | Performed by: STUDENT IN AN ORGANIZED HEALTH CARE EDUCATION/TRAINING PROGRAM

## 2024-03-15 PROCEDURE — 99215 OFFICE O/P EST HI 40 MIN: CPT | Performed by: FAMILY MEDICINE

## 2024-03-15 PROCEDURE — 81001 URINALYSIS AUTO W/SCOPE: CPT

## 2024-03-15 ASSESSMENT — ENCOUNTER SYMPTOMS
CHOKING: 0
FEVER: 0
SHORTNESS OF BREATH: 1
DIZZINESS: 0
COUGH: 0
VOMITING: 0
DIAPHORESIS: 0
PALPITATIONS: 0
LIGHT-HEADEDNESS: 0
ABDOMINAL PAIN: 0
HEMATURIA: 0
TROUBLE SWALLOWING: 0
CHILLS: 0
DYSURIA: 0
FEVER: 1
SHORTNESS OF BREATH: 1
CHEST TIGHTNESS: 0
NAUSEA: 0
WEAKNESS: 0

## 2024-03-15 NOTE — TELEPHONE ENCOUNTER
RN called and spoke with pt's DTR Cristina ([facility]), she is a NP. Pt has been having SOB with exertion for a couple of days, pt was seen by Cardiology and Lasix was increased to 20mg BID (see Nurse Triage note). Cristina reports that pt had a 2lb weight loss over the past couple of days and her symptoms are improving but pt still having SOB with exertion and she heard crackles in bilateral lower lobes. Cristina is concerned that pt may develop pneumonia over the weekend and she asked cardiology for CXR script. Per Cristina, Cardiology told them no one would be able to read the report over the weekend, Cristina is asking if we can see the pt. No available appts in office, Cristina said she will take pt to .

## 2024-03-15 NOTE — ED PROVIDER NOTES
History  Chief Complaint   Patient presents with   • Shortness of Breath     SOB since 3/12/2024, she was seen her cardiologist on 3/15  she wa told to increase lasix to 20 bid. SOB is better but not gone, daughter concerned about mother lungs, want Chest XR.     Patient started with shortness of breath earlier this week.  She was seen by her cardiologist and her Lasix was increased to 40 mg 2 times a day.  She did lose 2 pounds in the past 2 days.  She was seen in the office by the cardiologist and her EKG was normal on 3/13/2024.  Despite taking the higher dose of Lasix she still complaining of shortness of breath.  She denies any chest pain.      Shortness of Breath  Severity:  Moderate  Duration:  3 days  Progression:  Unchanged  Chronicity:  New  Worsened by:  Exertion  Ineffective treatments: Lasix.  Associated symptoms: no chest pain, no cough, no diaphoresis and no fever    Risk factors: no hx of PE/DVT        Past Medical History:   Diagnosis Date   • A-fib (CMS/HCC)    • Accelerated hypertension    • CHF (congestive heart failure) (CMS/HCC)    • Macular degeneration    • Thyroid nodule    • Type 2 diabetes mellitus (CMS/HCC)        Past Surgical History:   Procedure Laterality Date   • APPENDECTOMY     • HYSTERECTOMY     • JOINT REPLACEMENT     • KNEE ARTHROPLASTY         History reviewed. No pertinent family history.    Social History     Tobacco Use   • Smoking status: Never   • Smokeless tobacco: Never   Substance Use Topics   • Alcohol use: Never   • Drug use: Never       Review of Systems   Constitutional: Negative for diaphoresis and fever.   Respiratory: Positive for shortness of breath. Negative for cough.    Cardiovascular: Negative for chest pain.       Physical Exam  ED Triage Vitals   Temp Heart Rate Resp BP SpO2   03/15/24 1544 03/15/24 1544 -- 03/15/24 1547 03/15/24 1547   36.9 °C (98.5 °F) 78  123/67 93 %      Temp src Heart Rate Source Patient Position BP Location FiO2 (%) (Set)   -- -- --  -- --              Physical Exam  Vitals reviewed.   Constitutional:       Appearance: She is not diaphoretic.   HENT:      Mouth/Throat:      Mouth: Mucous membranes are dry.   Cardiovascular:      Rate and Rhythm: Normal rate and regular rhythm.      Heart sounds: Normal heart sounds.      Comments: Trace bilateral edema  Pulmonary:      Effort: Pulmonary effort is normal.      Breath sounds: Examination of the right-lower field reveals decreased breath sounds. Examination of the left-lower field reveals decreased breath sounds. Decreased breath sounds present.   Skin:     Coloration: Skin is pale.   Neurological:      Mental Status: She is alert.           Procedures  Procedures    UC Course   Shortness of breath    MDM  Possible mild CHF  I am concerned there may be other underlying cause such as anemia as patient is pale and conjunctival are pale  Pulse ox is stable  Patient sent to [place] [facility] for further evaluation and also the recommendation of the cardiologist             Digna Carreno DO  03/15/24 [id number]

## 2024-03-15 NOTE — TELEPHONE ENCOUNTER
Pt daughter lvm on behalf of her mother Rissa. She states she spoke to Dr Maher previously regarding shortness of breath and CHF with her mother. She has seen her cardiologist this week and is on a higher dose of lasix. Daughter requests a chest x-ray and the shortness of breath has decreased but not resolved and is short of breath with ambulation. She is also hearing crackles. Would like a call back

## 2024-03-15 NOTE — ED NOTES
Bed: 19  Expected date:   Expected time:   Means of arrival:   Comments:  [facility]       Tamara Joshua MA  03/15/24 5042

## 2024-03-15 NOTE — ED PROVIDER NOTES
Emergency Medicine Note  HPI   HISTORY OF PRESENT ILLNESS   Is an 88-year-old female with a past medical history of hypertension, paroxysmal A-fib, diastolic CHF, HLD, chronic anemia, type 2 diabetes who comes to the ED due to the ED due to shortness of breath.  Patient has been having shortness of breath for over a week, worse with laying flat, with her daily activities.  She was seen by her cardiologist Dr. Mendelson, who increased her Lasix to 40 mg/day, patient continue presenting shortness of breath for which he comes to the ED.  Patient denies chest pain, headache, dizziness, fainting, problems swallowing, nausea, vomiting, abdominal pain, dysuria, hematuria, numbness and tingling, weakness.      HPI      Patient History   PAST HISTORY     Reviewed from Nursing Triage:       Past Medical History:   Diagnosis Date   • A-fib (CMS/HCC)    • Accelerated hypertension    • CHF (congestive heart failure) (CMS/[facility])    • Macular degeneration    • Thyroid nodule    • Type 2 diabetes mellitus (CMS/[facility])        Past Surgical History:   Procedure Laterality Date   • APPENDECTOMY     • HYSTERECTOMY     • JOINT REPLACEMENT     • KNEE ARTHROPLASTY         History reviewed. No pertinent family history.    Social History     Tobacco Use   • Smoking status: Never   • Smokeless tobacco: Never   Substance Use Topics   • Alcohol use: Never   • Drug use: Never         Review of Systems   REVIEW OF SYSTEMS     Review of Systems   Constitutional: Positive for fever. Negative for chills.   HENT: Negative for trouble swallowing.    Eyes: Negative for visual disturbance.   Respiratory: Positive for shortness of breath. Negative for choking and chest tightness.    Cardiovascular: Negative for chest pain and palpitations.   Gastrointestinal: Negative for abdominal pain, nausea and vomiting.   Genitourinary: Negative for dysuria and hematuria.   Neurological: Negative for dizziness, syncope, weakness and light-headedness.         VITALS      ED Vitals    Date/Time Temp Pulse Resp BP SpO2 [facility]   03/15/24 2151 -- 76 20 136/58 94 % CK   03/15/24 1958 -- 76 20 166/84 93 % CK   03/15/24 1851 -- 68 26 126/62 92 % MB   03/15/24 1731 37.1 °C (98.8 °F) 67 16 129/61 95 % BM        Pulse Ox %: 95 % (03/15/24 1731)  Pulse Ox Interpretation: Normal (03/15/24 1731)           Physical Exam   PHYSICAL EXAM     Physical Exam  Constitutional:       General: She is not in acute distress.     Appearance: Normal appearance. She is not ill-appearing or toxic-appearing.   HENT:      Head: Normocephalic and atraumatic.   Eyes:      Pupils: Pupils are equal, round, and reactive to light.   Cardiovascular:      Rate and Rhythm: Normal rate. Rhythm irregular.      Pulses: Normal pulses.      Heart sounds: Murmur heard.      No friction rub. No gallop.   Pulmonary:      Effort: Pulmonary effort is normal.      Breath sounds: Examination of the right-lower field reveals decreased breath sounds. Examination of the left-lower field reveals decreased breath sounds. Decreased breath sounds and rales present. No wheezing or rhonchi.   Chest:      Chest wall: No tenderness or crepitus.   Abdominal:      Palpations: Abdomen is soft.      Tenderness: There is no abdominal tenderness. There is no guarding.   Musculoskeletal:         General: No tenderness or deformity.      Cervical back: No rigidity or tenderness.      Right lower leg: Edema present.      Left lower leg: Edema present.   Neurological:      General: No focal deficit present.      Mental Status: She is alert and oriented to person, place, and time.      Motor: No weakness.           PROCEDURES     Procedures     DATA     Results     Procedure Component Value Units Date/Time    Urinalysis with Reflex Culture [[id number]]  (Abnormal) Collected: 03/15/24 1957    Specimen: Urine, Clean Catch Updated: 03/15/24 2032    Narrative:      The following orders were created for panel order Urinalysis with Reflex Culture.  Procedure                                Abnormality         Status                     ---------                               -----------         ------                     UA Reflex to Culture (Ma...[[id number]]  Abnormal            Final result               UA Microscopic[[id number]]               Abnormal            Final result                 Please view results for these tests on the individual orders.    UA Microscopic [[id number]]  (Abnormal) Collected: 03/15/24 1957    Specimen: Urine, Clean Catch Updated: 03/15/24 2032     RBC, Urine 0 TO 4 /HPF      WBC, Urine 0 TO 3 /HPF      Squamous Epithelial Rare /hpf      Hyaline Cast None Seen /lpf      Bacteria, Urine Rare /HPF      Mucus Rare /LPF     UA Reflex to Culture (Macroscopic) [[id number]]  (Abnormal) Collected: 03/15/24 1957    Specimen: Urine, Clean Catch Updated: 03/15/24 2022     Color, Urine Yellow     Clarity, Urine Clear     Specific Gravity, Urine 1.015     pH, Urine 6.5     Leukocyte Esterase Negative     Comment: Results can be falsely negative due to high specific gravity, some antibiotics, glucose >3 g/dl, or WBC other than neutrophils.        Nitrite, Urine Negative     Protein, Urine Trace     Comment: Trace False Positive Protein can be seen with alkaline or highly buffered urines or urine with high specific gravity. Suggest clinical correlation.        Glucose, Urine Negative mg/dL      Ketones, Urine Negative mg/dL      Urobilinogen, Urine 0.2 EU/dL      Bilirubin, Urine Negative mg/dL      Blood, Urine Negative     Comment: The sensitivity of the occult blood test is equivalent to approximately 4 intact RBC/HPF.       HS Troponin (with 2 hour reflex) [[id number]]  (Normal) Collected: 03/15/24 1754    Specimen: Blood, Venous Updated: 03/15/24 1856     High Sens Troponin I 12.0 pg/mL     B-type natriuretic peptide [[id number]]  (Abnormal) Collected: 03/15/24 1754    Specimen: Blood, Venous Updated: 03/15/24 1853      pg/mL     Comprehensive  metabolic panel [[id number]]  (Abnormal) Collected: 03/15/24 1754    Specimen: Blood, Venous Updated: 03/15/24 1847     Sodium 136 mEQ/L      Potassium 3.5 mEQ/L      Comment: Results obtained on plasma. Plasma Potassium values may be up to 0.4 mEQ/L less than serum values. The differences may be greater for patients with high platelet or white cell counts.        Chloride 98 mEQ/L      CO2 29 mEQ/L      BUN 15 mg/dL      Creatinine 0.7 mg/dL      Glucose 106 mg/dL      Calcium 9.2 mg/dL      AST (SGOT) 18 IU/L      ALT (SGPT) 14 IU/L      Alkaline Phosphatase 43 IU/L      Total Protein 6.7 g/dL      Comment: Test performed on plasma which typically contains approximately 0.4 g/dL more protein than serum.        Albumin 4.0 g/dL      Bilirubin, Total 0.3 mg/dL      eGFR >60.0 mL/min/1.73m*2      Comment: Calculation based on the Chronic Kidney Disease Epidemiology Collaboration (CKD-EPI) equation refit without adjustment for race.        Anion Gap 9 mEQ/L     CBC and differential [[id number]]  (Abnormal) Collected: 03/15/24 1754    Specimen: Blood, Venous Updated: 03/15/24 1832     WBC 7.06 K/uL      RBC 3.51 M/uL      Hemoglobin 10.4 g/dL      Hematocrit 32.4 %      MCV 92.3 fL      MCH 29.6 pg      MCHC 32.1 g/dL      RDW 13.6 %      Platelets 316 K/uL      MPV 9.1 fL      Differential Type Auto     nRBC 0.0 %      Immature Granulocytes 0.4 %      Neutrophils 50.7 %      Lymphocytes 34.0 %      Monocytes 11.8 %      Eosinophils 2.4 %      Basophils 0.7 %      Immature Granulocytes, Absolute 0.03 K/uL      Neutrophils, Absolute 3.58 K/uL      Lymphocytes, Absolute 2.40 K/uL      Monocytes, Absolute 0.83 K/uL      Eosinophils, Absolute 0.17 K/uL      Basophils, Absolute 0.05 K/uL           Imaging Results    None         ECG 12 lead   Independent Interpretation by ED Provider   ECG  18:00 - nsr 75 bpm, nl axis, good rwp, no st/t wave changes, qt/qtc 412/460 ms, artifact from stimulatory present.             Scoring  tools                                  ED Course & MDM   MDM / ED COURSE / CLINICAL IMPRESSION / DISPO     MDM    ED Course as of 03/19/24 1104   Fri Mar 15, 2024   1800 ECG  18:00 - nsr 75 bpm, nl axis, good rwp, no st/t wave changes, qt/qtc 412/460 ms, artifact from stimulatory present.  [NS]   1843 Hemoglobin(!): 10.4  Last 11.2 a month ago [CK]   1844 Discussed with BAILEY. Agree with plan.   Follows up with Dr. Mendelson.  [NS]   1858 Patient had chest x-ray done in urgent care today which showed:    IMPRESSION:  No evidence of acute disease in the chest as discussed below.  Nonspecific gaseous distention of bowel in the upper abdomen.  Additional findings as discussed below.  COMMENT:     Comparison: Chest x-ray from 11/24/2023.     PA and lateral views the chest.  No pneumothorax or pleural effusion. There may be some very mild left basilar  volume loss or scarring which is similar to the prior study. No acute airspace  consolidation or pulmonary edema. No significant vascular congestion. Study is  somewhat limited due to leftward rotation of the patient the frontal view and  obliquity on the lateral view. Cardiac silhouette appears slightly enlarged  similar to the prior study with atherosclerotic aorta.  Gaseous distention of bowel in the upper abdomen incompletely assessed and  nonspecific. Spinal stimulator leads are noted.  There is a slight scoliosis suspected. Demineralization limits assessment of the  osseous structures. Diffuse degenerative changes. Mild loss of vertebral body  height in the thoracic spine appears chronic without significant change from  prior study.     [CK]   1906 High Sens Troponin I: 12.0  Will wait for second troponin [CK]   1906 Comprehensive metabolic panel(!)  Within acceptable values [CK]   1906 BNP(!): 280 [CK]   2036 Urinalysis with Reflex Culture(!)  No UTI present [CK]   2056 Troponin and delta negative [CK]   2126 Pt seen and evaluated with daughter at bedside. HPI  reviewed. States passing of her cat Monday; has felt fatigued since then and associated reported SOB. Spoke with Dr. Mendelson service and recommended to increase lasix to 20 mg BID (from qD) Tuesday. Pt independent living and called out of PT due to fatigue. Question if there is relation to grief secondary to passing of pet. Results reviewed; no drastic changes; mildly elevated BNP but exam grossly normal without rales or edema. Pt desiring discharge and states they were told if labs normal can increase lasix to 40 mg BID. Pt offered call to cardiology to inquire if would accept to service for monitoring overnight and possible echo in AM; pt declines stating comfortable with dc home with outpatient f/u as scheduled. Strict return if worse.  [NS]      ED Course User Index  [CK] Lien Anthony PA C  [NS] Albino Staley, DO     Clinical Impression      Shortness of breath   Congestive heart failure, unspecified HF chronicity, unspecified heart failure type (CMS/[facility])     _________________     ED Disposition   Discharge                   Lien Anthony PA C  03/15/24 2147       Lien Anthony PA C  03/19/24 1100

## 2024-03-16 LAB
ATRIAL RATE: 75
P AXIS: 77
PR INTERVAL: 126
QRS DURATION: 76
QT INTERVAL: 412
QTC CALCULATION(BAZETT): 460
R AXIS: 0
T WAVE AXIS: 44
VENTRICULAR RATE: 75

## 2024-03-16 NOTE — ED ATTESTATION NOTE
I have personally seen and examined Rissa Erickson.  I was involved in the care, management and medical decision making for this patient.  I reviewed and agree with physician assistant (PA-C) assessment and plan of care; we discussed the case and the treatment plan. Any exceptions are documented below.    Exam:  Patient Vitals for the past 72 hrs:   BP Temp Temp src Pulse Resp SpO2 Weight   03/15/24 1958 (!) 166/84 -- -- 76 20 93 % --   03/15/24 1851 126/62 -- -- 68 (!) 26 92 % --   03/15/24 1731 129/61 37.1 °C (98.8 °F) Tympanic 67 16 95 % 59.4 kg (131 lb)     VS reviewed, NAD, nontoxic, head at/nc, normal speech, no resp distress, normal skin tone, coordinated movement. Lungs ctab. Cardiac rrr with 3/6 HSM, abd soft nt/nd. Extremity exam unremarkable without edema, flat affect, depressed mood.      Albino Staley, DO  03/15/24 6113

## 2024-03-16 NOTE — DISCHARGE INSTRUCTIONS
Please return to the ED for any increased chest pain, trouble breathing, nausea, sweating, vomiting, cough, fevers, weakness or numbness, any worsening of symptoms or any other problems or concerns.     You are recommended to follow your primary care provider and with your cardiologist for reevaluation and continued care.  Is important you call make appointment to follow up with your healthcare provider for re-evaluation.     Thank you for letting us to be part of your care

## 2024-03-21 ENCOUNTER — TRANSCRIBE ORDERS (OUTPATIENT)
Dept: LAB | Age: 88
End: 2024-03-21

## 2024-03-21 ENCOUNTER — OFFICE VISIT (OUTPATIENT)
Dept: PRIMARY CARE | Facility: CLINIC | Age: 88
End: 2024-03-21
Payer: MEDICARE

## 2024-03-21 ENCOUNTER — APPOINTMENT (OUTPATIENT)
Dept: LAB | Age: 88
End: 2024-03-21
Attending: INTERNAL MEDICINE
Payer: MEDICARE

## 2024-03-21 VITALS
HEART RATE: 73 BPM | TEMPERATURE: 98.1 F | OXYGEN SATURATION: 96 % | DIASTOLIC BLOOD PRESSURE: 78 MMHG | SYSTOLIC BLOOD PRESSURE: 122 MMHG | RESPIRATION RATE: 16 BRPM

## 2024-03-21 DIAGNOSIS — I50.32 CHRONIC DIASTOLIC (CONGESTIVE) HEART FAILURE: Primary | ICD-10-CM

## 2024-03-21 DIAGNOSIS — D64.9 LOW HEMOGLOBIN: Primary | ICD-10-CM

## 2024-03-21 DIAGNOSIS — I50.32 CHRONIC DIASTOLIC CHF (CONGESTIVE HEART FAILURE) (CMS/HCC): ICD-10-CM

## 2024-03-21 DIAGNOSIS — D64.9 LOW HEMOGLOBIN: ICD-10-CM

## 2024-03-21 DIAGNOSIS — R53.83 OTHER FATIGUE: ICD-10-CM

## 2024-03-21 DIAGNOSIS — I50.32 CHRONIC DIASTOLIC (CONGESTIVE) HEART FAILURE: ICD-10-CM

## 2024-03-21 DIAGNOSIS — E11.9 TYPE 2 DIABETES MELLITUS WITHOUT COMPLICATION, WITHOUT LONG-TERM CURRENT USE OF INSULIN (CMS/HCC): ICD-10-CM

## 2024-03-21 PROBLEM — H90.3 SENSORINEURAL HEARING LOSS, BILATERAL: Status: ACTIVE | Noted: 2023-10-11

## 2024-03-21 PROBLEM — H61.20 IMPACTED CERUMEN: Status: ACTIVE | Noted: 2023-10-11

## 2024-03-21 LAB
ANION GAP SERPL CALC-SCNC: 8 MEQ/L (ref 3–15)
BUN SERPL-MCNC: 16 MG/DL (ref 7–25)
CALCIUM SERPL-MCNC: 9.3 MG/DL (ref 8.6–10.3)
CHLORIDE SERPL-SCNC: 91 MEQ/L (ref 98–107)
CO2 SERPL-SCNC: 32 MEQ/L (ref 21–31)
CREAT SERPL-MCNC: 0.8 MG/DL (ref 0.6–1.2)
EGFRCR SERPLBLD CKD-EPI 2021: >60 ML/MIN/1.73M*2
GLUCOSE SERPL-MCNC: 133 MG/DL (ref 70–99)
IRON SATN MFR SERPL: 8 % (ref 15–45)
IRON SERPL-MCNC: 33 UG/DL (ref 35–150)
POTASSIUM SERPL-SCNC: 4 MEQ/L (ref 3.5–5.1)
SODIUM SERPL-SCNC: 131 MEQ/L (ref 136–145)
TIBC SERPL-MCNC: 407 UG/DL (ref 270–460)
UIBC SERPL-MCNC: 374 UG/DL (ref 180–360)

## 2024-03-21 PROCEDURE — 36415 COLL VENOUS BLD VENIPUNCTURE: CPT

## 2024-03-21 PROCEDURE — 83540 ASSAY OF IRON: CPT

## 2024-03-21 PROCEDURE — 80048 BASIC METABOLIC PNL TOTAL CA: CPT

## 2024-03-21 PROCEDURE — 99214 OFFICE O/P EST MOD 30 MIN: CPT | Performed by: FAMILY MEDICINE

## 2024-03-21 ASSESSMENT — PATIENT HEALTH QUESTIONNAIRE - PHQ9: SUM OF ALL RESPONSES TO PHQ9 QUESTIONS 1 & 2: 1

## 2024-03-21 NOTE — PROGRESS NOTES
Patient ID: Rissa Erickson is a 88 y.o. female.    Subjective     HPI patient had an emergency room visit on the 15th, patient had previously called cardiologist office with a complaint of shortness of breath and unable to sleep flat for 3 days in a row she recently had an echo with good ejection fraction and trace mitral regurg according to the daughter she has a history of having diastolic dysfunction.  She has been on a regular dose of 20 mg of Lasix, this was doubled to 40 mg but patient persisted on having shortness of breath and dyspnea.  After the emergency room she was told to take furosemide 40 mg twice a day for 4 days and then go back to 20 and she has been on 20 mg ever since 2 days ago.  She has a trace amount of fluid in the left leg, her breathing is better  Chief Complaint   Patient presents with   • Congestive Heart Failure     Few days of shortness of breath   Increased lasix but not enpough  Chest X-ray normal  Normal labs    • Follow-up     Iron low           Patient Active Problem List   Diagnosis   • Amiodarone-induced thyroiditis   • Chronic diastolic CHF (congestive heart failure) (CMS/HCC)   • Generalized weakness   • GERD (gastroesophageal reflux disease)   • Hyperthyroidism   • Mixed hyperlipidemia   • Multiple thyroid nodules   • Non-seasonal allergic rhinitis due to pollen   • Normochromic normocytic anemia   • Osteoporosis   • Paroxysmal atrial fibrillation (CMS/HCC)   • Postherpetic neuralgia   • Primary hypertension   • Type 2 diabetes mellitus without complication, without long-term current use of insulin (CMS/HCC)   • Impacted cerumen   • Sensorineural hearing loss, bilateral       Past Medical History:   Diagnosis Date   • A-fib (CMS/HCC)    • Accelerated hypertension    • CHF (congestive heart failure) (CMS/HCC)    • Macular degeneration    • Thyroid nodule    • Type 2 diabetes mellitus (CMS/HCC)        Past Surgical History:   Procedure Laterality Date   • APPENDECTOMY     •  HYSTERECTOMY     • JOINT REPLACEMENT     • KNEE ARTHROPLASTY         History reviewed. No pertinent family history.    Social History     Socioeconomic History   • Marital status:      Spouse name: None   • Number of children: None   • Years of education: None   • Highest education level: None   Tobacco Use   • Smoking status: Never   • Smokeless tobacco: Never   Substance and Sexual Activity   • Alcohol use: Never   • Drug use: Never   • Sexual activity: Defer     Social Determinants of Health     Food Insecurity: No Food Insecurity (3/15/2024)    Hunger Vital Sign    • Worried About Running Out of Food in the Last Year: Never true    • Ran Out of Food in the Last Year: Never true       Current Outpatient Medications   Medication Sig Dispense Refill   • acetaminophen (TYLENOL) 325 mg tablet Take 650 mg by mouth every 6 hours as needed.     • apixaban (ELIQUIS) 5 mg tablet Take 1 tablet (5 mg total) by mouth 2 (two) times a day. 180 tablet 3   • atorvastatin (LIPITOR) 20 mg tablet Take 1 tablet (20 mg total) by mouth nightly. 90 tablet 3   • denosumab (PROLIA) 60 mg/mL syringe Inject 60 mg under the skin every 6 (six) months.     • FREESTYLE LITE STRIPS strip 1 strip 2 (two) times a day. 200 strip 1   • furosemide (LASIX) 20 mg tablet Take 1 tablet (20 mg total) by mouth daily. (Patient taking differently: Take 20 mg by mouth 2 (two) times a day.) 90 tablet 1   • gabapentin (NEURONTIN) 300 mg capsule Take 1 capsule (300 mg total) by mouth 2 (two) times a day. 180 capsule 1   • lancets misc 1 Lancet as needed (Diabetes). To check blood glucose 100 each 3   • metFORMIN (GLUCOPHAGE) 500 mg tablet Take 2 tablets (1,000 mg total) by mouth 2 (two) times a day with meals. 360 tablet 1   • pantoprazole (PROTONIX) 20 mg EC tablet Take 1 tablet (20 mg total) by mouth daily. 90 tablet 1   • propranoloL (INDERAL) 20 mg tablet Take 1 tablet (20 mg total) by mouth 2 (two) times a day. 180 tablet 0   • traMADoL (ULTRAM) 50  mg tablet Take 1 tablet (50 mg total) by mouth 2 (two) times a day. (Patient taking differently: Take 50 mg by mouth 3 (three) times a day.) 60 tablet 0   • lidocaine (LIDODERM) 5 % patch Place 1 patch on the skin daily as needed for pain. (Patient not taking: Reported on 2/22/2024) 30 patch 3     No current facility-administered medications for this visit.       Alllergies   Amiodarone      The following have been reviewed and updated as appropriate in this visit:        Review of Systems   As above improvement with dyspnea still some with exertion no abdominal pain no chest pain able to sleep flat, but tired  Objective     Vitals:    03/21/24 1417   BP: 122/78   BP Location: Right upper arm   Patient Position: Sitting   Pulse: 73   Resp: 16   Temp: 36.7 °C (98.1 °F)   TempSrc: Temporal   SpO2: 96%     There is no height or weight on file to calculate BMI.    Physical Exam  132  Dry wt this  Am patient had gone as high as 136 but has decreased down with the adjustment of Lasix.,neck without adenopathy no JVD heart regular rate and rhythm lungs are clear to auscultation fair air movement, left leg edema    Assessment/Plan   Problem List Items Addressed This Visit        Circulatory    Chronic diastolic CHF (congestive heart failure) (CMS/HCC)       Endocrine/Metabolic    Type 2 diabetes mellitus without complication, without long-term current use of insulin (CMS/[facility])   Other Visit Diagnoses     Low hemoglobin    -  Primary    Relevant Orders    Iron and TIBC    Other fatigue            Will check iron levels, agree with continuing 20 mg of Lasix, monitor weight at the increase in weight and dyspnea or pillow orthopnea patient should double up the Lasix again.  Formula should be established by cardiologist for how much weight gain and how much adjustment in diuretics needs to be given..  Patient's BNP was similar to prior values she had no troponins.  Cardiology had ordered basic metabolic panel but those results are  not back, patient thyroid function was stable back in February.  And hemoglobin A1c was 6.1 so diabetes appears to be stable on metformin which is managed by endocrinology.  Will try to have iron added to the blood work that was done by cardiologist.  If this cannot be done daughter can have this done at another time

## 2024-03-22 ENCOUNTER — NURSE TRIAGE (OUTPATIENT)
Dept: PRIMARY CARE | Facility: CLINIC | Age: 88
End: 2024-03-22
Payer: MEDICARE

## 2024-03-22 ENCOUNTER — TELEMEDICINE (OUTPATIENT)
Dept: PRIMARY CARE | Facility: CLINIC | Age: 88
End: 2024-03-22
Payer: MEDICARE

## 2024-03-22 DIAGNOSIS — U07.1 COVID: Primary | ICD-10-CM

## 2024-03-22 PROCEDURE — 99214 OFFICE O/P EST MOD 30 MIN: CPT | Mod: 95 | Performed by: NURSE PRACTITIONER

## 2024-03-22 ASSESSMENT — ENCOUNTER SYMPTOMS
VOICE CHANGE: 0
FATIGUE: 0
DIARRHEA: 0
STRIDOR: 0
SINUS PRESSURE: 0
CHILLS: 0
SHORTNESS OF BREATH: 0
WHEEZING: 0
VOMITING: 0
COUGH: 1
MYALGIAS: 0
SORE THROAT: 1
SINUS PAIN: 0
RHINORRHEA: 0
CHEST TIGHTNESS: 0
FEVER: 0
NAUSEA: 0

## 2024-03-22 NOTE — TELEPHONE ENCOUNTER
Regarding: REDFLAGCOMPLAINT - Shortness of breath, cough, recently in ER with CHF, sore throat  ----- Message from Aydee Amaral sent at 3/22/2024  8:35 AM EDT -----  REDFLAGCOMPLAINT - Pt was seen yesterday in the office for hospital. Pt woke up this am with severe throat and a cough and voice is hoarse. No fever. Pt is short of breath with exertions. Pt was in ER one week ago for CHF, she was on increased dose of lasix for four days pt was on 80 mg of lasix. Wednesday went back to normal dose 20 mg. Shortness of breath started this morning, pt is okay while sitting, pulse ox was running in the 90's. Shortness of breath with exertion. Call transferred to nurse triage

## 2024-03-22 NOTE — TELEPHONE ENCOUNTER
"Patient transferred to the Primary Care Telephonic Nurse Triage Line with complaints of Cough    HPI:  Patient's Daughter Rochelle (On PHI) reports patient has had a Dry Cough and Sore Throat since last night  -Denies SOB at rest- mild SOB on exertion  -Denies Fevers  -states patient was at 4 doctors appts yesterday   -seen by Dr Oleary yesterday for HFU for CHF  -states patient was on Lasix 80 mg x 4 days and has been back on normal 20mg dose for last 2-3 days and has been fine- able to lay flat while sleeping- was not able to do prior  -Pulse Ox has been in the 90's  -states patient did not have cough/ sore throat while in office yesterday   -wants her seen today for fear that she may end up in the hospital again if not seen  -Attempted to make office appointment- none available  -Offered AOD appt or can be seen in UC- daughter prefers AOD appt as she does not want patient to have to sit in UC and wait  -Daughter will do Covid test prior to appointment      Disposition:   Scheduled for AOD at 10am today      Reason for Disposition  • MILD difficulty breathing (e.g., minimal/no SOB at rest, SOB with walking, pulse <100) and still present when not coughing    Answer Assessment - Initial Assessment Questions  1. ONSET: \"When did the cough begin?\"       Last Night  2. SEVERITY: \"How bad is the cough today?\"       Mild to Moderate  3. SPUTUM: \"Describe the color of your sputum\" (none, dry cough; clear, white, yellow, green)      Dry   4. HEMOPTYSIS: \"Are you coughing up any blood?\" If so ask: \"How much?\" (flecks, streaks, tablespoons, etc.)      No  5. DIFFICULTY BREATHING: \"Are you having difficulty breathing?\" If Yes, ask: \"How bad is it?\" (e.g., mild, moderate, severe)     - MILD: No SOB at rest, mild SOB with walking, speaks normally in sentences, can lie down, no retractions, pulse < 100.     - MODERATE: SOB at rest, SOB with minimal exertion and prefers to sit, cannot lie down flat, speaks in phrases, mild " "retractions, audible wheezing, pulse 100-120.     - SEVERE: Very SOB at rest, speaks in single words, struggling to breathe, sitting hunched forward, retractions, pulse > 120       Denies SOB at rest- some SOB on Exertion  6. FEVER: \"Do you have a fever?\" If Yes, ask: \"What is your temperature, how was it measured, and when did it start?\"      Denies  7. CARDIAC HISTORY: \"Do you have any history of heart disease?\" (e.g., heart attack, congestive heart failure)      CHF and Afib      8. LUNG HISTORY: \"Do you have any history of lung disease?\"  (e.g., pulmonary embolus, asthma, emphysema)      Denies  9. PE RISK FACTORS: \"Do you have a history of blood clots?\" (or: recent major surgery, recent prolonged travel, bedridden)      Denies  10. OTHER SYMPTOMS: \"Do you have any other symptoms?\" (e.g., runny nose, wheezing, chest pain)        Sore Throat  11. PREGNANCY: \"Is there any chance you are pregnant?\" \"When was your last menstrual period?\"          12. TRAVEL: \"Have you traveled out of the country in the last month?\" (e.g., travel history, exposures)       No    Protocols used: COUGH-ADULT-[place]    "

## 2024-03-22 NOTE — ASSESSMENT & PLAN NOTE
Patient presents with positive Covid test. Advised to treat symptoms with Flonase 1 spray each nostril twice daily and alternating Ibuprofen 600 mg and Tylenol 1000 mg every 6 hours as needed for fever and/or pain. Advised if cough worsens or if patient develops chest tightness, call office or send MyChart message and will order chest xray. Advised if develop any worsening symptoms such as chest pain or shortness of breath, go immediately to the ER. Pt is agreeable to plan. No further questions at this time..    Per CDC guidelines, if you tested positive for COVID (or think you had COVID) AND had symptoms:  You can be with others after:  At least 24 hours with no fever without fever-reducing medication and   Other symptoms of COVID-19 are improving (loss of taste and smell may persist for weeks or months after recovery and need not delay the end of isolation)    Reviewed COVID diagnosis with patient. Advised patient if symptoms do not get better or become worse to follow up.Pt agreed and verbalized understanding with plan of care. Will follow up if needed. Sent Molnupiravir due to Paxlovid and medication interactions. Strict ED precautions.     -Take Molnupiravir as discussed for symptoms.   -humidifier  -honey cough drops for cough   -Mucinex as directed for expectorant but note cough can become worse due to expectorant.   -Sleep 30-45 degree angle for post nasal drip and cough  -Robitussin at night for cough not to interrupt sleeping

## 2024-03-22 NOTE — PROGRESS NOTES
Verification of Patient Location:  The patient affirms they are currently located in the following state: [place]    Request for Consent:    Audio and Video Encounter   Maria Elena, my name is VIVI Gonzales.  Before we proceed, can you please verify your identification by telling me your full name and date of birth?  Can you tell me who is in the room with you?    You and I are about to have a telemedicine check-in or visit because you have requested it.  This is a live video-conference.  I am a real person, speaking to you in real time.  There is no one else with me on the video-conference. I am not recording this conversation and I am asking you not to record it.  This telemedicine visit will be billed to your health insurance or you, if you are self-insured.  You understand you will be responsible for any copayments or coinsurances that apply to your telemedicine visit.  Communication platform used for this encounter:  [email or web address] Video Visit (Epic Video Client)       Before starting our telemedicine visit, I am required to get your consent for this virtual check-in or visit by telemedicine. Do you consent?      Patient Response to Request for Consent:  Yes      Visit Documentation:  Subjective     Patient ID: Rissa Erickson is a 88 y.o. female.  6/7/1935      Rissa joins video virtual visit. New patient to me. States tested positive for Covid just now. States symptoms started yesterday with a dry throat. Woke up today with a cough and sore throat today. Denies fever, body aches or chills. Pulse is 63, Pulse ox is 96 and blood pressure 118/63. Throat pink no exudate. No enlarged lymph nodes. No edema in throat or nose. Denies shortness or breath or wheezing. Tolerating new lasix dose. Was in ED one week ago with CHF exacerbation. Denies new lower extremity edema. Denies weakness or dizziness. Denies any other symptoms. Up to date on vaccines. Daughter states she is getting much better from CHF exacerbation and  PCP visit yesterday was cleared.       The following have been reviewed and updated as appropriate in this visit:   Allergies  Meds  Problems       Review of Systems   Constitutional: Negative for chills, fatigue and fever.   HENT: Positive for sore throat. Negative for congestion, ear pain, postnasal drip, rhinorrhea, sinus pressure, sinus pain and voice change.    Respiratory: Positive for cough. Negative for chest tightness, shortness of breath, wheezing and stridor.    Cardiovascular: Negative for leg swelling.   Gastrointestinal: Negative for diarrhea, nausea and vomiting.   Musculoskeletal: Negative for myalgias.   Skin: Negative for pallor.         Assessment/Plan      Patient well appearing on video. No acute distress. No shortness of breath or wheezing on video. Daughter present on video and states patient doing well from last week.will start Molnupiravir and continue over the counter medications and suppurative care for symptoms. Strict ED precautions. Verbalized understanding. Will follow up if needed.   Diagnoses and all orders for this visit:    COVID (Primary)  Assessment & Plan:  Patient presents with positive Covid test. Advised to treat symptoms with Flonase 1 spray each nostril twice daily and alternating Ibuprofen 600 mg and Tylenol 1000 mg every 6 hours as needed for fever and/or pain. Advised if cough worsens or if patient develops chest tightness, call office or send [employer] message and will order chest xray. Advised if develop any worsening symptoms such as chest pain or shortness of breath, go immediately to the ER. Pt is agreeable to plan. No further questions at this time..    Per CDC guidelines, if you tested positive for COVID (or think you had COVID) AND had symptoms:  You can be with others after:  At least 24 hours with no fever without fever-reducing medication and   Other symptoms of COVID-19 are improving (loss of taste and smell may persist for weeks or months after recovery and  need not delay the end of isolation)    Reviewed COVID diagnosis with patient. Advised patient if symptoms do not get better or become worse to follow up.Pt agreed and verbalized understanding with plan of care. Will follow up if needed. Sent Molnupiravir due to Paxlovid and medication interactions. Strict ED precautions.     -Take Molnupiravir as discussed for symptoms.   -humidifier  -honey cough drops for cough   -Mucinex as directed for expectorant but note cough can become worse due to expectorant.   -Sleep 30-45 degree angle for post nasal drip and cough  -Robitussin at night for cough not to interrupt sleeping        Other orders  -     molnupiravir 200 mg capsule capsule; Take 4 capsules (800 mg total) by mouth every 12 (twelve) hours for 5 days.      Time Spent:  I spent 31 minutes on this date of service performing the following activities: obtaining history, entering orders, documenting, preparing for visit, obtaining / reviewing records and providing counseling and education.

## 2024-03-24 ENCOUNTER — HOSPITAL ENCOUNTER (OUTPATIENT)
Facility: HOSPITAL | Age: 88
Setting detail: OBSERVATION
Discharge: HOME HEALTH | End: 2024-03-26
Attending: STUDENT IN AN ORGANIZED HEALTH CARE EDUCATION/TRAINING PROGRAM | Admitting: STUDENT IN AN ORGANIZED HEALTH CARE EDUCATION/TRAINING PROGRAM
Payer: MEDICARE

## 2024-03-24 ENCOUNTER — APPOINTMENT (EMERGENCY)
Dept: RADIOLOGY | Facility: HOSPITAL | Age: 88
End: 2024-03-24
Attending: STUDENT IN AN ORGANIZED HEALTH CARE EDUCATION/TRAINING PROGRAM
Payer: MEDICARE

## 2024-03-24 DIAGNOSIS — U07.1 COVID-19: ICD-10-CM

## 2024-03-24 DIAGNOSIS — I20.0 UNSTABLE ANGINA (CMS/HCC): Primary | ICD-10-CM

## 2024-03-24 PROBLEM — R07.9 CHEST PAIN: Status: ACTIVE | Noted: 2024-03-24

## 2024-03-24 LAB
ALBUMIN SERPL-MCNC: 3.8 G/DL (ref 3.5–5.7)
ALP SERPL-CCNC: 38 IU/L (ref 34–125)
ALT SERPL-CCNC: 16 IU/L (ref 7–52)
ANION GAP SERPL CALC-SCNC: 6 MEQ/L (ref 3–15)
AST SERPL-CCNC: 24 IU/L (ref 13–39)
BASOPHILS # BLD: 0.04 K/UL (ref 0.01–0.1)
BASOPHILS NFR BLD: 0.5 %
BILIRUB SERPL-MCNC: 0.4 MG/DL (ref 0.3–1.2)
BNP SERPL-MCNC: 245 PG/ML
BUN SERPL-MCNC: 20 MG/DL (ref 7–25)
CALCIUM SERPL-MCNC: 8.9 MG/DL (ref 8.6–10.3)
CHLORIDE SERPL-SCNC: 97 MEQ/L (ref 98–107)
CHOLEST SERPL-MCNC: 92 MG/DL
CO2 SERPL-SCNC: 32 MEQ/L (ref 21–31)
CREAT SERPL-MCNC: 0.7 MG/DL (ref 0.6–1.2)
DIFFERENTIAL METHOD BLD: ABNORMAL
EGFRCR SERPLBLD CKD-EPI 2021: >60 ML/MIN/1.73M*2
EOSINOPHIL # BLD: 0.22 K/UL (ref 0.04–0.36)
EOSINOPHIL NFR BLD: 2.9 %
ERYTHROCYTE [DISTWIDTH] IN BLOOD BY AUTOMATED COUNT: 13.8 % (ref 11.7–14.4)
EST. AVERAGE GLUCOSE BLD GHB EST-MCNC: 128 MG/DL
FLUAV RNA SPEC QL NAA+PROBE: NEGATIVE
FLUBV RNA SPEC QL NAA+PROBE: NEGATIVE
GLUCOSE BLD-MCNC: 138 MG/DL (ref 70–99)
GLUCOSE BLD-MCNC: 170 MG/DL (ref 70–99)
GLUCOSE SERPL-MCNC: 113 MG/DL (ref 70–99)
HBA1C MFR BLD: 6.1 %
HCT VFR BLD AUTO: 33.6 % (ref 35–45)
HDLC SERPL-MCNC: 43 MG/DL
HDLC SERPL: 2.1 {RATIO}
HGB BLD-MCNC: 10.6 G/DL (ref 11.8–15.7)
IMM GRANULOCYTES # BLD AUTO: 0.03 K/UL (ref 0–0.08)
IMM GRANULOCYTES NFR BLD AUTO: 0.4 %
LDLC SERPL CALC-MCNC: 32 MG/DL
LYMPHOCYTES # BLD: 1.84 K/UL (ref 1.2–3.5)
LYMPHOCYTES NFR BLD: 24.1 %
MAGNESIUM SERPL-MCNC: 1.8 MG/DL (ref 1.8–2.5)
MCH RBC QN AUTO: 29.5 PG (ref 28–33.2)
MCHC RBC AUTO-ENTMCNC: 31.5 G/DL (ref 32.2–35.5)
MCV RBC AUTO: 93.6 FL (ref 83–98)
MONOCYTES # BLD: 0.71 K/UL (ref 0.28–0.8)
MONOCYTES NFR BLD: 9.3 %
NEUTROPHILS # BLD: 4.79 K/UL (ref 1.7–7)
NEUTS SEG NFR BLD: 62.8 %
NONHDLC SERPL-MCNC: 49 MG/DL
NRBC BLD-RTO: 0 %
PDW BLD AUTO: 9.3 FL (ref 9.4–12.3)
PHOSPHATE SERPL-MCNC: 3.2 MG/DL (ref 2.4–4.7)
PLATELET # BLD AUTO: 243 K/UL (ref 150–369)
POCT TEST: ABNORMAL
POCT TEST: ABNORMAL
POTASSIUM SERPL-SCNC: 4 MEQ/L (ref 3.5–5.1)
PROT SERPL-MCNC: 6.4 G/DL (ref 6–8.2)
RBC # BLD AUTO: 3.59 M/UL (ref 3.93–5.22)
RSV RNA SPEC QL NAA+PROBE: NEGATIVE
SARS-COV-2 RNA RESP QL NAA+PROBE: POSITIVE
SODIUM SERPL-SCNC: 135 MEQ/L (ref 136–145)
TRIGL SERPL-MCNC: 86 MG/DL
TROPONIN I SERPL HS-MCNC: 11 PG/ML
TROPONIN I SERPL HS-MCNC: 11.7 PG/ML
WBC # BLD AUTO: 7.63 K/UL (ref 3.8–10.5)

## 2024-03-24 PROCEDURE — 80053 COMPREHEN METABOLIC PANEL: CPT | Performed by: STUDENT IN AN ORGANIZED HEALTH CARE EDUCATION/TRAINING PROGRAM

## 2024-03-24 PROCEDURE — 96372 THER/PROPH/DIAG INJ SC/IM: CPT | Mod: 59 | Performed by: HOSPITALIST

## 2024-03-24 PROCEDURE — G0378 HOSPITAL OBSERVATION PER HR: HCPCS

## 2024-03-24 PROCEDURE — 85025 COMPLETE CBC W/AUTO DIFF WBC: CPT | Performed by: STUDENT IN AN ORGANIZED HEALTH CARE EDUCATION/TRAINING PROGRAM

## 2024-03-24 PROCEDURE — 85025 COMPLETE CBC W/AUTO DIFF WBC: CPT

## 2024-03-24 PROCEDURE — 99285 EMERGENCY DEPT VISIT HI MDM: CPT | Mod: 25

## 2024-03-24 PROCEDURE — 93005 ELECTROCARDIOGRAM TRACING: CPT

## 2024-03-24 PROCEDURE — 83880 ASSAY OF NATRIURETIC PEPTIDE: CPT

## 2024-03-24 PROCEDURE — 84484 ASSAY OF TROPONIN QUANT: CPT | Performed by: STUDENT IN AN ORGANIZED HEALTH CARE EDUCATION/TRAINING PROGRAM

## 2024-03-24 PROCEDURE — 93005 ELECTROCARDIOGRAM TRACING: CPT | Mod: 59 | Performed by: STUDENT IN AN ORGANIZED HEALTH CARE EDUCATION/TRAINING PROGRAM

## 2024-03-24 PROCEDURE — 96365 THER/PROPH/DIAG IV INF INIT: CPT | Performed by: HOSPITALIST

## 2024-03-24 PROCEDURE — 83036 HEMOGLOBIN GLYCOSYLATED A1C: CPT | Performed by: STUDENT IN AN ORGANIZED HEALTH CARE EDUCATION/TRAINING PROGRAM

## 2024-03-24 PROCEDURE — 84100 ASSAY OF PHOSPHORUS: CPT | Performed by: STUDENT IN AN ORGANIZED HEALTH CARE EDUCATION/TRAINING PROGRAM

## 2024-03-24 PROCEDURE — 96366 THER/PROPH/DIAG IV INF ADDON: CPT | Performed by: HOSPITALIST

## 2024-03-24 PROCEDURE — 84484 ASSAY OF TROPONIN QUANT: CPT | Mod: 91 | Performed by: STUDENT IN AN ORGANIZED HEALTH CARE EDUCATION/TRAINING PROGRAM

## 2024-03-24 PROCEDURE — 71045 X-RAY EXAM CHEST 1 VIEW: CPT

## 2024-03-24 PROCEDURE — 87637 SARSCOV2&INF A&B&RSV AMP PRB: CPT | Performed by: STUDENT IN AN ORGANIZED HEALTH CARE EDUCATION/TRAINING PROGRAM

## 2024-03-24 PROCEDURE — 63700000 HC SELF-ADMINISTRABLE DRUG: Performed by: STUDENT IN AN ORGANIZED HEALTH CARE EDUCATION/TRAINING PROGRAM

## 2024-03-24 PROCEDURE — 83735 ASSAY OF MAGNESIUM: CPT | Performed by: STUDENT IN AN ORGANIZED HEALTH CARE EDUCATION/TRAINING PROGRAM

## 2024-03-24 PROCEDURE — 80061 LIPID PANEL: CPT | Performed by: STUDENT IN AN ORGANIZED HEALTH CARE EDUCATION/TRAINING PROGRAM

## 2024-03-24 PROCEDURE — 36415 COLL VENOUS BLD VENIPUNCTURE: CPT

## 2024-03-24 PROCEDURE — 99223 1ST HOSP IP/OBS HIGH 75: CPT | Performed by: STUDENT IN AN ORGANIZED HEALTH CARE EDUCATION/TRAINING PROGRAM

## 2024-03-24 PROCEDURE — 93005 ELECTROCARDIOGRAM TRACING: CPT | Performed by: STUDENT IN AN ORGANIZED HEALTH CARE EDUCATION/TRAINING PROGRAM

## 2024-03-24 RX ORDER — GABAPENTIN 300 MG/1
300 CAPSULE ORAL 3 TIMES DAILY
Status: DISCONTINUED | OUTPATIENT
Start: 2024-03-24 | End: 2024-03-26 | Stop reason: HOSPADM

## 2024-03-24 RX ORDER — DEXTROSE 50 % IN WATER (D50W) INTRAVENOUS SYRINGE
25 AS NEEDED
Status: DISCONTINUED | OUTPATIENT
Start: 2024-03-24 | End: 2024-03-26 | Stop reason: HOSPADM

## 2024-03-24 RX ORDER — IBUPROFEN 200 MG
16-32 TABLET ORAL AS NEEDED
Status: DISCONTINUED | OUTPATIENT
Start: 2024-03-24 | End: 2024-03-26 | Stop reason: HOSPADM

## 2024-03-24 RX ORDER — ACETAMINOPHEN 325 MG/1
650 TABLET ORAL EVERY 4 HOURS PRN
Status: DISCONTINUED | OUTPATIENT
Start: 2024-03-24 | End: 2024-03-26 | Stop reason: HOSPADM

## 2024-03-24 RX ORDER — PANTOPRAZOLE SODIUM 20 MG/1
20 TABLET, DELAYED RELEASE ORAL DAILY
Status: DISCONTINUED | OUTPATIENT
Start: 2024-03-25 | End: 2024-03-26 | Stop reason: HOSPADM

## 2024-03-24 RX ORDER — INSULIN LISPRO 100 [IU]/ML
3-5 INJECTION, SOLUTION INTRAVENOUS; SUBCUTANEOUS
Status: DISCONTINUED | OUTPATIENT
Start: 2024-03-24 | End: 2024-03-26 | Stop reason: HOSPADM

## 2024-03-24 RX ORDER — FUROSEMIDE 20 MG/1
20 TABLET ORAL DAILY
Status: DISCONTINUED | OUTPATIENT
Start: 2024-03-25 | End: 2024-03-26 | Stop reason: HOSPADM

## 2024-03-24 RX ORDER — TRAMADOL HYDROCHLORIDE 50 MG/1
50 TABLET ORAL 3 TIMES DAILY PRN
Status: DISCONTINUED | OUTPATIENT
Start: 2024-03-24 | End: 2024-03-26 | Stop reason: HOSPADM

## 2024-03-24 RX ORDER — METFORMIN HYDROCHLORIDE 500 MG/1
1000 TABLET ORAL 2 TIMES DAILY WITH MEALS
Status: DISCONTINUED | OUTPATIENT
Start: 2024-03-24 | End: 2024-03-26 | Stop reason: HOSPADM

## 2024-03-24 RX ORDER — ATORVASTATIN CALCIUM 20 MG/1
20 TABLET, FILM COATED ORAL NIGHTLY
Status: DISCONTINUED | OUTPATIENT
Start: 2024-03-25 | End: 2024-03-26 | Stop reason: HOSPADM

## 2024-03-24 RX ORDER — PROPRANOLOL HYDROCHLORIDE 10 MG/1
20 TABLET ORAL 2 TIMES DAILY
Status: DISCONTINUED | OUTPATIENT
Start: 2024-03-24 | End: 2024-03-26 | Stop reason: HOSPADM

## 2024-03-24 RX ORDER — DEXTROSE 40 %
15-30 GEL (GRAM) ORAL AS NEEDED
Status: DISCONTINUED | OUTPATIENT
Start: 2024-03-24 | End: 2024-03-26 | Stop reason: HOSPADM

## 2024-03-24 RX ADMIN — PROPRANOLOL HYDROCHLORIDE 20 MG: 10 TABLET ORAL at 21:51

## 2024-03-24 RX ADMIN — GABAPENTIN 300 MG: 300 CAPSULE ORAL at 17:41

## 2024-03-24 RX ADMIN — GABAPENTIN 300 MG: 300 CAPSULE ORAL at 21:50

## 2024-03-24 RX ADMIN — APIXABAN 5 MG: 5 TABLET, FILM COATED ORAL at 21:50

## 2024-03-24 RX ADMIN — ACETAMINOPHEN 650 MG: 325 TABLET ORAL at 22:42

## 2024-03-24 ASSESSMENT — COGNITIVE AND FUNCTIONAL STATUS - GENERAL
WALKING IN HOSPITAL ROOM: 3 - A LITTLE
MOVING TO AND FROM BED TO CHAIR: 3 - A LITTLE
STANDING UP FROM CHAIR USING ARMS: 3 - A LITTLE
CLIMB 3 TO 5 STEPS WITH RAILING: 2 - A LOT

## 2024-03-24 ASSESSMENT — ENCOUNTER SYMPTOMS
SINUS PRESSURE: 0
SHORTNESS OF BREATH: 1
SORE THROAT: 0
DIAPHORESIS: 1
FEVER: 0
DIZZINESS: 0
NAUSEA: 0
BACK PAIN: 0
LIGHT-HEADEDNESS: 0
SINUS PAIN: 0
ABDOMINAL PAIN: 0
WEAKNESS: 0
VOMITING: 0
CHILLS: 0

## 2024-03-24 NOTE — ED ATTESTATION NOTE
I personally saw and evaluated the patient, participated in the management, and agree with the findings in the note above except as where stated. The physician assistant and I discussed the case, workup, and disposition.     88-year-old female with past medical history significant for A-fib on Eliquis, diabetes, CHF presenting for evaluation of chest pain and shortness of breath.  Patient has been experiencing chest pain and shortness of breath for a few weeks now that is worse on exertion.  Her Lasix was increased from 20 mg to 40 mg which mildly improved her symptoms.  Patient tested positive for COVID 2 days ago after experiencing cough, sore throat.  Patient had persistent symptoms prompting ED visit.  She did talk to her cardiologist office on her way over.  She does have postherpetic pain; however, that is different than the chest pain she is been experiencing.  She has been experiencing pain around her left chest.    Patient is awake alert resting comfortably  Heart regular rate irregular rhythm normal S1-S2 with a holosystolic murmur  Respirations even and unlabored, lungs clear to auscultation bilaterally  Abdomen soft nontender  No lower extremity edema or calf tenderness    88-year-old female presenting for evaluation of chest pain and shortness of breath.  Labs are reassuring.  BNP near baseline.  Initial troponin 11. Will discuss with cardiology.     Lexie Patel MD  03/24/24 [id number]

## 2024-03-24 NOTE — ASSESSMENT & PLAN NOTE
Afebrile, no leukocytosis  Home kit + for COVID on 3/22/24 for sx of sore throat  COVID confirmed here on 3/24/24  Currently on room air, without shortness of breath.   Continue home molnupiravir

## 2024-03-24 NOTE — ASSESSMENT & PLAN NOTE
Hx of amiodarone induced thyroiditis and hyperthyroidism  Recent TFTs in Feb 2024 were good, no changes to medications

## 2024-03-24 NOTE — H&P
Hospital Medicine Service -  History & Physical        CHIEF COMPLAINT   Chest pain     HISTORY OF PRESENT ILLNESS      Rissa Erickson is a 88 y.o. female with a past medical history of atrial fibrillation s/p Eliquis, type II DM, HFpEF, post herpetic neuralgia s/p pain stimulator who presented to [facility] ED with complaints on acute on chronic chest pain.    Patient is accompanied by daughter, Cristina Zepeda, who also added to her history.     Pt states she has had chest pain from L mid to lower ribs radiating to the L nipple area. She has had this pain for several months now but has noticed it to be more frequent and worsening in the last couple of days. She describes as intermittent sharp shooting pain that is at rest and also on exertion. She states that tylenol, gabapentin and tramadol does help relief pain. However, she came to the ED today because the frequency and intensity of the pain significant increased the last few days.     She denies any recent strenuous exercise or trauma to the area. For the chest pain, she has been getting cardiac work up with Dr. Mendelson outpatient. She had echocardiogram done the week prior and had planned stress test next week. She was also recently recommended to double her lasix 40mg BID from usual dosing of lasix 20mg daily because pt has been feeling more short of breath. With the short term increase in her lasix, patient states her shortness of breath improved and currently denies SOB.     She also visited Dr. Frazier a day prior who was the surgeon that placed the [employer] pain stimulator. He repeated chest x-ray and evaluated the patient, wasn't concerned that this was related to the stimulator as the battery life and the placement was fine. She states this is different from her usual post herpetic neuralgia, which is usually more pain associated with posterior L mid to lower rib area.     Of note, 2-3 days prior patient was noted to have sore throat and home COVID  testing was done which was positive. Pt follow up with PCP through telemedicine and was started on Molnupiravir. She denied any cough, measured fevers, n/v, diarrhea at home.     In the ED, she was afebrile, saturating well on room air. CXR unremarkable with negative troponin elevation. However, BNP of 245. ECG with NSR at HR 65, no ischemic changes. Cardiology was contacted from ED and she was recommended to be admitted for further evaluation.    Pt states she is FULL CODE. Denies any living will or advance directives but states her daughter, Cristina Zepeda is the medical POA.      PAST MEDICAL AND SURGICAL HISTORY      Past Medical History:   Diagnosis Date    A-fib (CMS/HCC)     Accelerated hypertension     CHF (congestive heart failure) (CMS/HCC)     Macular degeneration     Thyroid nodule     Type 2 diabetes mellitus (CMS/[facility])        Past Surgical History:   Procedure Laterality Date    APPENDECTOMY      HYSTERECTOMY      JOINT REPLACEMENT      KNEE ARTHROPLASTY         PCP: Sloan Maher MD    MEDICATIONS      Prior to Admission medications    Medication Sig Start Date End Date Taking? Authorizing Provider   acetaminophen (TYLENOL) 325 mg tablet Take 650 mg by mouth every 6 hours as needed. 12/25/22   Provider, Sugey Nunn MD   apixaban (ELIQUIS) 5 mg tablet Take 1 tablet (5 mg total) by mouth 2 (two) times a day. 1/16/24   Sloan Maher MD   atorvastatin (LIPITOR) 20 mg tablet Take 1 tablet (20 mg total) by mouth nightly. 1/16/24   Sloan Maher MD   denosumab (PROLIA) 60 mg/mL syringe Inject 60 mg under the skin every 6 (six) months.    Provider, Sugey Nunn MD   FREESTYLE LITE STRIPS strip 1 strip 2 (two) times a day. 2/22/24   Tonio Sauceda MD   furosemide (LASIX) 20 mg tablet Take 1 tablet (20 mg total) by mouth daily. 1/16/24   Sloan Maher MD   gabapentin (NEURONTIN) 300 mg capsule Take 1 capsule (300 mg total) by mouth 2 (two) times a day. 1/16/24   Sloan Maher MD    lancets misc 1 Lancet as needed (Diabetes). To check blood glucose 9/8/23   Sloan Maher MD   lidocaine (LIDODERM) 5 % patch Place 1 patch on the skin daily as needed for pain.  Patient not taking: Reported on 2/22/2024 9/8/23   Sloan Maher MD   metFORMIN (GLUCOPHAGE) 500 mg tablet Take 2 tablets (1,000 mg total) by mouth 2 (two) times a day with meals. 2/22/24   Tonio Sauceda MD   molnupiravir 200 mg capsule capsule Take 4 capsules (800 mg total) by mouth every 12 (twelve) hours for 5 days. 3/22/24 3/27/24  Mary Sewell CRNP   pantoprazole (PROTONIX) 20 mg EC tablet Take 1 tablet (20 mg total) by mouth daily. 9/8/23   Sloan Maher MD   propranoloL (INDERAL) 20 mg tablet Take 1 tablet (20 mg total) by mouth 2 (two) times a day. 1/16/24   Sloan Maher MD   traMADoL (ULTRAM) 50 mg tablet Take 1 tablet (50 mg total) by mouth 2 (two) times a day.  Patient taking differently: Take 50 mg by mouth 3 (three) times a day. 10/19/23   Sloan Maher MD       ALLERGIES      Amiodarone    FAMILY HISTORY      History reviewed. No pertinent family history.    SOCIAL HISTORY      Social History     Socioeconomic History    Marital status:      Spouse name: None    Number of children: None    Years of education: None    Highest education level: None   Tobacco Use    Smoking status: Never    Smokeless tobacco: Never   Substance and Sexual Activity    Alcohol use: Never    Drug use: Never    Sexual activity: Defer     Social Determinants of Health     Food Insecurity: No Food Insecurity (3/24/2024)    Hunger Vital Sign     Worried About Running Out of Food in the Last Year: Never true     Ran Out of Food in the Last Year: Never true       REVIEW OF SYSTEMS      All other systems reviewed and negative except as noted in HPI    PHYSICAL EXAMINATION      Temp:  [36.7 °C (98 °F)] 36.7 °C (98 °F)  Heart Rate:  [64-65] 65  Resp:  [16] 16  BP: (116)/(58) 116/58  Body mass index is 25.78  kg/m².    General: awake and alert, in NAD follows commands  Head: NC/AT no obvious external lesions noted  Eyes: normal conjunctiva, good movement of extraocular muscles  Neck: supple, good ROM of the neck  Throat: moist mucous membrane  Cardio: S1 S2 regular rhythm  Resp: CTA b/l, no wheezes or rales appreciated, no rash noted appreciated along the L anterior and poster ribs or along the L breast area   GI: soft, NT, ND, + bowel sounds  Extremities: no edema appreciated in LE b/l  Neuro: awake and alert, oriented to self, situation and place, moves all extremities spontaneously  Psych: calm and appropriate    LABS / IMAGING / EKG        Labs  Results from last 7 days   Lab Units 03/24/24  1314 03/21/24  1330   SODIUM mEQ/L 135* 131*   POTASSIUM mEQ/L 4.0 4.0   CHLORIDE mEQ/L 97* 91*   CO2 mEQ/L 32* 32*   BUN mg/dL 20 16   CREATININE mg/dL 0.7 0.8   GLUCOSE mg/dL 113* 133*   CALCIUM mg/dL 8.9 9.3     Results from last 7 days   Lab Units 03/24/24  1314   WBC K/uL 7.63   HEMOGLOBIN g/dL 10.6*   HEMATOCRIT % 33.6*   PLATELETS K/uL 243         Imaging  X-RAY CHEST 1 VIEW    Result Date: 3/24/2024  IMPRESSION: No acute disease in chest. Stable enlargement of the cardiac silhouette. COMMENT: Comparison: Chest x-ray 3/15/2024. Technique: A single frontal AP portable upright projection of the chest was obtained. FINDINGS: The lungs are clear without focal airspace consolidation, pleural effusion or pneumothorax. There is stable enlargement of the cardiac silhouette. The mediastinal contours are unchanged. The upper abdomen is unremarkable. Again noted are neurostimulator leads terminating over the midline of the upper and mid to lower chest. There is no acute osseous abnormality.    X-RAY CHEST 2 VIEWS    Result Date: 3/15/2024  IMPRESSION: No evidence of acute disease in the chest as discussed below. Nonspecific gaseous distention of bowel in the upper abdomen. Additional findings as discussed below. COMMENT: Comparison:  Chest x-ray from 11/24/2023. PA and lateral views the chest. No pneumothorax or pleural effusion. There may be some very mild left basilar volume loss or scarring which is similar to the prior study. No acute airspace consolidation or pulmonary edema. No significant vascular congestion. Study is somewhat limited due to leftward rotation of the patient the frontal view and obliquity on the lateral view. Cardiac silhouette appears slightly enlarged similar to the prior study with atherosclerotic aorta. Gaseous distention of bowel in the upper abdomen incompletely assessed and nonspecific. Spinal stimulator leads are noted. There is a slight scoliosis suspected. Demineralization limits assessment of the osseous structures. Diffuse degenerative changes. Mild loss of vertebral body height in the thoracic spine appears chronic without significant change from prior study.       ECG/Telemetry  NSR with HR 65, no abnormal MD, QRS or ST changes.    ASSESSMENT AND PLAN           * Chest pain  Assessment & Plan  Unclear etiology, acute on chronic chest pain not related to chronic post-herpetic neuralgia or stimulator placement  Nontender upon palpation, no rash appreciated  No elevated troponin, BNP mildly up 245 but does not appear to be in volume overload. CXR unremarkable, ECG with NSR without acute ischemic change.  Remain on telemetry. NPO after midnight incase plan for procedure in AM  Cardiology consulted      COVID  Assessment & Plan  Afebrile, no leukocytosis  Home kit + for COVID on 3/22/24 for sx of sore throat  COVID confirmed here on 3/24/24  Currently on room air, without shortness of breath.   Continue home molnupiravir    Type 2 diabetes mellitus without complication, without long-term current use of insulin (CMS/[facility])  Assessment & Plan  Follows with Dr Maher, last A1c 6.1 in 02/2024  Has been compliant with home metformin 1000mg BID - which we will hold during hospitalization  On ISS, POCT glucose  checks    Postherpetic neuralgia  Assessment & Plan  S/p [employer] stimulator for pain control (~15 years ago for insertion)  Was recently evaluated by Dr Frazier, without any issues, good battery life and placement  Likely not the source of the pain  Home pain regimen: gabapentin TID, tramadol TID to continue for now      Paroxysmal atrial fibrillation (CMS/HCC)  Assessment & Plan  Hx of amiodarone induced thyroiditis  On Eliquis, currently on propranolol   Currently rate controlled, ECG in NSR  Continue home eliquid and propranolol    Mixed hyperlipidemia  Assessment & Plan  Continue home statin    Hyperthyroidism  Assessment & Plan  Hx of amiodarone induced thyroiditis and hyperthyroidism  Recent TFTs in Feb 2024 were good, no changes to medications    Chronic diastolic CHF (congestive heart failure) (CMS/HCC)  Assessment & Plan  Unknown recent echo results however, prior 2021 TTE with EF 55-60% with diastolic dfx  Pt was recently on increased lasix dosing due to shortness of breath, which resolved with increased dose  Currently on usual home lasix 20mg PO dose. Does not appear to be in volume overload, so will continue home dose  Cardiology consulted         VTE Assessment: Pad[place]    VTE Prophylaxis: Current anticoagulants:  apixaban (ELIQUIS) tablet 5 mg, oral, BID      Code Status: Full Code        Estimated Discharge Date: 3/26/2024  Disposition Planning: pending Cardiology evaluation     Geovanny Chavis DO  3/24/2024

## 2024-03-24 NOTE — ASSESSMENT & PLAN NOTE
Unknown recent echo results however, prior 2021 TTE with EF 55-60% with diastolic dfx  Pt was recently on increased lasix dosing due to shortness of breath, which resolved with increased dose  Currently on usual home lasix 20mg PO dose. Does not appear to be in volume overload, so will continue home dose  Continue outpatient follow-up with cardiology

## 2024-03-24 NOTE — ASSESSMENT & PLAN NOTE
Acute flareup of an ongoing problem  Unclear etiology, acute on chronic chest pain not related to chronic post-herpetic neuralgia or stimulator placement  Nontender upon palpation, no rash appreciated  Appears to be chest wall pain.  No rash or erythema noted on exam> as needed Voltaren gel ordered  Per cardiology no further inpatient workup.  They will schedule stress test for outpatient.  Will need to follow-up with her pain management doctor outpatient

## 2024-03-24 NOTE — ASSESSMENT & PLAN NOTE
Hx of amiodarone induced thyroiditis  On Eliquis, currently on propranolol   Currently rate controlled, ECG in NSR  Continue home eliquid and propranolol

## 2024-03-24 NOTE — ED PROVIDER NOTES
Emergency Medicine Note  HPI   HISTORY OF PRESENT ILLNESS     Patient is an 88-year-old female with past medical history of A-fib currently on blood thinners), CHF, postherpetic neuralgia, thyroid nodule, type 2 diabetes presenting for chest pain.  Per patient daughter, patient had shingles about a year ago and has been dealing with postherpetic neuralgia, which occurs on the left flank/rib area.  She currently has a pain pump which she uses for this.  With this pain, she also endorses occasional left-sided anterior chest pain described as an icy sensation, exertional, nonpleuritic, along with diaphoresis and shortness of breath.  She recently had her pain pump examined as he thought it could be related to malfunctioning of the pump but daughter reports no issues with this.  Patient was seen in the ER approximately a week ago for CHF exacerbation and had a brief increase in her Lasix but is now at her regular dose again.  She is known to Dr. Mendelson from cardiology who is in the process of doing a cardiac workup on her.  Patient recently had an echo which showed mild mitral regurgitation along with slight decrease in her EF but no other acute findings.  Over the past couple days, patient has been having increased episodes of chest pain and diaphoresis.  Her cardiologist is concern for possible unstable angina and sent her to the ER to be admitted.  Patient denies any fever, chills, cough, congestion, sore throat, sinus pain/pressure, leg swelling, palpitations, abdominal pain, nausea, vomiting, back pain, dizziness, lightheadedness, weakness, syncope.            Patient History   PAST HISTORY     Reviewed from Nursing Triage:  Meds       Past Medical History:   Diagnosis Date   • A-fib (CMS/HCC)    • Accelerated hypertension    • CHF (congestive heart failure) (CMS/HCC)    • Macular degeneration    • Thyroid nodule    • Type 2 diabetes mellitus (CMS/HCC)        Past Surgical History:   Procedure Laterality Date    • APPENDECTOMY     • HYSTERECTOMY     • JOINT REPLACEMENT     • KNEE ARTHROPLASTY         History reviewed. No pertinent family history.    Social History     Tobacco Use   • Smoking status: Never   • Smokeless tobacco: Never   Substance Use Topics   • Alcohol use: Never   • Drug use: Never         Review of Systems   REVIEW OF SYSTEMS     Review of Systems   Constitutional: Positive for diaphoresis. Negative for chills and fever.   HENT: Negative for congestion, sinus pressure, sinus pain and sore throat.    Respiratory: Positive for shortness of breath.    Cardiovascular: Positive for chest pain.   Gastrointestinal: Negative for abdominal pain, nausea and vomiting.   Musculoskeletal: Negative for back pain.   Neurological: Negative for dizziness, syncope, weakness and light-headedness.         VITALS     ED Vitals    Date/Time Temp Pulse Resp BP SpO2 [facility]   03/24/24 1634 -- 68 20 -- 98 % LT   03/24/24 1418 -- 65 -- -- -- LT   03/24/24 1300 36.7 °C (98 °F) 64 16 116/58 97 % BM        Pulse Ox %: 97 % (03/24/24 1300)  Pulse Ox Interpretation: Normal (03/24/24 1300)           Physical Exam   PHYSICAL EXAM     Physical Exam  Constitutional:       General: She is not in acute distress.     Appearance: Normal appearance. She is well-developed. She is not ill-appearing, toxic-appearing or diaphoretic.   HENT:      Head: Normocephalic and atraumatic.      Mouth/Throat:      Mouth: Mucous membranes are moist.      Pharynx: Oropharynx is clear.   Eyes:      Extraocular Movements: Extraocular movements intact.      Conjunctiva/sclera: Conjunctivae normal.      Pupils: Pupils are equal, round, and reactive to light.   Cardiovascular:      Rate and Rhythm: Normal rate and regular rhythm.   Pulmonary:      Effort: Pulmonary effort is normal. No respiratory distress.      Breath sounds: Normal breath sounds. No wheezing, rhonchi or rales.   Musculoskeletal:      Cervical back: Normal range of motion and neck supple.      Right  lower leg: No tenderness. No edema.      Left lower leg: No tenderness. No edema.   Skin:     General: Skin is warm and dry.      Capillary Refill: Capillary refill takes less than 2 seconds.   Neurological:      Mental Status: She is alert and oriented to person, place, and time.           PROCEDURES     Procedures     DATA     Results     Procedure Component Value Units Date/Time    B-type natriuretic peptide [[id number]]  (Abnormal) Collected: 03/24/24 1314    Specimen: Blood, Venous Updated: 03/24/24 1421      pg/mL     HS Troponin (with 2 hour reflex) [[id number]]  (Normal) Collected: 03/24/24 1314    Specimen: Blood, Venous Updated: 03/24/24 1409     High Sens Troponin I 11.0 pg/mL     Comprehensive metabolic panel [[id number]]  (Abnormal) Collected: 03/24/24 1314    Specimen: Blood, Venous Updated: 03/24/24 1400     Sodium 135 mEQ/L      Potassium 4.0 mEQ/L      Comment: Results obtained on plasma. Plasma Potassium values may be up to 0.4 mEQ/L less than serum values. The differences may be greater for patients with high platelet or white cell counts.        Chloride 97 mEQ/L      CO2 32 mEQ/L      BUN 20 mg/dL      Creatinine 0.7 mg/dL      Glucose 113 mg/dL      Calcium 8.9 mg/dL      AST (SGOT) 24 IU/L      ALT (SGPT) 16 IU/L      Alkaline Phosphatase 38 IU/L      Total Protein 6.4 g/dL      Comment: Test performed on plasma which typically contains approximately 0.4 g/dL more protein than serum.        Albumin 3.8 g/dL      Bilirubin, Total 0.4 mg/dL      eGFR >60.0 mL/min/1.73m*2      Comment: Calculation based on the Chronic Kidney Disease Epidemiology Collaboration (CKD-EPI) equation refit without adjustment for race.        Anion Gap 6 mEQ/L     CBC and differential [[id number]]  (Abnormal) Collected: 03/24/24 1314    Specimen: Blood, Venous Updated: 03/24/24 1329     WBC 7.63 K/uL      RBC 3.59 M/uL      Hemoglobin 10.6 g/dL      Hematocrit 33.6 %      MCV 93.6 fL      MCH 29.5 pg      MCHC 31.5  g/dL      RDW 13.8 %      Platelets 243 K/uL      MPV 9.3 fL      Differential Type Auto     nRBC 0.0 %      Immature Granulocytes 0.4 %      Neutrophils 62.8 %      Lymphocytes 24.1 %      Monocytes 9.3 %      Eosinophils 2.9 %      Basophils 0.5 %      Immature Granulocytes, Absolute 0.03 K/uL      Neutrophils, Absolute 4.79 K/uL      Lymphocytes, Absolute 1.84 K/uL      Monocytes, Absolute 0.71 K/uL      Eosinophils, Absolute 0.22 K/uL      Basophils, Absolute 0.04 K/uL           Imaging Results          X-RAY CHEST 1 VIEW (Final result)  Result time 03/24/24 14:39:52    Final result                 Impression:    IMPRESSION:  No acute disease in chest. Stable enlargement of the cardiac silhouette.    COMMENT:    Comparison: Chest x-ray 3/15/2024.    Technique: A single frontal AP portable upright projection of the chest was  obtained.    FINDINGS:    The lungs are clear without focal airspace consolidation, pleural effusion or  pneumothorax. There is stable enlargement of the cardiac silhouette. The  mediastinal contours are unchanged. The upper abdomen is unremarkable. Again  noted are neurostimulator leads terminating over the midline of the upper and  mid to lower chest. There is no acute osseous abnormality.             Narrative:    CLINICAL HISTORY: chest pain                                ECG 12 lead    (Results Pending)   ECG 12 lead    (Results Pending)       Scoring tools                                  ED Course & MDM   MDM / ED COURSE / CLINICAL IMPRESSION / DISPO     Medical Decision Making  COVID-19: acute illness or injury  Unstable angina (CMS/HCC): acute illness or injury  Amount and/or Complexity of Data Reviewed  Labs: ordered.  Radiology: ordered.  ECG/medicine tests: ordered.      Risk  Decision regarding hospitalization.          ED Course as of 03/24/24 1720   Sun Mar 24, 2024   1353 Call to Dr. Mendelson's office, cardiology [AB]   2519 Discussed with Dr. Lowery from cardiology who is  agreeable with admission.  Recommending [facility] admission [AB]   6703 Paged [facility] [AB]   150 Discussed with [facility], will admit [AB]      ED Course User Index  [AB] Lisa Gates PA C     Clinical Impression      Unstable angina (CMS/HCC)   COVID-19     _________________     ED Disposition   Admit / Observation                   Lisa Gates PA C  03/24/24 2423

## 2024-03-24 NOTE — ASSESSMENT & PLAN NOTE
S/p [employer] stimulator for pain control (~15 years ago for insertion)  Was recently evaluated by Dr Frazier, without any issues, good battery life and placement  Likely not the source of the pain  Home pain regimen: gabapentin TID, tramadol TID to continue for now

## 2024-03-25 ENCOUNTER — APPOINTMENT (OUTPATIENT)
Dept: RADIOLOGY | Facility: HOSPITAL | Age: 88
Setting detail: OBSERVATION
End: 2024-03-25
Attending: HOSPITALIST
Payer: MEDICARE

## 2024-03-25 LAB
25(OH)D3 SERPL-MCNC: 51 NG/ML (ref 30–100)
ANION GAP SERPL CALC-SCNC: 5 MEQ/L (ref 3–15)
ATRIAL RATE: 65
BASOPHILS # BLD: 0.03 K/UL (ref 0.01–0.1)
BASOPHILS NFR BLD: 0.5 %
BUN SERPL-MCNC: 20 MG/DL (ref 7–25)
CALCIUM SERPL-MCNC: 8.8 MG/DL (ref 8.6–10.3)
CHLORIDE SERPL-SCNC: 102 MEQ/L (ref 98–107)
CO2 SERPL-SCNC: 32 MEQ/L (ref 21–31)
CREAT SERPL-MCNC: 0.6 MG/DL (ref 0.6–1.2)
DIFFERENTIAL METHOD BLD: ABNORMAL
EGFRCR SERPLBLD CKD-EPI 2021: >60 ML/MIN/1.73M*2
EOSINOPHIL # BLD: 0.28 K/UL (ref 0.04–0.36)
EOSINOPHIL NFR BLD: 4.3 %
ERYTHROCYTE [DISTWIDTH] IN BLOOD BY AUTOMATED COUNT: 13.9 % (ref 11.7–14.4)
FOLATE SERPL-MCNC: >20 NG/ML
GLUCOSE BLD-MCNC: 111 MG/DL (ref 70–99)
GLUCOSE BLD-MCNC: 132 MG/DL (ref 70–99)
GLUCOSE BLD-MCNC: 158 MG/DL (ref 70–99)
GLUCOSE BLD-MCNC: 183 MG/DL (ref 70–99)
GLUCOSE SERPL-MCNC: 92 MG/DL (ref 70–99)
HCT VFR BLD AUTO: 32.2 % (ref 35–45)
HGB BLD-MCNC: 10.2 G/DL (ref 11.8–15.7)
IMM GRANULOCYTES # BLD AUTO: 0.02 K/UL (ref 0–0.08)
IMM GRANULOCYTES NFR BLD AUTO: 0.3 %
LYMPHOCYTES # BLD: 2.16 K/UL (ref 1.2–3.5)
LYMPHOCYTES NFR BLD: 33 %
MAGNESIUM SERPL-MCNC: 1.7 MG/DL (ref 1.8–2.5)
MCH RBC QN AUTO: 29.7 PG (ref 28–33.2)
MCHC RBC AUTO-ENTMCNC: 31.7 G/DL (ref 32.2–35.5)
MCV RBC AUTO: 93.9 FL (ref 83–98)
MONOCYTES # BLD: 0.63 K/UL (ref 0.28–0.8)
MONOCYTES NFR BLD: 9.6 %
NEUTROPHILS # BLD: 3.42 K/UL (ref 1.7–7)
NEUTS SEG NFR BLD: 52.3 %
NRBC BLD-RTO: 0 %
P AXIS: 116
PDW BLD AUTO: 9.5 FL (ref 9.4–12.3)
PHOSPHATE SERPL-MCNC: 3.3 MG/DL (ref 2.4–4.7)
PLATELET # BLD AUTO: 211 K/UL (ref 150–369)
POCT TEST: ABNORMAL
POTASSIUM SERPL-SCNC: 3.9 MEQ/L (ref 3.5–5.1)
PR INTERVAL: 152
QRS DURATION: 74
QT INTERVAL: 404
QTC CALCULATION(BAZETT): 420
R AXIS: -2
RBC # BLD AUTO: 3.43 M/UL (ref 3.93–5.22)
SODIUM SERPL-SCNC: 139 MEQ/L (ref 136–145)
T WAVE AXIS: 66
VENTRICULAR RATE: 65
VIT B12 SERPL-MCNC: 196 PG/ML (ref 180–914)
WBC # BLD AUTO: 6.54 K/UL (ref 3.8–10.5)

## 2024-03-25 PROCEDURE — 63700000 HC SELF-ADMINISTRABLE DRUG: Performed by: STUDENT IN AN ORGANIZED HEALTH CARE EDUCATION/TRAINING PROGRAM

## 2024-03-25 PROCEDURE — 82607 VITAMIN B-12: CPT | Performed by: STUDENT IN AN ORGANIZED HEALTH CARE EDUCATION/TRAINING PROGRAM

## 2024-03-25 PROCEDURE — 63600105 HC IODINE BASED CONTRAST: Mod: JW | Performed by: HOSPITALIST

## 2024-03-25 PROCEDURE — G0378 HOSPITAL OBSERVATION PER HR: HCPCS

## 2024-03-25 PROCEDURE — 80048 BASIC METABOLIC PNL TOTAL CA: CPT | Performed by: STUDENT IN AN ORGANIZED HEALTH CARE EDUCATION/TRAINING PROGRAM

## 2024-03-25 PROCEDURE — 36415 COLL VENOUS BLD VENIPUNCTURE: CPT | Performed by: STUDENT IN AN ORGANIZED HEALTH CARE EDUCATION/TRAINING PROGRAM

## 2024-03-25 PROCEDURE — 63600000 HC DRUGS/DETAIL CODE: Mod: JZ | Performed by: HOSPITALIST

## 2024-03-25 PROCEDURE — 93010 ELECTROCARDIOGRAM REPORT: CPT | Performed by: INTERNAL MEDICINE

## 2024-03-25 PROCEDURE — 63600000 HC DRUGS/DETAIL CODE: Performed by: STUDENT IN AN ORGANIZED HEALTH CARE EDUCATION/TRAINING PROGRAM

## 2024-03-25 PROCEDURE — 83735 ASSAY OF MAGNESIUM: CPT | Performed by: STUDENT IN AN ORGANIZED HEALTH CARE EDUCATION/TRAINING PROGRAM

## 2024-03-25 PROCEDURE — 71275 CT ANGIOGRAPHY CHEST: CPT

## 2024-03-25 PROCEDURE — 63700000 HC SELF-ADMINISTRABLE DRUG: Performed by: PHYSICIAN ASSISTANT

## 2024-03-25 PROCEDURE — 85025 COMPLETE CBC W/AUTO DIFF WBC: CPT | Performed by: STUDENT IN AN ORGANIZED HEALTH CARE EDUCATION/TRAINING PROGRAM

## 2024-03-25 PROCEDURE — 82746 ASSAY OF FOLIC ACID SERUM: CPT | Performed by: STUDENT IN AN ORGANIZED HEALTH CARE EDUCATION/TRAINING PROGRAM

## 2024-03-25 PROCEDURE — 84100 ASSAY OF PHOSPHORUS: CPT | Performed by: STUDENT IN AN ORGANIZED HEALTH CARE EDUCATION/TRAINING PROGRAM

## 2024-03-25 PROCEDURE — 82306 VITAMIN D 25 HYDROXY: CPT | Performed by: STUDENT IN AN ORGANIZED HEALTH CARE EDUCATION/TRAINING PROGRAM

## 2024-03-25 PROCEDURE — 99232 SBSQ HOSP IP/OBS MODERATE 35: CPT | Performed by: HOSPITALIST

## 2024-03-25 RX ORDER — FERROUS SULFATE 137(45) MG
137 TABLET, EXTENDED RELEASE ORAL
Qty: 90 TABLET | Refills: 1 | Status: SHIPPED | OUTPATIENT
Start: 2024-03-25 | End: 2024-09-21

## 2024-03-25 RX ORDER — IOPAMIDOL 755 MG/ML
90 INJECTION, SOLUTION INTRAVASCULAR
Status: COMPLETED | OUTPATIENT
Start: 2024-03-25 | End: 2024-03-25

## 2024-03-25 RX ADMIN — INSULIN LISPRO 3 UNITS: 100 INJECTION, SOLUTION INTRAVENOUS; SUBCUTANEOUS at 22:02

## 2024-03-25 RX ADMIN — GABAPENTIN 300 MG: 300 CAPSULE ORAL at 21:11

## 2024-03-25 RX ADMIN — TRAMADOL HYDROCHLORIDE 50 MG: 50 TABLET, COATED ORAL at 01:13

## 2024-03-25 RX ADMIN — PANTOPRAZOLE SODIUM 20 MG: 20 TABLET, DELAYED RELEASE ORAL at 09:22

## 2024-03-25 RX ADMIN — ACETAMINOPHEN 650 MG: 325 TABLET ORAL at 13:33

## 2024-03-25 RX ADMIN — ACETAMINOPHEN 650 MG: 325 TABLET ORAL at 18:15

## 2024-03-25 RX ADMIN — FUROSEMIDE 20 MG: 20 TABLET ORAL at 09:22

## 2024-03-25 RX ADMIN — IOPAMIDOL 90 ML: 755 INJECTION, SOLUTION INTRAVENOUS at 16:51

## 2024-03-25 RX ADMIN — GABAPENTIN 300 MG: 300 CAPSULE ORAL at 09:23

## 2024-03-25 RX ADMIN — ATORVASTATIN CALCIUM 20 MG: 20 TABLET, FILM COATED ORAL at 21:11

## 2024-03-25 RX ADMIN — APIXABAN 2.5 MG: 2.5 TABLET, FILM COATED ORAL at 21:11

## 2024-03-25 RX ADMIN — PROPRANOLOL HYDROCHLORIDE 20 MG: 10 TABLET ORAL at 09:22

## 2024-03-25 RX ADMIN — APIXABAN 5 MG: 5 TABLET, FILM COATED ORAL at 09:22

## 2024-03-25 RX ADMIN — ACETAMINOPHEN 650 MG: 325 TABLET ORAL at 09:22

## 2024-03-25 RX ADMIN — PROPRANOLOL HYDROCHLORIDE 20 MG: 10 TABLET ORAL at 21:11

## 2024-03-25 RX ADMIN — GABAPENTIN 300 MG: 300 CAPSULE ORAL at 13:34

## 2024-03-25 RX ADMIN — MAGNESIUM SULFATE IN DEXTROSE 1 G: 10 INJECTION, SOLUTION INTRAVENOUS at 15:15

## 2024-03-25 RX ADMIN — ACETAMINOPHEN 650 MG: 325 TABLET ORAL at 22:11

## 2024-03-25 RX ADMIN — TRAMADOL HYDROCHLORIDE 50 MG: 50 TABLET, COATED ORAL at 19:00

## 2024-03-25 ASSESSMENT — COGNITIVE AND FUNCTIONAL STATUS - GENERAL
MOVING TO AND FROM BED TO CHAIR: 3 - A LITTLE
CLIMB 3 TO 5 STEPS WITH RAILING: 2 - A LOT
STANDING UP FROM CHAIR USING ARMS: 3 - A LITTLE
STANDING UP FROM CHAIR USING ARMS: 3 - A LITTLE
WALKING IN HOSPITAL ROOM: 3 - A LITTLE
CLIMB 3 TO 5 STEPS WITH RAILING: 2 - A LOT
WALKING IN HOSPITAL ROOM: 3 - A LITTLE
STANDING UP FROM CHAIR USING ARMS: 3 - A LITTLE
MOVING TO AND FROM BED TO CHAIR: 3 - A LITTLE
WALKING IN HOSPITAL ROOM: 3 - A LITTLE
MOVING TO AND FROM BED TO CHAIR: 3 - A LITTLE
CLIMB 3 TO 5 STEPS WITH RAILING: 2 - A LOT

## 2024-03-25 NOTE — PLAN OF CARE
Care Coordination Admission Assessment Note    General Information:  Readmission Within the last 30 days: no previous admission in last 30 days  Does patient have a :    Patient-Specific Goals (include timeframe):      Living Arrangements:  Arrived From: home  Current Living Arrangements: apartment, independent living facility  People in Home: alone  Home Accessibility: elevator accessible  Living Arrangement Comments: [place] lives [place] at [facility]. She has a 4th floor apartment with elevator access. Apsrtment is all one level.    Housing Stability and Utility Access (SDOH):  In the last 12 months, was there a time when you were not able to pay the mortgage or rent on time?: No  In the last 12 months, how many places have you lived?: 1  In the last 12 months, was there a time when you did not have a steady place to sleep or slept in a shelter (including now)?: No  In the past 12 months has the electric, gas, oil, or water company threatened to shut off services in your home?: No    Functional Status Prior to Admission:   Assistive Device/Animal Currently Used at Home: other (see comments) (rolllator, bedrail.)  Functional Status Comments: Until a few days ago pt was Independent. Daughter fills pill planner for pt, facilities provides meals.  IADL Comments:       Supports and Services:  Current Outpatient/Agency/Support Group: none  Type of Current Home Care Services:    History of home care episode or rehab stay: Had [facility] and [facility] in the past. Daughter did not know company names.    Discharge Needs Assessment:   Concerns to be Addressed: discharge planning, care coordination/care conferences  Current Discharge Risk:    Anticipated Changes Related to Illness: none    Patient/Family Anticipated Discharge Plan:  Patient/Family Anticipates Transition To:    Patient/Family Anticipated Services at Transition: rehabilitation services, skilled nursing    Connection to Community  Not applicable    Patient  Choice:   Offered/Gave Vendor List:    Patient's Choice of Community Agency(s):         Anticipated Discharge Plan:  Met with patient. Provided education and contact information for Care Coordination services.: yes  Anticipated Discharge Disposition: home with home health, skilled nursing facility     Transportation Needs (SDOH):  Transportation Concerns: none  Transportation Anticipated:    Is Out of Hospital DNR needed at discharge?:      In the past 12 months, has lack of transportation kept you from medical appointments or from getting medications?: No  In the past 12 months, has lack of transportation kept you from meetings, work, or from getting things needed for daily living?: No    Concerns - comments:  CC spoke with pt and pt gave cc permission to complete cma with her daughter jose

## 2024-03-25 NOTE — PLAN OF CARE
Plan of Care Review  Plan of Care Reviewed With: patient  Progress: improving  Outcome Evaluation: pt up in chair throughout day for meals. pt able to ambulate to bathroom supervision with walker.

## 2024-03-25 NOTE — ASSESSMENT & PLAN NOTE
She is in sinus rhythm currently. Continue Eliquis 5mg bid, although with current weight should be reduced to 2.5mg bid. Consider changing Eliquis to Xarelto 20mg daily. Creatinine clearance is 60.

## 2024-03-25 NOTE — PLAN OF CARE
Problem: Adult Inpatient Plan of Care  Goal: Plan of Care Review  Outcome: Progressing  Flowsheets (Taken 3/24/2024 8973)  Plan of Care Reviewed With: patient  Goal: Patient-Specific Goal (Individualized)  Outcome: Progressing  Goal: Absence of Hospital-Acquired Illness or Injury  Outcome: Progressing  Goal: Optimal Comfort and Wellbeing  Outcome: Progressing  Goal: Readiness for Transition of Care  Outcome: Progressing   Plan of Care Review  Plan of Care Reviewed With: patient

## 2024-03-25 NOTE — CONSULTS
Cardiology Consult Note    Subjective     Rissa Erickson is a 88 y.o. female , known to Dr. Mendelson, with a past medical history of PAF, HFpEF, HLD, type 2 DM, post herpetic neuralgia s/p pain simulator presents with acute on chronic chest pain. She tells me she had Shingles 15 years ago and has had recurrent left sided back and chest pain since that time. She has had more chest pain in the past few days which is why she came to the ED on 3/24. Currently her chest pain is 4/10 after Tylenol. She has no palliative or provocative factors. It is not pleuritic or worse with palpation.     She has also had some recent dyspnea on exertion. She saw Dr. Mendelson in the office on 3/13/24 for dyspnea on exertion and orthopnea. She denied chest pain at that visit. She was advised to continue on Lasix twice daily. She was then seen in the ED 3/15/24 for chest pain and shortness of breath. Testing was normal and she was advised to increase her Lasix dose to 40mg bid. She tells me her breathing has been improving.     She had increased fatigue, a cough and a sore throat for several days and tested positive for Covid on a home test 3/22/24. She had a telemedicine visit with her PCP and was started on Molnupiravir and Flonase. She has been sleeping in a lift chair for about two weeks. Denies weight gain or leg swelling.       Allergies  Amiodarone    Prior to Admission medications    Medication Sig Start Date End Date Taking? Authorizing Provider   acetaminophen (TYLENOL) 325 mg tablet Take 650 mg by mouth every 6 hours as needed. 12/25/22   Provider, Sugey Nunn MD   apixaban (ELIQUIS) 5 mg tablet Take 1 tablet (5 mg total) by mouth 2 (two) times a day. 1/16/24   Sloan Maher MD   atorvastatin (LIPITOR) 20 mg tablet Take 1 tablet (20 mg total) by mouth nightly. 1/16/24   Sloan Maher MD   denosumab (PROLIA) 60 mg/mL syringe Inject 60 mg under the skin every 6 (six) months.    Provider, Sugey Nunn MD    FREESTYLE LITE STRIPS strip 1 strip 2 (two) times a day. 2/22/24   Tonio Sauceda MD   furosemide (LASIX) 20 mg tablet Take 1 tablet (20 mg total) by mouth daily. 1/16/24   Sloan Maher MD   gabapentin (NEURONTIN) 300 mg capsule Take 1 capsule (300 mg total) by mouth 2 (two) times a day.  Patient taking differently: Take 300 mg by mouth 3 (three) times a day. 1/16/24   Sloan Maher MD   lancets misc 1 Lancet as needed (Diabetes). To check blood glucose 9/8/23   Sloan Maher MD   metFORMIN (GLUCOPHAGE) 500 mg tablet Take 2 tablets (1,000 mg total) by mouth 2 (two) times a day with meals. 2/22/24   Tonio Sauceda MD   molnupiravir 200 mg capsule capsule Take 4 capsules (800 mg total) by mouth every 12 (twelve) hours for 5 days. 3/22/24 3/27/24  Mary Sewell CRNP   pantoprazole (PROTONIX) 20 mg EC tablet Take 1 tablet (20 mg total) by mouth daily. 9/8/23   Sloan Maher MD   propranoloL (INDERAL) 20 mg tablet Take 1 tablet (20 mg total) by mouth 2 (two) times a day. 1/16/24   Sloan Maher MD   traMADoL (ULTRAM) 50 mg tablet Take 1 tablet (50 mg total) by mouth 2 (two) times a day.  Patient taking differently: Take 50 mg by mouth 3 (three) times a day. 10/19/23   Sloan Maher MD       Current Facility-Administered Medications   Medication Dose Route Frequency Provider Last Rate Last Admin    acetaminophen (TYLENOL) tablet 650 mg  650 mg oral q4h PRN Partha, Geovanny Mi, DO   650 mg at 03/25/24 0922    apixaban (ELIQUIS) tablet 5 mg  5 mg oral BID Chavis, Geovanny Mi, DO   5 mg at 03/25/24 0922    atorvastatin (LIPITOR) tablet 20 mg  20 mg oral Nightly Chavis, Geovanny Mi, DO        glucose chewable tablet 16-32 g of dextrose  16-32 g of dextrose oral PRN Geovanny Chavis Mi, DO        Or    dextrose 40 % oral gel 15-30 g of dextrose  15-30 g of dextrose oral PRN Geovanny Chavis DO        Or    glucagon (GLUCAGEN) injection 1 mg  1 mg intramuscular PRN Geovanny Chavis DO        Or    dextrose 50 % in water  "(D50) injection 12.5 g  25 mL intravenous PRN Geovanny Chavis DO        furosemide (LASIX) tablet 20 mg  20 mg oral Daily Geovanny Chavis DO   20 mg at 03/25/24 0922    gabapentin (NEURONTIN) capsule 300 mg  300 mg oral TID Geovanny Chavis DO   300 mg at 03/25/24 0923    insulin lispro U-100 (HumaLOG) pen 3-5 Units  3-5 Units subcutaneous With meals & nightly Geovanny Chavis DO        [Provider Managed Hold] metFORMIN (GLUCOPHAGE) tablet 1,000 mg  1,000 mg oral BID with meals Geovanny Chavis DO        molnupiravir capsule 800 mg  800 mg oral BID Geovanny Chavis DO   800 mg at 03/25/24 0921    pantoprazole (PROTONIX) tablet,delayed release (DR/EC) 20 mg  20 mg oral Daily Geovanny Chavis DO   20 mg at 03/25/24 0922    propranoloL (INDERAL) tablet 20 mg  20 mg oral BID Geovanny Chavis DO   20 mg at 03/25/24 0922    traMADoL (ULTRAM) tablet 50 mg  50 mg oral 3x daily PRN Geovanny Chavis DO   50 mg at 03/25/24 0113       Past Medical History:   Diagnosis Date    A-fib (CMS/[facility])     Accelerated hypertension     CHF (congestive heart failure) (CMS/[facility])     Macular degeneration     Thyroid nodule     Type 2 diabetes mellitus (CMS/[facility])        Past Surgical History:   Procedure Laterality Date    APPENDECTOMY      HYSTERECTOMY      JOINT REPLACEMENT      KNEE ARTHROPLASTY         Social History  Social History     Tobacco Use    Smoking status: Never    Smokeless tobacco: Never   Substance Use Topics    Alcohol use: Never    Drug use: Never       History reviewed. No pertinent family history.      Review of Systems: All other systems reviewed and negative except as noted in the HPI.    Physical Exam     Visit Vitals  /62 (BP Location: Left upper arm, Patient Position: Lying)   Pulse 62   Temp 36.7 °C (98 °F) (Oral)   Resp 18   Ht 1.6 m (5' 3\")   Wt 59.7 kg (131 lb 9.6 oz)   SpO2 95%   BMI 23.31 kg/m²       The patient appears comfortable and is in no apparent distress,    Eyes: Eyelids and sclera normal,   Neck: No jugular " venous distention, no thyromegaly, no lymphadenopathy, no carotid bruits,    Lungs: Clear to auscultation, normal respiratory effort,   Heart: Regular rhythm, normal S1 and S2, no murmurs rubs or gallops,   Chest wall: Nontender  Abdomen: Soft, nontender,  NABS x 4  Extremities: No edema, no calf tenderness or swelling, pulses intact,   Skin: No rashes,   Neuro: Alert and oriented without focal deficit,   Psych: Affect normal.      Objective     Labs   Lab Results   Component Value Date    WBC 6.54 03/25/2024    HGB 10.2 (L) 03/25/2024     03/25/2024    CHOL 92 03/24/2024    TRIG 86 03/24/2024    HDL 43 (L) 03/24/2024    ALT 16 03/24/2024    AST 24 03/24/2024     03/25/2024    K 3.9 03/25/2024     03/25/2024    CREATININE 0.6 03/25/2024    BUN 20 03/25/2024    CO2 32 (H) 03/25/2024    TSH 0.85 02/08/2024    HGBA1C 6.1 (H) 03/24/2024     (H) 03/24/2024    HSTROPONINI 11.7 03/24/2024         Imaging  I have independently reviewed the pertinent imaging from the last 24 hrs.    Echocardiogram 3/21/24  Normal LV size and systolic function, EF=60-65%, no RWMA  Normal RV size and systolic function  RVSP=50mmHg      ECG   sinus rhythm, borderline LVH    Telemetry  sinus rhythm    Outside records reviewed..    Assessment     * Chest pain  Assessment & Plan  Her symptoms are most consistent with noncardiac chest wall pain. Troponin is normal x 2 sets this admission and on 3/13 when seen in the Ed. She has had some relief with Tylenol today. ECG is nonischemic. Would have her follow up with her PCP for management of postherpetic neuralgia. Echocardiogram on 3/21 was unremarkable except for moderate pulmonary hypertension.     Paroxysmal atrial fibrillation (CMS/HCC)  Assessment & Plan  She is in sinus rhythm currently. Continue Eliquis 5mg bid, although with current weight should be reduced to 2.5mg bid. Consider changing Eliquis to Xarelto.     COVID  Assessment & Plan  Management as per [facility].      Mixed hyperlipidemia  Assessment & Plan  Continue statin.     Chronic diastolic CHF (congestive heart failure) (CMS/[facility])  Assessment & Plan  She appears euvolemic currently. Continue Lasix 20mg daily.             AMMON Simms  3/25/2024    Plan not final until patient seen by attending cardiologist. See attestation for final plan.

## 2024-03-25 NOTE — PROGRESS NOTES
"Rissa Dre   [id number]    Your doctor has referred you for a CT examination that requires the injection of an iodinated contrast material into your bloodstream. This iodinated contrast material, sometimes referred to as \"x-ray dye\" allows for better interpretation of the x-ray films or CT images and results in a more accurate interpretation of the examination.     Without the use of iodinated contrast (x-ray dye), the examination may be less informative and result in a suboptimal examination, and possibly a delay in diagnosis and, if needed, treatment.     The iodinated contrast material is given through a small needle or catheter placed into a vein, usually on the inside of the elbow, on the back of hand, or in a vein in the foot or lower leg.    The most common, though still rare, potential reaction to an intravenous contrast injection is an allergic-like reaction. Most allergic-like reactions are mild, though a small subset of people can have more severe reactions. Mild reactions include mild / scattered hives, itching, scratchy throat, sneezing and nasal congestion. More severe reactions include facial swelling, severe difficulty breathing, wheezing and anaphylactic shock. Those with a prior history allergic-like reaction to the same class of contrast media (such as CT contrast or MRI contrast) are at the highest risk for an allergic reaction.     If you believe you had an allergic reaction to contrast in the past, please let our staff know. We can determine if this increases your risk for a future reaction and provide steps to decrease the risk. This may delay your examination, but it decreases the risk of having a new and possibly more severe reaction to the contrast injection.    People with a history of prior allergic reactions to other substances (such as unrelated medications and food) and patients with a history of asthma have slightly increased risk for an allergic reaction from contrast material " when compared with the general population, but do not require to be pretreated prior to a contrast injection.    You should notify the physician, nurse or technologist if you have ever had any of these conditions or if you have any questions.

## 2024-03-25 NOTE — ASSESSMENT & PLAN NOTE
Her symptoms are most consistent with noncardiac chest wall pain. Troponin is normal x 2 sets this admission and on 3/13 when seen in the Ed. She has had some relief with Tylenol today. ECG is nonischemic. Would have her follow up with her PCP for management of postherpetic neuralgia. Echocardiogram on 3/21 was unremarkable except for moderate pulmonary hypertension.

## 2024-03-25 NOTE — PROGRESS NOTES
Hospital Medicine Service -  Daily Progress Note       SUBJECTIVE   Interval History: Pt seen and examined this morning.  Still with left-sided chest pain but better with meds.  No increased shortness of breath, no palpitations, no dizziness or lightheadedness, no fever/chills.  Does report some sore throat, intermittent dry cough.  Feels generally weak all over.  No leg pain or calf tenderness.  No pleuritic pain     OBJECTIVE      Vital signs in last 24 hours:  Temp:  [36.5 °C (97.7 °F)-37.2 °C (98.9 °F)] 37 °C (98.6 °F)  Heart Rate:  [62-80] 63  Resp:  [16-20] 18  BP: (109-154)/(56-69) 154/68    Intake/Output Summary (Last 24 hours) at 3/25/2024 1134  Last data filed at 3/25/2024 0005  Gross per 24 hour   Intake 10 ml   Output --   Net 10 ml       PHYSICAL EXAMINATION      Physical Exam  Constitutional:       General: She is not in acute distress.     Appearance: She is not toxic-appearing.   HENT:      Head: Normocephalic and atraumatic.   Eyes:      Conjunctiva/sclera: Conjunctivae normal.   Cardiovascular:      Rate and Rhythm: Normal rate and regular rhythm.      Heart sounds: Normal heart sounds.   Pulmonary:      Effort: Pulmonary effort is normal.      Breath sounds: Normal breath sounds.   Abdominal:      General: Bowel sounds are normal. There is no distension.      Palpations: Abdomen is soft.      Tenderness: There is no abdominal tenderness.   Musculoskeletal:         General: No swelling.      Cervical back: Neck supple.      Right lower leg: No edema.      Left lower leg: No edema.   Skin:     General: Skin is warm and dry.      Findings: No rash.   Neurological:      Mental Status: She is alert and oriented to person, place, and time.   Psychiatric:         Mood and Affect: Mood normal.         Behavior: Behavior normal.            LINES, CATHETERS, DRAINS, AIRWAYS, AND WOUNDS   Lines, Drains, and Airways:  Wounds (agree with documentation and present on admission):  Peripheral IV (Adult)  03/24/24 Right Antecubital (Active)   Number of days: 1         Comments:      LABS / IMAGING / TELE      Labs  Reviewed  Results from last 7 days   Lab Units 03/25/24  0408 03/24/24  1314   WBC K/uL 6.54 7.63   HEMOGLOBIN g/dL 10.2* 10.6*   HEMATOCRIT % 32.2* 33.6*   PLATELETS K/uL 211 243      Results from last 7 days   Lab Units 03/25/24  0408 03/24/24  1314 03/21/24  1330   SODIUM mEQ/L 139 135* 131*   POTASSIUM mEQ/L 3.9 4.0 4.0   CHLORIDE mEQ/L 102 97* 91*   CO2 mEQ/L 32* 32* 32*   BUN mg/dL 20 20 16   CREATININE mg/dL 0.6 0.7 0.8   CALCIUM mg/dL 8.8 8.9 9.3   ALBUMIN g/dL  --  3.8  --    BILIRUBIN TOTAL mg/dL  --  0.4  --    ALK PHOS IU/L  --  38  --    ALT IU/L  --  16  --    AST IU/L  --  24  --    GLUCOSE mg/dL 92 113* 133*          Imaging  I have independently reviewed the pertinent imaging from the last 24 hrs.    ECG/Telemetry  SR on tele    ASSESSMENT AND PLAN      COVID  Assessment & Plan  Afebrile, no leukocytosis  Home kit + for COVID on 3/22/24 for sx of sore throat  COVID confirmed here on 3/24/24  Currently on room air, without shortness of breath.   Continue home molnupiravir    Type 2 diabetes mellitus without complication, without long-term current use of insulin (CMS/[facility])  Assessment & Plan  Follows with Dr Maher, last A1c 6.1 in 02/2024  Has been compliant with home metformin 1000mg BID - which we will hold during hospitalization  On ISS, POCT glucose checks    Postherpetic neuralgia  Assessment & Plan  S/p [employer] stimulator for pain control (~15 years ago for insertion)  Was recently evaluated by Dr Frazier, without any issues, good battery life and placement  Likely not the source of the pain  Home pain regimen: gabapentin TID, tramadol TID to continue for now      Paroxysmal atrial fibrillation (CMS/HCC)  Assessment & Plan  Hx of amiodarone induced thyroiditis  On Eliquis, currently on propranolol   Currently rate controlled, ECG in NSR  Continue home eliquid and  propranolol    Mixed hyperlipidemia  Assessment & Plan  Continue home statin    Hyperthyroidism  Assessment & Plan  Hx of amiodarone induced thyroiditis and hyperthyroidism  Recent TFTs in Feb 2024 were good, no changes to medications    Chronic diastolic CHF (congestive heart failure) (CMS/[facility])  Assessment & Plan  Unknown recent echo results however, prior 2021 TTE with EF 55-60% with diastolic dfx  Pt was recently on increased lasix dosing due to shortness of breath, which resolved with increased dose  Currently on usual home lasix 20mg PO dose. Does not appear to be in volume overload, so will continue home dose  Cardiology consulted    * Chest pain  Assessment & Plan  Unclear etiology, acute on chronic chest pain not related to chronic post-herpetic neuralgia or stimulator placement  Nontender upon palpation, no rash appreciated  Appears to be chest wall pain.  No rash or erythema noted on exam  No elevated troponin, BNP mildly up 245 but does not appear to be in volume overload. CXR unremarkable, ECG with NSR without acute ischemic change.  Monitor on telemetry on telemetry.   Cardiology consulted: Appreciate recs           VTE Assessment: [place]    VTE Prophylaxis:  Current anticoagulants:  apixaban (ELIQUIS) tablet 5 mg, oral, BID      Code Status: Full Code      Estimated Discharge Date: 3/26/2024   Disposition Planning: LORRIE Alonzo MD  3/25/2024

## 2024-03-26 VITALS
SYSTOLIC BLOOD PRESSURE: 144 MMHG | DIASTOLIC BLOOD PRESSURE: 63 MMHG | OXYGEN SATURATION: 94 % | WEIGHT: 131.6 LBS | BODY MASS INDEX: 23.32 KG/M2 | HEART RATE: 61 BPM | RESPIRATION RATE: 18 BRPM | TEMPERATURE: 98.3 F | HEIGHT: 63 IN

## 2024-03-26 PROBLEM — E04.2 MULTINODULAR GOITER: Status: ACTIVE | Noted: 2024-03-26

## 2024-03-26 LAB
ANION GAP SERPL CALC-SCNC: 5 MEQ/L (ref 3–15)
BUN SERPL-MCNC: 22 MG/DL (ref 7–25)
CALCIUM SERPL-MCNC: 8.7 MG/DL (ref 8.6–10.3)
CHLORIDE SERPL-SCNC: 102 MEQ/L (ref 98–107)
CO2 SERPL-SCNC: 31 MEQ/L (ref 21–31)
CREAT SERPL-MCNC: 0.6 MG/DL (ref 0.6–1.2)
EGFRCR SERPLBLD CKD-EPI 2021: >60 ML/MIN/1.73M*2
ERYTHROCYTE [DISTWIDTH] IN BLOOD BY AUTOMATED COUNT: 14.1 % (ref 11.7–14.4)
GLUCOSE BLD-MCNC: 106 MG/DL (ref 70–99)
GLUCOSE BLD-MCNC: 147 MG/DL (ref 70–99)
GLUCOSE SERPL-MCNC: 106 MG/DL (ref 70–99)
HCT VFR BLD AUTO: 32.8 % (ref 35–45)
HGB BLD-MCNC: 10.4 G/DL (ref 11.8–15.7)
MAGNESIUM SERPL-MCNC: 2 MG/DL (ref 1.8–2.5)
MCH RBC QN AUTO: 29.6 PG (ref 28–33.2)
MCHC RBC AUTO-ENTMCNC: 31.7 G/DL (ref 32.2–35.5)
MCV RBC AUTO: 93.4 FL (ref 83–98)
PDW BLD AUTO: 9.6 FL (ref 9.4–12.3)
PLATELET # BLD AUTO: 221 K/UL (ref 150–369)
POCT TEST: ABNORMAL
POCT TEST: ABNORMAL
POTASSIUM SERPL-SCNC: 4 MEQ/L (ref 3.5–5.1)
RBC # BLD AUTO: 3.51 M/UL (ref 3.93–5.22)
SODIUM SERPL-SCNC: 138 MEQ/L (ref 136–145)
WBC # BLD AUTO: 6.89 K/UL (ref 3.8–10.5)

## 2024-03-26 PROCEDURE — 85027 COMPLETE CBC AUTOMATED: CPT | Performed by: HOSPITALIST

## 2024-03-26 PROCEDURE — 80048 BASIC METABOLIC PNL TOTAL CA: CPT | Performed by: HOSPITALIST

## 2024-03-26 PROCEDURE — G0378 HOSPITAL OBSERVATION PER HR: HCPCS

## 2024-03-26 PROCEDURE — 36415 COLL VENOUS BLD VENIPUNCTURE: CPT | Performed by: HOSPITALIST

## 2024-03-26 PROCEDURE — 83735 ASSAY OF MAGNESIUM: CPT | Performed by: HOSPITALIST

## 2024-03-26 PROCEDURE — 97166 OT EVAL MOD COMPLEX 45 MIN: CPT | Mod: GO

## 2024-03-26 PROCEDURE — 63700000 HC SELF-ADMINISTRABLE DRUG: Performed by: STUDENT IN AN ORGANIZED HEALTH CARE EDUCATION/TRAINING PROGRAM

## 2024-03-26 PROCEDURE — 97162 PT EVAL MOD COMPLEX 30 MIN: CPT | Mod: GP

## 2024-03-26 PROCEDURE — 99239 HOSP IP/OBS DSCHRG MGMT >30: CPT | Performed by: STUDENT IN AN ORGANIZED HEALTH CARE EDUCATION/TRAINING PROGRAM

## 2024-03-26 PROCEDURE — 97116 GAIT TRAINING THERAPY: CPT | Mod: GP

## 2024-03-26 PROCEDURE — 63700000 HC SELF-ADMINISTRABLE DRUG: Performed by: PHYSICIAN ASSISTANT

## 2024-03-26 RX ORDER — ADHESIVE BANDAGE
30 BANDAGE TOPICAL ONCE
Status: DISCONTINUED | OUTPATIENT
Start: 2024-03-26 | End: 2024-03-26 | Stop reason: HOSPADM

## 2024-03-26 RX ORDER — GABAPENTIN 300 MG/1
300 CAPSULE ORAL 3 TIMES DAILY
Start: 2024-03-26 | End: 2024-11-19 | Stop reason: ENTERED-IN-ERROR

## 2024-03-26 RX ORDER — DICLOFENAC SODIUM 10 MG/G
GEL TOPICAL 2 TIMES DAILY PRN
Qty: 100 G | Refills: 0 | Status: SHIPPED | OUTPATIENT
Start: 2024-03-26 | End: 2024-08-22 | Stop reason: ALTCHOICE

## 2024-03-26 RX ORDER — TRAMADOL HYDROCHLORIDE 50 MG/1
50 TABLET ORAL 3 TIMES DAILY PRN
Start: 2024-03-26 | End: 2024-03-31

## 2024-03-26 RX ADMIN — PANTOPRAZOLE SODIUM 20 MG: 20 TABLET, DELAYED RELEASE ORAL at 08:11

## 2024-03-26 RX ADMIN — GABAPENTIN 300 MG: 300 CAPSULE ORAL at 08:11

## 2024-03-26 RX ADMIN — ACETAMINOPHEN 650 MG: 325 TABLET ORAL at 13:44

## 2024-03-26 RX ADMIN — PROPRANOLOL HYDROCHLORIDE 20 MG: 10 TABLET ORAL at 08:11

## 2024-03-26 RX ADMIN — APIXABAN 2.5 MG: 2.5 TABLET, FILM COATED ORAL at 08:11

## 2024-03-26 RX ADMIN — ACETAMINOPHEN 650 MG: 325 TABLET ORAL at 08:10

## 2024-03-26 RX ADMIN — FUROSEMIDE 20 MG: 20 TABLET ORAL at 08:11

## 2024-03-26 ASSESSMENT — COGNITIVE AND FUNCTIONAL STATUS - GENERAL
DRESSING REGULAR UPPER BODY CLOTHING: 3 - A LITTLE
HELP NEEDED FOR BATHING: 3 - A LITTLE
CLIMB 3 TO 5 STEPS WITH RAILING: 3 - A LITTLE
AFFECT: WFL;FLAT/BLUNTED AFFECT
MOVING TO AND FROM BED TO CHAIR: 4 - NONE
STANDING UP FROM CHAIR USING ARMS: 3 - A LITTLE
TOILETING: 3 - A LITTLE
EATING MEALS: 4 - NONE
AFFECT: WFL;FLAT/BLUNTED AFFECT
CLIMB 3 TO 5 STEPS WITH RAILING: 3 - A LITTLE
DRESSING REGULAR LOWER BODY CLOTHING: 3 - A LITTLE
MOVING TO AND FROM BED TO CHAIR: 3 - A LITTLE
WALKING IN HOSPITAL ROOM: 3 - A LITTLE
STANDING UP FROM CHAIR USING ARMS: 3 - A LITTLE
WALKING IN HOSPITAL ROOM: 3 - A LITTLE
HELP NEEDED FOR PERSONAL GROOMING: 3 - A LITTLE

## 2024-03-26 NOTE — DISCHARGE INSTRUCTIONS
You were admitted for chest pain.  Cardiology evaluated you and did not think you were having recurrent heart attack so they will follow-up with you outpatient for stress test.  He also had a CT scan of your chest which did not show any signs of a blood clot.  You were found to have COVID which can exacerbate any previous symptoms like this chronic chest discomfort you have been having for a while.  Although we were not able to find the source of your pain we are confident that is not something life-threatening and will just need further outpatient follow-up.  In the meantime we have ordered you as needed Voltaren gel to be used whenever he have this pain.  Please make sure you follow-up with your pain doctor for further medication adjustments as necessary.  Please take your medications as discussed in the discharge instructions and follow-up with your PCP within 1 week of discharge to reassess how you are doing.

## 2024-03-26 NOTE — UM PHYSICIAN REVIEW NOTE
Patient admitted 3/24 for chest pain thought to be MSK or from post herpetic neuralgia, getting Tylenol for pain, cards said no further testing.  Continue observation for now.  I d/w attending.

## 2024-03-26 NOTE — PROGRESS NOTES
Physical Therapy -  Initial Evaluation     Patient: Rissa Erickson  Location: [facility] 3B 0331  MRN: [id number]  Today's date: 3/26/2024    HISTORY OF PRESENT ILLNESS     Rissa is a 88 y.o. female admitted on 3/24/2024 with Unstable angina (CMS/HCC) [I20.0]  Chest pain [R07.9]  COVID-19 [U07.1]. Principal problem is Chest pain.    Past Medical History  Rissa has a past medical history of A-fib (CMS/HCC), Accelerated hypertension, CHF (congestive heart failure) (CMS/HCC), Macular degeneration, Thyroid nodule, and Type 2 diabetes mellitus (CMS/HCC).    History of Present Illness  88 y.o. female with a past medical history of atrial fibrillation s/p Eliquis, type II DM, HFpEF, post herpetic neuralgia s/p pain stimulator who presented to [facility] ED with complaints on acute on chronic chest pain. (+) COVID-19    Chest XR IMPRESSION:     1.  No evidence of pulmonary embolism. Mild enlargement of central pulmonary arteries suggests pulmonary arterial hypertension; correlate clinically.  2.  No acute pulmonary abnormality.  3.  Enlarged, multinodular thyroid gland narrows and displaced trachea, as above.  4.  Mild subcarinal adenopathy, nonspecific but possibly reactive.  PRIOR LEVEL OF FUNCTION AND LIVING ENVIRONMENT     Prior Level of Function      Flowsheet Row Most Recent Value   Dominant Hand right   Ambulation independent   Transferring independent   Toileting independent   Bathing independent   Dressing independent   Eating independent   IADLs independent   Communication understands/communicates without difficulty   Prior Level of Function Comment IND w/ ADLs,  Rollator for Mobility   Assistive Device Currently Used at Home walker, 4-wheeled          Prior Living Environment      Flowsheet Row Most Recent Value   People in Home alone   Current Living Arrangements apartment, independent living facility   Home Accessibility elevator accessible   Living Environment Comment Lives in 4th [facility] [place] [facility]  with elevator access          VITALS AND PAIN     PT Vitals      Date/Time Pulse SpO2 Pt Activity O2 Therapy BP BP Location BP Method Pt Position [facility]   03/26/24 0953 67 97 % At rest None (Room air) 127/58 Left upper arm Automatic Sitting KENDY   03/26/24 1009 68 99 % At rest None (Room air) 127/59 Left upper arm Automatic Sitting KENDY          PT Pain      Date/Time Pain Type Rating: Rest [facility]   03/26/24 0953 Pain Assessment 0 - no pain KENDY             Objective   OBJECTIVE     Start time:  0950  End time:  1014  Session Length: 24 min  Mode of Treatment: physical therapy, co-treatment, occupational therapy (co-tx w/ OT to expedite DC planning)    General Observations  Patient received upright, in chair. She was agreeable to therapy, no issues or concerns identified by nurse prior to session (Covid (+)). pt in no distress    Precautions: fall, contact              PT Eval and Treat - 03/26/24 0950          PT Time Calculation    Start Time 0950     Stop Time 1014     Time Calculation (min) 24 min        Cognition    Orientation Status oriented x 4     Affect/Mental Status WFL;flat/blunted affect     Follows Commands follows one-step commands;over 90% accuracy     Cognitive Function safety deficit     Safety Deficit safety precautions awareness;judgment        Vision Assessment/Intervention    Vision Assessment corrective lenses for distance   has macular degen       Sensory Assessment    Sensory Assessment sensation intact, lower extremities        Lower Extremity Assessment    LE Assessment ROM and Strength WFL     General Observations demo's full (B)LE AROM and grosslsy 4+/5 strength w/ no focal deficits noted        Motor Skills    Functional Endurance fair        Bed Mobility    Comment pt OOB in chair by nsg        Mobility Belt    Mobility Belt Used for All Out of Bed Activity yes        Sit/Stand Transfer    Surface chair with arm rests     [place] close supervision     Safety/Cues increased time to  complete;hand placement;verbal cues;technique     Assistive Device walker, front-wheeled     Transfer Comments slow to stand from chair.   standing posture is kypho-scoliotic w/ moderate fwd flxn using RW.  unable to correct past this position        Gait Training    [place], Gait close supervision     Assistive Device walker, front-wheeled     Distance in Feet 12 feet   12ft x2    Pattern step-through     Comment (Gait/Stairs) pt limited to in-room ambulation only 2* covid precautions.        Balance    Static Sitting Balance WFL     Dynamic Sitting Balance WFL     Sit to Stand Dynamic Balance mild impairment;supported     Static Standing Balance mild impairment;supported     Dynamic Standing Balance mild impairment;supported     Comment, Balance benefits from RW for ambulation & txfrs        Impairments/Safety Issues    Impairments Affecting Function balance;strength;endurance/activity tolerance                     Education Documentation  Treatment Plan, taught by Tho Acevedo PT at 3/26/2024  5:59 PM.  Learner: Patient  Readiness: Acceptance  Method: Explanation  Response: Verbalizes Understanding  Comment: explained role of therapy in pt's DC planning        Session Outcome  Patient upright, in chair at end of session, chair alarm on, all needs met, personal items in reach, call light in reach. Nursing notified about patient's response to therapy/activity.    AM-PAC™ - Mobility (Current Function)     Turning form your back to your side while in flat bed without using bedrails 4 - None   Moving from lying on your back to sitting on the side of a flat bed without using bedrails 4 - None   Moving to and from a bed to a chair 3 - A Little   Standing up from a chair using your arms 3 - A Little   To walk in a hospital room 3 - A Little   Climbing 3-5 steps with a railing 3 - A Little   AM-PAC™ Mobility Score 20      ASSESSMENT AND PLAN     Progress Summary  assessment for mobility and ambulation for DC back  to Indep Living facility - pt currently req's cl(S)for mobility and ambulation w/ RW in room and likely hallway.  would benefit from home therapy upon return home from [facility] to address possible needs for safety and maximizing her mobility / ambulation w/in her home and community -  pt receptive.    Patient/Family Therapy Goals Statement: wants to return home    PT Plan      Flowsheet Row Most Recent Value   Therapy Frequency evaluation only at 03/26/2024 0950            PT Discharge Recommendations      Flowsheet Row Most Recent Value   PT Recommended Discharge Disposition independent living facility, home with home health at 03/26/2024 0950   Anticipated Equipment Needs if Discharged Home (PT) none  [has rollator walker] at 03/26/2024 0950

## 2024-03-26 NOTE — PLAN OF CARE
Problem: Adult Inpatient Plan of Care  Goal: Plan of Care Review  Outcome: Progressing  Flowsheets (Taken 3/26/2024 1300)  Progress: improving  Outcome Evaluation: OT eval complete  Plan of Care Reviewed With: patient     Problem: BADL (Basic Activities of Daily Living) Impairment  Goal: Optimal Safe BADL Performance  Outcome: Progressing

## 2024-03-26 NOTE — DISCHARGE SUMMARY
Hospital Medicine Service -  Inpatient Discharge Summary        BRIEF OVERVIEW   Patient: Rissa Erickson (6/7/1935)    Admitting Provider: Geovanny Chavis DO  Attending Provider: Kael Mathew MD Attending phys phone: [phone]    PCP: Sloan Maher [phone]    Admission Date: 3/24/2024  Discharge Date: 3/26/2024     DISCHARGE DIAGNOSES      Primary Discharge Diagnosis  Chest pain    Secondary Discharge Diagnoses  Active Hospital Problems    Diagnosis Date Noted    Chest pain 03/24/2024     Priority: High    COVID 03/22/2024     Priority: High    Hyperthyroidism 06/21/2021     Priority: High    Chronic diastolic CHF (congestive heart failure) (CMS/[facility]) 04/07/2021     Priority: High    Multinodular goiter 03/26/2024    Type 2 diabetes mellitus without complication, without long-term current use of insulin (CMS/[facility]) 08/08/2023    Paroxysmal atrial fibrillation (CMS/[facility]) 12/24/2022    Postherpetic neuralgia 10/05/2021    Mixed hyperlipidemia 10/05/2021      Resolved Hospital Problems   No resolved problems to display.       Problem List on Day of Discharge  COVID  Assessment & Plan  Afebrile, no leukocytosis  Home kit + for COVID on 3/22/24 for sx of sore throat  COVID confirmed here on 3/24/24  Currently on room air, without shortness of breath.   Continue home molnupiravir    Hyperthyroidism  Assessment & Plan  Hx of amiodarone induced thyroiditis and hyperthyroidism  Recent TFTs in Feb 2024 were good, no changes to medications    Chronic diastolic CHF (congestive heart failure) (CMS/[facility])  Assessment & Plan  Unknown recent echo results however, prior 2021 TTE with EF 55-60% with diastolic dfx  Pt was recently on increased lasix dosing due to shortness of breath, which resolved with increased dose  Currently on usual home lasix 20mg PO dose. Does not appear to be in volume overload, so will continue home dose  Continue outpatient follow-up with cardiology    * Chest pain  Assessment & Plan  Acute  flareup of an ongoing problem  Unclear etiology, acute on chronic chest pain not related to chronic post-herpetic neuralgia or stimulator placement  Nontender upon palpation, no rash appreciated  Appears to be chest wall pain.  No rash or erythema noted on exam> as needed Voltaren gel ordered  Per cardiology no further inpatient workup.  They will schedule stress test for outpatient.  Will need to follow-up with her pain management doctor outpatient    Multinodular goiter  Assessment & Plan  Has been ongoing for some time.  Normal recent TSH levels.  Noted on CT to be pushing on the trachea leading to narrowing but was told outpatient that she was not a surgical candidate as the size of the thyroid is encompassing several nerves and the procedure would be too risky.  She continue outpatient follow-up with PCP for close monitoring    Type 2 diabetes mellitus without complication, without long-term current use of insulin (CMS/[facility])  Assessment & Plan  Follows with Dr Maher, last A1c 6.1 in 02/2024  Continue home metformin      Postherpetic neuralgia  Assessment & Plan  S/p [employer] stimulator for pain control (~15 years ago for insertion)  Was recently evaluated by Dr Frazier, without any issues, good battery life and placement  Likely not the source of the pain  Home pain regimen: gabapentin TID, tramadol TID to continue for now      Paroxysmal atrial fibrillation (CMS/HCC)  Assessment & Plan  Hx of amiodarone induced thyroiditis  On Eliquis, currently on propranolol   Currently rate controlled, ECG in NSR  Continue home eliquid and propranolol    Mixed hyperlipidemia  Assessment & Plan  Continue home statin        SUMMARY OF HOSPITALIZATION      Presenting Problem/History of Present Illness  This is a 88 y.o. year-old female admitted on 3/24/2024 with Chest pain.    Hospital Course    88-year-old female with past medical history of A-fib s/p Eliquis, type 2 diabetes, HFpEF, postherpetic neuralgia s/p pain  stimulator presented with complaints of acute on chronic chest pain.  Evaluated by cardiology who did not think it was ACS.  They recommended outpatient stress test.  Daughter is an NP and requested CT chest to rule out PE even though patient is on Eliquis.  No PE was noted on CT.  Chest pain likely MSK related.  Started on as needed Voltaren gel.  Encouraged her to follow-up with her pain management doctor outpatient..  Of note she was noted to be COVID-positive, but hemodynamically stable and breathing comfortably on room air.  We discussed how this could be exacerbating chest discomfort.    Exam on Day of Discharge  Physical Exam:  General: NAD, sitting up in bed  HEENT: atraumatic, no scleral icterus  Cardiovascular: RRR, no murmurs  Pulmonary: CTAB; no rales, rhonchi or wheezing  GI: Soft, nontender, nondistended  Ext: no trace pedal edema  Neuro: alert and oriented to person, place, and time; no sensory or motor deficits        Consults During Admission  IP CONSULT TO CARDIOLOGY    DISCHARGE MEDICATIONS               Medication List        START taking these medications      diclofenac sodium 1 % topical gel  Commonly known as: VOLTAREN  Apply topically 2 (two) times a day as needed for pain.     SLOW  mg (45 mg iron) tablet extended release tablet, extended release  Take 1 tablet (137 mg total) by mouth daily with breakfast.  Dose: 137 mg  Generic drug: ferrous sulfate            CHANGE how you take these medications      apixaban 2.5 mg tablet  Commonly known as: ELIQUIS  Take 1 tablet (2.5 mg total) by mouth 2 (two) times a day Indications: treatment to prevent blood clots in chronic atrial fibrillation.  Dose: 2.5 mg  What changed:   medication strength  how much to take     traMADoL 50 mg tablet  Commonly known as: ULTRAM  Take 1 tablet (50 mg total) by mouth 3 (three) times a day as needed for pain for up to 5 days.  Dose: 50 mg  What changed:   when to take this  reasons to take this             CONTINUE taking these medications      acetaminophen 325 mg tablet  Commonly known as: TYLENOL  Take 650 mg by mouth every 6 hours as needed.  Dose: 650 mg     atorvastatin 20 mg tablet  Commonly known as: LIPITOR  Take 1 tablet (20 mg total) by mouth nightly.  Dose: 20 mg     furosemide 20 mg tablet  Commonly known as: LASIX  Take 1 tablet (20 mg total) by mouth daily.  Dose: 20 mg     gabapentin 300 mg capsule  Commonly known as: NEURONTIN  Take 1 capsule (300 mg total) by mouth 3 (three) times a day.  Dose: 300 mg     lancets misc  1 Lancet as needed (Diabetes). To check blood glucose  Dose: 1 Lancet     metFORMIN 500 mg tablet  Commonly known as: GLUCOPHAGE  Take 2 tablets (1,000 mg total) by mouth 2 (two) times a day with meals.  Dose: 1,000 mg     molnupiravir 200 mg capsule capsule  Take 4 capsules (800 mg total) by mouth every 12 (twelve) hours for 5 days.  Dose: 800 mg     pantoprazole 20 mg EC tablet  Commonly known as: PROTONIX  Take 1 tablet (20 mg total) by mouth daily.  Dose: 20 mg     PROLIA 60 mg/mL syringe  Inject 60 mg under the skin every 6 (six) months.  Dose: 60 mg  Generic drug: denosumab     propranoloL 20 mg tablet  Commonly known as: INDERAL  Take 1 tablet (20 mg total) by mouth 2 (two) times a day.  Dose: 20 mg            STOP taking these medications      FREESTYLE LITE STRIPS strip  Generic drug: blood sugar diagnostic                    PROCEDURES / LABS / IMAGING      Procedures  N/a    Pertinent Labs  Results from last 7 days   Lab Units 03/26/24  0223   WBC K/uL 6.89   HEMOGLOBIN g/dL 10.4*   HEMATOCRIT % 32.8*   PLATELETS K/uL 221     Results from last 7 days   Lab Units 03/26/24  0223   SODIUM mEQ/L 138   POTASSIUM mEQ/L 4.0   CHLORIDE mEQ/L 102   CO2 mEQ/L 31   BUN mg/dL 22   CREATININE mg/dL 0.6   GLUCOSE mg/dL 106*   CALCIUM mg/dL 8.7           Pertinent Imaging  CT ANGIOGRAPHY CHEST PULMONARY EMBOLISM WITH IV CONTRAST    Result Date: 3/25/2024  IMPRESSION: 1.  No evidence of  pulmonary embolism. Mild enlargement of central pulmonary arteries suggests pulmonary arterial hypertension; correlate clinically. 2.  No acute pulmonary abnormality. 3.  Enlarged, multinodular thyroid gland narrows and displaced trachea, as above. 4.  Mild subcarinal adenopathy, nonspecific but possibly reactive. Preliminary results of no pulmonary embolism, enlargement of central pulmonary arteries and goiter were personally communicated to and acknowledged by Dr. Yemi Alonzo via [employer] Secure Chat at 1659 hours on 3/25/2024.    X-RAY CHEST 1 VIEW    Result Date: 3/24/2024  IMPRESSION: No acute disease in chest. Stable enlargement of the cardiac silhouette. COMMENT: Comparison: Chest x-ray 3/15/2024. Technique: A single frontal AP portable upright projection of the chest was obtained. FINDINGS: The lungs are clear without focal airspace consolidation, pleural effusion or pneumothorax. There is stable enlargement of the cardiac silhouette. The mediastinal contours are unchanged. The upper abdomen is unremarkable. Again noted are neurostimulator leads terminating over the midline of the upper and mid to lower chest. There is no acute osseous abnormality.    X-RAY CHEST 2 VIEWS    Result Date: 3/15/2024  IMPRESSION: No evidence of acute disease in the chest as discussed below. Nonspecific gaseous distention of bowel in the upper abdomen. Additional findings as discussed below. COMMENT: Comparison: Chest x-ray from 11/24/2023. PA and lateral views the chest. No pneumothorax or pleural effusion. There may be some very mild left basilar volume loss or scarring which is similar to the prior study. No acute airspace consolidation or pulmonary edema. No significant vascular congestion. Study is somewhat limited due to leftward rotation of the patient the frontal view and obliquity on the lateral view. Cardiac silhouette appears slightly enlarged similar to the prior study with atherosclerotic aorta. Gaseous distention  of bowel in the upper abdomen incompletely assessed and nonspecific. Spinal stimulator leads are noted. There is a slight scoliosis suspected. Demineralization limits assessment of the osseous structures. Diffuse degenerative changes. Mild loss of vertebral body height in the thoracic spine appears chronic without significant change from prior study.     OUTPATIENT  FOLLOW-UP / REFERRALS / PENDING TESTS        Outpatient Follow-Up Appointments            In 3 weeks  [facility]    In 4 months [facility] - Radiology    In 4 months Sloan Maher MD [facility] in [facility]    In 4 months Lyric Perez MD [facility] Endocrinology at [facility]            Referrals  No orders of the defined types were placed in this encounter.      Test Results Pending at Discharge  Unresulted Labs (From admission, onward)      None            Important Issues to Address in Follow-Up  You were admitted for chest pain.  Cardiology evaluated you and did not think you were having recurrent heart attack so they will follow-up with you outpatient for stress test.  He also had a CT scan of your chest which did not show any signs of a blood clot.  You were found to have COVID which can exacerbate any previous symptoms like this chronic chest discomfort you have been having for a while.  Although we were not able to find the source of your pain we are confident that is not something life-threatening and will just need further outpatient follow-up.  In the meantime we have ordered you as needed Voltaren gel to be used whenever he have this pain.  Please make sure you follow-up with your pain doctor for further medication adjustments as necessary.  Please take your medications as discussed in the discharge instructions and follow-up with your PCP within 1 week of discharge to reassess how you are doing.        DISCHARGE DISPOSITION AND DESTINATION       Disposition: Home   Destination:  Home                            Code Status At Discharge: Full Code    Spent more than 35 minutes in patient evaluation, family update, physical evaluation, and  coordination of discharge and care.       Physician Order for Life-Sustaining Treatment Document Status        No documents found                     This note was dictated using voice recognition software, and might contain typographical errors leading to some inaccuracies, if questions arise, please feel free to reach out.

## 2024-03-26 NOTE — PLAN OF CARE
Problem: Adult Inpatient Plan of Care  Goal: Plan of Care Review  Outcome: Progressing  Flowsheets (Taken 3/26/2024 6337)  Progress: improving  Outcome Evaluation: PT eval complete  Plan of Care Reviewed With: patient

## 2024-03-26 NOTE — PROGRESS NOTES
"Daily Progress Note    Subjective  No chest pain or shortness of breath    Medications    acetaminophen, 650 mg, oral, q4h PRN    apixaban, 2.5 mg, oral, BID    atorvastatin, 20 mg, oral, Nightly    glucose, 16-32 g of dextrose, oral, PRN **OR** dextrose, 15-30 g of dextrose, oral, PRN **OR** glucagon, 1 mg, intramuscular, PRN **OR** dextrose 50 % in water (D50), 25 mL, intravenous, PRN    furosemide, 20 mg, oral, Daily    gabapentin, 300 mg, oral, TID    insulin lispro U-100, 3-5 Units, subcutaneous, With meals & nightly    magnesium hydroxide, 30 mL, oral, Once    [Provider Managed Hold] metFORMIN, 1,000 mg, oral, BID with meals    molnupiravir, 800 mg, oral, BID    pantoprazole, 20 mg, oral, Daily    propranoloL, 20 mg, oral, BID    traMADoL, 50 mg, oral, 3x daily PRN    SLOW FE    Objective  Vital signs in last 24 hours:  Temp:  [36.5 °C (97.7 °F)-37.1 °C (98.7 °F)] 37 °C (98.6 °F)  Heart Rate:  [63-75] 64  Resp:  [16-26] 16  BP: (103-144)/(61-69) 144/64    No intake or output data in the 24 hours ending 03/26/24 1208    Physical Exam  Visit Vitals  BP (!) 144/64 (BP Location: Left upper arm, Patient Position: Lying)   Pulse 64   Temp 37 °C (98.6 °F) (Oral)   Resp 16   Ht 1.6 m (5' 3\")   Wt 59.7 kg (131 lb 9.6 oz)   SpO2 95%   BMI 23.31 kg/m²     The patient appears comfortable and is in no apparent distress,    Lungs: Clear to auscultation,   Heart: Regular rhythm,   Abdomen: Soft, nontender,    Extremities: No edema,   Neuro: Alert and oriented without focal deficit.    Lab Results   Component Value Date    WBC 6.89 03/26/2024    HGB 10.4 (L) 03/26/2024     03/26/2024    CHOL 92 03/24/2024    TRIG 86 03/24/2024    HDL 43 (L) 03/24/2024    ALT 16 03/24/2024    AST 24 03/24/2024     03/26/2024    K 4.0 03/26/2024     03/26/2024    CREATININE 0.6 03/26/2024    BUN 22 03/26/2024    CO2 31 03/26/2024    TSH 0.85 02/08/2024    HGBA1C 6.1 (H) 03/24/2024     (H) 03/24/2024    HSTROPONINI 11.7 " 03/24/2024       ECG/Telemetry:   Sinus rhythm    Assessment & Plan    COVID  Assessment & Plan  Management as per [facility].     Paroxysmal atrial fibrillation (CMS/[facility])  Assessment & Plan  She is in sinus rhythm currently. Continue Eliquis .    Mixed hyperlipidemia  Assessment & Plan  Continue statin.     Chronic diastolic CHF (congestive heart failure) (CMS/[facility])  Assessment & Plan  She appears euvolemic currently. Continue Lasix 20mg daily.     * Chest pain  Assessment & Plan  No recurrent chest pain.  Outpatient stress test.        León Rodriguez MD  3/26/2024

## 2024-03-26 NOTE — PROGRESS NOTES
Occupational Therapy -  Initial Evaluation     Patient: Rissa Erickson  Location: [facility] 3B 0331  MRN: [id number]  Today's date: 3/26/2024    HISTORY OF PRESENT ILLNESS     Rissa is a 88 y.o. female admitted on 3/24/2024 with Unstable angina (CMS/HCC) [I20.0]  Chest pain [R07.9]  COVID-19 [U07.1]. Principal problem is Chest pain.    Past Medical History  Rissa has a past medical history of A-fib (CMS/HCC), Accelerated hypertension, CHF (congestive heart failure) (CMS/HCC), Macular degeneration, Thyroid nodule, and Type 2 diabetes mellitus (CMS/HCC).    History of Present Illness  88 y.o. female with a past medical history of atrial fibrillation s/p Eliquis, type II DM, HFpEF, post herpetic neuralgia s/p pain stimulator who presented to [facility] ED with complaints on acute on chronic chest pain. (+) COVID-19    Chest XR IMPRESSION:     1.  No evidence of pulmonary embolism. Mild enlargement of central pulmonary arteries suggests pulmonary arterial hypertension; correlate clinically.  2.  No acute pulmonary abnormality.  3.  Enlarged, multinodular thyroid gland narrows and displaced trachea, as above.  4.  Mild subcarinal adenopathy, nonspecific but possibly reactive.    PRIOR LEVEL OF FUNCTION AND LIVING ENVIRONMENT     Prior Level of Function    Flowsheet Row Most Recent Value   Dominant Hand right   Ambulation independent   Transferring independent   Toileting independent   Bathing independent   Dressing independent   Eating independent   IADLs independent   Communication understands/communicates without difficulty   Prior Level of Function Comment IND w/ ADLs,  Rollator for Mobility   Assistive Device Currently Used at Home walker, 4-wheeled        Prior Living Environment    Flowsheet Row Most Recent Value   People in Home alone   Current Living Arrangements apartment, independent living facility   Home Accessibility elevator accessible   Living Environment Comment Lives in 4th floor [facility]  with elevator access        Occupational Profile    Flowsheet Row Most Recent Value   Environmental Supports and Barriers lives at an ILF alone        VITALS AND PAIN     OT Vitals    Date/Time Pulse SpO2 Pt Activity O2 Therapy BP BP Location BP Method Pt Position [facility]   03/26/24 0953 67 97 % At rest None (Room air) 127/58 Left upper arm Automatic Sitting KENDY   03/26/24 1009 68 99 % At rest None (Room air) 127/59 Left upper arm Automatic Sitting KENDY      OT Pain    Date/Time Pain Type Rating: Rest [facility]   03/26/24 0953 Pain Assessment 0 - no pain KENDY           Objective   OBJECTIVE     Start time:  0951  End time:  1011  Session Length: 20 min  Mode of Treatment: co-treatment, occupational therapy (skilled overlap w/ PT to expedite dc)    General Observations  Patient received upright, in chair. She was agreeable to therapy, no issues or concerns identified by nurse prior to session. (+) COVID precaution; Pt rec'd seated in bedside chair; NAD - agreeable to therapy    Precautions: fall, contact              OT Eval and Treat - 03/26/24 0951        Cognition    Orientation Status oriented x 4     Affect/Mental Status WFL;flat/blunted affect     Follows Commands follows one-step commands;over 90% accuracy     Cognitive Function safety deficit     Safety Deficit safety precautions awareness;judgment     Comment, Cognition pleasant and cooperative; dec safety awareness; follows commands well        Vision Assessment/Intervention    Vision Assessment other (see comments)   macular degeneration       Sensory Assessment    Sensory Assessment sensation intact, upper extremities        Upper Extremity Assessment    UE Assessment ROM and Strength WFL except     General Observations grossly 4/5 strength for BUE's        Motor Skills    Coordination WFL;opposition        Bed Mobility    Comment rec'd OOB        Mobility Belt    Mobility Belt Used for All Out of Bed Activity yes        Sit/Stand Transfer    Surface chair with arm  rests     [place] close supervision     Safety/Cues increased time to complete;hand placement;verbal cues;technique     Assistive Device walker, front-wheeled     Transfer Comments Incr'd time to rise from chair; forward flexed posture however steady upon standing; dec eccentric control to sit; denies LH/dizziness;        Functional Mobility    Distance in room/bathroom     Functional Mobility [place] close supervision     Safety/Cues increased time to complete;hand placement;verbal cues;sequencing;technique     Assistive Device walker, front-wheeled     Functional Mobility Comments incr'd time, cueing for safety, RW management and body mechanics to navigate from chair <> bathroom. SpO2 > 90% on RA pre & post ambulation. 0/10 RPE; mildly limited by dec act tolerance, gross weakness and balance        Lower Body Dressing    Tasks don;shoes/slippers     [place] supervision     Safety/Cues increased time to complete;energy conservation     Position supported sitting     Comment good functional reach to floor level to don b/l shoes; figiure four for adjusting straps        Grooming    Tasks washes, rinses and dries hands     [place] close supervision     Position sink side     Comment sink leaning noted for hand hygiene; limited by balance, and BUE weakness        Toileting    Tasks adjust/manage clothing;perform bladder hygiene     [place] supervision     Position supported sitting     Comment no assist req'd for adjusting LE garments and completing milagros care performed seated on toilet        Balance    Static Sitting Balance WFL     Dynamic Sitting Balance WFL     Sit to Stand Dynamic Balance mild impairment;supported     Static Standing Balance mild impairment;supported     Dynamic Standing Balance mild impairment;supported     Comment, Balance CLS for fxl transfers; no overt LOB noted        Impairments/Safety Issues    Impairments Affecting Function balance;strength;endurance/activity  tolerance     Functional Endurance fair                                Education Documentation  Safety Techniques, taught by Selena Robbins OT at 3/26/2024  1:00 PM.  Learner: Patient  Readiness: Acceptance  Method: Explanation  Response: Verbalizes Understanding, Demonstrated Understanding  Comment: OT POC; safety w/ ADLs and fxl transfers    Equipment/Methods, taught by Selena Robbins OT at 3/26/2024  1:00 PM.  Learner: Patient  Readiness: Acceptance  Method: Explanation  Response: Verbalizes Understanding, Demonstrated Understanding  Comment: OT POC; safety w/ ADLs and fxl transfers    Treatment Plan, taught by Selena Robbins OT at 3/26/2024  1:00 PM.  Learner: Patient  Readiness: Acceptance  Method: Explanation  Response: Verbalizes Understanding, Demonstrated Understanding  Comment: OT POC; safety w/ ADLs and fxl transfers        Session Outcome  Patient upright, in chair at end of session, call light in reach, chair alarm on, personal items in reach, all needs met. Nursing notified about patient's response to therapy/activity, patient's performance, and patient's position.    AM-PAC™ - ADL (Current Function)     Putting on/taking off regular lower body clothing 3 - A Little   Bathing 3 - A Little   Toileting 3 - A Little   Putting on/taking off regular upper body clothing 3 - A Little   Help for taking care of personal grooming 3 - A Little   Eating meals 4 - None   AM-PAC™ ADL Score 19      ASSESSMENT AND PLAN     Progress Summary  OT eval complete; Pt lives alone in ILF admit for L sided Chest pain presents slightly below functional baseline performing ADLs & fxl mob. Pt Sup - CL S w/ RW for fxl transfers, Sup for seated LD dressing; Sup for Toileting & grooming at sink. Performance limited by endurance, balance and strength deficits. OT will follow for strengthening & conditioning to promote ADL engagement and improve overall safety & IND. Rec pt return Home (ILF) w/ HH OT once  medically appropriate for dc.    Patient/Family Therapy Goal Statement: to go home    OT Plan    Flowsheet Row Most Recent Value   Rehab Potential good, to achieve stated therapy goals at 03/26/2024 0951   Therapy Frequency 3 times/wk at 03/26/2024 0951   Planned Therapy Interventions activity tolerance training, patient/caregiver education/training, transfer/mobility retraining, adaptive equipment training, functional balance retraining, occupation/activity based interventions, BADL retraining, IADL retraining, ROM/therapeutic exercise, passive ROM/stretching, strengthening exercise at 03/26/2024 0951          OT Discharge Recommendations    Flowsheet Row Most Recent Value   OT Recommended Discharge Disposition independent living facility, home with home health at 03/26/2024 0951   Anticipated Equipment Needs if Discharged Home (OT) reacher at 03/26/2024 0951               OT Goals    Flowsheet Row Most Recent Value   Transfer Goal 1    Activity/Assistive Device all transfers at 03/26/2024 0951   [place] modified independence at 03/26/2024 0951   Time Frame by discharge at 03/26/2024 0951   Progress/Outcome goal ongoing at 03/26/2024 0951   Dressing Goal 1    Activity/Adaptive Equipment dressing skills, all at 03/26/2024 0951   [place] modified independence at 03/26/2024 0951   Time Frame by discharge at 03/26/2024 0951   Progress/Outcome goal ongoing at 03/26/2024 0951   Toileting Goal 1    Activity/Assistive Device toileting skills, all at 03/26/2024 0951   [place] modified independence at 03/26/2024 0951   Time Frame by discharge at 03/26/2024 0951   Progress/Outcome goal ongoing at 03/26/2024 0951

## 2024-03-26 NOTE — ASSESSMENT & PLAN NOTE
Has been ongoing for some time.  Normal recent TSH levels.  Noted on CT to be pushing on the trachea leading to narrowing but was told outpatient that she was not a surgical candidate as the size of the thyroid is encompassing several nerves and the procedure would be too risky.  She continue outpatient follow-up with PCP for close monitoring

## 2024-03-26 NOTE — HOSPITAL COURSE
Rissa is a 88 y.o. female admitted on 3/24/2024 with Unstable angina (CMS/HCC) [I20.0]  Chest pain [R07.9]  COVID-19 [U07.1]. Principal problem is Chest pain.    Past Medical History  Rissa has a past medical history of A-fib (CMS/HCC), Accelerated hypertension, CHF (congestive heart failure) (CMS/HCC), Macular degeneration, Thyroid nodule, and Type 2 diabetes mellitus (CMS/HCC).    History of Present Illness  88 y.o. female with a past medical history of atrial fibrillation s/p Eliquis, type II DM, HFpEF, post herpetic neuralgia s/p pain stimulator who presented to [facility] ED with complaints on acute on chronic chest pain. (+) COVID-19    Chest XR IMPRESSION:     1.  No evidence of pulmonary embolism. Mild enlargement of central pulmonary arteries suggests pulmonary arterial hypertension; correlate clinically.  2.  No acute pulmonary abnormality.  3.  Enlarged, multinodular thyroid gland narrows and displaced trachea, as above.  4.  Mild subcarinal adenopathy, nonspecific but possibly reactive.

## 2024-03-26 NOTE — PROGRESS NOTES
Referral per CC  for SN, PT, OT at home.  [facility] information provided per CC, no new equipment ordered.  [facility] will contact within 24 h of discharge.  Daughter, Cristina, is contact.

## 2024-03-27 ENCOUNTER — TELEPHONE (OUTPATIENT)
Dept: PRIMARY CARE | Facility: CLINIC | Age: 88
End: 2024-03-27
Payer: MEDICARE

## 2024-03-27 ENCOUNTER — PATIENT OUTREACH (OUTPATIENT)
Dept: CASE MANAGEMENT | Facility: CLINIC | Age: 88
End: 2024-03-27
Payer: MEDICARE

## 2024-03-27 ASSESSMENT — ENCOUNTER SYMPTOMS
ABDOMINAL PAIN: 0
SHORTNESS OF BREATH: 1
DIARRHEA: 0
DIFFICULTY URINATING: 0
DIZZINESS: 0
VOMITING: 0
NAUSEA: 0
LIGHT-HEADEDNESS: 0
APPETITE CHANGE: 0

## 2024-03-27 NOTE — PROGRESS NOTES
NAME: Rissa Erickson    MRN: [id number]    YOB: 1935    Event Review:    Initial TCM Patient Outreach Date: 03/27/24    Assessment completed with: Family Member  Patient stated reason for hospitalization: pt having chest pain  Discharge Diagnosis: Chest pain    Patient readmitted in the last 30 days: (!) Yes  Discharging Facility: [facility]  Date of Last Admission: 03/24/24  Date of Last Discharge: 03/26/24  Patient's perception of their health status since discharge: Improving    HPI:  Spoke with pt daughter today, pt presented to [facility] on 3/24/2024 with chest pain.  Pt had CT scan to rule out pulmonary embolism, which was negative.  Pt seen by Cardiology and they felt not heart related chest pain.  Pt did test positive for Covid and they felt it may be increasing her symptoms.  Pt to follow-up outpatient with stress test.  Pt discharged to home on 3/26/2024 with services.       Spoke with pt's daughter today, pt doing okay.  Pt lives alone in apartment with elevator in building.  Pt using Rolator for ambulation.  Pt has grab bars, shower seat and elevated toilet seat.  Pt's daughter checks in with pt frequently.    Pt with no complaints of chest pain, dizziness, nausea, vomiting or diarrhea.  Pt with shortness of breath with exertion.  Pt with slight peripheral edema, non pitting.  Pt with pain from left rib/sternum around to spine.  Pt has implanted pain stimulator-checked on 3/23/2024 and functioning well.    [facility] will be providing nursing, physical and occupational therapies in home.  Pt's daught has not received call to schedule.    Discharge instructions reviewed with pt's daughter.  Pt's daughter states pt checks weight, blood pressure, pulse oximeter and blood sugar daily.  Pt following low sodium, diabetic diet.  Pt aware of CHF diet.  Pt aware of when to call provider regarding weight.    In basket message sent to PCP to schedule.  Pt's daughter needs paperwork for diabetic  shoes to be faxed to  as requested.    Review of Systems   Constitutional:  Negative for appetite change.   Respiratory:  Positive for shortness of breath.    Cardiovascular:  Positive for leg swelling. Negative for chest pain.   Gastrointestinal:  Negative for abdominal pain, diarrhea, nausea and vomiting.   Genitourinary:  Negative for difficulty urinating.   Neurological:  Negative for dizziness and light-headedness.     Medication Review:    Medication Review: Yes     Reported by: Patient  Any new medications prescribed at discharge?: Yes  Is the patient having any side effects they believe may be caused by any medication additions or changes?: No  New prescriptions filled?: Yes     Do you have enough of your regularly prescribed medications?: Yes       Medication adherence problem?: No  Was a medication discrepancy indentified?: No       Nursing Interventions: No intervention needed  Reconciled the current and discharge medications: Yes  Reviewed AVS (Discharge Instructions)?: Yes     Acute Pain:    Acute pain: No      Chronic Pain:    Chronic pain: Yes  Limitation of routine activities due to chronic pain?: no  Chronic pain severity: 7 (varies)  Chronic pain timing: intermittent  Location of chronic pain: left rib, sternum to spine    Diet/Nutrition:    Type of Diet: Diabetic, Low sodium  Diet Adherence: Adherent with diet   Home Care Services:    Home Care Agency: [facility]  Type of Home Care Services: Home PT, Home OT, Home Nursing        Post-Discharge Durable Medical Equipment::    Durable Medical Equipment: Front wheeled walker, Grab bars, Raised toilet seat, Shower/tub seat  Does patient's condition require a scale?: No     Oxygen Use: No   Home Management:    Living Arrangement: Alone  Support System:: Family  Type of Residence: [address] [place] [address] house   Appointment Scheduling:    PCP appointment scheduled: No      Patient Scheduling Dispositions: No availability on schedule/message sent to office to assist  with scheduling     Follow-Ups:     Relevant Labs & Tests: Stress test 3/29/2024  Relevant Specialist Follow-ups: Dr. Wong  5/2024    Interventions/ Care Coordination:    Interventions/ Care Coordination: Encouraged patient to call PCP/Specialist, Encouraged patient to schedule with the PCP/Specialist  General Education: Respiratory precautions (flu, colds, pneumonia, RSV, COVID-19), Falls and home safety precautions     Initiated Care Plan: CHF          Reviewed signs/symptoms of worsening condition or complication that necessitate a call to the Physician's office.  Educated patient on access to care.  RN phone number given for future care management.    Ofelia Gamez RN BSN   [phone]

## 2024-03-27 NOTE — TELEPHONE ENCOUNTER
----- Message from Clemencia Gamez CM sent at 3/27/2024  1:58 PM EDT -----  Regarding: TCM  Hello all,    TCM complete.  Pt discharged from [facility] on 3/16/2024 with chest pain.    Please schedule for follow-up.    Thank you,  Ofelia     Medication: Phenytoin 100 mg    Date of last refill: 02/14/17 with 5 addt refills  [place]     Last office visit: 2/14/2017  Due back to clinic per last office note:  RTN in 1 year by 02/14/18  Date next office visit ryan

## 2024-03-27 NOTE — TELEPHONE ENCOUNTER
[facility] Appointment Request   Provider: Dr Maher   Appointment Type: HFU  Reason for Visit: Pt d/c'd from [facility] on 3/26/2024. Pt advised to f/u within a wk with pcp  Available Day and Time: Pt is avail on 3/29, 4/1, 4/2 or 4/3  Best Contact Number: Please call shola Evans for scheduling at [phone]    The practice will reach out to schedule your appointment within the next 2 business days.

## 2024-03-28 NOTE — TELEPHONE ENCOUNTER
Called and spoke with Cristina (daughter on PHI) Scheduled 40 min TCM for Thursday 4/4 at 9:20 am with Dr. Maher.  Daughter will call back if appt needs to be rescheduled and I sent appt info through [employer] per Cristina's request.

## 2024-04-01 ENCOUNTER — TELEPHONE (OUTPATIENT)
Dept: PRIMARY CARE | Facility: CLINIC | Age: 88
End: 2024-04-01

## 2024-04-01 NOTE — TELEPHONE ENCOUNTER
Kath, one of the home care nurses wanted to let Dr. Maher know the patient has Covid. She is very weak so, they're going to give her PT and OT. Once completed they will send a care plan over.

## 2024-04-03 ENCOUNTER — HOSPITAL ENCOUNTER (INPATIENT)
Facility: HOSPITAL | Age: 88
LOS: 5 days | Discharge: NURSING, REHAB OR LONG-TERM CARE | DRG: 194 | End: 2024-04-08
Attending: EMERGENCY MEDICINE | Admitting: INTERNAL MEDICINE
Payer: MEDICARE

## 2024-04-03 ENCOUNTER — APPOINTMENT (EMERGENCY)
Dept: RADIOLOGY | Facility: HOSPITAL | Age: 88
DRG: 194 | End: 2024-04-03
Payer: MEDICARE

## 2024-04-03 ENCOUNTER — PATIENT OUTREACH (OUTPATIENT)
Dept: CASE MANAGEMENT | Facility: CLINIC | Age: 88
End: 2024-04-03
Payer: MEDICARE

## 2024-04-03 DIAGNOSIS — J10.1 INFLUENZA A: ICD-10-CM

## 2024-04-03 DIAGNOSIS — R09.02 HYPOXIA: Primary | ICD-10-CM

## 2024-04-03 PROBLEM — R50.9 FEVER: Status: ACTIVE | Noted: 2024-04-03

## 2024-04-03 PROBLEM — E87.1 HYPONATREMIA: Status: ACTIVE | Noted: 2024-04-03

## 2024-04-03 PROBLEM — E04.9 THYROID GOITER: Status: ACTIVE | Noted: 2024-04-03

## 2024-04-03 LAB
ALBUMIN SERPL-MCNC: 3.8 G/DL (ref 3.5–5.7)
ALP SERPL-CCNC: 38 IU/L (ref 34–125)
ALT SERPL-CCNC: 19 IU/L (ref 7–52)
ANION GAP SERPL CALC-SCNC: 7 MEQ/L (ref 3–15)
APTT PPP: 31 SEC (ref 23–35)
AST SERPL-CCNC: 22 IU/L (ref 13–39)
ATRIAL RATE: 93
BASE EXCESS BLDV CALC-SCNC: 5.5 MEQ/L
BASOPHILS # BLD: 0.02 K/UL (ref 0.01–0.1)
BASOPHILS NFR BLD: 0.3 %
BILIRUB SERPL-MCNC: 0.6 MG/DL (ref 0.3–1.2)
BILIRUB UR QL STRIP.AUTO: NEGATIVE MG/DL
BNP SERPL-MCNC: 408 PG/ML
BUN SERPL-MCNC: 23 MG/DL (ref 7–25)
CALCIUM SERPL-MCNC: 8.9 MG/DL (ref 8.6–10.3)
CHLORIDE SERPL-SCNC: 98 MEQ/L (ref 98–107)
CLARITY UR REFRACT.AUTO: CLEAR
CO2 BLDV-SCNC: 32.6 MEQ/L (ref 22–32)
CO2 SERPL-SCNC: 30 MEQ/L (ref 21–31)
COLOR UR AUTO: YELLOW
CREAT SERPL-MCNC: 0.9 MG/DL (ref 0.6–1.2)
CRP SERPL-MCNC: 15.7 MG/L
D DIMER PPP IA.FEU-MCNC: 0.45 UG/ML FEU (ref 0–0.5)
DIFFERENTIAL METHOD BLD: ABNORMAL
EGFRCR SERPLBLD CKD-EPI 2021: >60 ML/MIN/1.73M*2
EOSINOPHIL # BLD: 0.02 K/UL (ref 0.04–0.36)
EOSINOPHIL NFR BLD: 0.3 %
ERYTHROCYTE [DISTWIDTH] IN BLOOD BY AUTOMATED COUNT: 14.7 % (ref 11.7–14.4)
FERRITIN SERPL-MCNC: 40 NG/ML (ref 11–250)
FIO2 ON VENT: 98 %
FLUAV RNA SPEC QL NAA+PROBE: POSITIVE
FLUBV RNA SPEC QL NAA+PROBE: NEGATIVE
GLUCOSE BLD-MCNC: 218 MG/DL (ref 70–99)
GLUCOSE SERPL-MCNC: 238 MG/DL (ref 70–99)
GLUCOSE UR STRIP.AUTO-MCNC: NEGATIVE MG/DL
HCO3 BLDV-SCNC: 29 MEQ/L (ref 21–28)
HCT VFR BLD AUTO: 33 % (ref 35–45)
HGB BLD-MCNC: 10.7 G/DL (ref 11.8–15.7)
HGB UR QL STRIP.AUTO: NEGATIVE
IMM GRANULOCYTES # BLD AUTO: 0.04 K/UL (ref 0–0.08)
IMM GRANULOCYTES NFR BLD AUTO: 0.6 %
INHALED O2 CONCENTRATION: 0 %
INR PPP: 1.2
KETONES UR STRIP.AUTO-MCNC: NEGATIVE MG/DL
LACTATE SERPL-SCNC: 1.1 MMOL/L (ref 0.4–2)
LDH SERPL L TO P-CCNC: 164 IU/L (ref 98–271)
LEUKOCYTE ESTERASE UR QL STRIP.AUTO: NEGATIVE
LYMPHOCYTES # BLD: 0.64 K/UL (ref 1.2–3.5)
LYMPHOCYTES NFR BLD: 10.1 %
MCH RBC QN AUTO: 30 PG (ref 28–33.2)
MCHC RBC AUTO-ENTMCNC: 32.4 G/DL (ref 32.2–35.5)
MCV RBC AUTO: 92.4 FL (ref 83–98)
MONOCYTES # BLD: 0.75 K/UL (ref 0.28–0.8)
MONOCYTES NFR BLD: 11.8 %
NEUTROPHILS # BLD: 4.89 K/UL (ref 1.7–7)
NEUTS SEG NFR BLD: 76.9 %
NITRITE UR QL STRIP.AUTO: NEGATIVE
NRBC BLD-RTO: 0 %
P AXIS: 81
PCO2 BLDV: 48 MM HG (ref 41–51)
PDW BLD AUTO: 9.4 FL (ref 9.4–12.3)
PH BLDV: 7.42 [PH] (ref 7.32–7.42)
PH UR STRIP.AUTO: 7 [PH]
PLATELET # BLD AUTO: 182 K/UL (ref 150–369)
PO2 BLDV: 55 MM HG (ref 25–40)
POCT TEST: ABNORMAL
POTASSIUM SERPL-SCNC: 4.2 MEQ/L (ref 3.5–5.1)
PR INTERVAL: 138
PROT SERPL-MCNC: 6.3 G/DL (ref 6–8.2)
PROT UR QL STRIP.AUTO: NEGATIVE
PROTHROMBIN TIME: 14.8 SEC (ref 12.2–14.5)
QRS DURATION: 82
QT INTERVAL: 340
QTC CALCULATION(BAZETT): 422
R AXIS: 18
RBC # BLD AUTO: 3.57 M/UL (ref 3.93–5.22)
RSV RNA SPEC QL NAA+PROBE: NEGATIVE
SARS-COV-2 RNA RESP QL NAA+PROBE: NEGATIVE
SODIUM SERPL-SCNC: 135 MEQ/L (ref 136–145)
SP GR UR REFRACT.AUTO: 1.03
T WAVE AXIS: 76
UROBILINOGEN UR STRIP-ACNC: 0.2 EU/DL
VENTRICULAR RATE: 93
WBC # BLD AUTO: 6.36 K/UL (ref 3.8–10.5)

## 2024-04-03 PROCEDURE — 85025 COMPLETE CBC W/AUTO DIFF WBC: CPT | Performed by: EMERGENCY MEDICINE

## 2024-04-03 PROCEDURE — 20600000 HC ROOM AND CARE INTERMEDIATE/TELEMETRY

## 2024-04-03 PROCEDURE — 87637 SARSCOV2&INF A&B&RSV AMP PRB: CPT | Performed by: EMERGENCY MEDICINE

## 2024-04-03 PROCEDURE — 71045 X-RAY EXAM CHEST 1 VIEW: CPT

## 2024-04-03 PROCEDURE — 85379 FIBRIN DEGRADATION QUANT: CPT | Performed by: INTERNAL MEDICINE

## 2024-04-03 PROCEDURE — 80053 COMPREHEN METABOLIC PANEL: CPT | Performed by: EMERGENCY MEDICINE

## 2024-04-03 PROCEDURE — 63700000 HC SELF-ADMINISTRABLE DRUG: Performed by: EMERGENCY MEDICINE

## 2024-04-03 PROCEDURE — 82803 BLOOD GASES ANY COMBINATION: CPT | Performed by: INTERNAL MEDICINE

## 2024-04-03 PROCEDURE — 63600105 HC IODINE BASED CONTRAST: Mod: JZ | Performed by: PHYSICIAN ASSISTANT

## 2024-04-03 PROCEDURE — 87040 BLOOD CULTURE FOR BACTERIA: CPT | Performed by: EMERGENCY MEDICINE

## 2024-04-03 PROCEDURE — 63700000 HC SELF-ADMINISTRABLE DRUG: Performed by: INTERNAL MEDICINE

## 2024-04-03 PROCEDURE — 83615 LACTATE (LD) (LDH) ENZYME: CPT | Performed by: INTERNAL MEDICINE

## 2024-04-03 PROCEDURE — 63600000 HC DRUGS/DETAIL CODE: Performed by: INTERNAL MEDICINE

## 2024-04-03 PROCEDURE — 36415 COLL VENOUS BLD VENIPUNCTURE: CPT | Performed by: EMERGENCY MEDICINE

## 2024-04-03 PROCEDURE — 85610 PROTHROMBIN TIME: CPT | Performed by: INTERNAL MEDICINE

## 2024-04-03 PROCEDURE — 81003 URINALYSIS AUTO W/O SCOPE: CPT | Performed by: EMERGENCY MEDICINE

## 2024-04-03 PROCEDURE — 1123F ACP DISCUSS/DSCN MKR DOCD: CPT | Performed by: INTERNAL MEDICINE

## 2024-04-03 PROCEDURE — 83880 ASSAY OF NATRIURETIC PEPTIDE: CPT | Performed by: PHYSICIAN ASSISTANT

## 2024-04-03 PROCEDURE — 85730 THROMBOPLASTIN TIME PARTIAL: CPT | Performed by: INTERNAL MEDICINE

## 2024-04-03 PROCEDURE — 63700000 HC SELF-ADMINISTRABLE DRUG: Performed by: PHYSICIAN ASSISTANT

## 2024-04-03 PROCEDURE — 96374 THER/PROPH/DIAG INJ IV PUSH: CPT | Mod: 59

## 2024-04-03 PROCEDURE — 93005 ELECTROCARDIOGRAM TRACING: CPT | Performed by: EMERGENCY MEDICINE

## 2024-04-03 PROCEDURE — 82728 ASSAY OF FERRITIN: CPT | Performed by: INTERNAL MEDICINE

## 2024-04-03 PROCEDURE — 83605 ASSAY OF LACTIC ACID: CPT | Performed by: PHYSICIAN ASSISTANT

## 2024-04-03 PROCEDURE — 93010 ELECTROCARDIOGRAM REPORT: CPT | Performed by: INTERNAL MEDICINE

## 2024-04-03 PROCEDURE — 99223 1ST HOSP IP/OBS HIGH 75: CPT | Performed by: INTERNAL MEDICINE

## 2024-04-03 PROCEDURE — 63600000 HC DRUGS/DETAIL CODE: Performed by: PHYSICIAN ASSISTANT

## 2024-04-03 PROCEDURE — 99284 EMERGENCY DEPT VISIT MOD MDM: CPT | Mod: 25

## 2024-04-03 PROCEDURE — 86140 C-REACTIVE PROTEIN: CPT | Performed by: INTERNAL MEDICINE

## 2024-04-03 PROCEDURE — 96375 TX/PRO/DX INJ NEW DRUG ADDON: CPT | Mod: 59

## 2024-04-03 PROCEDURE — 71275 CT ANGIOGRAPHY CHEST: CPT

## 2024-04-03 RX ORDER — DEXTROSE 50 % IN WATER (D50W) INTRAVENOUS SYRINGE
25 AS NEEDED
Status: DISCONTINUED | OUTPATIENT
Start: 2024-04-03 | End: 2024-04-08 | Stop reason: HOSPADM

## 2024-04-03 RX ORDER — PANTOPRAZOLE SODIUM 20 MG/1
20 TABLET, DELAYED RELEASE ORAL DAILY
Status: DISCONTINUED | OUTPATIENT
Start: 2024-04-03 | End: 2024-04-08 | Stop reason: HOSPADM

## 2024-04-03 RX ORDER — FUROSEMIDE 10 MG/ML
20 INJECTION INTRAMUSCULAR; INTRAVENOUS ONCE
Status: COMPLETED | OUTPATIENT
Start: 2024-04-03 | End: 2024-04-03

## 2024-04-03 RX ORDER — IBUPROFEN 200 MG
16-32 TABLET ORAL AS NEEDED
Status: DISCONTINUED | OUTPATIENT
Start: 2024-04-03 | End: 2024-04-08 | Stop reason: HOSPADM

## 2024-04-03 RX ORDER — DEXAMETHASONE SODIUM PHOSPHATE 4 MG/ML
6 INJECTION, SOLUTION INTRA-ARTICULAR; INTRALESIONAL; INTRAMUSCULAR; INTRAVENOUS; SOFT TISSUE ONCE
Status: COMPLETED | OUTPATIENT
Start: 2024-04-03 | End: 2024-04-03

## 2024-04-03 RX ORDER — DEXAMETHASONE 6 MG/1
6 TABLET ORAL DAILY
Qty: 10 TABLET | Refills: 0 | Status: DISCONTINUED | OUTPATIENT
Start: 2024-04-04 | End: 2024-04-03

## 2024-04-03 RX ORDER — DEXTROSE 40 %
15-30 GEL (GRAM) ORAL AS NEEDED
Status: DISCONTINUED | OUTPATIENT
Start: 2024-04-03 | End: 2024-04-08 | Stop reason: HOSPADM

## 2024-04-03 RX ORDER — CALCIUM CARBONATE 500(1250)
1 TABLET ORAL EVERY MORNING
COMMUNITY

## 2024-04-03 RX ORDER — ATORVASTATIN CALCIUM 20 MG/1
20 TABLET, FILM COATED ORAL NIGHTLY
Status: DISCONTINUED | OUTPATIENT
Start: 2024-04-03 | End: 2024-04-08 | Stop reason: HOSPADM

## 2024-04-03 RX ORDER — HYDROMORPHONE HYDROCHLORIDE 1 MG/ML
0.25 INJECTION, SOLUTION INTRAMUSCULAR; INTRAVENOUS; SUBCUTANEOUS ONCE
Status: COMPLETED | OUTPATIENT
Start: 2024-04-03 | End: 2024-04-03

## 2024-04-03 RX ORDER — OSELTAMIVIR PHOSPHATE 30 MG/1
30 CAPSULE ORAL 2 TIMES DAILY
Status: DISCONTINUED | OUTPATIENT
Start: 2024-04-04 | End: 2024-04-08

## 2024-04-03 RX ORDER — IOPAMIDOL 755 MG/ML
100 INJECTION, SOLUTION INTRAVASCULAR
Status: COMPLETED | OUTPATIENT
Start: 2024-04-03 | End: 2024-04-03

## 2024-04-03 RX ORDER — ALBUTEROL SULFATE 90 UG/1
2 INHALANT RESPIRATORY (INHALATION) EVERY 6 HOURS PRN
Status: DISCONTINUED | OUTPATIENT
Start: 2024-04-03 | End: 2024-04-06

## 2024-04-03 RX ORDER — ENOXAPARIN SODIUM 100 MG/ML
40 INJECTION SUBCUTANEOUS
Status: DISCONTINUED | OUTPATIENT
Start: 2024-04-03 | End: 2024-04-03

## 2024-04-03 RX ORDER — ACETAMINOPHEN 325 MG/1
650 TABLET ORAL ONCE
Status: COMPLETED | OUTPATIENT
Start: 2024-04-03 | End: 2024-04-03

## 2024-04-03 RX ORDER — OSELTAMIVIR PHOSPHATE 75 MG/1
75 CAPSULE ORAL ONCE
Status: COMPLETED | OUTPATIENT
Start: 2024-04-03 | End: 2024-04-03

## 2024-04-03 RX ORDER — TRAMADOL HYDROCHLORIDE 50 MG/1
50 TABLET ORAL EVERY 8 HOURS PRN
Status: ON HOLD | COMMUNITY
End: 2024-04-08

## 2024-04-03 RX ORDER — TRAMADOL HYDROCHLORIDE 50 MG/1
50 TABLET ORAL EVERY 8 HOURS PRN
Status: DISCONTINUED | OUTPATIENT
Start: 2024-04-03 | End: 2024-04-08 | Stop reason: HOSPADM

## 2024-04-03 RX ORDER — INSULIN LISPRO 100 [IU]/ML
6-10 INJECTION, SOLUTION INTRAVENOUS; SUBCUTANEOUS
Status: DISCONTINUED | OUTPATIENT
Start: 2024-04-03 | End: 2024-04-08 | Stop reason: HOSPADM

## 2024-04-03 RX ORDER — GABAPENTIN 300 MG/1
300 CAPSULE ORAL 3 TIMES DAILY
Status: DISCONTINUED | OUTPATIENT
Start: 2024-04-03 | End: 2024-04-08 | Stop reason: HOSPADM

## 2024-04-03 RX ORDER — OSELTAMIVIR PHOSPHATE 75 MG/1
75 CAPSULE ORAL 2 TIMES DAILY
Status: DISCONTINUED | OUTPATIENT
Start: 2024-04-03 | End: 2024-04-03

## 2024-04-03 RX ORDER — TRAMADOL HYDROCHLORIDE 50 MG/1
50 TABLET ORAL ONCE
Status: COMPLETED | OUTPATIENT
Start: 2024-04-03 | End: 2024-04-03

## 2024-04-03 RX ORDER — ACETAMINOPHEN 500 MG
5000 TABLET ORAL DAILY
COMMUNITY

## 2024-04-03 RX ORDER — PROPRANOLOL HYDROCHLORIDE 10 MG/1
20 TABLET ORAL 2 TIMES DAILY
Status: DISCONTINUED | OUTPATIENT
Start: 2024-04-03 | End: 2024-04-08 | Stop reason: HOSPADM

## 2024-04-03 RX ADMIN — OSELTAMIVIR PHOSPHATE 75 MG: 75 CAPSULE ORAL at 18:00

## 2024-04-03 RX ADMIN — IOPAMIDOL 100 ML: 755 INJECTION, SOLUTION INTRAVENOUS at 13:40

## 2024-04-03 RX ADMIN — INSULIN LISPRO 6 UNITS: 100 INJECTION, SOLUTION INTRAVENOUS; SUBCUTANEOUS at 21:54

## 2024-04-03 RX ADMIN — GABAPENTIN 300 MG: 300 CAPSULE ORAL at 21:39

## 2024-04-03 RX ADMIN — PROPRANOLOL HYDROCHLORIDE 20 MG: 10 TABLET ORAL at 21:38

## 2024-04-03 RX ADMIN — HYDROMORPHONE HYDROCHLORIDE 0.25 MG: 1 INJECTION, SOLUTION INTRAMUSCULAR; INTRAVENOUS; SUBCUTANEOUS at 18:00

## 2024-04-03 RX ADMIN — PANTOPRAZOLE SODIUM 20 MG: 20 TABLET, DELAYED RELEASE ORAL at 21:37

## 2024-04-03 RX ADMIN — APIXABAN 2.5 MG: 2.5 TABLET, FILM COATED ORAL at 21:40

## 2024-04-03 RX ADMIN — ENOXAPARIN SODIUM 40 MG: 40 INJECTION SUBCUTANEOUS at 17:48

## 2024-04-03 RX ADMIN — DEXAMETHASONE SODIUM PHOSPHATE 6 MG: 4 INJECTION, SOLUTION INTRA-ARTICULAR; INTRALESIONAL; INTRAMUSCULAR; INTRAVENOUS; SOFT TISSUE at 14:52

## 2024-04-03 RX ADMIN — FUROSEMIDE 20 MG: 10 INJECTION, SOLUTION INTRAMUSCULAR; INTRAVENOUS at 14:51

## 2024-04-03 RX ADMIN — TRAMADOL HYDROCHLORIDE 50 MG: 50 TABLET ORAL at 17:46

## 2024-04-03 RX ADMIN — ACETAMINOPHEN 650 MG: 325 TABLET ORAL at 11:58

## 2024-04-03 RX ADMIN — ATORVASTATIN CALCIUM 20 MG: 20 TABLET, FILM COATED ORAL at 21:40

## 2024-04-03 ASSESSMENT — COGNITIVE AND FUNCTIONAL STATUS - GENERAL
STANDING UP FROM CHAIR USING ARMS: 2 - A LOT
CLIMB 3 TO 5 STEPS WITH RAILING: 2 - A LOT
WALKING IN HOSPITAL ROOM: 2 - A LOT
MOVING TO AND FROM BED TO CHAIR: 2 - A LOT

## 2024-04-03 ASSESSMENT — ENCOUNTER SYMPTOMS
LIGHT-HEADEDNESS: 0
VOMITING: 0
APPETITE CHANGE: 1
HEMATURIA: 0
CHEST TIGHTNESS: 0
COUGH: 1
PALPITATIONS: 0
CHILLS: 0
DYSURIA: 0
ARTHRALGIAS: 0
SEIZURES: 0
ACTIVITY CHANGE: 1
EYE PAIN: 0
COLOR CHANGE: 0
ABDOMINAL PAIN: 0
BACK PAIN: 0
FATIGUE: 1
FEVER: 0
SORE THROAT: 0
NAUSEA: 0
SHORTNESS OF BREATH: 1

## 2024-04-03 NOTE — ED PROVIDER NOTES
Emergency Medicine Note  HPI   HISTORY OF PRESENT ILLNESS     88 female with PMH of atrial fibrillation, htn, chf, thyroid nodule, DM here from [facility] assisted living for cough, sob, generalized weakness x 2 days.  Recently admitted her for CHF.  Covid diagnosed on 3/24/24  Patient does not wear oxygen at baseline and arrived hypoxic with low grade fever  Did not take any medication pta.  On eliquis for afib.  No leg swelling or cp.  Denies falls   Took extra dose of lasix PO yesterday (40 mg total)    Hx obtained via daughter as well            Patient History   PAST HISTORY     Reviewed from Nursing Triage:       Past Medical History:   Diagnosis Date   • A-fib (CMS/HCC)    • Accelerated hypertension    • CHF (congestive heart failure) (CMS/HCC)    • Macular degeneration    • Thyroid nodule    • Type 2 diabetes mellitus (CMS/HCC)        Past Surgical History:   Procedure Laterality Date   • APPENDECTOMY     • HYSTERECTOMY     • JOINT REPLACEMENT     • KNEE ARTHROPLASTY         No family history on file.    Social History     Tobacco Use   • Smoking status: Never   • Smokeless tobacco: Never   Substance Use Topics   • Alcohol use: Never   • Drug use: Never         Review of Systems   REVIEW OF SYSTEMS     Review of Systems   Constitutional: Positive for activity change, appetite change and fatigue. Negative for chills and fever.   HENT: Negative for congestion, dental problem, ear pain and sore throat.    Eyes: Negative for pain and visual disturbance.   Respiratory: Positive for cough and shortness of breath. Negative for chest tightness.    Cardiovascular: Negative for chest pain and palpitations.   Gastrointestinal: Negative for abdominal pain, nausea and vomiting.   Genitourinary: Negative for dysuria and hematuria.   Musculoskeletal: Negative for arthralgias and back pain.   Skin: Negative for color change and rash.   Neurological: Negative for seizures, syncope and light-headedness.   All other systems  reviewed and are negative.        VITALS     ED Vitals    Date/Time Temp Pulse Resp BP SpO2 [facility]   04/03/24 1400 37.2 °C (98.9 °F) 83 14 -- 97 % TS   04/03/24 1301 -- 81 25 116/47 98 % TS   04/03/24 1201 -- 90 22 129/62 98 % TS   04/03/24 1145 -- 90 21 -- 100 % TS   04/03/24 1120 -- -- -- -- 95 % DS   04/03/24 1115 38.1 °C (100.6 °F) 95 32 125/41 87 % DS        Pulse Ox %: 90 % (04/03/24 1435)  Pulse Ox Interpretation: Normal (04/03/24 1435)  Heart Rate: 83 (04/03/24 1435)  Rhythm Strip Interpretation: Normal Sinus Rhythm (04/03/24 1435)     Physical Exam   PHYSICAL EXAM     Physical Exam  Vitals and nursing note reviewed.   Constitutional:       General: She is in acute distress.      Appearance: Normal appearance. She is well-developed. She is ill-appearing. She is not toxic-appearing or diaphoretic.   HENT:      Head: Normocephalic and atraumatic.      Right Ear: Tympanic membrane normal.      Left Ear: Tympanic membrane normal.      Nose: No congestion or rhinorrhea.      Mouth/Throat:      Mouth: Mucous membranes are moist.   Eyes:      Extraocular Movements: Extraocular movements intact.      Conjunctiva/sclera: Conjunctivae normal.      Pupils: Pupils are equal, round, and reactive to light.   Cardiovascular:      Rate and Rhythm: Normal rate and regular rhythm.      Heart sounds: Normal heart sounds.   Pulmonary:      Effort: Pulmonary effort is normal. No respiratory distress.      Breath sounds: Rales present.   Chest:      Chest wall: No tenderness.   Abdominal:      General: Abdomen is flat.      Palpations: Abdomen is soft.      Tenderness: There is no abdominal tenderness. There is no right CVA tenderness or left CVA tenderness.   Musculoskeletal:         General: No tenderness. Normal range of motion.      Comments: minimal edema    Skin:     General: Skin is warm and dry.      Capillary Refill: Capillary refill takes less than 2 seconds.   Neurological:      General: No focal deficit present.       Mental Status: She is alert and oriented to person, place, and time.      Sensory: No sensory deficit.           PROCEDURES     Procedures     DATA     Results     Procedure Component Value Units Date/Time    B-type natriuretic peptide [[id number]]  (Abnormal) Collected: 04/03/24 1121    Specimen: Blood, Venous Updated: 04/03/24 1232      pg/mL     Lactate, w/ reflex repeat if > 2.0 [[id number]]  (Normal) Collected: 04/03/24 1158    Specimen: Blood, Venous Updated: 04/03/24 1211     Lactate 1.1 mmol/L     Comprehensive metabolic panel [[id number]]  (Abnormal) Collected: 04/03/24 1121    Specimen: Blood, Venous Updated: 04/03/24 1156     Sodium 135 mEQ/L      Potassium 4.2 mEQ/L      Comment: Results obtained on plasma. Plasma Potassium values may be up to 0.4 mEQ/L less than serum values. The differences may be greater for patients with high platelet or white cell counts.        Chloride 98 mEQ/L      CO2 30 mEQ/L      BUN 23 mg/dL      Creatinine 0.9 mg/dL      Glucose 238 mg/dL      Calcium 8.9 mg/dL      AST (SGOT) 22 IU/L      ALT (SGPT) 19 IU/L      Alkaline Phosphatase 38 IU/L      Total Protein 6.3 g/dL      Comment: Test performed on plasma which typically contains approximately 0.4 g/dL more protein than serum.        Albumin 3.8 g/dL      Bilirubin, Total 0.6 mg/dL      eGFR >60.0 mL/min/1.73m*2      Comment: Calculation based on the Chronic Kidney Disease Epidemiology Collaboration (CKD-EPI) equation refit without adjustment for race.        Anion Gap 7 mEQ/L     CBC and differential [[id number]]  (Abnormal) Collected: 04/03/24 1121    Specimen: Blood, Venous Updated: 04/03/24 1139     WBC 6.36 K/uL      RBC 3.57 M/uL      Hemoglobin 10.7 g/dL      Hematocrit 33.0 %      MCV 92.4 fL      MCH 30.0 pg      MCHC 32.4 g/dL      RDW 14.7 %      Platelets 182 K/uL      MPV 9.4 fL      Differential Type Auto     nRBC 0.0 %      Immature Granulocytes 0.6 %      Neutrophils 76.9 %      Lymphocytes 10.1 %       Monocytes 11.8 %      Eosinophils 0.3 %      Basophils 0.3 %      Immature Granulocytes, Absolute 0.04 K/uL      Neutrophils, Absolute 4.89 K/uL      Lymphocytes, Absolute 0.64 K/uL      Monocytes, Absolute 0.75 K/uL      Eosinophils, Absolute 0.02 K/uL      Basophils, Absolute 0.02 K/uL     Blood Culture Blood, Venous [[id number]] Collected: 04/03/24 1121    Specimen: Blood, Venous Updated: 04/03/24 1133    Blood Culture Blood, Venous [[id number]] Collected: 04/03/24 1121    Specimen: Blood, Venous Updated: 04/03/24 1133    RAINBOW LT BLUE [[id number]] Collected: 04/03/24 1122    Specimen: Blood, Venous Updated: 04/03/24 1129    [facility] Draw Panel [[id number]] Collected: 04/03/24 1122    Specimen: Blood, Venous Updated: 04/03/24 1129    Narrative:      The following orders were created for panel order [facility] Draw Panel.  Procedure                               Abnormality         Status                     ---------                               -----------         ------                     RAINBOW LT BLUE[[id number]]                                  In process                 RAINBOW LT GREEN[[id number]]                                 In process                   Please view results for these tests on the individual orders.    RAINBOW LT GREEN [[id number]] Collected: 04/03/24 1122    Specimen: Blood, Venous Updated: 04/03/24 1129          Imaging Results          CT ANGIOGRAPHY CHEST PULMONARY EMBOLISM WITH IV CONTRAST (Final result)  Result time 04/03/24 14:08:07    Final result                 Impression:    IMPRESSION:    1. No acute pulmonary embolism.  2. Redemonstration of enlarged, multinodular thyroid gland/goiter which narrows  the mid trachea.               Narrative:    CLINICAL HISTORY: Pulmonary embolism (PE) suspected, high prob  recnt covid, hypoxic, low grade fever.    COMMENT:    Technique: CT angiogram of the chest was performed per pulmonary embolus  protocol during the arterial phase, after the  uneventful intravenous  administration of contrast.   Coronal and sagittal reformatted images were  obtained.  100 cc of Isovue-370 was administered.  3D MIP and/or volume rendered image reconstruction performed and reviewed.  CT DOSE:  One or more dose reduction techniques (e.g. automated exposure  control, adjustment of the mA and/or kV according to patient size, use of  iterative reconstruction technique) utilized for this examination.    Comparisons studies:  CT angiogram chest 3/25/2024.    Findings:    Pulmonary artery: No acute pulmonary embolism to the segmental level. Evaluation  distally is limited by breathing motion artifact. Enlarged main pulmonary  artery, which may be seen in association with pulmonary hypertension.  Lungs: No focal lung consolidation or groundglass opacity.  Mediastinum/Sonia/Heart: The heart is mildly enlarged. 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 Stable prominent subcarinal lymph node  measuring 1.2 cm in greatest short axis dimension.  Chest wall/Pleura: No pneumothorax or pleural effusion. Enlarged thyroid nodule  with intrathoracic extension and narrowing of the mid trachea.  Axilla: No pathologically enlarged lymph nodes are identified.  Upper abdomen:  Unremarkable.  Bones: Chronic L1 vertebral body compression fracture. Presence of a  neurostimulator.                               X-RAY CHEST 1 VIEW (Final result)  Result time 04/03/24 11:56:06    Final result                 Impression:    IMPRESSION:  Please see above.             Narrative:      CLINICAL HISTORY: Fever    COMMENT:  A portable upright AP view of the chest was performed.    Comparison: Chest CT performed 3/25/2024 and chest x-ray performed 3/24/2024    Cardiac monitoring leads obscure portions of the underlying lungs.  There is  prominence of the superior mediastinum corresponding to the patient's known  enlarged right thyroid lobe.  Mild patchy infrahilar airspace disease is present  on  the right.  There is no pleural effusion or pneumothorax.  The cardiac  silhouette is enlarged.  Aortic atherosclerosis is present.  Leads from a neuro  stimulating device overlie the thoracic spine.                                ECG 12 lead          Scoring tools                                  ED Course & MDM   MDM / ED COURSE / CLINICAL IMPRESSION / DISPO     Medical Decision Making  Congestive heart failure, unspecified HF chronicity, unspecified heart failure type (CMS/HCC): acute illness or injury  COVID-19: acute illness or injury  Hypoxia: acute illness or injury  Amount and/or Complexity of Data Reviewed  External Data Reviewed: labs and radiology.  Labs: ordered.  Radiology: ordered and independent interpretation performed.  ECG/medicine tests: ordered.  Discussion of management or test interpretation with external provider(s): Admit to [facility]  OTC drugs.  Prescription drug management.  Decision regarding hospitalization.          ED Course as of 04/03/24 1730   Wed Apr 03, 2024   1211 Patint on 3 liters NC   Usually not on oxygen  [DE]   1506 Case was discussed with hospitalist.  Reviewed patient's presentation, ED course, and relevant data.  Hospitalist accepts patient on their service and will see / admit pt.   [DE]   1729 Pt. Resting comfortably, eating in room. [SM]      ED Course User Index  [DE] Augustina Crowell PA C  [SM] Luis Pena MD     Clinical Impression      Hypoxia   COVID-19   Congestive heart failure, unspecified HF chronicity, unspecified heart failure type (CMS/HCC)     _________________     ED Disposition   Admit / Observation                   Augustina Crowell PA C  04/03/24 1512

## 2024-04-03 NOTE — ED ATTESTATION NOTE
I have personally seen and examined Rissa Erickson.  I was involved in the care and medical decision making for this patient.    I reviewed and agree with physician assistant / nurse practitioner’s assessment and plan of care; any exceptions are documented below.      My focused history, examination, assessment and plan of care of Rissa Erickson is as follows:    Brief History:  HPI    88-year-old female history of CHF, recent COVID, presents for hypoxia and low-grade fever.  Recently treated for both CHF and COVID in the hospital.  Presents with increased weakness as well.  Lives independently.    Focused Physical Exam:  Physical Exam    Awake alert answers questions follows commands  No respiratory distress  Lungs are clear  Skin s warm and dry    Assessment / Plan / MDM:    Check labs EKG chest x-ray, UA, cultures  Flu positive  No pneumonia or PE on CT of her chest  Given hypoxia will need admission.  Sx < 48 hours, will give dose of tamiflu.    Medical Decision Making  Amount and/or Complexity of Data Reviewed  Labs: ordered.  Radiology: ordered.  ECG/medicine tests: ordered.    Risk  OTC drugs.  Prescription drug management.  Decision regarding hospitalization.            I was physically present for the key/critical portions of the following procedures:  Procedures         Luis Pena MD  04/03/24 3936

## 2024-04-03 NOTE — PROGRESS NOTES
"Rissa Dre   [id number]    Your doctor has referred you for a CT examination that requires the injection of an iodinated contrast material into your bloodstream. This iodinated contrast material, sometimes referred to as \"x-ray dye\" allows for better interpretation of the x-ray films or CT images and results in a more accurate interpretation of the examination.     Without the use of iodinated contrast (x-ray dye), the examination may be less informative and result in a suboptimal examination, and possibly a delay in diagnosis and, if needed, treatment.     The iodinated contrast material is given through a small needle or catheter placed into a vein, usually on the inside of the elbow, on the back of hand, or in a vein in the foot or lower leg.    The most common, though still rare, potential reaction to an intravenous contrast injection is an allergic-like reaction. Most allergic-like reactions are mild, though a small subset of people can have more severe reactions. Mild reactions include mild / scattered hives, itching, scratchy throat, sneezing and nasal congestion. More severe reactions include facial swelling, severe difficulty breathing, wheezing and anaphylactic shock. Those with a prior history allergic-like reaction to the same class of contrast media (such as CT contrast or MRI contrast) are at the highest risk for an allergic reaction.     If you believe you had an allergic reaction to contrast in the past, please let our staff know. We can determine if this increases your risk for a future reaction and provide steps to decrease the risk. This may delay your examination, but it decreases the risk of having a new and possibly more severe reaction to the contrast injection.    People with a history of prior allergic reactions to other substances (such as unrelated medications and food) and patients with a history of asthma have slightly increased risk for an allergic reaction from contrast material " when compared with the general population, but do not require to be pretreated prior to a contrast injection.    You should notify the physician, nurse or technologist if you have ever had any of these conditions or if you have any questions.

## 2024-04-03 NOTE — PROGRESS NOTES
"Rissa Dre   [id number]    Your doctor has referred you for a CT examination that requires the injection of an iodinated contrast material into your bloodstream. This iodinated contrast material, sometimes referred to as \"x-ray dye\" allows for better interpretation of the x-ray films or CT images and results in a more accurate interpretation of the examination.     Without the use of iodinated contrast (x-ray dye), the examination may be less informative and result in a suboptimal examination, and possibly a delay in diagnosis and, if needed, treatment.     The iodinated contrast material is given through a small needle or catheter placed into a vein, usually on the inside of the elbow, on the back of hand, or in a vein in the foot or lower leg.    The most common, though still rare, potential reaction to an intravenous contrast injection is an allergic-like reaction. Most allergic-like reactions are mild, though a small subset of people can have more severe reactions. Mild reactions include mild / scattered hives, itching, scratchy throat, sneezing and nasal congestion. More severe reactions include facial swelling, severe difficulty breathing, wheezing and anaphylactic shock. Those with a prior history allergic-like reaction to the same class of contrast media (such as CT contrast or MRI contrast) are at the highest risk for an allergic reaction.     If you believe you had an allergic reaction to contrast in the past, please let our staff know. We can determine if this increases your risk for a future reaction and provide steps to decrease the risk. This may delay your examination, but it decreases the risk of having a new and possibly more severe reaction to the contrast injection.    People with a history of prior allergic reactions to other substances (such as unrelated medications and food) and patients with a history of asthma have slightly increased risk for an allergic reaction from contrast material " when compared with the general population, but do not require to be pretreated prior to a contrast injection.    You should notify the physician, nurse or technologist if you have ever had any of these conditions or if you have any questions.

## 2024-04-03 NOTE — PROGRESS NOTES
TCM follow-up call.  Pt currently in [place] emergency admitted to observation.  TCM closed.  CM to follow-up at discharge.    Ofelia Gamez RN BSN   [phone]

## 2024-04-04 PROBLEM — K59.00 CONSTIPATION: Status: ACTIVE | Noted: 2024-04-04

## 2024-04-04 LAB
ALBUMIN SERPL-MCNC: 3.5 G/DL (ref 3.5–5.7)
ALP SERPL-CCNC: 34 IU/L (ref 34–125)
ALT SERPL-CCNC: 17 IU/L (ref 7–52)
ANION GAP SERPL CALC-SCNC: 8 MEQ/L (ref 3–15)
AST SERPL-CCNC: 20 IU/L (ref 13–39)
BASOPHILS # BLD: 0.02 K/UL (ref 0.01–0.1)
BASOPHILS NFR BLD: 0.3 %
BILIRUB SERPL-MCNC: 0.4 MG/DL (ref 0.3–1.2)
BUN SERPL-MCNC: 24 MG/DL (ref 7–25)
CALCIUM SERPL-MCNC: 8.3 MG/DL (ref 8.6–10.3)
CHLORIDE SERPL-SCNC: 97 MEQ/L (ref 98–107)
CO2 SERPL-SCNC: 28 MEQ/L (ref 21–31)
CREAT SERPL-MCNC: 0.7 MG/DL (ref 0.6–1.2)
DIFFERENTIAL METHOD BLD: ABNORMAL
EGFRCR SERPLBLD CKD-EPI 2021: >60 ML/MIN/1.73M*2
EOSINOPHIL # BLD: 0.01 K/UL (ref 0.04–0.36)
EOSINOPHIL NFR BLD: 0.1 %
ERYTHROCYTE [DISTWIDTH] IN BLOOD BY AUTOMATED COUNT: 14.5 % (ref 11.7–14.4)
GLUCOSE BLD-MCNC: 101 MG/DL (ref 70–99)
GLUCOSE BLD-MCNC: 120 MG/DL (ref 70–99)
GLUCOSE BLD-MCNC: 165 MG/DL (ref 70–99)
GLUCOSE BLD-MCNC: 176 MG/DL (ref 70–99)
GLUCOSE SERPL-MCNC: 102 MG/DL (ref 70–99)
HCT VFR BLD AUTO: 29.6 % (ref 35–45)
HGB BLD-MCNC: 9.7 G/DL (ref 11.8–15.7)
IMM GRANULOCYTES # BLD AUTO: 0.04 K/UL (ref 0–0.08)
IMM GRANULOCYTES NFR BLD AUTO: 0.6 %
LYMPHOCYTES # BLD: 0.94 K/UL (ref 1.2–3.5)
LYMPHOCYTES NFR BLD: 13 %
MCH RBC QN AUTO: 29.8 PG (ref 28–33.2)
MCHC RBC AUTO-ENTMCNC: 32.8 G/DL (ref 32.2–35.5)
MCV RBC AUTO: 91.1 FL (ref 83–98)
MONOCYTES # BLD: 0.56 K/UL (ref 0.28–0.8)
MONOCYTES NFR BLD: 7.7 %
NEUTROPHILS # BLD: 5.67 K/UL (ref 1.7–7)
NEUTS SEG NFR BLD: 78.3 %
NRBC BLD-RTO: 0 %
PDW BLD AUTO: 9.6 FL (ref 9.4–12.3)
PLATELET # BLD AUTO: 182 K/UL (ref 150–369)
POCT TEST: ABNORMAL
POTASSIUM SERPL-SCNC: 3.8 MEQ/L (ref 3.5–5.1)
PROT SERPL-MCNC: 5.4 G/DL (ref 6–8.2)
RBC # BLD AUTO: 3.25 M/UL (ref 3.93–5.22)
SODIUM SERPL-SCNC: 133 MEQ/L (ref 136–145)
WBC # BLD AUTO: 7.24 K/UL (ref 3.8–10.5)

## 2024-04-04 PROCEDURE — 99233 SBSQ HOSP IP/OBS HIGH 50: CPT | Performed by: STUDENT IN AN ORGANIZED HEALTH CARE EDUCATION/TRAINING PROGRAM

## 2024-04-04 PROCEDURE — 63700000 HC SELF-ADMINISTRABLE DRUG: Performed by: STUDENT IN AN ORGANIZED HEALTH CARE EDUCATION/TRAINING PROGRAM

## 2024-04-04 PROCEDURE — 80053 COMPREHEN METABOLIC PANEL: CPT | Performed by: INTERNAL MEDICINE

## 2024-04-04 PROCEDURE — 63700000 HC SELF-ADMINISTRABLE DRUG: Performed by: PHYSICIAN ASSISTANT

## 2024-04-04 PROCEDURE — 20600000 HC ROOM AND CARE INTERMEDIATE/TELEMETRY

## 2024-04-04 PROCEDURE — 85025 COMPLETE CBC W/AUTO DIFF WBC: CPT | Performed by: INTERNAL MEDICINE

## 2024-04-04 PROCEDURE — 63700000 HC SELF-ADMINISTRABLE DRUG: Performed by: INTERNAL MEDICINE

## 2024-04-04 PROCEDURE — 36415 COLL VENOUS BLD VENIPUNCTURE: CPT | Performed by: INTERNAL MEDICINE

## 2024-04-04 RX ORDER — ACETAMINOPHEN 325 MG/1
650 TABLET ORAL EVERY 4 HOURS PRN
Status: DISCONTINUED | OUTPATIENT
Start: 2024-04-04 | End: 2024-04-08 | Stop reason: HOSPADM

## 2024-04-04 RX ORDER — FUROSEMIDE 20 MG/1
20 TABLET ORAL DAILY
Status: DISCONTINUED | OUTPATIENT
Start: 2024-04-04 | End: 2024-04-08 | Stop reason: HOSPADM

## 2024-04-04 RX ORDER — POLYETHYLENE GLYCOL 3350 17 G/17G
17 POWDER, FOR SOLUTION ORAL DAILY
Status: DISCONTINUED | OUTPATIENT
Start: 2024-04-04 | End: 2024-04-08 | Stop reason: HOSPADM

## 2024-04-04 RX ADMIN — GABAPENTIN 300 MG: 300 CAPSULE ORAL at 10:02

## 2024-04-04 RX ADMIN — ATORVASTATIN CALCIUM 20 MG: 20 TABLET, FILM COATED ORAL at 21:18

## 2024-04-04 RX ADMIN — OSELTAMIVIR PHOSPHATE 30 MG: 30 CAPSULE ORAL at 10:08

## 2024-04-04 RX ADMIN — OSELTAMIVIR PHOSPHATE 30 MG: 30 CAPSULE ORAL at 21:33

## 2024-04-04 RX ADMIN — APIXABAN 2.5 MG: 2.5 TABLET, FILM COATED ORAL at 10:02

## 2024-04-04 RX ADMIN — PANTOPRAZOLE SODIUM 20 MG: 20 TABLET, DELAYED RELEASE ORAL at 10:02

## 2024-04-04 RX ADMIN — ACETAMINOPHEN 650 MG: 325 TABLET ORAL at 16:19

## 2024-04-04 RX ADMIN — ACETAMINOPHEN 650 MG: 325 TABLET ORAL at 01:39

## 2024-04-04 RX ADMIN — POLYETHYLENE GLYCOL 3350 17 G: 17 POWDER, FOR SOLUTION ORAL at 14:14

## 2024-04-04 RX ADMIN — PROPRANOLOL HYDROCHLORIDE 20 MG: 10 TABLET ORAL at 10:02

## 2024-04-04 RX ADMIN — FUROSEMIDE 20 MG: 20 TABLET ORAL at 14:14

## 2024-04-04 RX ADMIN — APIXABAN 2.5 MG: 2.5 TABLET, FILM COATED ORAL at 21:18

## 2024-04-04 RX ADMIN — GABAPENTIN 300 MG: 300 CAPSULE ORAL at 14:14

## 2024-04-04 RX ADMIN — GABAPENTIN 300 MG: 300 CAPSULE ORAL at 21:18

## 2024-04-04 RX ADMIN — TRAMADOL HYDROCHLORIDE 50 MG: 50 TABLET, COATED ORAL at 04:55

## 2024-04-04 ASSESSMENT — COGNITIVE AND FUNCTIONAL STATUS - GENERAL
CLIMB 3 TO 5 STEPS WITH RAILING: 2 - A LOT
MOVING TO AND FROM BED TO CHAIR: 2 - A LOT
MOVING TO AND FROM BED TO CHAIR: 2 - A LOT
WALKING IN HOSPITAL ROOM: 2 - A LOT
CLIMB 3 TO 5 STEPS WITH RAILING: 2 - A LOT
STANDING UP FROM CHAIR USING ARMS: 2 - A LOT
WALKING IN HOSPITAL ROOM: 2 - A LOT
STANDING UP FROM CHAIR USING ARMS: 2 - A LOT

## 2024-04-04 NOTE — PLAN OF CARE
Care Coordination Admission Assessment Note    General Information:  Readmission Within the last 30 days: no previous admission in last 30 days  Does patient have a :    Patient-Specific Goals (include timeframe): pt goal to go to SNF pending recommendations    Living Arrangements:  Arrived From: home  Current Living Arrangements: independent living facility  People in Home: alone  Home Accessibility: wheelchair accessible, elevator accessible  Living Arrangement Comments: Pt was living alone in [facility] in [facility] with elevator access .    Housing Stability and Utility Access (SDOH):  In the last 12 months, was there a time when you were not able to pay the mortgage or rent on time?: No  In the last 12 months, how many places have you lived?: 1  In the last 12 months, was there a time when you did not have a steady place to sleep or slept in a shelter (including now)?: No  In the past 12 months has the electric, gas, oil, or water company threatened to shut off services in your home?: No    Functional Status Prior to Admission:   Assistive Device/Animal Currently Used at Home: walker, 4-wheeled, shower chair, grab bar, other (see comments) (bed rail)  Functional Status Comments: Pt was reportedly independent with functional status prior to hospital  IADL Comments: Pt was reportedly independent with IADLS prior to hospital     Supports and Services:  Current Outpatient/Agency/Support Group: none  Type of Current Home Care Services: home OT, home PT, nursing  History of home care episode or rehab stay: [facility]    Discharge Needs Assessment:   Concerns to be Addressed: care coordination/care conferences, discharge planning  Current Discharge Risk: dependent with mobility/activities of daily living  Anticipated Changes Related to Illness: none    Patient/Family Anticipated Discharge Plan:  Patient/Family Anticipates Transition To: home with help/services, skilled nursing facility  Patient/Family Anticipated  Services at Transition:      Connection to Community  Patient declined offered resources.    Patient Choice:   Offered/Gave Vendor List: yes  Patient's Choice of Community Agency(s): [facility]  Patient and/or patient guardian/advocate was made aware of their right to choose a provider. A list of eligible providers was presented and reviewed with the patient and/or patient guardian/advocate in written and/or verbal form. The list delineates providers in the patient’s desired geographic area who are participating in the Medicare program and/or providers contracted with the patient’s primary insurance. The Medicare list and quality ratings were obtained from the Medicare.gov [medicare.gov] website.    Anticipated Discharge Plan:  Met with patient. Provided education and contact information for Care Coordination services.: yes (screening completed with pt daughter Cristina over the phone.)  Anticipated Discharge Disposition: home with home health, skilled nursing facility     Transportation Needs (SDOH):  Transportation Concerns: none  Transportation Anticipated: family or friend will provide  Is Out of Hospital DNR needed at discharge?: no    In the past 12 months, has lack of transportation kept you from medical appointments or from getting medications?: No  In the past 12 months, has lack of transportation kept you from meetings, work, or from getting things needed for daily living?: No    Concerns - comments: Screening completed with pt sandraanneshakeel Evans over the phone. Pt lives alone in [facility]. Pt PCP and Pharamacy verified and confirmed on pt chart. Pt is known to [facility] with PT, OT, and RN services and would like to pursue services again upon d/c. Pt daughter interested in looking at SNF post hospital stay. SW provided much edcaution. Pt daugther intersted in this option pending PT and recommendations. SW to f/u for dispo planning as needed.

## 2024-04-04 NOTE — ASSESSMENT & PLAN NOTE
Not in acute exacerbation  Clinically looks euvolemic, chest imaging reviewed  Followed by Dr. Mendelson    - on lasix 20mg po q daily  - CHF diet, 2L fluid restriction  - daily weights  - monitor I/Os

## 2024-04-04 NOTE — PLAN OF CARE
Plan of Care Review  Outcome Evaluation: pt aaox3. NSR on the monitor. tylenol given once for fever of 101, temp rechecked WNL.  3L NC purwick in place, call bell within reach. fall precautions in place. bed alarm activated.

## 2024-04-04 NOTE — ASSESSMENT & PLAN NOTE
- positive for influenza A on 4/3/24   - completed tamiflu 5 day course on 4/8/24 - renally dosed at 30mg bid  - Mucinex 600 mg bid for congestion

## 2024-04-04 NOTE — ASSESSMENT & PLAN NOTE
Hx of mild covid     - recent covid positive on 3/24/24 - negative on 4/3/24  - completed course of molnupiravir

## 2024-04-04 NOTE — PLAN OF CARE
Problem: Adult Inpatient Plan of Care  Goal: Plan of Care Review  Outcome: Progressing  Flowsheets (Taken 4/4/2024 0126)  Progress: no change  Outcome Evaluation: pt aaox3, had fever 100.3 and administered Tylnol, droplet precaution manitained, purewick on and call light within reach.  Plan of Care Reviewed With: patient     Problem: Adult Inpatient Plan of Care  Goal: Patient-Specific Goal (Individualized)  Outcome: Progressing   Plan of Care Review  Plan of Care Reviewed With: patient  Progress: no change  Outcome Evaluation: pt aaox3, had fever 100.3 and administered Tylnol, droplet precaution manitained, purewick on and call light within reach.

## 2024-04-04 NOTE — ASSESSMENT & PLAN NOTE
HbA1c 6.1% 10 days ago  - holding metformin  - on lispro ssi  - hypoglycemia protocol  - added diabetic diet

## 2024-04-04 NOTE — PROGRESS NOTES
Hospital Medicine Service -  Daily Progress Note       SUBJECTIVE   Interval History: Patient was seen and examined at bedside. No acute events overnight. On 3L NC. Denies chest pain, worsening dyspnea, nausea, vomiting. Having intermittent fevers.     Patient is requesting bowel regimen as her last bowel movement was on Friday.      OBJECTIVE      Vital signs in last 24 hours:  Temp:  [36.6 °C (97.9 °F)-38.3 °C (101 °F)] 38.3 °C (101 °F)  Heart Rate:  [61-88] 83  Resp:  [18-23] 18  BP: (108-144)/(52-66) 131/59    Intake/Output Summary (Last 24 hours) at 4/4/2024 1629  Last data filed at 4/4/2024 1415  Gross per 24 hour   Intake 480 ml   Output 500 ml   Net -20 ml       PHYSICAL EXAMINATION      Physical Exam  Gen Well appearing, comfortable, NAD. AAOX3  HEENT  MMM. No JVD  CV RR norm s1 and s2 no mrg  Pulm  CTA Bl.  No WRR  Abd +BS soft, NT, ND.    Ext NT, No LE edema  Neuro Non focal   No Ga  Psych  Full range affect.    LINES, CATHETERS, DRAINS, AIRWAYS, AND WOUNDS   Lines, Drains, and Airways:  Wounds (agree with documentation and present on admission):         Comments:      LABS / IMAGING / TELE      Labs  I independently reviewed all pertinent labs     Imaging  No new imaging     ECG/Telemetry  I independently reviewed telemetry and it shows NSR with HR 74     ASSESSMENT AND PLAN      Constipation  Assessment & Plan  Last BM on 3/29   Start miralax daily     Fever  Assessment & Plan  In the setting of influenza A  Tamiflu 75 mg twice daily for 5 days  Diagnosed with COVID March 22 had mild symptoms did not require oxygen  Blood cultures x 2 pending  Hemodynamically stable and does not have leukocytosis    Thyroid goiter  Assessment & Plan  Has history of thyroid goiter and multiple nodules and being followed outpatient  Patient is on propranolol and will continue the same  Outpatient thyroid ultrasound    Hyponatremia  Assessment & Plan  Mild  Sodium 135-->133  Due to acute illness  Continue to  monitor     Hypoxia  Assessment & Plan  Most likely in the setting of influenza A  Supplemental oxygen. Wean as tolerated   CTA negative for PE  Continue Tamiflu and supportive care     COVID  Assessment & Plan  Hx of mild covid   Diagnosed on 3/24/22  Did not require oxygen and completed course of molnupiravir  Repeat COVID swab negative      Type 2 diabetes mellitus without complication, without long-term current use of insulin (CMS/[facility])  Assessment & Plan  HbA1c 6.1% 10 days ago  Hold home metformin  Sliding scale insulin  Hypoglycemia protocol    Primary hypertension  Assessment & Plan  Blood pressure stable  Continue home propranolol and home Lasix    Postherpetic neuralgia  Assessment & Plan  S/p [employer] LineaQuattro stimulator for pain control (~15 years ago for insertion)  gabapentin TID, tramadol     Paroxysmal atrial fibrillation (CMS/[facility])  Assessment & Plan  Currently in NSR on telemetry   C/w home eliquis     Anemia  Assessment & Plan  Hemoglobin is at baseline  Monitor CBC     Mixed hyperlipidemia  Assessment & Plan  Chronic  Continue statin    Hyperthyroidism  Assessment & Plan  Off of medication apparently TFTs have remained stable  Takes propranolol and will continue    GERD (gastroesophageal reflux disease)  Assessment & Plan  Stable  Continue home omeprazole    Chronic diastolic CHF (congestive heart failure) (CMS/[facility])  Assessment & Plan  Not in acute exacerbation  Clinically looks euvolemic   Resume home lasix     * Influenza A  Assessment & Plan  Tamiflu 75 mg for 5 days  See fever       VTE Assessment: Padua    VTE Prophylaxis:  Current anticoagulants:  apixaban (ELIQUIS) tablet 2.5 mg, oral, BID      Code Status: Full Code      Estimated Discharge Date: 4/6/2024     Disposition Planning: Continue tamiflu. Wean oxygen as tolerated      Shreya Chambers MD  4/4/2024

## 2024-04-04 NOTE — H&P
Hospital Medicine Service -  History & Physical        CHIEF COMPLAINT   Fever, hypoxia     HISTORY OF PRESENT ILLNESS      88 y.o. female with a past medical history of atrial fibrillation on Eliquis, type II DM, HFpEF, COVID on 3/22/24, post herpetic neuralgia s/p pain stimulator who presented to [facility] ED with complaints of fever and hypoxia.  Reports generalized weakness and was noted to be hypoxic in the ED and currently on nasal cannula.  Denies chest pain shortness of breath.  D-dimer was elevated and CTA negative for PE.    PAST MEDICAL AND SURGICAL HISTORY      Past Medical History:   Diagnosis Date    A-fib (CMS/[facility])     Accelerated hypertension     CHF (congestive heart failure) (CMS/[facility])     Macular degeneration     Thyroid nodule     Type 2 diabetes mellitus (CMS/[facility])        Past Surgical History:   Procedure Laterality Date    APPENDECTOMY      HYSTERECTOMY      JOINT REPLACEMENT      KNEE ARTHROPLASTY         PCP: Sloan Maher MD    MEDICATIONS      Prior to Admission medications    Medication Sig Start Date End Date Taking? Authorizing Provider   apixaban (ELIQUIS) 2.5 mg tablet Take 1 tablet (2.5 mg total) by mouth 2 (two) times a day Indications: treatment to prevent blood clots in chronic atrial fibrillation. 3/26/24 4/25/24 Yes Kael Mathew MD   atorvastatin (LIPITOR) 20 mg tablet Take 1 tablet (20 mg total) by mouth nightly. 1/16/24  Yes Sloan Maher MD   ferrous sulfate (SLOW FE) 137 mg (45 mg iron) tablet extended release tablet, extended release Take 1 tablet (137 mg total) by mouth daily with breakfast. 3/25/24 9/21/24 Yes Sloan Maher MD   furosemide (LASIX) 20 mg tablet Take 1 tablet (20 mg total) by mouth daily. 1/16/24  Yes Sloan Maher MD   gabapentin (NEURONTIN) 300 mg capsule Take 1 capsule (300 mg total) by mouth 3 (three) times a day. 3/26/24 4/25/24 Yes Kael Mathew MD   metFORMIN (GLUCOPHAGE) 500 mg tablet Take 2 tablets (1,000 mg total) by mouth 2  (two) times a day with meals. 2/22/24  Yes Tonio Sauceda MD   pantoprazole (PROTONIX) 20 mg EC tablet Take 1 tablet (20 mg total) by mouth daily. 9/8/23  Yes Sloan Maher MD   propranoloL (INDERAL) 20 mg tablet Take 1 tablet (20 mg total) by mouth 2 (two) times a day. 1/16/24  Yes Sloan Maher MD   acetaminophen (TYLENOL) 325 mg tablet Take 650 mg by mouth every 6 hours as needed. 12/25/22   ProviderSugey MD   calcium carbonate (OS-HERBERT) 500 mg calcium (1,250 mg) tablet Take 1 tablet by mouth daily.    ProviderSugey MD   cholecalciferol, vitamin D3, 5,000 unit (125 mcg) tablet Take 5,000 Units by mouth daily.    ProviderSugey MD   denosumab (PROLIA) 60 mg/mL syringe Inject 60 mg under the skin every 6 (six) months.    ProviderSugey MD   diclofenac sodium (VOLTAREN) 1 % topical gel Apply topically 2 (two) times a day as needed for pain. 3/26/24 4/25/24  Kael Mathew MD   lancets misc 1 Lancet as needed (Diabetes). To check blood glucose 9/8/23   Sloan Maher MD   traMADoL (ULTRAM) 50 mg tablet Take 50 mg by mouth every 8 (eight) hours as needed for moderate pain or severe pain.    ProviderSugey MD   vit A/vit C/vit E/zinc/copper (ICAPS AREDS ORAL) Take 1 tablet by mouth daily.    ProviderSugey MD       ALLERGIES      Amiodarone    FAMILY HISTORY      No family history on file.    SOCIAL HISTORY      Social History     Socioeconomic History    Marital status:    Tobacco Use    Smoking status: Never    Smokeless tobacco: Never   Substance and Sexual Activity    Alcohol use: Never    Drug use: Never    Sexual activity: Defer     Social Determinants of Health     Food Insecurity: No Food Insecurity (4/3/2024)    Hunger Vital Sign     Worried About Running Out of Food in the Last Year: Never true     Ran Out of Food in the Last Year: Never true   Transportation Needs: No Transportation Needs (3/25/2024)    PRAPARE -  Transportation     Lack of Transportation (Medical): No     Lack of Transportation (Non-Medical): No   Housing Stability: Low Risk  (3/25/2024)    Housing Stability Vital Sign     Unable to Pay for Housing in the Last Year: No     Number of Places Lived in the Last Year: 1     Unstable Housing in the Last Year: No       REVIEW OF SYSTEMS      All other systems reviewed and negative except as noted in HPI    PHYSICAL EXAMINATION      Temp:  [37.2 °C (98.9 °F)-38.1 °C (100.6 °F)] 37.4 °C (99.3 °F)  Heart Rate:  [78-95] 78  Resp:  [14-32] 18  BP: (116-141)/(41-65) 141/65  Body mass index is 25.19 kg/m².    Physical Exam  Constitutional:  No distress.   HENT:   Head: Normocephalic and atraumatic.   Mouth/Throat: Oropharynx is clear and moist.   Eyes:  No discharge  Neck: Normal range of motion. No tracheal deviation present.   Cardiovascular: Normal rate, regular rhythm and normal heart sounds.  Exam reveals no gallop and no friction rub.    No murmur heard.  Pulmonary/Chest: Mild wheezing, no crackles  Abdominal: Soft. Bowel sounds are normal. exhibits no distension. There is no tenderness. There is no rebound and no guarding.       LABS / IMAGING / EKG        I have reviewed all labs and imaging results. Please see Problem List/A/P for relevant findings.      ASSESSMENT AND PLAN           Fever  Assessment & Plan  In the setting of influenza A  Tamiflu 75 mg twice daily for 5 days  Diagnosed with COVID March 22 had mild symptoms did not require oxygen  Blood cultures x 2 pending  Hemodynamically stable and does not have leukocytosis    * Influenza A  Assessment & Plan  Tamiflu 75 mg for 5 days  See fever    Hyponatremia  Assessment & Plan  Mild  Sodium 135  Due to acute illness  Anticipate to resolve by tomorrow  BMP in the morning    Type 2 diabetes mellitus without complication, without long-term current use of insulin (CMS/[facility])  Assessment & Plan  HbA1c 6.1% 10 days ago  Hold home metformin  Sliding scale  insulin  Hypoglycemia protocol    Thyroid goiter  Assessment & Plan  Has history of thyroid goiter and multiple nodules and being followed outpatient  Patient is on propranolol and will continue the same  Outpatient thyroid ultrasound    Hypoxia  Assessment & Plan  Most likely in the setting of influenza A  Supplemental oxygen  CTA negative for PE  Continue Tamiflu    COVID  Assessment & Plan  Mid   Diagnosed on 3/24/22  Did not require oxygen and completed course of molnupiravir  COVID swab negative today    Primary hypertension  Assessment & Plan  Blood pressure stable  Continue home propranolol and hold home Lasix    Postherpetic neuralgia  Assessment & Plan  S/p [employer] stimulator for pain control (~15 years ago for insertion)  gabapentin TID, tramadol     Anemia  Assessment & Plan  Hemoglobin is at baseline  CBC in the morning    Mixed hyperlipidemia  Assessment & Plan  Chronic  Continue statin    Hyperthyroidism  Assessment & Plan  Off of medication apparently TFTs have remained stable  Takes propranolol and will continue    GERD (gastroesophageal reflux disease)  Assessment & Plan  Stable  Continue home omeprazole         VTE Assessment: Padua    VTE Prophylaxis: Current anticoagulants:  apixaban (ELIQUIS) tablet 2.5 mg, oral, BID      Code Status: Full Code      Discussed advanced care planning. Rissa has an ACP and Rissa's surrogate decision maker is daughter.  Estimated Discharge Date: 2024  Disposition Plannin-72 hours pending clinical improvement     Lorie Oconnell DO  4/3/2024

## 2024-04-04 NOTE — ASSESSMENT & PLAN NOTE
S/p Roomish stimulator for pain control (~15 years ago for insertion)    - on gabapentin 300mg tid - current renal dosing is 900mg total max daily  - on tylenol prn  - on tramadol prn

## 2024-04-04 NOTE — ASSESSMENT & PLAN NOTE
Has history of thyroid goiter and multiple nodules and being followed outpatient  Patient is on propranolol and will continue the same  Outpatient thyroid ultrasound

## 2024-04-04 NOTE — ASSESSMENT & PLAN NOTE
In the setting of influenza A    Afebrile    - Diagnosed with COVID recently, had mild symptoms did not require oxygen  - CTA w/o consolidation or groundglass opacity, neg for PE  - no leukocytosis  - blood cx ngtd  - 4/3/24 UA unremarkable for UTI  - monitor temp curve

## 2024-04-04 NOTE — ASSESSMENT & PLAN NOTE
Most likely in the setting of influenza A and recent COVID    - RA at baseline  - Weaned from 3L o2 NC-->1.5L   - wean fio2 as tolerated  - CTA neg for PE  - tx of influenza reviewed elsewhere

## 2024-04-05 PROBLEM — R53.81 DEBILITY: Status: ACTIVE | Noted: 2024-04-05

## 2024-04-05 LAB
ANION GAP SERPL CALC-SCNC: 6 MEQ/L (ref 3–15)
BASOPHILS # BLD: 0.03 K/UL (ref 0.01–0.1)
BASOPHILS NFR BLD: 0.4 %
BUN SERPL-MCNC: 19 MG/DL (ref 7–25)
CALCIUM SERPL-MCNC: 8.2 MG/DL (ref 8.6–10.3)
CHLORIDE SERPL-SCNC: 100 MEQ/L (ref 98–107)
CO2 SERPL-SCNC: 29 MEQ/L (ref 21–31)
CREAT SERPL-MCNC: 0.6 MG/DL (ref 0.6–1.2)
DIFFERENTIAL METHOD BLD: ABNORMAL
EGFRCR SERPLBLD CKD-EPI 2021: >60 ML/MIN/1.73M*2
EOSINOPHIL # BLD: 0.06 K/UL (ref 0.04–0.36)
EOSINOPHIL NFR BLD: 0.8 %
ERYTHROCYTE [DISTWIDTH] IN BLOOD BY AUTOMATED COUNT: 14.3 % (ref 11.7–14.4)
GLUCOSE BLD-MCNC: 112 MG/DL (ref 70–99)
GLUCOSE BLD-MCNC: 155 MG/DL (ref 70–99)
GLUCOSE BLD-MCNC: 236 MG/DL (ref 70–99)
GLUCOSE BLD-MCNC: 93 MG/DL (ref 70–99)
GLUCOSE SERPL-MCNC: 93 MG/DL (ref 70–99)
HCT VFR BLD AUTO: 32 % (ref 35–45)
HGB BLD-MCNC: 10.5 G/DL (ref 11.8–15.7)
IMM GRANULOCYTES # BLD AUTO: 0.03 K/UL (ref 0–0.08)
IMM GRANULOCYTES NFR BLD AUTO: 0.4 %
LYMPHOCYTES # BLD: 1.73 K/UL (ref 1.2–3.5)
LYMPHOCYTES NFR BLD: 24.1 %
MCH RBC QN AUTO: 29.8 PG (ref 28–33.2)
MCHC RBC AUTO-ENTMCNC: 32.8 G/DL (ref 32.2–35.5)
MCV RBC AUTO: 90.9 FL (ref 83–98)
MONOCYTES # BLD: 0.68 K/UL (ref 0.28–0.8)
MONOCYTES NFR BLD: 9.5 %
NEUTROPHILS # BLD: 4.64 K/UL (ref 1.7–7)
NEUTS SEG NFR BLD: 64.8 %
NRBC BLD-RTO: 0 %
PDW BLD AUTO: 9 FL (ref 9.4–12.3)
PLATELET # BLD AUTO: 180 K/UL (ref 150–369)
POCT TEST: ABNORMAL
POCT TEST: NORMAL
POTASSIUM SERPL-SCNC: 4.2 MEQ/L (ref 3.5–5.1)
RBC # BLD AUTO: 3.52 M/UL (ref 3.93–5.22)
SODIUM SERPL-SCNC: 135 MEQ/L (ref 136–145)
WBC # BLD AUTO: 7.17 K/UL (ref 3.8–10.5)

## 2024-04-05 PROCEDURE — 97162 PT EVAL MOD COMPLEX 30 MIN: CPT | Mod: GP

## 2024-04-05 PROCEDURE — 97116 GAIT TRAINING THERAPY: CPT | Mod: GP

## 2024-04-05 PROCEDURE — 85025 COMPLETE CBC W/AUTO DIFF WBC: CPT | Performed by: STUDENT IN AN ORGANIZED HEALTH CARE EDUCATION/TRAINING PROGRAM

## 2024-04-05 PROCEDURE — 20600000 HC ROOM AND CARE INTERMEDIATE/TELEMETRY

## 2024-04-05 PROCEDURE — 97166 OT EVAL MOD COMPLEX 45 MIN: CPT | Mod: GO

## 2024-04-05 PROCEDURE — 63700000 HC SELF-ADMINISTRABLE DRUG: Performed by: INTERNAL MEDICINE

## 2024-04-05 PROCEDURE — 97535 SELF CARE MNGMENT TRAINING: CPT | Mod: GO

## 2024-04-05 PROCEDURE — 99232 SBSQ HOSP IP/OBS MODERATE 35: CPT | Performed by: STUDENT IN AN ORGANIZED HEALTH CARE EDUCATION/TRAINING PROGRAM

## 2024-04-05 PROCEDURE — 63700000 HC SELF-ADMINISTRABLE DRUG: Performed by: NURSE PRACTITIONER

## 2024-04-05 PROCEDURE — 63700000 HC SELF-ADMINISTRABLE DRUG: Performed by: PHYSICIAN ASSISTANT

## 2024-04-05 PROCEDURE — 80048 BASIC METABOLIC PNL TOTAL CA: CPT | Performed by: STUDENT IN AN ORGANIZED HEALTH CARE EDUCATION/TRAINING PROGRAM

## 2024-04-05 PROCEDURE — 63700000 HC SELF-ADMINISTRABLE DRUG: Performed by: STUDENT IN AN ORGANIZED HEALTH CARE EDUCATION/TRAINING PROGRAM

## 2024-04-05 PROCEDURE — 36415 COLL VENOUS BLD VENIPUNCTURE: CPT | Performed by: STUDENT IN AN ORGANIZED HEALTH CARE EDUCATION/TRAINING PROGRAM

## 2024-04-05 RX ORDER — SENNOSIDES 8.6 MG/1
2 TABLET ORAL NIGHTLY
Status: DISCONTINUED | OUTPATIENT
Start: 2024-04-05 | End: 2024-04-08 | Stop reason: HOSPADM

## 2024-04-05 RX ADMIN — PROPRANOLOL HYDROCHLORIDE 20 MG: 10 TABLET ORAL at 08:35

## 2024-04-05 RX ADMIN — GABAPENTIN 300 MG: 300 CAPSULE ORAL at 08:35

## 2024-04-05 RX ADMIN — APIXABAN 2.5 MG: 2.5 TABLET, FILM COATED ORAL at 08:35

## 2024-04-05 RX ADMIN — TRAMADOL HYDROCHLORIDE 50 MG: 50 TABLET, COATED ORAL at 03:02

## 2024-04-05 RX ADMIN — GABAPENTIN 300 MG: 300 CAPSULE ORAL at 21:55

## 2024-04-05 RX ADMIN — ACETAMINOPHEN 650 MG: 325 TABLET ORAL at 00:44

## 2024-04-05 RX ADMIN — TRAMADOL HYDROCHLORIDE 50 MG: 50 TABLET, COATED ORAL at 14:57

## 2024-04-05 RX ADMIN — FUROSEMIDE 20 MG: 20 TABLET ORAL at 08:35

## 2024-04-05 RX ADMIN — OSELTAMIVIR PHOSPHATE 30 MG: 30 CAPSULE ORAL at 22:02

## 2024-04-05 RX ADMIN — ATORVASTATIN CALCIUM 20 MG: 20 TABLET, FILM COATED ORAL at 21:55

## 2024-04-05 RX ADMIN — SENNOSIDES 2 TABLET: 8.6 TABLET, FILM COATED ORAL at 21:55

## 2024-04-05 RX ADMIN — PROPRANOLOL HYDROCHLORIDE 20 MG: 10 TABLET ORAL at 21:55

## 2024-04-05 RX ADMIN — GABAPENTIN 300 MG: 300 CAPSULE ORAL at 13:38

## 2024-04-05 RX ADMIN — APIXABAN 2.5 MG: 2.5 TABLET, FILM COATED ORAL at 21:55

## 2024-04-05 RX ADMIN — PANTOPRAZOLE SODIUM 20 MG: 20 TABLET, DELAYED RELEASE ORAL at 08:35

## 2024-04-05 RX ADMIN — ACETAMINOPHEN 650 MG: 325 TABLET ORAL at 14:57

## 2024-04-05 RX ADMIN — OSELTAMIVIR PHOSPHATE 30 MG: 30 CAPSULE ORAL at 08:40

## 2024-04-05 RX ADMIN — POLYETHYLENE GLYCOL 3350 17 G: 17 POWDER, FOR SOLUTION ORAL at 08:36

## 2024-04-05 ASSESSMENT — COGNITIVE AND FUNCTIONAL STATUS - GENERAL
DRESSING REGULAR LOWER BODY CLOTHING: 3 - A LITTLE
EATING MEALS: 4 - NONE
MOVING TO AND FROM BED TO CHAIR: 3 - A LITTLE
TOILETING: 3 - A LITTLE
STANDING UP FROM CHAIR USING ARMS: 3 - A LITTLE
DRESSING REGULAR UPPER BODY CLOTHING: 3 - A LITTLE
WALKING IN HOSPITAL ROOM: 3 - A LITTLE
AFFECT: WFL
MOVING TO AND FROM BED TO CHAIR: 3 - A LITTLE
WALKING IN HOSPITAL ROOM: 3 - A LITTLE
STANDING UP FROM CHAIR USING ARMS: 3 - A LITTLE
CLIMB 3 TO 5 STEPS WITH RAILING: 2 - A LOT
HELP NEEDED FOR BATHING: 3 - A LITTLE
AFFECT: WFL
HELP NEEDED FOR PERSONAL GROOMING: 3 - A LITTLE
CLIMB 3 TO 5 STEPS WITH RAILING: 2 - A LOT

## 2024-04-05 NOTE — PLAN OF CARE
Problem: Adult Inpatient Plan of Care  Goal: Plan of Care Review  Outcome: Progressing  Flowsheets (Taken 4/5/2024 1412)  Progress: improving  Outcome Evaluation: OT eval complete  Plan of Care Reviewed With: patient     Problem: Self-Care Deficit  Goal: Improved Ability to Complete Activities of Daily Living  Outcome: Progressing

## 2024-04-05 NOTE — PROGRESS NOTES
Physical Therapy -  Initial Evaluation     Patient: Rissa Erickson  Location: [place] [address]  MRN: [id number]  Today's date: 4/5/2024    HISTORY OF PRESENT ILLNESS     Rissa is a 88 y.o. female admitted on 4/3/2024 with Influenza A [J10.1]  Hypoxia [R09.02]. Principal problem is Influenza A.    Past Medical History  Rissa has a past medical history of A-fib (CMS/[facility]), Accelerated hypertension, CHF (congestive heart failure) (CMS/[facility]), Macular degeneration, Thyroid nodule, and Type 2 diabetes mellitus (CMS/[facility]).    History of Present Illness   Pt presents with complaints of fever and hypoxia.  Reports generalized weakness and was noted to be hypoxic in the ED     CTA Chest:  1. No acute pulmonary embolism.  2. Redemonstration of enlarged, multinodular thyroid gland/goiter which narrows  the mid trachea.  PRIOR LEVEL OF FUNCTION AND LIVING ENVIRONMENT     Prior Level of Function      Flowsheet Row Most Recent Value   Ambulation assistive equipment   Transferring assistive equipment   Toileting assistive equipment   Bathing assistive equipment   Dressing independent   Eating independent   IADLs assistive person   Driving/Transportation friends/family   Communication understands/communicates without difficulty   Prior Level of Function Comment Pt reports mod I w/ rollator. Pt goes to the dining [employer]ra[employer] for lunch and dinner. Independent w/ ADLs. Pt's dtr assists w/ medication management, IADLs, groceries, and transportation. Doesn't use O2   Assistive Device Currently Used at Home walker, 4-wheeled, shower chair, grab bar  [bed rail, lift recliner]          Prior Living Environment      Flowsheet Row Most Recent Value   People in Home alone   Current Living Arrangements independent living facility   Home Accessibility wheelchair accessible, elevator accessible   Living Environment Comment 4th floor ILF ([facility]) with elevator access, WIS w/ SC+GB, standard bed w/ bed rail, standard toilet w/ GB           VITALS AND PAIN     PT Vitals      Date/Time Pulse SpO2 Pt Activity O2 Therapy O2 Del Method O2 Flow Rate BP BP Location BP Method Pt Position [facility]   04/05/24 0839 68 95 % At rest Supplemental oxygen Nasal cannula 3 L/min 125/59 Right upper arm Automatic Lying SG   04/05/24 0857 94 98 % At rest Supplemental oxygen Nasal cannula 2 L/min 158/66 Right upper arm Automatic Sitting KK          PT Pain      Date/Time Pain Type Rating: Rest Rating: Activity [facility]   04/05/24 0839 Pain Assessment 0 - no pain 0 - no pain SG             Objective   OBJECTIVE     Start time:  0836  End time:  0902  Session Length: 26 min  Mode of Treatment: co-treatment, physical therapy (co-tx for d/c planning)    General Observations  Patient received reclined, in bed. She was no issues or concerns identified by nurse prior to session, agreeable to therapy. NC donned    Precautions: fall, droplet, oxygen therapy device and L/min (2-3L NC, Influenza)        Services  Do You Speak a Language Other Than English at Home?: no      PT Eval and Treat - 04/05/24 0836          Cognition    Orientation Status oriented x 3     Affect/Mental Status WFL     Follows Commands WFL     Cognitive Function safety deficit     Safety Deficit insight into deficits/self-awareness     Comment, Cognition pleasant, cooperative, receptive        Vision Assessment/Intervention    Vision Assessment corrective lenses for distance;impaired     Visual Impairment/Limitations macular degeneration        Hearing Assessment    Hearing Status hearing aid, bilateral   typically wears but left them at home so they dont get lost at the hospital       Sensory Assessment    Sensory Assessment sensation intact, lower extremities        Lower Extremity Assessment    LE Assessment ROM and Strength WFL        Bed Mobility    Bed Mobility Activities left;supine to sit     [place] close supervision     Safety/Cues increased time to complete     Assistive Device head of bed  elevated;bed rails     Comment HOB 20 deg        Mobility Belt    Mobility Belt Used for All Out of Bed Activity yes        Sit/Stand Transfer    Surface chair with arm rests;edge of bed     [place] minimum assist (75% or more patient effort);1 person assist     Safety/Cues verbal cues;hand placement;technique     Assistive Device walker, front-wheeled     Transfer Comments 1x from bed, 1x to chair. PA for posterior boost. Poor eccentric control        Gait Training    [place], Gait minimum assist (75% or more patient effort);1 person assist     Safety/Cues verbal cues;technique;increased time to complete     Assistive Device walker, front-wheeled     Distance in Feet 8 feet     Pattern step-to     Deviations/Abnormal Patterns gait speed decreased;step length decreased     Comment (Gait/Stairs) 8'x2 w/ toilet transfer between trials. PA for balance. Kyphotic plus shoulders significantly shifted to the L in comparison to hips        Balance    Static Sitting Balance WFL     Dynamic Sitting Balance mild impairment     Sit to Stand Dynamic Balance mild impairment     Static Standing Balance mild impairment     Dynamic Standing Balance mild impairment     Comment, Balance w/ RW        Impairments/Safety Issues    Impairments Affecting Function balance;endurance/activity tolerance;strength                                    Education Documentation  Assistive/Adaptive Devices, taught by Enmanuel Viramontes PT at 4/5/2024  9:49 AM.  Learner: Patient  Readiness: Acceptance  Method: Explanation  Response: Verbalizes Understanding, Needs Reinforcement  Comment: PT POC, safety during mobility, use of call bell    Risk Factors, taught by Enmanuel Viramontes PT at 4/5/2024  9:49 AM.  Learner: Patient  Readiness: Acceptance  Method: Explanation  Response: Verbalizes Understanding, Needs Reinforcement  Comment: PT POC, safety during mobility, use of call bell        Session Outcome  Patient upright, in chair, leg(s) elevated at end  of session, chair alarm on, all needs met, call light in reach, personal items in reach. Nursing notified about patient's performance, patient's response to therapy/activity, and patient's position.    AM-PAC™ - Mobility (Current Function)     Turning form your back to your side while in flat bed without using bedrails 4 - None   Moving from lying on your back to sitting on the side of a flat bed without using bedrails 3 - A Little   Moving to and from a bed to a chair 3 - A Little   Standing up from a chair using your arms 3 - A Little   To walk in a hospital room 3 - A Little   Climbing 3-5 steps with a railing 2 - A Lot   AM-PAC™ Mobility Score 18      ASSESSMENT AND PLAN     Progress Summary  [facility] 18. Pt requires close supervision for bed mobility, min Ax1 for transfers and short distance amb w/ RW. Pt received on 3L but left on 3L due to saturations in high 90s. Rec d/c to SNF given pt's deconditioning    Patient/Family Therapy Goals Statement: to get better    PT Plan      Flowsheet Row Most Recent Value   Rehab Potential good, to achieve stated therapy goals at 04/05/2024 0836   Therapy Frequency 3 times/wk at 04/05/2024 0836   Planned Therapy Interventions bed mobility training, balance training, gait training, patient/family education, strengthening, transfer training at 04/05/2024 0836            PT Discharge Recommendations      Flowsheet Row Most Recent Value   PT Recommended Discharge Disposition skilled nursing facility at 04/05/2024 0836   Anticipated Equipment Needs if Discharged Home (PT) none at 04/05/2024 0836                 PT Goals      Flowsheet Row Most Recent Value   Bed Mobility Goal 1    Activity/Assistive Device bed mobility activities, all at 04/05/2024 0836   [place] modified independence at 04/05/2024 0836   Time Frame by discharge at 04/05/2024 0836   Progress/Outcome goal ongoing at 04/05/2024 0836   Transfer Goal 1    Activity/Assistive Device all transfers, walker,  front-wheeled at 04/05/2024 0836   [place] modified independence at 04/05/2024 0836   Time Frame by discharge at 04/05/2024 0836   Progress/Outcome goal ongoing at 04/05/2024 0836   Gait Training Goal 1    Activity/Assistive Device gait (walking locomotion), walker, front-wheeled at 04/05/2024 0836   [place] modified independence at 04/05/2024 0836   Distance 30 at 04/05/2024 0836   Time Frame by discharge at 04/05/2024 0836   Progress/Outcome goal ongoing at 04/05/2024 0836

## 2024-04-05 NOTE — PLAN OF CARE
Care Coordination Discharge Plan Note     Discharge Needs Assessment  Concerns to be Addressed: care coordination/care conferences  Current Discharge Risk: physical impairment    Anticipated Discharge Plan  Anticipated Discharge Disposition: skilled nursing facility  Type of Skilled Nursing Care Services: OT, PT, nursing      Patient Choice  Offered/Gave Vendor List: yes  Patient's Choice of Community Agency(s): [facility] referrals sent    Patient and/or patient guardian/advocate was made aware of their right to choose a provider. A list of eligible providers was presented and reviewed with the patient and/or patient guardian/advocate in written and/or verbal form. The list delineates providers in the patient’s desired geographic area who are participating in the Medicare program and/or providers contracted with the patient’s primary insurance. The Medicare list and quality ratings were obtained from the Medicare.gov [medicare.gov] website.    ---------------------------------------------------------------------------------------------------------------------    Interdisciplinary Discharge Plan Review:  Participants:social work/services, patient, family/lay caregiver    Concerns Comments: SHIMA met w. pt by bedside and discussed recc for SNF but pt differed to dtr. SHIMA spoke w. pt dtr Cristina and she is open to blanket referrals to see who is willing to offer a bed and will go over the accepting list. SHIMA sent referrals. SHIMA to follow.      Discharge Transportation:  Is Out of Hospital DNR needed at Discharge: no  Does patient need discharge transport? Yes

## 2024-04-05 NOTE — PLAN OF CARE
Problem: Adult Inpatient Plan of Care  Goal: Plan of Care Review  Outcome: Progressing  Flowsheets (Taken 4/5/2024 0949)  Progress: improving  Outcome Evaluation: PT Eval complete  Plan of Care Reviewed With: patient  Goal: Patient-Specific Goal (Individualized)  Outcome: Progressing  Flowsheets (Taken 4/5/2024 0949)  Patient/Family-Specific Goals (Include Timeframe): to get better     Problem: Mobility Impairment  Goal: Optimal Mobility  4/5/2024 0949 by Enmanuel Viramontes, PT  Outcome: Progressing  4/5/2024 0948 by Enmanuel Viramontes, PT  Outcome: Progressing

## 2024-04-05 NOTE — PROGRESS NOTES
[facility] received referral from Angela for  RN, PT and OT services.  Will follow for anticipating discharge needs Dispo home versus SNF.

## 2024-04-05 NOTE — PLAN OF CARE
Problem: Adult Inpatient Plan of Care  Goal: Plan of Care Review  Outcome: Not progressing  Flowsheets (Taken 4/5/2024 8247)  Progress: no change  Outcome Evaluation: Recd pt in bed, AAOx4, on oxygen 3L, maintained droplet precaution, purwick in place, brief and call light within reach.  Plan of Care Reviewed With: patient     Problem: Adult Inpatient Plan of Care  Goal: Patient-Specific Goal (Individualized)  Outcome: Progressing  Goal: Absence of Hospital-Acquired Illness or Injury  Outcome: Progressing  Goal: Optimal Comfort and Wellbeing  Outcome: Progressing  Goal: Readiness for Transition of Care  Outcome: Progressing     Problem: Infection  Goal: Absence of Infection Signs and Symptoms  Outcome: Progressing     Problem: Skin Injury Risk Increased  Goal: Skin Health and Integrity  Outcome: Progressing     Problem: Fall Injury Risk  Goal: Absence of Fall and Fall-Related Injury  Outcome: Progressing   Plan of Care Review  Plan of Care Reviewed With: patient  Progress: no change  Outcome Evaluation: Recd pt in bed, AAOx4, on oxygen 3L, maintained droplet precaution, purwick in place, brief and call light within reach.

## 2024-04-05 NOTE — PROGRESS NOTES
Hospital Medicine Service -  Daily Progress Note       SUBJECTIVE   Interval History: Pt resting OOB in chair, ate breakfast. On RA at baseline. Denies dyspnea at rest. Reports LAKE. Denies chest pain, nausea/vomiting, abd pain, dysuria. PT rec SNF, family agreeable.    Called daughter Cristina to provide medical update today.      OBJECTIVE      Vital signs in last 24 hours:  Temp:  [36.1 °C (97 °F)-38.3 °C (101 °F)] 36.1 °C (97 °F)  Heart Rate:  [64-94] 80  Resp:  [16-18] 16  BP: (101-158)/(53-66) 107/53    Intake/Output Summary (Last 24 hours) at 4/5/2024 1342  Last data filed at 4/5/2024 0530  Gross per 24 hour   Intake --   Output 2400 ml   Net -2400 ml       PHYSICAL EXAMINATION        General: NAD, frail appearing  Skin: warm  HEENT: Normocephalic, atraumatic  Cardiovascular: RRR  Pulmonary: rales bibasilar, no wheezing  Abdomen: non-tender to palpation  Extremities: no pitting edema of b/l LE  Musculoskeletal: no deformities  Neuro: alert and awake  Psychiatric: no agitation      LINES, CATHETERS, DRAINS, AIRWAYS, AND WOUNDS   Lines, Drains, and Airways:  Wounds (agree with documentation and present on admission):       Comments: reviewed     LABS / IMAGING / TELE      Labs: reviewed    Lab Results   Component Value Date    GLUCOSE 93 04/05/2024    CALCIUM 8.2 (L) 04/05/2024     (L) 04/05/2024    K 4.2 04/05/2024    CO2 29 04/05/2024     04/05/2024    BUN 19 04/05/2024    CREATININE 0.6 04/05/2024     Lab Results   Component Value Date    WBC 7.17 04/05/2024    HGB 10.5 (L) 04/05/2024    HCT 32.0 (L) 04/05/2024    MCV 90.9 04/05/2024     04/05/2024       Imaging: reviewed    CT ANGIOGRAPHY CHEST PULMONARY EMBOLISM WITH IV CONTRAST   Final Result   IMPRESSION:      1. No acute pulmonary embolism.   2. Redemonstration of enlarged, multinodular thyroid gland/goiter which narrows   the mid trachea.         X-RAY CHEST 1 VIEW   Final Result   IMPRESSION:   Please see above.      ECG 12 lead   ED  Interpretation   NSR 93, no st elevation, nml axis      Final Result           Microbiology Data: reviewed  Microbiology Results     Procedure Component Value Units Date/Time    Blood Culture Blood, Venous [[id number]]  (Normal) Collected: 04/03/24 1121    Specimen: Blood, Venous Updated: 04/04/24 1601     Culture No growth at 18-24 hours    Blood Culture Blood, Venous [[id number]]  (Normal) Collected: 04/03/24 1121    Specimen: Blood, Venous Updated: 04/04/24 1601     Culture No growth at 18-24 hours    SARS-COV-2 (COVID-19)/ FLU A/B, AND RSV, PCR Nasopharynx [[id number]]  (Abnormal) Collected: 04/03/24 1547    Specimen: Nasopharyngeal Swab from Nasopharynx Updated: 04/03/24 1634     SARS-CoV-2 (COVID-19) Negative     Influenza A Positive     Influenza B Negative     Respiratory Syncytial Virus Negative    Narrative:      Testing performed using real-time PCR for detection of COVID-19. EUA approved validation studies performed on site.            ECG/Telemetry: reviewed    ASSESSMENT AND PLAN      Debility  Assessment & Plan  - pt is from [facility]  - PT/OT    Constipation  Assessment & Plan  - last bm 3/29  - on miralax, added senna nightly    Fever  Assessment & Plan  In the setting of influenza A    4/5/24 - febrile at 100.6F    - Diagnosed with COVID recently, had mild symptoms did not require oxygen  - CTA w/o consolidation or groundglass opacity, neg for PE  - no leukocytosis  - blood cx ngtd  - 4/3/24 UA unremarkable for UTI  - monitor temp curve      Thyroid goiter  Assessment & Plan  Has history of thyroid goiter and multiple nodules and being followed outpatient  Patient is on propranolol and will continue the same  Outpatient thyroid ultrasound    Hyponatremia  Assessment & Plan  Mild  Due to acute illness    - Na improving  - trend bmp  - monitor on lasix po    Hypoxia  Assessment & Plan  Most likely in the setting of influenza A and recent COVID    - RA at baseline  - on 3L o2 NC currently  - wean  fio2 as tolerated  - home o2 eval prior to d/c  - CTA neg for PE  - tx of influenza reviewed elsewhere    COVID  Assessment & Plan  Hx of mild covid     - recent covid positive on 3/24/24 - negative on 4/3/24  - Did not require oxygen and completed course of molnupiravir      Type 2 diabetes mellitus without complication, without long-term current use of insulin (CMS/[facility])  Assessment & Plan  HbA1c 6.1% 10 days ago  - holding metformin  - on lispro ssi  - hypoglycemia protocol  - added NCS diet    Primary hypertension  Assessment & Plan  Monitor BP    - on lasix  - on propranolol      Postherpetic neuralgia  Assessment & Plan  S/p [employer] stimulator for pain control (~15 years ago for insertion)    - on gabapentin 300mg tid - current renal dosing is 900mg total max daily  - on tylenol prn  - on tramadol prn    Paroxysmal atrial fibrillation (CMS/[facility])  Assessment & Plan  Currently in NSR on telemetry   C/w home eliquis     Anemia  Assessment & Plan  Hemoglobin is at baseline  Monitor CBC     Mixed hyperlipidemia  Assessment & Plan  Chronic  - on statin    Hyperthyroidism  Assessment & Plan  Off of medication apparently TFTs have remained stable  Takes propranolol and will continue    GERD (gastroesophageal reflux disease)  Assessment & Plan  Stable    - on ppi    Chronic diastolic CHF (congestive heart failure) (CMS/[facility])  Assessment & Plan  Not in acute exacerbation  Clinically looks euvolemic, chest imaging reviewed  Followed by Dr. Mendelson    - on lasix 20mg po q daily  - CHF diet, 2L fluid restriction  - daily weights  - monitor I/Os    * Influenza A  Assessment & Plan  - positive for influenza A on 4/3/24   - on tamiflu for 5 day course - renally dosed at 30mg bid           VTE Assessment: Padua    VTE Prophylaxis:  Current anticoagulants:  apixaban (ELIQUIS) tablet 2.5 mg, oral, BID        Code Status: Full Code      Estimated Discharge Date: 4/6/2024     Disposition Planning: wean off O2 as able, PT  rec SNF       VIVI Huertas  4/5/2024

## 2024-04-05 NOTE — PLAN OF CARE
Plan of Care Review  Plan of Care Reviewed With: patient  Progress: no change  Outcome Evaluation: Received patient at 1500. Patient alert and oriented times three. On 2 L nasal cannula, no complaints of shortness of breath. Lungs are coarse and patient has infrequent congested cough. No complaints of pain. Up to bedside commode with one person assistance and walker. Patient appears weak and fatigued. Bed alarm on and call bell within reach.

## 2024-04-05 NOTE — PROGRESS NOTES
Occupational Therapy -  Initial Evaluation     Patient: Rissa Erickson  Location: [place] [address]  MRN: [id number]  Today's date: 4/5/2024    HISTORY OF PRESENT ILLNESS     Rissa is a 88 y.o. female admitted on 4/3/2024 with Influenza A [J10.1]  Hypoxia [R09.02]. Principal problem is Influenza A.    Past Medical History  Rissa has a past medical history of A-fib (CMS/[facility]), Accelerated hypertension, CHF (congestive heart failure) (CMS/[facility]), Macular degeneration, Thyroid nodule, and Type 2 diabetes mellitus (CMS/[facility]).    History of Present Illness   Pt presents with complaints of fever and hypoxia.  Reports generalized weakness and was noted to be hypoxic in the ED     CTA Chest:  1. No acute pulmonary embolism.  2. Redemonstration of enlarged, multinodular thyroid gland/goiter which narrows  the mid trachea.  PRIOR LEVEL OF FUNCTION AND LIVING ENVIRONMENT     Prior Level of Function      Flowsheet Row Most Recent Value   Dominant Hand right   Ambulation assistive equipment   Transferring assistive equipment   Toileting assistive equipment   Bathing assistive equipment   Dressing independent   Eating independent   IADLs assistive person   Driving/Transportation friends/family   Communication understands/communicates without difficulty   Prior Level of Function Comment Pt reports mod I w/ rollator. Pt goes to the [facility] [employer] for lunch and dinner. Independent w/ ADLs. Pt's dtr assists w/ medication management, IADLs, groceries, and transportation. Doesn't use O2   Assistive Device Currently Used at Home walker, 4-wheeled, shower chair, grab bar  [bed rail, lift recliner]          Prior Living Environment      Flowsheet Row Most Recent Value   People in Home alone   Current Living Arrangements independent living facility   Home Accessibility wheelchair accessible, elevator accessible   Living Environment Comment 4th floor ILF ([facility]) with elevator access, WIS w/ SC+GB, standard bed w/ bed rail,  standard toilet w/ GB          Occupational Profile      Flowsheet Row Most Recent Value   Environmental Supports and Barriers supportive family          VITALS AND PAIN     OT Vitals      Date/Time Pulse SpO2 Pt Activity O2 Therapy O2 Del Method O2 Flow Rate BP BP Location BP Method Pt Position [facility]   04/05/24 0839 68 95 % At rest Supplemental oxygen Nasal cannula 3 L/min 125/59 Right upper arm Automatic Lying SG   04/05/24 0857 94 98 % At rest Supplemental oxygen Nasal cannula 2 L/min 158/66 Right upper arm Automatic Sitting KK          OT Pain      Date/Time Pain Type Rating: Rest Rating: Activity [facility]   04/05/24 0839 Pain Assessment 0 - no pain 0 - no pain SG             Objective   OBJECTIVE     Start time:  0836  End time:  0859  Session Length: 23 min  Mode of Treatment: co-treatment, occupational therapy    General Observations  Patient received reclined, in bed. She was agreeable to therapy, no issues or concerns identified by nurse prior to session. 3L NC donned    Precautions: fall, droplet, oxygen therapy device and L/min (3L donned; weaned to 2L)        Services  Do You Speak a Language Other Than English at Home?: no      OT Eval and Treat - 04/05/24 0835          Cognition    Orientation Status oriented x 3     Affect/Mental Status WFL     Follows Commands WFL     Cognitive Function safety deficit     Safety Deficit awareness of need for assistance;insight into deficits/self-awareness        Vision Assessment/Intervention    Vision Assessment corrective lenses for distance;impaired     Visual Impairment/Limitations macular degeneration        Hearing Assessment    Hearing Status hearing aid, bilateral;hearing impairment, bilaterally   hearing aids not present       Sensory Assessment    Sensory Assessment sensation intact, upper extremities        Upper Extremity Assessment    UE Assessment ROM and Strength WFL        Bed Mobility    Bed Mobility Activities supine to sit     [place]  close supervision     Safety/Cues increased time to complete     Assistive Device head of bed elevated;bed rails     Comment OOB to L. HOB 20 degrees. Able to maintina static sitting balance        Mobility Belt    Mobility Belt Used for All Out of Bed Activity yes        Sit/Stand Transfer    Surface chair with arm rests;edge of bed     [place] minimum assist (75% or more patient effort);1 person assist     Safety/Cues increased time to complete;verbal cues;hand placement;technique     Assistive Device walker, front-wheeled     Transfer Comments Min A for posterior boost and stability once standing with RW        Toilet Transfer    Transfer Technique sit/stand     [place] minimum assist (75% or more patient effort);1 person assist     Safety/Cues increased time to complete;verbal cues;hand placement;technique     Assistive Device commode, 3-in-1;walker, front-wheeled     Comment benefits from elevated surface and b/l arm rests        Functional Mobility    Distance few steps at bedside     Functional Mobility [place] minimum assist (75% or more patient effort);1 person assist     Safety/Cues increased time to complete;verbal cues;sequencing;proper use of assistive device     Assistive Device walker, front-wheeled     Functional Mobility Comments short distance at side of bed to commode and chair with use of RW. Mild balance impairment required Min A for stability.        Lower Body Dressing    Tasks don;shoes/slippers     [place] close supervision     Safety/Cues increased time to complete     Position edge of bed sitting     Adaptive Equipment none     Comment CLS for distal reach out of FIDEL.        Toileting    Tasks adjust/manage clothing;perform bladder hygiene     [place] minimum assist (75% or more patient effort);1 person assist     Position supported sitting;supported standing     Adaptive Equipment none     Comment Min A for clothing management, change of brief, balance during  "grooming while standing supported with RW        Balance    Static Sitting Balance WFL     Dynamic Sitting Balance mild impairment     Sit to Stand Dynamic Balance mild impairment     Static Standing Balance mild impairment     Dynamic Standing Balance mild impairment     Comment, Balance w/ RW        Impairments/Safety Issues    Impairments Affecting Function balance;endurance/activity tolerance;strength     Functional Endurance fair                                    Education Documentation  Treatment Plan, taught by Dea García OT at 4/5/2024  2:12 PM.  Learner: Patient  Readiness: Acceptance  Method: Explanation  Response: Needs Reinforcement  Comment: educated on OT role/POC and d/c rec        Session Outcome  Patient upright, in chair, leg(s) elevated at end of session, all needs met, chair alarm on, call light in reach, personal items in reach. Nursing notified about patient's performance, patient's position, and patient's response to therapy/activity.    AM-PAC™ - ADL (Current Function)     Putting on/taking off regular lower body clothing 3 - A Little   Bathing 3 - A Little   Toileting 3 - A Little   Putting on/taking off regular upper body clothing 3 - A Little   Help for taking care of personal grooming 3 - A Little   Eating meals 4 - None   AM-PAC™ ADL Score 19      ASSESSMENT AND PLAN     Progress Summary  Seen for OT terri. Obtained PLOF/HPI. Assessed UE ROM/Strength, WFL. Reported independent at [facility]; family interested in SNF per CM documentation. Pt reports feeling \"weak\" and typically does not require O2 at home; placed on 3L at start of session however O2 sats >95% and weaned to 2L (able to maintain). CLS during bed mbility and able to sit at EOB without PA. CLS provided during LB dressing from bed for reach out of FIDEL, slight deficit in safety awareness. Min A during functional transfers and benefits from use of RW for balance support. Able to initiate participation in toileting ADLs " however required Min A for safety and balance support. Will continue to benefit from skilled OT while hospitalized in order to increase functional ADl skills. Rec SNF at d/c to maximize independence.    Patient/Family Therapy Goal Statement: return home    OT Plan      Flowsheet Row Most Recent Value   Rehab Potential good, to achieve stated therapy goals at 04/05/2024 0835   Therapy Frequency 3 times/wk at 04/05/2024 0835   Planned Therapy Interventions activity tolerance training, strengthening exercise, patient/caregiver education/training, occupation/activity based interventions, adaptive equipment training, transfer/mobility retraining, BADL retraining, functional balance retraining at 04/05/2024 0835            OT Discharge Recommendations      Flowsheet Row Most Recent Value   OT Recommended Discharge Disposition skilled nursing facility at 04/05/2024 0835   Anticipated Equipment Needs if Discharged Home (OT) reacher at 04/05/2024 0835                 OT Goals      Flowsheet Row Most Recent Value   Bed Mobility Goal 1    Activity/Assistive Device bed mobility activities, all at 04/05/2024 0835   [place] modified independence at 04/05/2024 0835   Time Frame by discharge at 04/05/2024 0835   Progress/Outcome goal ongoing at 04/05/2024 0835   Transfer Goal 1    Activity/Assistive Device toilet at 04/05/2024 0835   [place] modified independence at 04/05/2024 0835   Time Frame by discharge at 04/05/2024 0835   Progress/Outcome goal ongoing at 04/05/2024 0835   Dressing Goal 1    Activity/Adaptive Equipment dressing skills, all at 04/05/2024 0835   [place] modified independence at 04/05/2024 0835   Time Frame by discharge at 04/05/2024 0835   Progress/Outcome goal ongoing at 04/05/2024 0835   Toileting Goal 1    Activity/Assistive Device toileting skills, all at 04/05/2024 0835   [place] modified independence at 04/05/2024 0835   Time Frame by discharge at 04/05/2024 0835   Progress/Outcome goal  ongoing at 04/05/2024 0835   Grooming Goal 1    Activity/Assistive Device grooming skills, all at 04/05/2024 0835   [place] modified independence at 04/05/2024 0835   Time Frame by discharge at 04/05/2024 0835   Progress/Outcome goal ongoing at 04/05/2024 0835

## 2024-04-05 NOTE — HOSPITAL COURSE
Rissa is a 88 y.o. female admitted on 4/3/2024 with Influenza A [J10.1]  Hypoxia [R09.02]. Principal problem is Influenza A.    Past Medical History  Rissa has a past medical history of A-fib (CMS/HCC), Accelerated hypertension, CHF (congestive heart failure) (CMS/HCC), Macular degeneration, Thyroid nodule, and Type 2 diabetes mellitus (CMS/HCC).    History of Present Illness   Pt presents with complaints of fever and hypoxia.  Reports generalized weakness and was noted to be hypoxic in the ED     CTA Chest:  1. No acute pulmonary embolism.  2. Redemonstration of enlarged, multinodular thyroid gland/goiter which narrows  the mid trachea.

## 2024-04-06 LAB
ANION GAP SERPL CALC-SCNC: 5 MEQ/L (ref 3–15)
BUN SERPL-MCNC: 22 MG/DL (ref 7–25)
CALCIUM SERPL-MCNC: 8.3 MG/DL (ref 8.6–10.3)
CHLORIDE SERPL-SCNC: 100 MEQ/L (ref 98–107)
CO2 SERPL-SCNC: 31 MEQ/L (ref 21–31)
CREAT SERPL-MCNC: 0.6 MG/DL (ref 0.6–1.2)
EGFRCR SERPLBLD CKD-EPI 2021: >60 ML/MIN/1.73M*2
ERYTHROCYTE [DISTWIDTH] IN BLOOD BY AUTOMATED COUNT: 14.2 % (ref 11.7–14.4)
GLUCOSE BLD-MCNC: 113 MG/DL (ref 70–99)
GLUCOSE BLD-MCNC: 169 MG/DL (ref 70–99)
GLUCOSE BLD-MCNC: 205 MG/DL (ref 70–99)
GLUCOSE BLD-MCNC: 209 MG/DL (ref 70–99)
GLUCOSE SERPL-MCNC: 99 MG/DL (ref 70–99)
HCT VFR BLD AUTO: 30.7 % (ref 35–45)
HGB BLD-MCNC: 10 G/DL (ref 11.8–15.7)
MCH RBC QN AUTO: 29.9 PG (ref 28–33.2)
MCHC RBC AUTO-ENTMCNC: 32.6 G/DL (ref 32.2–35.5)
MCV RBC AUTO: 91.9 FL (ref 83–98)
PDW BLD AUTO: 9.3 FL (ref 9.4–12.3)
PLATELET # BLD AUTO: 180 K/UL (ref 150–369)
POCT TEST: ABNORMAL
POTASSIUM SERPL-SCNC: 4.5 MEQ/L (ref 3.5–5.1)
RBC # BLD AUTO: 3.34 M/UL (ref 3.93–5.22)
SODIUM SERPL-SCNC: 136 MEQ/L (ref 136–145)
WBC # BLD AUTO: 5.92 K/UL (ref 3.8–10.5)

## 2024-04-06 PROCEDURE — 99233 SBSQ HOSP IP/OBS HIGH 50: CPT | Performed by: STUDENT IN AN ORGANIZED HEALTH CARE EDUCATION/TRAINING PROGRAM

## 2024-04-06 PROCEDURE — 63700000 HC SELF-ADMINISTRABLE DRUG: Performed by: STUDENT IN AN ORGANIZED HEALTH CARE EDUCATION/TRAINING PROGRAM

## 2024-04-06 PROCEDURE — 85027 COMPLETE CBC AUTOMATED: CPT | Performed by: NURSE PRACTITIONER

## 2024-04-06 PROCEDURE — 63700000 HC SELF-ADMINISTRABLE DRUG: Performed by: PHYSICIAN ASSISTANT

## 2024-04-06 PROCEDURE — 36415 COLL VENOUS BLD VENIPUNCTURE: CPT | Performed by: NURSE PRACTITIONER

## 2024-04-06 PROCEDURE — 63700000 HC SELF-ADMINISTRABLE DRUG: Performed by: NURSE PRACTITIONER

## 2024-04-06 PROCEDURE — 80048 BASIC METABOLIC PNL TOTAL CA: CPT | Performed by: NURSE PRACTITIONER

## 2024-04-06 PROCEDURE — 20600000 HC ROOM AND CARE INTERMEDIATE/TELEMETRY

## 2024-04-06 PROCEDURE — 63700000 HC SELF-ADMINISTRABLE DRUG: Performed by: INTERNAL MEDICINE

## 2024-04-06 RX ORDER — GUAIFENESIN 600 MG/1
600 TABLET, EXTENDED RELEASE ORAL 2 TIMES DAILY
Status: DISCONTINUED | OUTPATIENT
Start: 2024-04-06 | End: 2024-04-08 | Stop reason: HOSPADM

## 2024-04-06 RX ORDER — IPRATROPIUM BROMIDE AND ALBUTEROL SULFATE 2.5; .5 MG/3ML; MG/3ML
3 SOLUTION RESPIRATORY (INHALATION) EVERY 4 HOURS PRN
Status: DISCONTINUED | OUTPATIENT
Start: 2024-04-06 | End: 2024-04-08 | Stop reason: HOSPADM

## 2024-04-06 RX ADMIN — ACETAMINOPHEN 650 MG: 325 TABLET ORAL at 20:57

## 2024-04-06 RX ADMIN — APIXABAN 2.5 MG: 2.5 TABLET, FILM COATED ORAL at 08:31

## 2024-04-06 RX ADMIN — OSELTAMIVIR PHOSPHATE 30 MG: 30 CAPSULE ORAL at 20:00

## 2024-04-06 RX ADMIN — GABAPENTIN 300 MG: 300 CAPSULE ORAL at 20:57

## 2024-04-06 RX ADMIN — TRAMADOL HYDROCHLORIDE 50 MG: 50 TABLET, COATED ORAL at 04:55

## 2024-04-06 RX ADMIN — PROPRANOLOL HYDROCHLORIDE 20 MG: 10 TABLET ORAL at 08:24

## 2024-04-06 RX ADMIN — TRAMADOL HYDROCHLORIDE 50 MG: 50 TABLET, COATED ORAL at 20:57

## 2024-04-06 RX ADMIN — ACETAMINOPHEN 650 MG: 325 TABLET ORAL at 10:57

## 2024-04-06 RX ADMIN — POLYETHYLENE GLYCOL 3350 17 G: 17 POWDER, FOR SOLUTION ORAL at 08:24

## 2024-04-06 RX ADMIN — TRAMADOL HYDROCHLORIDE 50 MG: 50 TABLET, COATED ORAL at 10:57

## 2024-04-06 RX ADMIN — INSULIN LISPRO 6 UNITS: 100 INJECTION, SOLUTION INTRAVENOUS; SUBCUTANEOUS at 22:08

## 2024-04-06 RX ADMIN — GUAIFENESIN 600 MG: 600 TABLET, EXTENDED RELEASE ORAL at 20:57

## 2024-04-06 RX ADMIN — APIXABAN 2.5 MG: 2.5 TABLET, FILM COATED ORAL at 20:57

## 2024-04-06 RX ADMIN — GABAPENTIN 300 MG: 300 CAPSULE ORAL at 08:24

## 2024-04-06 RX ADMIN — GUAIFENESIN 600 MG: 600 TABLET, EXTENDED RELEASE ORAL at 10:57

## 2024-04-06 RX ADMIN — PROPRANOLOL HYDROCHLORIDE 20 MG: 10 TABLET ORAL at 20:57

## 2024-04-06 RX ADMIN — PANTOPRAZOLE SODIUM 20 MG: 20 TABLET, DELAYED RELEASE ORAL at 08:24

## 2024-04-06 RX ADMIN — OSELTAMIVIR PHOSPHATE 30 MG: 30 CAPSULE ORAL at 08:31

## 2024-04-06 RX ADMIN — GABAPENTIN 300 MG: 300 CAPSULE ORAL at 14:39

## 2024-04-06 RX ADMIN — SENNOSIDES 2 TABLET: 8.6 TABLET, FILM COATED ORAL at 20:57

## 2024-04-06 RX ADMIN — FUROSEMIDE 20 MG: 20 TABLET ORAL at 08:24

## 2024-04-06 RX ADMIN — ATORVASTATIN CALCIUM 20 MG: 20 TABLET, FILM COATED ORAL at 20:57

## 2024-04-06 ASSESSMENT — COGNITIVE AND FUNCTIONAL STATUS - GENERAL
CLIMB 3 TO 5 STEPS WITH RAILING: 2 - A LOT
WALKING IN HOSPITAL ROOM: 2 - A LOT
MOVING TO AND FROM BED TO CHAIR: 2 - A LOT
STANDING UP FROM CHAIR USING ARMS: 2 - A LOT

## 2024-04-06 NOTE — PLAN OF CARE
Care Coordination Discharge Plan Note     Discharge Needs Assessment  Concerns to be Addressed: care coordination/care conferences  Current Discharge Risk: physical impairment    Anticipated Discharge Plan  Anticipated Discharge Disposition: skilled nursing facility  Type of Skilled Nursing Care Services: OT, PT, nursing      Patient Choice  Offered/Gave Vendor List: yes  Patient's Choice of Community Agency(s): SNF referral sent    Patient and/or patient guardian/advocate was made aware of their right to choose a provider. A list of eligible providers was presented and reviewed with the patient and/or patient guardian/advocate in written and/or verbal form. The list delineates providers in the patient’s desired geographic area who are participating in the Medicare program and/or providers contracted with the patient’s primary insurance. The Medicare list and quality ratings were obtained from the Medicare.gov [medicare.gov] website.    ---------------------------------------------------------------------------------------------------------------------    Interdisciplinary Discharge Plan Review:  Participants:social work/services, family/lay caregiver    Concerns Comments: SHIMA f/u w/ pt dtr Cristina and reviewed accepting SNFs. Cristina to call back w. final choice. SHIMA to follow.    SHIMA received a call from Cristina strong/ SNF choice of [facility]. SHIMA left VM to SNF AD requesting a call back and messaged in [facility]. Pending confirmation from SNF. SHIMA to follow.     Discharge Transportation:  Is Out of Hospital DNR needed at Discharge: no  Does patient need discharge transport? Yes

## 2024-04-06 NOTE — PROGRESS NOTES
Hospital Medicine Service -  Daily Progress Note       SUBJECTIVE   Interval History: Patient was seen and examined at bedside. No acute events overnight. She was weaned down to room air by nurse this morning but dropped to 90% at rest. Currently on 1-1.5 L NC. Reports chest congestion with dry cough and difficulty expectorating phlegm.     No new fever episodes noted overnight.      OBJECTIVE      Vital signs in last 24 hours:  Temp:  [36.8 °C (98.2 °F)-37.3 °C (99.1 °F)] 36.8 °C (98.2 °F)  Heart Rate:  [54-72] 72  Resp:  [18] 18  BP: (101-143)/(51-73) 122/73    Intake/Output Summary (Last 24 hours) at 4/6/2024 2200  Last data filed at 4/6/2024 0915  Gross per 24 hour   Intake 240 ml   Output --   Net 240 ml       PHYSICAL EXAMINATION      Physical Exam  Gen Well appearing, comfortable, NAD. AAOX3  HEENT  MMM. No JVD  CV RR norm s1 and s2 no mrg  Pulm Faint wheezing in RLL   Abd +BS soft, NT, ND.    Ext NT, No LE edema  Neuro Non focal   No Ga  Psych  Full range affect.      LINES, CATHETERS, DRAINS, AIRWAYS, AND WOUNDS   Lines, Drains, and Airways:  Wounds (agree with documentation and present on admission):         Comments:      LABS / IMAGING / TELE      Labs  I independently reviewed all pertinent labs    Imaging  No new imaging     ECG/Telemetry  I independently reviewed telemetry and it shows NSR with HR 65    ASSESSMENT AND PLAN      Debility  Assessment & Plan  - pt is from [facility]  - PT/OT    Constipation  Assessment & Plan  - last bm 3/29  - on miralax, added senna nightly    Fever  Assessment & Plan  In the setting of influenza A    4/5/24 - febrile at 100.6F    - Diagnosed with COVID recently, had mild symptoms did not require oxygen  - CTA w/o consolidation or groundglass opacity, neg for PE  - no leukocytosis  - blood cx ngtd  - 4/3/24 UA unremarkable for UTI  - monitor temp curve      Thyroid goiter  Assessment & Plan  Has history of thyroid goiter and multiple nodules and being  followed outpatient  Patient is on propranolol and will continue the same  Outpatient thyroid ultrasound    Hyponatremia  Assessment & Plan  Mild  Due to acute illness    - trend bmp  - monitor on lasix po  - RESOLVED    Hypoxia  Assessment & Plan  Most likely in the setting of influenza A and recent COVID    - RA at baseline  - Weaned from 3L o2 NC-->1.5L   - wean fio2 as tolerated  - home o2 eval prior to d/c  - CTA neg for PE  - tx of influenza reviewed elsewhere    COVID  Assessment & Plan  Hx of mild covid     - recent covid positive on 3/24/24 - negative on 4/3/24  - Did not require oxygen and completed course of molnupiravir      Type 2 diabetes mellitus without complication, without long-term current use of insulin (CMS/[facility])  Assessment & Plan  HbA1c 6.1% 10 days ago  - holding metformin  - on lispro ssi  - hypoglycemia protocol  - added diabetic diet    Primary hypertension  Assessment & Plan  Monitor BP    - on lasix  - on propranolol      Postherpetic neuralgia  Assessment & Plan  S/p [employer] stimulator for pain control (~15 years ago for insertion)    - on gabapentin 300mg tid - current renal dosing is 900mg total max daily  - on tylenol prn  - on tramadol prn    Paroxysmal atrial fibrillation (CMS/HCC)  Assessment & Plan  Currently in NSR on telemetry   C/w home eliquis     Anemia  Assessment & Plan  Hemoglobin is at baseline  Monitor CBC     Mixed hyperlipidemia  Assessment & Plan  Chronic  - on statin    Hyperthyroidism  Assessment & Plan  Off of medication apparently TFTs have remained stable  Takes propranolol and will continue    GERD (gastroesophageal reflux disease)  Assessment & Plan  Stable    - on ppi    Chronic diastolic CHF (congestive heart failure) (CMS/HCC)  Assessment & Plan  Not in acute exacerbation  Clinically looks euvolemic, chest imaging reviewed  Followed by Dr. Mendelson    - on lasix 20mg po q daily  - CHF diet, 2L fluid restriction  - daily weights  - monitor  I/Os    * Influenza A  Assessment & Plan  - positive for influenza A on 4/3/24   - on tamiflu for 5 day course - renally dosed at 30mg bid  - Mucinex 600 mg bid for congestion            VTE Assessment: Padua    VTE Prophylaxis:  Current anticoagulants:  apixaban (ELIQUIS) tablet 2.5 mg, oral, BID      Code Status: Full Code      Estimated Discharge Date: 4/7/2024     Disposition Planning: Monitor fever curve. Continue to wean oxygen.      Shreya Chambers MD  4/6/2024

## 2024-04-07 LAB
ANION GAP SERPL CALC-SCNC: 5 MEQ/L (ref 3–15)
BACTERIA BLD CULT: NORMAL
BACTERIA BLD CULT: NORMAL
BUN SERPL-MCNC: 24 MG/DL (ref 7–25)
CALCIUM SERPL-MCNC: 8.3 MG/DL (ref 8.6–10.3)
CHLORIDE SERPL-SCNC: 99 MEQ/L (ref 98–107)
CO2 SERPL-SCNC: 33 MEQ/L (ref 21–31)
CREAT SERPL-MCNC: 0.6 MG/DL (ref 0.6–1.2)
EGFRCR SERPLBLD CKD-EPI 2021: >60 ML/MIN/1.73M*2
GLUCOSE BLD-MCNC: 114 MG/DL (ref 70–99)
GLUCOSE BLD-MCNC: 114 MG/DL (ref 70–99)
GLUCOSE BLD-MCNC: 150 MG/DL (ref 70–99)
GLUCOSE BLD-MCNC: 242 MG/DL (ref 70–99)
GLUCOSE SERPL-MCNC: 87 MG/DL (ref 70–99)
POCT TEST: ABNORMAL
POTASSIUM SERPL-SCNC: 4.4 MEQ/L (ref 3.5–5.1)
SODIUM SERPL-SCNC: 137 MEQ/L (ref 136–145)

## 2024-04-07 PROCEDURE — 20600000 HC ROOM AND CARE INTERMEDIATE/TELEMETRY

## 2024-04-07 PROCEDURE — 82310 ASSAY OF CALCIUM: CPT | Performed by: STUDENT IN AN ORGANIZED HEALTH CARE EDUCATION/TRAINING PROGRAM

## 2024-04-07 PROCEDURE — 99233 SBSQ HOSP IP/OBS HIGH 50: CPT | Performed by: STUDENT IN AN ORGANIZED HEALTH CARE EDUCATION/TRAINING PROGRAM

## 2024-04-07 PROCEDURE — 25000000 HC PHARMACY GENERAL: Performed by: NURSE PRACTITIONER

## 2024-04-07 PROCEDURE — 36415 COLL VENOUS BLD VENIPUNCTURE: CPT | Performed by: STUDENT IN AN ORGANIZED HEALTH CARE EDUCATION/TRAINING PROGRAM

## 2024-04-07 PROCEDURE — 63700000 HC SELF-ADMINISTRABLE DRUG: Performed by: PHYSICIAN ASSISTANT

## 2024-04-07 PROCEDURE — 63700000 HC SELF-ADMINISTRABLE DRUG: Performed by: STUDENT IN AN ORGANIZED HEALTH CARE EDUCATION/TRAINING PROGRAM

## 2024-04-07 PROCEDURE — 63700000 HC SELF-ADMINISTRABLE DRUG: Performed by: NURSE PRACTITIONER

## 2024-04-07 PROCEDURE — 63700000 HC SELF-ADMINISTRABLE DRUG: Performed by: INTERNAL MEDICINE

## 2024-04-07 RX ADMIN — ACETAMINOPHEN 650 MG: 325 TABLET ORAL at 02:20

## 2024-04-07 RX ADMIN — POLYETHYLENE GLYCOL 3350 17 G: 17 POWDER, FOR SOLUTION ORAL at 08:56

## 2024-04-07 RX ADMIN — TRAMADOL HYDROCHLORIDE 50 MG: 50 TABLET, COATED ORAL at 18:50

## 2024-04-07 RX ADMIN — PROPRANOLOL HYDROCHLORIDE 20 MG: 10 TABLET ORAL at 08:56

## 2024-04-07 RX ADMIN — TRAMADOL HYDROCHLORIDE 50 MG: 50 TABLET, COATED ORAL at 09:04

## 2024-04-07 RX ADMIN — OSELTAMIVIR PHOSPHATE 30 MG: 30 CAPSULE ORAL at 09:04

## 2024-04-07 RX ADMIN — GABAPENTIN 300 MG: 300 CAPSULE ORAL at 14:41

## 2024-04-07 RX ADMIN — GUAIFENESIN 600 MG: 600 TABLET, EXTENDED RELEASE ORAL at 08:56

## 2024-04-07 RX ADMIN — APIXABAN 2.5 MG: 2.5 TABLET, FILM COATED ORAL at 08:56

## 2024-04-07 RX ADMIN — APIXABAN 2.5 MG: 2.5 TABLET, FILM COATED ORAL at 20:54

## 2024-04-07 RX ADMIN — IPRATROPIUM BROMIDE AND ALBUTEROL SULFATE 3 ML: 2.5; .5 SOLUTION RESPIRATORY (INHALATION) at 00:41

## 2024-04-07 RX ADMIN — GABAPENTIN 300 MG: 300 CAPSULE ORAL at 08:56

## 2024-04-07 RX ADMIN — INSULIN LISPRO 6 UNITS: 100 INJECTION, SOLUTION INTRAVENOUS; SUBCUTANEOUS at 12:10

## 2024-04-07 RX ADMIN — ATORVASTATIN CALCIUM 20 MG: 20 TABLET, FILM COATED ORAL at 20:53

## 2024-04-07 RX ADMIN — ACETAMINOPHEN 650 MG: 325 TABLET ORAL at 09:47

## 2024-04-07 RX ADMIN — ACETAMINOPHEN 650 MG: 325 TABLET ORAL at 18:52

## 2024-04-07 RX ADMIN — GUAIFENESIN 600 MG: 600 TABLET, EXTENDED RELEASE ORAL at 20:54

## 2024-04-07 RX ADMIN — GABAPENTIN 300 MG: 300 CAPSULE ORAL at 20:53

## 2024-04-07 RX ADMIN — SENNOSIDES 2 TABLET: 8.6 TABLET, FILM COATED ORAL at 20:53

## 2024-04-07 RX ADMIN — FUROSEMIDE 20 MG: 20 TABLET ORAL at 08:56

## 2024-04-07 RX ADMIN — PROPRANOLOL HYDROCHLORIDE 20 MG: 10 TABLET ORAL at 20:53

## 2024-04-07 RX ADMIN — OSELTAMIVIR PHOSPHATE 30 MG: 30 CAPSULE ORAL at 20:56

## 2024-04-07 RX ADMIN — PANTOPRAZOLE SODIUM 20 MG: 20 TABLET, DELAYED RELEASE ORAL at 08:56

## 2024-04-07 NOTE — PLAN OF CARE
Problem: Adult Inpatient Plan of Care  Goal: Plan of Care Review  Flowsheets (Taken 4/7/2024 9744)  Progress: no change  Outcome Evaluation: received pt in bed. oriented x3. on 1.5 L NC . no c/o of sob. infrequent congested cough . c/o of L. armit pain medicated with tramadol and tylenol ,effectve. . up to bsc with 1 person assist. Repositioning encouraged.    Plan of Care Reviewed With: patient

## 2024-04-07 NOTE — PLAN OF CARE
Care Coordination Discharge Plan Note     Discharge Needs Assessment  Concerns to be Addressed: care coordination/care conferences  Current Discharge Risk: physical impairment    Anticipated Discharge Plan  Anticipated Discharge Disposition: skilled nursing facility  Type of Skilled Nursing Care Services: PT, OT, nursing      Patient Choice  Offered/Gave Vendor List: yes  Patient's Choice of Community Agency(s): [facility]    Patient and/or patient guardian/advocate was made aware of their right to choose a provider. A list of eligible providers was presented and reviewed with the patient and/or patient guardian/advocate in written and/or verbal form. The list delineates providers in the patient’s desired geographic area who are participating in the Medicare program and/or providers contracted with the patient’s primary insurance. The Medicare list and quality ratings were obtained from the Medicare.gov [medicare.gov] website.    ---------------------------------------------------------------------------------------------------------------------    Interdisciplinary Discharge Plan Review:  Participants:social work/services, other (comments)    Concerns Comments: SHIMA attempted to speak jonathan Renetta Vladimir Syed AD but had to leave multiple VM since yesterday. SHIMA then called SNF directly and was informed that they do not have anyone covering for weekedn and AD will be back tomorrow. SW to follow.      Discharge Transportation:  Is Out of Hospital DNR needed at Discharge: no  Does patient need discharge transport? Yes

## 2024-04-07 NOTE — PROGRESS NOTES
Hospital Medicine Service -  Daily Progress Note       SUBJECTIVE   Interval History: Patient was seen and examined at bedside. On 1L NC satting 97%. No recurrent fevers noted. Reports post herpetic neuralgia pain in left flank this morning.     She had a BM yesterday.      OBJECTIVE      Vital signs in last 24 hours:  Temp:  [36.3 °C (97.4 °F)-36.9 °C (98.4 °F)] 36.3 °C (97.4 °F)  Heart Rate:  [54-72] 61  Resp:  [16-18] 16  BP: (122-144)/(61-73) 139/67  No intake or output data in the 24 hours ending 04/07/24 1546    PHYSICAL EXAMINATION      Physical Exam  Gen Well appearing, comfortable, NAD. AAOX3  HEENT  MMM. No JVD  CV RR norm s1 and s2 no mrg  Pulm Faint wheezing in RLL   Abd +BS soft, NT, ND.    Ext NT, No LE edema  Neuro Non focal   No Ga  Psych  Full range affect.       LINES, CATHETERS, DRAINS, AIRWAYS, AND WOUNDS   Lines, Drains, and Airways:  Wounds (agree with documentation and present on admission):         Comments:      LABS / IMAGING / TELE      Labs  I independently reviewed all pertinent labs     Imaging  No new imaging     ECG/Telemetry  I independently reviewed telemetry and it shows NSR with HR 69     ASSESSMENT AND PLAN      Debility  Assessment & Plan  - pt is from [facility]  - PT/OT    Constipation  Assessment & Plan  - last bm 3/29  - on miralax, added senna nightly  - Having Bms now     Fever  Assessment & Plan  In the setting of influenza A    4/5/24 - febrile at 100.6F    - Diagnosed with COVID recently, had mild symptoms did not require oxygen  - CTA w/o consolidation or groundglass opacity, neg for PE  - no leukocytosis  - blood cx ngtd  - 4/3/24 UA unremarkable for UTI  - monitor temp curve      Thyroid goiter  Assessment & Plan  Has history of thyroid goiter and multiple nodules and being followed outpatient  Patient is on propranolol and will continue the same  Outpatient thyroid ultrasound    Hyponatremia  Assessment & Plan  Mild  Due to acute illness    - trend  bmp  - monitor on lasix po  - RESOLVED    Hypoxia  Assessment & Plan  Most likely in the setting of influenza A and recent COVID    - RA at baseline  - Weaned from 3L o2 NC-->1.5L   - wean fio2 as tolerated  - home o2 eval prior to d/c  - CTA neg for PE  - tx of influenza reviewed elsewhere    COVID  Assessment & Plan  Hx of mild covid     - recent covid positive on 3/24/24 - negative on 4/3/24  - Did not require oxygen and completed course of molnupiravir      Type 2 diabetes mellitus without complication, without long-term current use of insulin (CMS/[facility])  Assessment & Plan  HbA1c 6.1% 10 days ago  - holding metformin  - on lispro ssi  - hypoglycemia protocol  - added diabetic diet    Primary hypertension  Assessment & Plan  Monitor BP    - on lasix  - on propranolol      Postherpetic neuralgia  Assessment & Plan  S/p [employer] stimulator for pain control (~15 years ago for insertion)    - on gabapentin 300mg tid - current renal dosing is 900mg total max daily  - on tylenol prn  - on tramadol prn    Paroxysmal atrial fibrillation (CMS/[facility])  Assessment & Plan  Currently in NSR on telemetry   C/w home eliquis     Anemia  Assessment & Plan  Hemoglobin is at baseline  Monitor CBC     Mixed hyperlipidemia  Assessment & Plan  Chronic  - on statin    Hyperthyroidism  Assessment & Plan  Off of medication apparently TFTs have remained stable  Takes propranolol and will continue    GERD (gastroesophageal reflux disease)  Assessment & Plan  Stable    - on ppi    Chronic diastolic CHF (congestive heart failure) (CMS/[facility])  Assessment & Plan  Not in acute exacerbation  Clinically looks euvolemic, chest imaging reviewed  Followed by Dr. Mendelson    - on lasix 20mg po q daily  - CHF diet, 2L fluid restriction  - daily weights  - monitor I/Os    * Influenza A  Assessment & Plan  - positive for influenza A on 4/3/24   - on tamiflu for 5 day course - renally dosed at 30mg bid  - Mucinex 600 mg bid for congestion               VTE Assessment: Padua    VTE Prophylaxis:  Current anticoagulants:  apixaban (ELIQUIS) tablet 2.5 mg, oral, BID      Code Status: Full Code      Estimated Discharge Date: 4/8/2024   Disposition Planning: Medically cleared pending SNF placement      Shreya Chambers MD  4/7/2024

## 2024-04-08 VITALS
BODY MASS INDEX: 26.5 KG/M2 | HEART RATE: 65 BPM | SYSTOLIC BLOOD PRESSURE: 147 MMHG | DIASTOLIC BLOOD PRESSURE: 67 MMHG | TEMPERATURE: 97.8 F | HEIGHT: 60 IN | RESPIRATION RATE: 16 BRPM | OXYGEN SATURATION: 96 % | WEIGHT: 135 LBS

## 2024-04-08 LAB
GLUCOSE BLD-MCNC: 107 MG/DL (ref 70–99)
GLUCOSE BLD-MCNC: 237 MG/DL (ref 70–99)
POCT TEST: ABNORMAL
POCT TEST: ABNORMAL

## 2024-04-08 PROCEDURE — 63700000 HC SELF-ADMINISTRABLE DRUG: Performed by: PHYSICIAN ASSISTANT

## 2024-04-08 PROCEDURE — 99239 HOSP IP/OBS DSCHRG MGMT >30: CPT | Performed by: STUDENT IN AN ORGANIZED HEALTH CARE EDUCATION/TRAINING PROGRAM

## 2024-04-08 PROCEDURE — 63700000 HC SELF-ADMINISTRABLE DRUG: Performed by: INTERNAL MEDICINE

## 2024-04-08 PROCEDURE — 63700000 HC SELF-ADMINISTRABLE DRUG: Performed by: STUDENT IN AN ORGANIZED HEALTH CARE EDUCATION/TRAINING PROGRAM

## 2024-04-08 RX ORDER — SENNOSIDES 8.6 MG/1
2 TABLET ORAL NIGHTLY
Start: 2024-04-08 | End: 2024-10-14

## 2024-04-08 RX ORDER — POLYETHYLENE GLYCOL 3350 17 G/17G
17 POWDER, FOR SOLUTION ORAL DAILY
Start: 2024-04-09 | End: 2024-11-19 | Stop reason: ENTERED-IN-ERROR

## 2024-04-08 RX ORDER — GUAIFENESIN 600 MG/1
600 TABLET, EXTENDED RELEASE ORAL 2 TIMES DAILY
Start: 2024-04-08 | End: 2024-04-18

## 2024-04-08 RX ORDER — IPRATROPIUM BROMIDE AND ALBUTEROL SULFATE 2.5; .5 MG/3ML; MG/3ML
3 SOLUTION RESPIRATORY (INHALATION) EVERY 4 HOURS PRN
Start: 2024-04-08 | End: 2024-08-22 | Stop reason: ALTCHOICE

## 2024-04-08 RX ORDER — ACETAMINOPHEN 325 MG/1
650 TABLET ORAL EVERY 6 HOURS PRN
Start: 2024-04-08 | End: 2024-05-08

## 2024-04-08 RX ORDER — TRAMADOL HYDROCHLORIDE 50 MG/1
50 TABLET ORAL EVERY 8 HOURS PRN
Qty: 10 TABLET | Refills: 0 | Status: SHIPPED | OUTPATIENT
Start: 2024-04-08 | End: 2024-11-19 | Stop reason: ENTERED-IN-ERROR

## 2024-04-08 RX ADMIN — GUAIFENESIN 600 MG: 600 TABLET, EXTENDED RELEASE ORAL at 08:18

## 2024-04-08 RX ADMIN — GABAPENTIN 300 MG: 300 CAPSULE ORAL at 14:39

## 2024-04-08 RX ADMIN — GABAPENTIN 300 MG: 300 CAPSULE ORAL at 08:18

## 2024-04-08 RX ADMIN — OSELTAMIVIR PHOSPHATE 30 MG: 30 CAPSULE ORAL at 08:18

## 2024-04-08 RX ADMIN — TRAMADOL HYDROCHLORIDE 50 MG: 50 TABLET, COATED ORAL at 10:12

## 2024-04-08 RX ADMIN — TRAMADOL HYDROCHLORIDE 50 MG: 50 TABLET, COATED ORAL at 02:12

## 2024-04-08 RX ADMIN — ACETAMINOPHEN 650 MG: 325 TABLET ORAL at 10:12

## 2024-04-08 RX ADMIN — APIXABAN 2.5 MG: 2.5 TABLET, FILM COATED ORAL at 08:18

## 2024-04-08 RX ADMIN — ACETAMINOPHEN 650 MG: 325 TABLET ORAL at 00:25

## 2024-04-08 RX ADMIN — PROPRANOLOL HYDROCHLORIDE 20 MG: 10 TABLET ORAL at 08:18

## 2024-04-08 RX ADMIN — POLYETHYLENE GLYCOL 3350 17 G: 17 POWDER, FOR SOLUTION ORAL at 08:18

## 2024-04-08 RX ADMIN — PANTOPRAZOLE SODIUM 20 MG: 20 TABLET, DELAYED RELEASE ORAL at 08:18

## 2024-04-08 RX ADMIN — FUROSEMIDE 20 MG: 20 TABLET ORAL at 08:18

## 2024-04-08 ASSESSMENT — COGNITIVE AND FUNCTIONAL STATUS - GENERAL
WALKING IN HOSPITAL ROOM: 2 - A LOT
STANDING UP FROM CHAIR USING ARMS: 2 - A LOT
CLIMB 3 TO 5 STEPS WITH RAILING: 2 - A LOT
MOVING TO AND FROM BED TO CHAIR: 2 - A LOT

## 2024-04-08 NOTE — PLAN OF CARE
Care Coordination Discharge Plan Summary    Admission Assessment Summary    General Information  Readmission Within the last 30 days: no previous admission in last 30 days  Does patient have a :    Patient-Specific Goals (include timeframe): pt goal to go to SNF pending recommendations    Living Arrangements  Arrived From: home  Current Living Arrangements: independent living facility  People in Home: alone  Home Accessibility: wheelchair accessible, elevator accessible  Living Arrangement Comments: Pt was living alone in [facility] in [facility] with elevator access .    Social Determinants of Health - Screenings  Housing Stability  In the last 12 months, was there a time when you were not able to pay the mortgage or rent on time?: No  In the last 12 months, how many places have you lived?: 1  In the last 12 months, was there a time when you did not have a steady place to sleep or slept in a shelter (including now)?: No  Utility Access  In the past 12 months has the electric, gas, oil, or water company threatened to shut off services in your home?: No  Transportation Needs  In the past 12 months, has lack of transportation kept you from medical appointments or from getting medications?: No  In the past 12 months, has lack of transportation kept you from meetings, work, or from getting things needed for daily living?: No    Functional Status Prior to Admission  Assistive Device/Animal Currently Used at Home: walker, 4-wheeled, shower chair, grab bar (bed rail, lift recliner)  Functional Status Comments: Pt was reportedly independent with functional status prior to hospital  IADL Comments: Pt was reportedly independent with IADLS prior to hospital    Discharge Needs Assessment    Concerns to be Addressed: care coordination/care conferences, discharge planning  Current Discharge Risk: chronically ill  Anticipated Changes Related to Illness: none    Discharge Plan Summary    Patient Choice  Offered/Gave Vendor  List: yes  Patient's Choice of Community Agency(s): Gabrielle @ [facility]  Patient and/or patient guardian/advocate was made aware of their right to choose a provider. A list of eligible providers was presented and reviewed with the patient and/or patient guardian/advocate in written and/or verbal form. The list delineates providers in the patient’s desired geographic area who are participating in the Medicare program and/or providers contracted with the patient’s primary insurance. The Medicare list and quality ratings were obtained from the Medicare.gov [medicare.gov] website.    Concerns / Comments: SHIMA spoke with KAVITHA Narvaez @ [place] to inform of pt clearance. KAVITHA provided report number of [phone]. Report number given to TT. SHIMA arranged for S transport for 16:30 PM with trip # of [id number]. SHIMA spoke with pt yaneth Evans to provide update. pt yaneth in agreement with d/c at this time. IMM completed and placed in chart. PCs form for transport also placed in chart. SW to f/u as needed.    Discharge Plan:  Disposition/Destination: Skilled Nursing Facility - Other  / Skilled Nursing Facility  Destination - Admitted Since 4/3/2024       Service Provider Selected Services Address Phone Fax Patient Preferred Last Updated    [facility] [place] [facility] [address] [facility] [address] [phone] -- JosieMicheal garcia, MONA 4/8/2024 1208            Connection to Community  Patient declined offered resources.  Community Resources:      Discharge Transportation:  Is Out of Hospital DNR needed at Discharge: no  Does patient need discharge transport? Yes  Discharge Transportation Vendor: Jacqui ([phone])  Type of Transportation: basic life support  What day is the transport expected?: 04/08/24  What time is the transport expected?: 0252

## 2024-04-08 NOTE — PLAN OF CARE
Problem: Adult Inpatient Plan of Care  Goal: Plan of Care Review  Outcome: Progressing  Flowsheets (Taken 4/8/2024 1033)  Progress: improving  Outcome Evaluation:   Pt rec'd AAO x 4, flat affect, calm and cooperative. Intermittent congested, non prod cough noted. Cough has improved per patient. 1L O2 via NC. Attempted RA   patient desat to 90%. Pain meds PRN.  Plan of Care Reviewed With: patient  Goal: Patient-Specific Goal (Individualized)  Outcome: Progressing  Flowsheets (Taken 4/8/2024 1033)  Patient/Family-Specific Goals (Include Timeframe): to feel better and discharge to SNF today  Individualized Care Needs: cardiac monitoring, supp O2, pain management  Anxieties, Fears or Concerns: generalized  Goal: Absence of Hospital-Acquired Illness or Injury  Outcome: Progressing  Goal: Optimal Comfort and Wellbeing  Outcome: Progressing  Goal: Readiness for Transition of Care  Outcome: Progressing   Plan of Care Review  Plan of Care Reviewed With: patient  Progress: improving  Outcome Evaluation: Pt rec'd AAO x 4, flat affect, calm and cooperative. Intermittent congested, non prod cough noted. Cough has improved per patient. 1L O2 via NC. Attempted RA; patient desat to 90%. Pain meds PRN.

## 2024-04-08 NOTE — DISCHARGE INSTRUCTIONS
____________________________________________________________________________________    You were evaluated for the following in the hospital:     Influenza A - completed Tamiflu on 4/8/24, continue to wean down supplemental oxygen as tolerated  History of CHF - continue Lasix as prescribed, monitor daily weights.  Recent COVID - tested negative on 4/3/24  History of thyroid goiter - follow up with PCP and discuss surveillance imaging    Please follow up with your primary care provider. Please call for an appointment and specify that it is for a hospital follow-up.    Please get the following lab work done after discharge - please ask your primary care provider for the lab prescriptions:     BMP, CBC, Magnesium in 3-5 days     Follow up with the recommended specialists after discharge. Please call their offices for an appointment if you do not have one already scheduled.     Please review your medication reconciliation closely as your medications may have changed.      Please call your primary care provider for symptoms including, but not limited to: fevers (temps > 100.4F or 38.1C), chills, intractable nausea or vomiting, diarrhea, rash, shortness of breath, bleeding, pain, or if you have worsening of symptoms that brought you to the hospital. For EMERGENCY and SERIOUS health-related issues, you may need to go directly to the Emergency Room or call 911.      Please bring a copy of your after visit summary and discharge instructions to your follow-up appointments.     It was a pleasure taking care of you!  _____________________________________________________________________________________    Daily Heart Failure Care Recommendations:    Weigh yourself each morning before breakfast. Use the same scale and wear the same type of clothing each day. Write your weight on the calendar provided.    Rescue diuretic plan:    []Yes:  If you gain 3 pounds or more in 1 day or 5 pounds or more in one week, take an extra __    and  call your cardiologist.    [x]No: Call your cardiologist for further direction.    Check your heart failure signs and symptoms each day. Call your cardiologist for any of the following: weight gain, increased shortness of breath, more swelling in your legs, ankles, feet or belly or if you need to sleep with extra pillows or up in a chair.       Follow a heart healthy low sodium diet (<6491-5599 mg sodium/day). Avoid/limit canned, packaged, processed and fast foods. Read food labels and choose lower sodium options. Don't add salt to your food.     Be aware of your fluid intake. Ask your doctor how much fluid is right for you. In general limit fluid to 2 liters or 64 ounces a day.      Take your medication as prescribed. If you have any questions or concerns regarding your medication, contact your doctor.    Keep active. Ask your health care provider if Outpatient Cardiac Rehabilitation or the Cardiac Wellness Program is right for you.       __________________________________________________

## 2024-04-08 NOTE — DISCHARGE SUMMARY
[facility] Medicine Service -  Inpatient Discharge Summary        BRIEF OVERVIEW   Admitting Provider: Lorie Oconnell DO  Attending Provider: Shreya Chambers MD Attending phys phone: [phone]    PCP: Sloan Maher [phone]    Admission Date: 4/3/2024  Discharge Date: 4/8/2024     DISCHARGE DIAGNOSES      Primary Discharge Diagnosis  Influenza A    Secondary Discharge Diagnoses  Active Hospital Problems    Diagnosis Date Noted   • Debility 04/05/2024   • Constipation 04/04/2024   • Hypoxia 04/03/2024   • Influenza A 04/03/2024   • Hyponatremia 04/03/2024   • Thyroid goiter 04/03/2024   • Fever 04/03/2024   • COVID 03/22/2024   • Type 2 diabetes mellitus without complication, without long-term current use of insulin (CMS/[facility]) 08/08/2023   • GERD (gastroesophageal reflux disease) 08/08/2023   • Primary hypertension 12/24/2022   • Paroxysmal atrial fibrillation (CMS/[facility]) 12/24/2022   • Anemia 02/01/2022   • Mixed hyperlipidemia 10/05/2021   • Postherpetic neuralgia 10/05/2021   • Hyperthyroidism 06/21/2021   • Chronic diastolic CHF (congestive heart failure) (CMS/[facility]) 04/07/2021      Resolved Hospital Problems   No resolved problems to display.       Problem List on Day of Discharge  Debility  Assessment & Plan  - pt is from [facility]  - PT/OT - rec SNF    Constipation  Assessment & Plan  - last  4/6  - on bowel regimen    Fever  Assessment & Plan  In the setting of influenza A    Afebrile    - Diagnosed with COVID recently, had mild symptoms did not require oxygen  - CTA w/o consolidation or groundglass opacity, neg for PE  - no leukocytosis  - blood cx ngtd  - 4/3/24 UA unremarkable for UTI  - monitor temp curve      Thyroid goiter  Assessment & Plan  Has history of thyroid goiter and multiple nodules and being followed outpatient  Patient is on propranolol and will continue the same  Outpatient thyroid ultrasound    Hyponatremia  Assessment & Plan  Mild  Due to acute illness    - trend bmp  -  monitor on lasix po  - RESOLVED    Hypoxia  Assessment & Plan  Most likely in the setting of influenza A and recent COVID    - RA at baseline  - Weaned from 3L o2 NC-->1.5L   - wean fio2 as tolerated  - CTA neg for PE  - tx of influenza reviewed elsewhere    COVID  Assessment & Plan  Hx of mild covid     - recent covid positive on 3/24/24 - negative on 4/3/24  - completed course of molnupiravir      Type 2 diabetes mellitus without complication, without long-term current use of insulin (CMS/[facility])  Assessment & Plan  HbA1c 6.1% 10 days ago  - holding metformin  - on lispro ssi  - hypoglycemia protocol  - added diabetic diet    Primary hypertension  Assessment & Plan  Monitor BP    - on lasix  - on propranolol      Postherpetic neuralgia  Assessment & Plan  S/p Diagnovus stimulator for pain control (~15 years ago for insertion)    - on gabapentin 300mg tid - current renal dosing is 900mg total max daily  - on tylenol prn  - on tramadol prn    Paroxysmal atrial fibrillation (CMS/[facility])  Assessment & Plan  Currently in NSR on telemetry   C/w home eliquis     Anemia  Assessment & Plan  Hemoglobin is at baseline  Monitor CBC     Mixed hyperlipidemia  Assessment & Plan  Chronic  - on statin    Hyperthyroidism  Assessment & Plan  Off of medication apparently TFTs have remained stable  Takes propranolol and will continue    GERD (gastroesophageal reflux disease)  Assessment & Plan  Stable    - on ppi    Chronic diastolic CHF (congestive heart failure) (CMS/[facility])  Assessment & Plan  Not in acute exacerbation  Clinically looks euvolemic, chest imaging reviewed  Followed by Dr. Mendelson    - on lasix 20mg po q daily  - CHF diet, 2L fluid restriction  - daily weights  - monitor I/Os    * Influenza A  Assessment & Plan  - positive for influenza A on 4/3/24   - completed tamiflu 5 day course on 4/8/24 - renally dosed at 30mg bid  - Mucinex 600 mg bid for congestion           SUMMARY OF HOSPITALIZATION      Presenting  Problem/History of Present Illness  This is a 88 y.o. year-old female admitted on 4/3/2024 with Influenza A [J10.1]  Hypoxia [R09.02].    Hospital Course  Rissa Erickson is a 88 y.o. female  who  has a past medical history of A-fib (CMS/[facility]), Accelerated hypertension, CHF (congestive heart failure) (CMS/[facility]), Macular degeneration, Thyroid nodule, and Type 2 diabetes mellitus (CMS/[facility])., who presented to [facility] on 4/3/2024 for fever, hypoxia.  Pt was found to have influenza A. Pt had recent diagnosis of COVID, tested negative on 4/3/24.    Treated with 5 day course of Tamiflu. CTA w/o consolidation or groundglass opacity, neg for PE. Blood cx ngtd. 4/3/24 UA unremarkable for UTI. Still requiring supplemental O2 on d/c, continue to wean as tolerated. RA at baseline.    PT recommended SNF.    See above problem list for further details and for management of chronic medical issues.    On the day of discharge, the patient was deemed to be in stable condition to be discharged to SNF. Denied dyspnea, chest pain, nausea/vomiting, abd pain, dysuria.     Seen by [facility] as inpatient. Will need to follow up with PCP and specialist(s) as OP.     Discharge planning took 35 minutes.    Exam on Day of Discharge  General: NAD  Skin: warm, dry  HEENT: Normocephalic, atraumatic  Cardiovascular: RRR  Pulmonary: diminished, no wheezing  Abdomen: non-tender to palpation  Extremities: no pitting edema of b/l LE  Musculoskeletal: no deformities  Neuro: alert and awake, oriented x3  Psychiatric: no agitation    Consults During Admission  None    DISCHARGE MEDICATIONS               Medication List      START taking these medications    guaiFENesin 600 mg 12 hr tablet  Commonly known as: MUCINEX  Take 1 tablet (600 mg total) by mouth 2 (two) times a day for 10 days.  Dose: 600 mg     ipratropium-albuteroL 0.5-2.5 mg/3 mL nebulizer solution  Commonly known as: DUO-NEB  Take 3 mL by nebulization every 4 (four) hours as needed for  wheezing or shortness of breath.  Dose: 3 mL     polyethylene glycol 17 gram packet  Commonly known as: MIRALAX  Start taking on: April 9, 2024  Take 17 g by mouth daily. Hold for diarrhea.  Dose: 17 g     senna 8.6 mg tablet  Commonly known as: SENOKOT  Take 2 tablets by mouth nightly. Hold for diarrhea.  Dose: 2 tablet        CHANGE how you take these medications    acetaminophen 325 mg tablet  Commonly known as: TYLENOL  Take 2 tablets (650 mg total) by mouth every 6 (six) hours as needed for headaches or mild pain.  Dose: 650 mg  What changed:   · when to take this  · reasons to take this        CONTINUE taking these medications    apixaban 2.5 mg tablet  Commonly known as: ELIQUIS  Take 1 tablet (2.5 mg total) by mouth 2 (two) times a day Indications: treatment to prevent blood clots in chronic atrial fibrillation.  Dose: 2.5 mg     atorvastatin 20 mg tablet  Commonly known as: LIPITOR  Take 1 tablet (20 mg total) by mouth nightly.  Dose: 20 mg     calcium carbonate 500 mg calcium (1,250 mg) tablet  Commonly known as: OS-HERBERT  Take 1 tablet by mouth daily.  Dose: 1 tablet     cholecalciferol (vitamin D3) 5,000 unit (125 mcg) tablet  Take 5,000 Units by mouth daily.  Dose: 5,000 Units     diclofenac sodium 1 % topical gel  Commonly known as: VOLTAREN  Apply topically 2 (two) times a day as needed for pain.     furosemide 20 mg tablet  Commonly known as: LASIX  Take 1 tablet (20 mg total) by mouth daily.  Dose: 20 mg     gabapentin 300 mg capsule  Commonly known as: NEURONTIN  Take 1 capsule (300 mg total) by mouth 3 (three) times a day.  Dose: 300 mg     ICAPS AREDS ORAL  Take 1 tablet by mouth daily.  Dose: 1 tablet     lancets misc  1 Lancet as needed (Diabetes). To check blood glucose  Dose: 1 Lancet     metFORMIN 500 mg tablet  Commonly known as: GLUCOPHAGE  Take 2 tablets (1,000 mg total) by mouth 2 (two) times a day with meals.  Dose: 1,000 mg     pantoprazole 20 mg EC tablet  Commonly known as:  PROTONIX  Take 1 tablet (20 mg total) by mouth daily.  Dose: 20 mg     PROLIA 60 mg/mL syringe  Inject 60 mg under the skin every 6 (six) months.  Dose: 60 mg  Generic drug: denosumab     propranoloL 20 mg tablet  Commonly known as: INDERAL  Take 1 tablet (20 mg total) by mouth 2 (two) times a day.  Dose: 20 mg     SLOW  mg (45 mg iron) tablet extended release tablet, extended release  Take 1 tablet (137 mg total) by mouth daily with breakfast.  Dose: 137 mg  Generic drug: ferrous sulfate     traMADoL 50 mg tablet  Commonly known as: ULTRAM  Take 1 tablet (50 mg total) by mouth every 8 (eight) hours as needed for severe pain or moderate pain.  Dose: 50 mg             Instructions for after discharge     Activity:  Per Rehab Recommendations      Discharge Condtion:  Stabilized      Discharge diet      Diet Type / Texture:  Diabetic, No Concentrated Sweets  Diabetic (Low Carb/Low Fat)  Regular  Cardiac (Low Sodium, Low Fat)       Fluid restriction dietary / 24h: 2000 mL Fluid    Follow Up:  With Primary MD within 1 week discharge from facility      Follow-up with Provider:      Routine follow up with cardiologist.    Mendelson, Lawrence S, MD   [phone]    [facility]  [address]  [address]  [place] [address]       Future Lab Orders at [facility]      Labs include: BMP, CBC, Magnesium in 3-5 days    Level of Care:  Skilled      Nutritionist      Occupational Therapy Eval and Treat      Physical Therapy Eval and Treat      Podiatry As Needed Per Facility      Psychologist As Needed Per Facility      Respiratory Therapy      Social Work Services      Vital Signs Per Facility Standard               PROCEDURES / LABS / IMAGING      Operative Procedures  none    Other Procedures  none    SARS-CoV-2 (COVID-19) (no units)   Date/Time Value   04/03/2024 1547 Negative       Pertinent Imaging  CT ANGIOGRAPHY CHEST PULMONARY EMBOLISM WITH IV CONTRAST    Result Date: 4/3/2024  IMPRESSION: 1. No acute pulmonary embolism.  2. Redemonstration of enlarged, multinodular thyroid gland/goiter which narrows the mid trachea.     X-RAY CHEST 1 VIEW    Result Date: 4/3/2024  IMPRESSION: Please see above.    CT ANGIOGRAPHY CHEST PULMONARY EMBOLISM WITH IV CONTRAST    Result Date: 3/25/2024  IMPRESSION: 1.  No evidence of pulmonary embolism. Mild enlargement of central pulmonary arteries suggests pulmonary arterial hypertension; correlate clinically. 2.  No acute pulmonary abnormality. 3.  Enlarged, multinodular thyroid gland narrows and displaced trachea, as above. 4.  Mild subcarinal adenopathy, nonspecific but possibly reactive. Preliminary results of no pulmonary embolism, enlargement of central pulmonary arteries and goiter were personally communicated to and acknowledged by Dr. Yemi Alonzo via [employer] Secure Chat at 1659 hours on 3/25/2024.    X-RAY CHEST 1 VIEW    Result Date: 3/24/2024  IMPRESSION: No acute disease in chest. Stable enlargement of the cardiac silhouette. COMMENT: Comparison: Chest x-ray 3/15/2024. Technique: A single frontal AP portable upright projection of the chest was obtained. FINDINGS: The lungs are clear without focal airspace consolidation, pleural effusion or pneumothorax. There is stable enlargement of the cardiac silhouette. The mediastinal contours are unchanged. The upper abdomen is unremarkable. Again noted are neurostimulator leads terminating over the midline of the upper and mid to lower chest. There is no acute osseous abnormality.    X-RAY CHEST 2 VIEWS    Result Date: 3/15/2024  IMPRESSION: No evidence of acute disease in the chest as discussed below. Nonspecific gaseous distention of bowel in the upper abdomen. Additional findings as discussed below. COMMENT: Comparison: Chest x-ray from 11/24/2023. PA and lateral views the chest. No pneumothorax or pleural effusion. There may be some very mild left basilar volume loss or scarring which is similar to the prior study. No acute airspace consolidation  "or pulmonary edema. No significant vascular congestion. Study is somewhat limited due to leftward rotation of the patient the frontal view and obliquity on the lateral view. Cardiac silhouette appears slightly enlarged similar to the prior study with atherosclerotic aorta. Gaseous distention of bowel in the upper abdomen incompletely assessed and nonspecific. Spinal stimulator leads are noted. There is a slight scoliosis suspected. Demineralization limits assessment of the osseous structures. Diffuse degenerative changes. Mild loss of vertebral body height in the thoracic spine appears chronic without significant change from prior study.      OUTPATIENT  FOLLOW-UP / REFERRALS / PENDING TESTS        Outpatient Follow-Up Appointments            In 1 week  [facility]    In 4 months [facility] US1 [facility] - Radiology    In 4 months Sloan Maher MD [facility] in [facility]    In 4 months Lyric Perez MD Main [facility] Endocrinology at [facility]          Referrals  No orders of the defined types were placed in this encounter.      Test Results Pending at Discharge  Unresulted Labs (From admission, onward)     Start     Ordered    04/03/24 1123  [place] Draw Panel  STAT        Question Answer Comment   Red Top No Labels    Gold Top No Labels    Light Blue 1 Label    Light Green Top 1 Label    Lavender Top No Labels    Pink Top No Labels    Yellow - Urine Tall No Labels    Urine Culture Tube No Labels    Blood Culture No Labels    Release to patient Immediate        04/03/24 1122                Important Issues to Address in Follow-Up  F/u with PCP and specialists as above in \"instructions for after discharge\"  Repeat labs as above in \"instructions for after discharge\"    DISCHARGE DISPOSITION AND DESTINATION      Disposition: Skilled Nursing Facility - Other   Destination: Skilled Nursing Facility                            Code Status At " Discharge: Full Code    Physician Order for Life-Sustaining Treatment Document Status      No documents found

## 2024-04-08 NOTE — PROGRESS NOTES
[facility], referral retracted dispo to  [facility] in [facility].  Will email community to follow pending discharge from SNF

## 2024-04-17 ENCOUNTER — HOSPITAL ENCOUNTER (OUTPATIENT)
Dept: PREOP | Facility: HOSPITAL | Age: 88
Discharge: HOME | End: 2024-04-17
Attending: INTERNAL MEDICINE
Payer: MEDICARE

## 2024-04-17 VITALS
TEMPERATURE: 96.8 F | BODY MASS INDEX: 26.11 KG/M2 | WEIGHT: 133 LBS | DIASTOLIC BLOOD PRESSURE: 58 MMHG | SYSTOLIC BLOOD PRESSURE: 126 MMHG | OXYGEN SATURATION: 96 % | HEIGHT: 60 IN | RESPIRATION RATE: 20 BRPM | HEART RATE: 74 BPM

## 2024-04-17 DIAGNOSIS — M81.0 AGE-RELATED OSTEOPOROSIS WITHOUT CURRENT PATHOLOGICAL FRACTURE: Primary | ICD-10-CM

## 2024-04-17 PROCEDURE — 3E013GC INTRODUCTION OF OTHER THERAPEUTIC SUBSTANCE INTO SUBCUTANEOUS TISSUE, PERCUTANEOUS APPROACH: ICD-10-PCS | Performed by: INTERNAL MEDICINE

## 2024-04-17 PROCEDURE — 96372 THER/PROPH/DIAG INJ SC/IM: CPT

## 2024-04-17 PROCEDURE — 63600000 HC DRUGS/DETAIL CODE: Mod: JZ,JG | Performed by: INTERNAL MEDICINE

## 2024-04-17 RX ORDER — DIPHENHYDRAMINE HCL 50 MG/ML
25 VIAL (ML) INJECTION ONCE AS NEEDED
Status: CANCELLED | OUTPATIENT
Start: 2024-10-07

## 2024-04-17 RX ORDER — FAMOTIDINE 10 MG/ML
20 INJECTION INTRAVENOUS ONCE AS NEEDED
Status: CANCELLED | OUTPATIENT
Start: 2024-10-07

## 2024-04-17 RX ORDER — EPINEPHRINE 1 MG/ML
0.5 INJECTION, SOLUTION INTRAMUSCULAR; SUBCUTANEOUS ONCE AS NEEDED
Status: CANCELLED | OUTPATIENT
Start: 2024-10-07

## 2024-04-17 RX ADMIN — DENOSUMAB 60 MG: 60 INJECTION SUBCUTANEOUS at 14:05

## 2024-05-09 ENCOUNTER — TELEPHONE (OUTPATIENT)
Dept: PRIMARY CARE | Facility: CLINIC | Age: 88
End: 2024-05-09
Payer: MEDICARE

## 2024-05-09 NOTE — TELEPHONE ENCOUNTER
Received vm today at 3:11 pm. Olivia from Dr. Rincon's office is requesting the most recent ov note.  Faxed note from 3/21/24 through [facility] to fax # [phone]

## 2024-05-10 PROBLEM — E11.51: Status: ACTIVE | Noted: 2024-05-10

## 2024-06-14 RX ORDER — PROPRANOLOL HYDROCHLORIDE 20 MG/1
20 TABLET ORAL 2 TIMES DAILY
Qty: 180 TABLET | Refills: 2 | Status: SHIPPED | OUTPATIENT
Start: 2024-06-14

## 2024-06-14 NOTE — TELEPHONE ENCOUNTER
Medicine Refill Request    Last Office Visit: 3/21/2024   Last Consult Visit: Visit date not found  Last Telemedicine Visit: 3/22/2024 Mary Sewell CRNP    Next Appointment: Visit date not found      Current Outpatient Medications:     apixaban (ELIQUIS) 2.5 mg tablet, Take 1 tablet (2.5 mg total) by mouth 2 (two) times a day Indications: treatment to prevent blood clots in chronic atrial fibrillation., Disp: 60 tablet, Rfl: 0    atorvastatin (LIPITOR) 20 mg tablet, Take 1 tablet (20 mg total) by mouth nightly., Disp: 90 tablet, Rfl: 3    calcium carbonate (OS-HERBERT) 500 mg calcium (1,250 mg) tablet, Take 1 tablet by mouth daily., Disp: , Rfl:     cholecalciferol, vitamin D3, 5,000 unit (125 mcg) tablet, Take 5,000 Units by mouth daily., Disp: , Rfl:     denosumab (PROLIA) 60 mg/mL syringe, Inject 60 mg under the skin every 6 (six) months., Disp: , Rfl:     diclofenac sodium (VOLTAREN) 1 % topical gel, Apply topically 2 (two) times a day as needed for pain., Disp: 100 g, Rfl: 0    ferrous sulfate (SLOW FE) 137 mg (45 mg iron) tablet extended release tablet, extended release, Take 1 tablet (137 mg total) by mouth daily with breakfast., Disp: 90 tablet, Rfl: 1    furosemide (LASIX) 20 mg tablet, Take 1 tablet (20 mg total) by mouth daily., Disp: 90 tablet, Rfl: 1    gabapentin (NEURONTIN) 300 mg capsule, Take 1 capsule (300 mg total) by mouth 3 (three) times a day., Disp: , Rfl:     ipratropium-albuteroL (DUO-NEB) 0.5-2.5 mg/3 mL nebulizer solution, Take 3 mL by nebulization every 4 (four) hours as needed for wheezing or shortness of breath., Disp: , Rfl:     lancets misc, 1 Lancet as needed (Diabetes). To check blood glucose, Disp: 100 each, Rfl: 3    metFORMIN (GLUCOPHAGE) 500 mg tablet, Take 2 tablets (1,000 mg total) by mouth 2 (two) times a day with meals., Disp: 360 tablet, Rfl: 1    pantoprazole (PROTONIX) 20 mg EC tablet, Take 1 tablet (20 mg total) by mouth daily., Disp: 90 tablet, Rfl: 1    polyethylene  glycol (MIRALAX) 17 gram packet, Take 17 g by mouth daily. Hold for diarrhea., Disp: , Rfl:     propranoloL (INDERAL) 20 mg tablet, Take 1 tablet (20 mg total) by mouth 2 (two) times a day., Disp: 180 tablet, Rfl: 0    senna (SENOKOT) 8.6 mg tablet, Take 2 tablets by mouth nightly. Hold for diarrhea., Disp: , Rfl:     traMADoL (ULTRAM) 50 mg tablet, Take 1 tablet (50 mg total) by mouth every 8 (eight) hours as needed for severe pain or moderate pain., Disp: 10 tablet, Rfl: 0    vit A/vit C/vit E/zinc/copper (ICAPS AREDS ORAL), Take 1 tablet by mouth daily., Disp: , Rfl:       BP Readings from Last 3 Encounters:   04/17/24 (!) 126/58   04/08/24 (!) 147/67   03/26/24 (!) 144/63       Recent Lab results:  Lab Results   Component Value Date    CHOL 92 03/24/2024   ,   Lab Results   Component Value Date    HDL 43 (L) 03/24/2024   ,   Lab Results   Component Value Date    LDLCALC 32 03/24/2024   ,   Lab Results   Component Value Date    TRIG 86 03/24/2024        Lab Results   Component Value Date    GLUCOSE 87 04/07/2024   ,   Lab Results   Component Value Date    HGBA1C 6.1 (H) 03/24/2024         Lab Results   Component Value Date    CREATININE 0.6 04/07/2024       Lab Results   Component Value Date    TSH 0.85 02/08/2024           Lab Results   Component Value Date    HGBA1C 6.1 (H) 03/24/2024

## 2024-08-14 ENCOUNTER — HOSPITAL ENCOUNTER (OUTPATIENT)
Dept: RADIOLOGY | Age: 88
Discharge: HOME | End: 2024-08-14
Attending: INTERNAL MEDICINE
Payer: MEDICARE

## 2024-08-14 ENCOUNTER — APPOINTMENT (OUTPATIENT)
Dept: LAB | Age: 88
End: 2024-08-14
Attending: INTERNAL MEDICINE
Payer: MEDICARE

## 2024-08-14 DIAGNOSIS — E04.2 MULTIPLE THYROID NODULES: ICD-10-CM

## 2024-08-14 DIAGNOSIS — Z86.39 HISTORY OF HYPERTHYROIDISM: ICD-10-CM

## 2024-08-14 PROCEDURE — 84443 ASSAY THYROID STIM HORMONE: CPT

## 2024-08-14 PROCEDURE — 76536 US EXAM OF HEAD AND NECK: CPT

## 2024-08-14 PROCEDURE — 36415 COLL VENOUS BLD VENIPUNCTURE: CPT

## 2024-08-14 PROCEDURE — 84439 ASSAY OF FREE THYROXINE: CPT

## 2024-08-15 LAB
T4 FREE SERPL-MCNC: 1.1 NG/DL (ref 0.58–1.64)
TSH SERPL DL<=0.05 MIU/L-ACNC: 0.26 MIU/L (ref 0.34–5.6)

## 2024-08-22 ENCOUNTER — OFFICE VISIT (OUTPATIENT)
Dept: ENDOCRINOLOGY | Facility: CLINIC | Age: 88
End: 2024-08-22
Payer: MEDICARE

## 2024-08-22 VITALS
BODY MASS INDEX: 27.29 KG/M2 | HEIGHT: 60 IN | SYSTOLIC BLOOD PRESSURE: 126 MMHG | OXYGEN SATURATION: 98 % | DIASTOLIC BLOOD PRESSURE: 68 MMHG | WEIGHT: 139 LBS | RESPIRATION RATE: 18 BRPM | HEART RATE: 66 BPM

## 2024-08-22 DIAGNOSIS — M81.0 AGE-RELATED OSTEOPOROSIS WITHOUT CURRENT PATHOLOGICAL FRACTURE: ICD-10-CM

## 2024-08-22 DIAGNOSIS — E11.9 TYPE 2 DIABETES MELLITUS WITHOUT COMPLICATION, WITHOUT LONG-TERM CURRENT USE OF INSULIN (CMS/HCC): Primary | ICD-10-CM

## 2024-08-22 DIAGNOSIS — Z86.39 HISTORY OF HYPERTHYROIDISM: ICD-10-CM

## 2024-08-22 LAB
EXPIRATION DATE: NORMAL
GLUCOSE BLOOD, POC: 201
Lab: NORMAL
POCT MANUFACTURER: NORMAL

## 2024-08-22 PROCEDURE — 99215 OFFICE O/P EST HI 40 MIN: CPT | Performed by: INTERNAL MEDICINE

## 2024-08-22 PROCEDURE — G2211 COMPLEX E/M VISIT ADD ON: HCPCS | Performed by: INTERNAL MEDICINE

## 2024-08-22 PROCEDURE — 82962 GLUCOSE BLOOD TEST: CPT | Performed by: INTERNAL MEDICINE

## 2024-08-22 RX ORDER — HYDROXYZINE HYDROCHLORIDE 25 MG/1
50 TABLET, FILM COATED ORAL NIGHTLY
COMMUNITY
End: 2024-11-19 | Stop reason: ENTERED-IN-ERROR

## 2024-08-22 NOTE — PATIENT INSTRUCTIONS
Dietary sources of calcium and the amount of calcium (in milligrams) provided per serving are listed below. The actual amount of calcium may differ slightly depending upon the brand of the food. Aim for 1200 mg/day    Dairy:   1 cup of skim milk (302 mg)  1 cup of 2 percent milk (297 mg)  1 cup of whole milk (291 mg)  1 cup of buttermilk (285 mg)  1 cup of lactose reduced milk (285-302 mg)  1/3 cup of powdered nonfat milk (283 mg)  1 cup of lowfat plain yogurt (415 mg)  1 cup of lowfat fruit yogurt (245-384 mg)  1/2 cup ice cream (100 mg)  1 cup sour cream, cultured (250 mg)  1 cup of cottage cheese made with one percent milk fat (138 mg)  1.5 ounces of part skim mozzarella cheese (275 mg)  1.0 ounces of hard cheese (cheddar or demetrice) (200 mg)  1 ounce Brie cheese (50 mg)  1 Tablespoon Parmesan Cheese (70 mg)  1 ounce Swiss or Gruyere cheese (270 mg)    Dairy Alternatives:   1 cup Soy milk, calcium fortified (200 to 400 mg)  1 cup [place] milk, calcium fortified (450 mg)     Fruits and Vegetables:   1 cup of calcium-fortified orange juice (300 mg)  1 cup of orange juice from concentrate (20 mg)  1 cup acorn squash, cooked (90 mg)  1 cup raw arugula (125 mg)  1 cup raw bok shakeel (40 mg)  1 cup chard or okra, cooked (100 mg)  1 cup spinach, cooked (240 mg)  1/2 cup of cooked tiffany greens (220 mg)  1/2 cup of cooked turnip greens (99 mg)  1/2 cup of steamed broccoli (47 mg)  1/2 cup of cooked kale (45 mg)  1 cup, dried, uncooked figs (300 mg)  1 cup raw kiwi (50 mg)    Legumes:  1 cup garbanzo beans (80 mg)  1/2 cup Legumes, general, cooked (15-50 mg)  1 cup guerrier beans (75)  1/2 cup boiled soy beans (100 mg)  1/2 cup tempeh (75 mg)  4 oz Tofu, firm, calcium set (250-750 mg)  4 oz Tofu, soft regular (120-390 mg)  1/2 cup White beans, cooked (70 mg)    Grains:  1/2 to 1 cup Cereals (calcium fortified) (250-1000 mg)  1/2 cup amaranth (135 mg)  1 slice bread, calcium fortified (150-200 mg)  1 cup brown rice, long grain,  raw (50 mg)  1 package oatmeal (100-150 mg)  2 corn tortillas 85 mg)    Seafood:   4 canned sardines, with bones (242 mg)  3 ounces of cooked crab (50 mg)  3 ounces of [place], canned with bones (170 to 210 mg)  3 ounces of canned mackerel (250 mg)    Nuts and Seeds:   1 ounce almonds, toasted unblanched (80 mg)  1 ounce sesame seeds, whole roasted (280 mg)  1 ounce (2 Tbsp) Sesame tahini (130 mg)  1 ounce dried sunflower seeds (50 mg)    Desserts:   1/2 cup of frozen yogurt (103 mg)  1/2 cup of vanilla ice cream (85 mg)    Other:  1 tbsp Blackstrap Molasses (135 mg)    What are some ways to add extra calcium to the foods I eat?  You can increase your calcium intake by adding foods that contain calcium to the foods you normally eat. For example, non-fat powdered dry milk has about 52 mg of calcium in one tablespoon. You can add powdered milk to several different foods. The following are some ideas for adding extra calcium to your foods:  Add 3 tablespoons of powdered milk to each cup of milk in puddings, cocoa, or custard  Add 4 tablespoons of powdered milk to each cup of hot cereal before cooking  Sift two tablespoons of powdered milk into each cup of flour in cakes, cookies, or breads  Use lowfat or fat free milk instead of water in pancake mix, mashed potatoes, pudding, and hot breakfast cereals  Add lowfat or fat free cheese to salad, soup, or pasta  Add tofu with added calcium to vegetable stir sandoval

## 2024-08-22 NOTE — PROGRESS NOTES
HPI  89 y.o. female with PMH amiodarone-induced hyperthyroidism, MNG, A fib s/p cardioversion, DM2, osteoporosis, GERD, HFpEF, HTN, HLD, post herpetic neuralgia s/p pain stimulator presenting for follow up of thyroid nodules, DM2  Referred by: self    Initial history (9/7/23):  - Former pt of Dr. Stephens and Dr. Rowe at [facility], last visit 6/12/23. 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 Treated with high dose decadron and MMI with rapid improvement in symptoms, discharged on MMI + prednisone, then re-admitted for ADHF 6/2021.  Referred for total thyroidectomy 6/2021, then readmitted 1 week later for falls + hyponatremia. During this hospitalization she was seen by general surgery who felt she was too high risk for thyroidectomy. Off amiodarone since 7/2021. Stopped MMI 9/2022. Also with DM2 being managed by PCP with metformin monotherapy. Also with osteoporosis on prolia as per PCP. 8/30/23 Dr. Stephens contacted pt that thyroid US was stable. Recommended 1 year US    First visit 9/7/23  Last visit 2/22/24 with Dr. Sauceda. Treatment plan continued     Interval history summarized as per review of records:  - Saw cardiology Dr. Mendelson 2/29 in follow up. Treatment plan continued   - Saw cardiology Dr. Mendelson 3/13 due to [place]. Increased lasix dose. TTE ordered. PET/CT stress test ordered  - Went to urgent care 3/15 for SOB. Sent to ED. Pt declined admission. Increased lasix dose   - Saw PCP Dr. Oleary 3/21 in follow up. Iron levels ordered  - Saw PCP Mary TRINIDAD 3/22 for + COVID test. Rx monupiravir  - Admitted to [facility] 3/24-3/26 for COVID infection  - Saw ortho Dr. Spence 4/2 for h/o hip replacement.   - Admitted to  4/3-4/8 for influenza, treated with tamiflu. Discharged to SNF  - Got prolia 4/17    Pt's daughter, Cristina,  was present for the entire visit and helped provide elements of the HPI     New PCP  "at [facility] in [place]    Still having episodes where she feels a block of ice on her chest. Sometimes happens few times a day. Happens at rest. Then sweats and then has to change clothes. Has appt with cardiology next month. Has talked to pain doctor about it, states has been \"inconclusive\". Stress test was normal 5/2024    C/o palpitations. Sweats are going on for > 1 year. Sweating is localized to L mid chest. Denies diarrhea. Denies tremors.     Off amiodarone since 7/2021  Off MMI since 9/2022.     Recent steroids: no  Taking biotin: denies  Recent illness: not since hospitalization 4/2024    Dysphagia/odynophagia: denies  Change in neck size/new neck masses: denies    Personal history of head/neck radiation: denies  Family history of thyroid disease or cancer: daughter - hypothyroidism/postpartum thyroiditis    Last US: 8/2024  Prior biopsy: never    DM2  Diagnosed: > 10 years ago    Medications:   1. Metformin 1000 mg BID    Compliance: good    Previous medications: none    Other relevant medications:   Atorvastatin 20 mg daily  No ACEI/ARB  No steroids    SMBG per memory:   FBG today 91, usually in 90s    Today (random - ate honey and oats granola bar + caramel rice cake 1-1.5 hours prior) 201  Lab Results   Component Value Date    POCGLU 237 (H) 04/08/2024     States A1C was 6.1% 6/2024    Exercise: walking    Weight: gained 5 lb since last visit with Dr. Sauceda   Wt Readings from Last 3 Encounters:   08/22/24 63 kg (139 lb)   04/17/24 60.3 kg (133 lb)   04/08/24 61.2 kg (135 lb)       Diabetic Complications:   Microvascular:   - Retinopathy? no; Last ophtho visit: q2 mo, getting injections for macular degeneration.   - Neuropathy? no; Last podiatry visit: 5/2024, Dr. Tho Turner  - Nephropathy? Negative UACR 2/2021    Macrovascular:  - CAD? no  - CHF? no  - CVA? no  - PVD? no  - Smoking? never    + DM in son    Osteoporosis  - Last DEXA: 10/2023  - Fractures: spine fracture at 57 yo after " falling in the shower. Thinks this was time of initial diagnosis  - Falls: none recently  - Vitamin D intake: 5000 IU daily  - Dietary Ca intake: 1-2 servings per day  - Ca supplements: once a day, not sure of the dose  - Exercise: walks, stationary bike  - Loss of height: thinks ~5 inches  - Dental history: full dentures, went to see dentist about new dentures but wanted to do bone X ray to determine if there was too much bone loss to give her new dentures. Found out the process would be lengthy and costly so decided not to do it    - Prior treatment: denies despite diagnosis > 20 years ago  - Current treatment: prolia q6 mo for past ~3 years, last dose 2024. Scheduled for 10/23/24    Lives at [facility] independent living    2023  -------------------------------------------------------------------------  PMH  Past Medical History:   Diagnosis Date    A-fib (CMS/[facility])     Accelerated hypertension     CHF (congestive heart failure) (CMS/[facility])     Macular degeneration     Thyroid nodule     Type 2 diabetes mellitus (CMS/[facility])      PSH  Past Surgical History:   Procedure Laterality Date    APPENDECTOMY      HYSTERECTOMY      JOINT REPLACEMENT      KNEE ARTHROPLASTY       Social  Social History     Socioeconomic History    Marital status:      Spouse name: Not on file    Number of children: Not on file    Years of education: Not on file    Highest education level: Not on file   Occupational History    Not on file   Tobacco Use    Smoking status: Never    Smokeless tobacco: Never   Substance and Sexual Activity    Alcohol use: Never    Drug use: Never    Sexual activity: Defer   Other Topics Concern    Not on file   Social History Narrative    Not on file     Social Determinants of Health     Financial Resource Strain: Not on file   Food Insecurity: No Food Insecurity (4/3/2024)    Hunger Vital Sign     Worried About Running Out of Food in the Last Year: Never true     Ran Out of Food in the Last Year:  Never true   Transportation Needs: No Transportation Needs (4/4/2024)    PRAPARE - Transportation     Lack of Transportation (Medical): No     Lack of Transportation (Non-Medical): No   Physical Activity: Not on file   Stress: Not on file   Social Connections: Not on file   Intimate Partner Violence: Not on file   Housing Stability: Low Risk  (4/4/2024)    Housing Stability Vital Sign     Unable to Pay for Housing in the Last Year: No     Number of Places Lived in the Last Year: 1     Unstable Housing in the Last Year: No     Family hx  History reviewed. No pertinent family history.  Medications    Current Outpatient Medications:     apixaban (ELIQUIS) 2.5 mg tablet, Take 1 tablet (2.5 mg total) by mouth 2 (two) times a day Indications: treatment to prevent blood clots in chronic atrial fibrillation., Disp: 60 tablet, Rfl: 0    atorvastatin (LIPITOR) 20 mg tablet, Take 1 tablet (20 mg total) by mouth nightly., Disp: 90 tablet, Rfl: 3    calcium carbonate (OS-HERBERT) 500 mg calcium (1,250 mg) tablet, Take 1 tablet by mouth daily., Disp: , Rfl:     cholecalciferol, vitamin D3, 5,000 unit (125 mcg) tablet, Take 5,000 Units by mouth daily., Disp: , Rfl:     denosumab (PROLIA) 60 mg/mL syringe, Inject 60 mg under the skin every 6 (six) months., Disp: , Rfl:     ferrous sulfate (SLOW FE) 137 mg (45 mg iron) tablet extended release tablet, extended release, Take 1 tablet (137 mg total) by mouth daily with breakfast., Disp: 90 tablet, Rfl: 1    furosemide (LASIX) 20 mg tablet, Take 1 tablet (20 mg total) by mouth daily., Disp: 90 tablet, Rfl: 1    gabapentin (NEURONTIN) 300 mg capsule, Take 1 capsule (300 mg total) by mouth 3 (three) times a day. (Patient taking differently: Take 300 mg by mouth 4 (four) times a day.), Disp: , Rfl:     hydrOXYzine (ATARAX) 25 mg tablet, Take 50 mg by mouth nightly., Disp: , Rfl:     lancets misc, 1 Lancet as needed (Diabetes). To check blood glucose, Disp: 100 each, Rfl: 3    metFORMIN  (GLUCOPHAGE) 500 mg tablet, Take 2 tablets (1,000 mg total) by mouth 2 (two) times a day with meals., Disp: 360 tablet, Rfl: 1    pantoprazole (PROTONIX) 20 mg EC tablet, Take 1 tablet (20 mg total) by mouth daily., Disp: 90 tablet, Rfl: 1    polyethylene glycol (MIRALAX) 17 gram packet, Take 17 g by mouth daily. Hold for diarrhea., Disp: , Rfl:     propranoloL (INDERAL) 20 mg tablet, TAKE 1 TABLET TWICE A DAY, Disp: 180 tablet, Rfl: 2    senna (SENOKOT) 8.6 mg tablet, Take 2 tablets by mouth nightly. Hold for diarrhea., Disp: , Rfl:     traMADoL (ULTRAM) 50 mg tablet, Take 1 tablet (50 mg total) by mouth every 8 (eight) hours as needed for severe pain or moderate pain. (Patient taking differently: Take 50 mg by mouth 3 (three) times a day.), Disp: 10 tablet, Rfl: 0    vit A/vit C/vit E/zinc/copper (ICAPS AREDS ORAL), Take 1 tablet by mouth daily., Disp: , Rfl:    Allergies  Allergies   Allergen Reactions    Amiodarone Other (see comments)     Affected thyroid   thyroiditis       -------------------------------------------------------------------------  ROS: All other 10 point systems reviewed and negative except as mentioned in HPI    PHYSICAL EXAM  Visit Vitals  /68 (BP Location: Left upper arm, Patient Position: Sitting)   Pulse 66   Resp 18   Ht 1.524 m (5')   Wt 63 kg (139 lb)   SpO2 98%   BMI 27.15 kg/m²       Wt Readings from Last 3 Encounters:   08/22/24 63 kg (139 lb)   04/17/24 60.3 kg (133 lb)   04/08/24 61.2 kg (135 lb)     Gen: well nourished, no acute distress  Eyes: no proptosis, normal conjunctiva  Neck: enlarged irregular thyroid with L sided predominance  Pulm: no use of accessory muscles, on room air  Abd: non distended  Neuro: AAOx3  MSK: ambulating with walker, no tremor of outstretched hands  Psych: normal mood, affect    LABS REVIEWED  Lab Results   Component Value Date    TSH 0.26 (L) 08/14/2024    TSH 0.85 02/08/2024    TSH 0.39 08/25/2023    FREET4 1.10 08/14/2024    FREET4 1.16  "02/08/2024    C0DTCZF 47 (L) 02/08/2024           Lab Results   Component Value Date    GLUCOSE 87 04/07/2024    BUN 24 04/07/2024    CREATININE 0.6 04/07/2024    EGFR >60.0 04/07/2024     04/07/2024    K 4.4 04/07/2024    CL 99 04/07/2024    CO2 33 (H) 04/07/2024    ALBUMIN 3.5 04/04/2024    BILITOT 0.4 04/04/2024    ALKPHOS 34 04/04/2024    ALT 17 04/04/2024    AST 20 04/04/2024     Lab Results   Component Value Date    WBC 5.92 04/06/2024    HGB 10.0 (L) 04/06/2024     04/06/2024     Hemoglobin A1C   Date Value Ref Range Status   03/24/2024 6.1 (H) <5.7 % Final     Comment:     In the absence of an established diagnosis of Diabetes Mellitus, HbA1c levels between 5.7% and 6.4% are indicative of increased risk for developing Diabetes(Pre-Diabetes). Levels of 6.5% or greater are diagnostic for Diabetes Mellitus.   02/08/2024 6.1 (H) <5.7 % Final     Comment:     In the absence of an established diagnosis of Diabetes Mellitus, HbA1c levels between 5.7% and 6.4% are indicative of increased risk for developing Diabetes(Pre-Diabetes). Levels of 6.5% or greater are diagnostic for Diabetes Mellitus.   08/25/2023 5.7 (H) <5.7 % Final     Comment:     In the absence of an established diagnosis of Diabetes Mellitus, HbA1c levels between 5.7% and 6.4% are indicative of increased risk for developing Diabetes(Pre-Diabetes). Levels of 6.5% or greater are diagnostic for Diabetes Mellitus.          No results found for: \"MCRALBCREAT\", \"ALBCRET\"          IMAGING REVIEWED  ULTRASOUND THYROID 8/14/24    Narrative  EXAM: US THYROID    CLINICAL HISTORY: E04.2: Nontoxic multinodular goiter    COMPARISON: Thyroid ultrasound August 25, 2023    PROCEDURE: A complete ultrasound examination of the thyroid and lateral cervical lymph node chains was done with grayscale and color flow Doppler imaging.    COMMENT:    THYROID  Right lobe: 2.0 cm x 2.0 cm x 5.3 cm  Left lobe: 3.3 cm x 3.0 cm x 7.1 cm  Isthmus: 0.4 cm  Echotexture: " Normal in echotexture.    NODULES:    Right lobe: The right lobe contains multiple sonographically similar TR 1/TR 2 colloid cysts and, and mixed/spongiform thyroid nodules.    Left lobe: The left lobe contains multiple sonographically similar mixed and spongiform thyroid nodules as well as a previously biopsied mid to lower pole nodule as follows:    *  Left nodule 1  *  Location: Upper pole  *  Measurement: 1.2 cm x 1.1 cm x 1.5 cm  *  Prior measurement: 1.2 x 1.2 x 1.4 cm  *  Composition: Mixed cystic and solid  (1 point)  *  Echogenicity: Isoechoic (1 point)  *  Shape: Wider-than-tall (0 points)  *  Margin: Smooth (0 points)  *  Echogenic foci: None or comet-tail artifacts (0 points)  *  TI-RADS category: TR2 (2 points)    *  Left nodule 2  *  Location: Upper pole  *  Measurement: 1.4 (cm) x 2.4 (cm) x 2.3 (cm)  *  Prior measurement: 1.4 x 2.0 x 2.2 cm  *  Composition: Spongiform (0 points)  *  Shape: Wider-than-tall (0 points)  *  Margin: Smooth (0 points)  *  Echogenic foci: None or comet-tail artifacts (0 points)  *  TI-RADS category: TR1 (0 points)    *  Left nodule 3  *  Location: Lower pole  *  Measurement: 3.3 (cm) x 4.3 (cm) x 4.3 (cm)  *  Prior measurement: 3.6 x 4.9 x 4.3 cm  *  Composition: Solid or almost completely solid (2 points)  *  Echogenicity: Isoechoic (1 point)  *  Shape: Wider-than-tall (0 points)  *  Margin: Smooth (0 points)  *  Echogenic foci: None or comet-tail artifacts (0 points)  *  TI-RADS category: This nodule has undergone prior biopsy, with cytology results noted above. Therefore, it does not receive repeat TI-RADS categorization.    LYMPH NODES: Survey of the bilateral lateral cervical lymph node chains revealed no suspicious appearing lymph nodes.    IMPRESSION:  Thyroid nodules, as described in the body of the report. Measured nodules are similar to the prior study including previously biopsied left lobe nodule. Multiple sonographically similar TR 1/TR 2 nodules in the right  lobe.    No suspicious lymph nodes identified in the lateral neck.    I have personally reviewed the images. The following is my interpretation:   L nodule #1 measures 1.2 x 1.6 x 1.2 cm. Agree with description as per radiology report   L nodule #2 measures 2.2 x 2.2 x 1.5 cm. Agree with description as per radiology report   L mid to inferior pole nodule #3 measures 3.4 x 4.0 x 2.9 cm. It is mixed cystic/solid. Agree with remaining description as per radiology report     DEXA BONE DENSITY 10/9/23    Narrative  CLINICAL HISTORY: Postmenopausal, history of diabetes and left hip replacement, screening for osteoporosis, Z 78.0.    COMMENT:    Scans of the the right hip and the left forearm were obtained. The bone mineral density was calculated for all the levels scanned.  The patient's bone mineral density was referenced to both young normal patients (T-score) as well as age matched patients (Z-score).  For the evaluation of osteoporosis, it is recommended that the patient's bone mineral density be compared to young normal patients or the T-score.    Current World Health Organization criteria for the diagnosis of osteoporosis are as follows:  1.  Up to 1 standard deviation below normal compared to young normal patients - normal.  2.  Between 1 and 2.5 standard deviations below normal compared to young normal patients - low bone mass or osteopenia.  3.  Greater than or equal to 2.5 standard deviations below normal compared to young normal patients - osteoporosis.    The DEXA was performed on [employer] equipment.    The lumbar spine was not able to be evaluated due to the placement of surgical devices.    Right hip replacement    AP right hip lowest T-score:  Femoral neck      -1.8  Corresponding bone mineral density:       0.792 g/cm2.    AP imaging of the non-dominant left forearm, 33% radius, demonstrates the T-score to measure -2.8  Corresponding bone mineral density of 0.632g/cm2.    IMPRESSION:  Osteoporosis.    I have personally reviewed the images and agree with the radiology report.     ULTRASOUND THYROID 8/25/23    Narrative  CLINICAL HISTORY: E05.90: Thyrotoxicosis, unspecified without thyrotoxic crisis  or storm  E04.2: Nontoxic multinodular goiter    COMMENT:    Comparison: None    Technique: Real-time high resolution sonography of the thyroid gland was performed.    Findings:  Right Lobe:  Size: Measures 5.1 x 2.1 x 1.7 cm.  Echotexture:  Heterogeneous.  Nodules: Present, with numerous colloid cysts and colloid nodules. Largest detailed below.    Nodule #right 1  Location: Lower pole  Size: 1 x 0.7 x 1 cm  Composition: Mixed cystic and solid  (1 point)  Echogenicity: isoechoic (1 point)  Shape: Wider-than-tall (0 points)  Margin: Ill-defined (0 points)  Echogenic foci: Punctate echogenic foci (3 points) (probably colloid but too small to generate ringdown)  Comparison: None available  TI-RADS category: TR4 (4-6 points): Moderately suspicious., Does not meet  criteria for biopsy. In one year recommended.    Nodule #right 2  Location: upper pole only seen on cine series  Size: 1.2 x 0.4 x 0.5 cm  Composition: Mixed cystic and solid  (1 point)  Echogenicity:  isoechoic (1 point)  Shape: Wider-than-tall (0 points)  Margin: Smooth (0 points)  Echogenic foci:  large comet-tail artifacts (0 points)  Comparison: None available  TI-RADS category: TR2 (2 points): Not suspicious.    Other smaller cysts and colloid nodules noted elsewhere.    -    Left Lobe:  Size: Measures 9.3 x 3.7 x 4.2 cm, enlarged  Echotexture:  Heterogeneous.  Nodules: Present    Nodule #left 1  Location: Upper pole  Size: 1.2 x 1.2 x 1.4 cm  Composition: Mixed cystic and solid  (1 point)  Echogenicity: isoechoic (1 point)  Shape: Wider-than-tall (0 points)  Margin: Smooth (0 points)  Echogenic foci: None or large comet-tail artifacts (0 points)  Comparison: None available  TI-RADS category: TR2 (2 points): Not suspicious.    Nodule #left  2  Location: Interpolar/upper pole  Size:  2 x 1.4 x 2.2cm  Composition: Spongiform (0 points)  Echogenicity: isoechoic (1 point)  Shape: Wider-than-tall (0 points)  Margin: Smooth (0 points)  Echogenic foci: None or large comet-tail artifacts (0 points)  Comparison: None available  TI-RADS category: TR2 (2 points): Not suspicious.    Nodule #left 3  Location: Interpolar lower pole  Size: 4.9 x 3.6 x 4.3 cm  Composition: Spongiform (0 points)  Echogenicity:  isoechoic (1 point)  Shape: Wider-than-tall (0 points)  Margin: Ill-defined (0 points)  Echogenic foci: None or large comet-tail artifacts (0 points)  Comparison: None available  TI-RADS category: TR2 (2 points): Not suspicious.      -    Isthmus:  Mildly heterogeneous, measuring 0.4 cm. No nodules.    -  Other:  No suspicious lymph nodes in the visualized neck.    --    Impression  Enlarged multinodular goiter with multiple left-sided spongiform colloid nodules  and smaller right-sided nodules as discussed above.  Follow up in one year to document stability.    I personally reviewed the images on 9/7/23 and agree with the radiology report. The following is my interpretation:   Heterogeneous gland  R lower pole nodule measures 1.01 x 0.88 x 0.69 cm. There are no calcifications. Agree with remaining description as per radiology report   R upper pole nodule not seen on still images  L upper pole nodule measures 1.19 x 1.41 x 1.15 cm. Agree with description as per radiology report   L upper/mid pole nodule measures 1.95 x 2.17 x 1.39 cm. Agree with description as per radiology report   L mid/lower pole nodule measures 2.8 x 4.6 x 3.57 cm. Agree with description as per radiology report      DXA 1/12/21  History: Postmenopausal. L1 compression fracture with spinal cord stimulator.   Left hip prosthesis..     Comparison: 10/18/2018. 6/28/2016.     Results: Evaluation of the right hip demonstrates a total bone mineral density   of 0.924 g/cm2 with a T-score of -0.7  (priors -0.8 and -1.1 in 2018 and 2016   respectively).. The W.H.O. classification is normal. The right femoral neck   demonstrates a total bone mineral density of 0.778 g/cm2 with a T-score of -1.9   (priors -2.0 and -2.5). The W.H.O. classification is osteopenia.     Evaluation of the total left radius demonstrates a total bone mineral density of   0.409 g/cm2 with a T score of -4.4 (priors -4.2 and -4.1). The W.H.O.   classification is osteoporosis. The total bone mineral density of the most   distal left radius is 0.249 g/cm2 with a T score of -4.8 (priors -4.9 and -5.3).   The W.H.O. classification is osteoporosis.     Based on the clinical information provided to the technical staff and the above   described measurements, the 10 year probability of a major osteoporotic fracture   is 21.3%. The 10 year probability of a hip fracture is 5.8%    Impression: Analysis of the right femoral neck and left radius demonstrate   diminished bone mineral density with evidence for increased fracture risk.       ASSESMENT AND PLAN    1. Osteoporosis  - S/p L1 compression fracture after falling in shower at 55 yo. Reportedly not treated until started prolia ~2021  - DXA stable 10/2023  - Continue prolia q6 mo, due for next dose 10/2024.   - Continue vitamin D and Ca supplementation  - Continue with weight bearing exercise  - Counseled on importance of fall prevention  - Repeat DXA 10/2025  - Check CMP, 25 vitamin D prior to next visit for monitoring. Will contact pt with results     2. Amiodarone induced hyperthyroidism  - Off amiodarone since 7/2021 and off MMI since 9/2022  - Had resolved but TSH now low again. No symptoms of excess thyroid hormone  - Monitor TFTs prior to next visit for monitoring     3. MNG  - No prior biopsies as per pt, however US report comments on a prior biopsy  - Most recent US 8/2024 is stable from prior  - Given advanced age and pt previously deemed by surgeon to be poor surgical candidate for  thyroidectomy recommend stopping US monitoring, unless pt develops new local neck sx, as it will not change medical management    4. DM2, controlled, without known complications  - A1C 6.1% 3/2024, at goal < 8.0%. Hyperglycemic today, however this is due to eating a caramel rice cake prior to visit  - Counseled on importance of dietary changes such as eliminating sugar sweetened beverages, reducing intake of processed foods, and increasing intake of whole foods such as fruits and veggies, nuts, seeds, and legumes  - Recommend increasing physical activity to at least 150 min moderate aerobic exercise per week + resistance training 2x/week  - Continue metformin 1000 mg BID for now. Trial of downtitration at next visit if A1C remains <6.5%  - Counseled on risk of hypoglycemia and instructed pt to call for BG < 80  - Many elements of diabetes self management were reviewed including, but not limited to, the importance of blood sugar monitoring, symptoms and treatment of hypoglycemia, blood sugar/A1C goals, adherence with medications, lifestyle management, and surveillance of eyes and feet and need for routine follow-up with ophthalmology and podiatry.   - Pt is aware of alternatives of therapy, risks and benefits, and accepts risk of default.  - Retinopathy screening: up to date, sees retinal specialist q2 mo for macular degeneration  - Nephropathy screening: due, check UACR  - Check A1C, CMP prior to next visit for monitoring       RTC 2 mo  Total time spent on the day of the visit, including record review, face to face time, and documentation: 45 min    I attest that this visit supports the complexity inherent to evaluation and management associated with medical care services that serve as the continuing focal point for all needed health care services and/or medical care services that are part of ongoing care related to this patient's single, serious condition or a complex condition.

## 2024-10-10 ENCOUNTER — TELEPHONE (OUTPATIENT)
Dept: ENDOCRINOLOGY | Facility: CLINIC | Age: 88
End: 2024-10-10
Payer: MEDICARE

## 2024-10-14 ENCOUNTER — TELEMEDICINE (OUTPATIENT)
Dept: ENDOCRINOLOGY | Facility: CLINIC | Age: 88
End: 2024-10-14
Payer: MEDICARE

## 2024-10-14 VITALS — BODY MASS INDEX: 26.31 KG/M2 | HEIGHT: 60 IN | WEIGHT: 134 LBS

## 2024-10-14 DIAGNOSIS — E04.2 MULTIPLE THYROID NODULES: ICD-10-CM

## 2024-10-14 DIAGNOSIS — Z86.39 HISTORY OF HYPERTHYROIDISM: ICD-10-CM

## 2024-10-14 DIAGNOSIS — E11.9 TYPE 2 DIABETES MELLITUS WITHOUT COMPLICATION, WITHOUT LONG-TERM CURRENT USE OF INSULIN (CMS/HCC): ICD-10-CM

## 2024-10-14 DIAGNOSIS — M81.0 AGE-RELATED OSTEOPOROSIS WITHOUT CURRENT PATHOLOGICAL FRACTURE: Primary | ICD-10-CM

## 2024-10-14 PROCEDURE — 99214 OFFICE O/P EST MOD 30 MIN: CPT | Mod: 95 | Performed by: INTERNAL MEDICINE

## 2024-10-14 PROCEDURE — G2211 COMPLEX E/M VISIT ADD ON: HCPCS | Mod: 95 | Performed by: INTERNAL MEDICINE

## 2024-10-14 RX ORDER — METFORMIN HYDROCHLORIDE 500 MG/1
500 TABLET ORAL 2 TIMES DAILY WITH MEALS
Start: 2024-10-14

## 2024-10-14 NOTE — PROGRESS NOTES
HPI  89 y.o. female with PMH amiodarone-induced hyperthyroidism, MNG, A fib s/p cardioversion, DM2, osteoporosis, GERD, HFpEF, HTN, HLD, post herpetic neuralgia s/p pain stimulator presenting for follow up of thyroid nodules, DM2  Referred by: self    Initial history (9/7/23):  - Former pt of Dr. Stephens and Dr. Rowe at [facility], last visit 6/12/23. 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 Treated with high dose decadron and MMI with rapid improvement in symptoms, discharged on MMI + prednisone, then re-admitted for ADHF 6/2021.  Referred for total thyroidectomy 6/2021, then readmitted 1 week later for falls + hyponatremia. During this hospitalization she was seen by general surgery who felt she was too high risk for thyroidectomy. Off amiodarone since 7/2021. Stopped MMI 9/2022. Also with DM2 being managed by PCP with metformin monotherapy. Also with osteoporosis on prolia as per PCP. 8/30/23 Dr. Stephens contacted pt that thyroid US was stable. Recommended 1 year US    First visit 9/7/23  Last visit 8/22/24. Treatment plan continued     Interval history summarized as per review of records:  - Saw cardiology Dr. Mendelson 9/11 in follow up. Treatment plan continued     Pt's daughter, Cristina,  was present for the entire visit and helped provide elements of the HPI     Off amiodarone since 7/2021  Off MMI since 9/2022.     Dysphagia/odynophagia: denies  Change in neck size/new neck masses: denies    Personal history of head/neck radiation: denies  Family history of thyroid disease or cancer: daughter - hypothyroidism/postpartum thyroiditis    Last US: 8/2024  Prior biopsy: never    DM2  Diagnosed: > 10 years ago    Medications:   1. Metformin 1000 mg BID    Compliance: good    Previous medications: none    Other relevant medications:   Atorvastatin 20 mg daily  No ACEI/ARB  No steroids    SMBG per memory:   Checking once  a week - most recently     Exercise: walking    Weight: gained another 5 lb since last visit   Wt Readings from Last 3 Encounters:   10/14/24 60.8 kg (134 lb)   24 63 kg (139 lb)   24 60.3 kg (133 lb)       Diabetic Complications:   Microvascular:   - Retinopathy? no; Last ophtho visit: q2 mo, getting injections for macular degeneration.   - Neuropathy? no; Last podiatry visit: 2024, Dr. Tho Turner  - Nephropathy? Negative UACR 2021    Macrovascular:  - CAD? no  - CHF? no  - CVA? no  - PVD? no  - Smoking? never    + DM in son    Osteoporosis  - Last DEXA: 10/2023  - Fractures: spine fracture at 55 yo after falling in the shower. Thinks this was time of initial diagnosis  - Falls: none recently  - Vitamin D intake: 5000 IU daily  - Dietary Ca intake: 1-2 servings per day  - Ca supplements: 500 mg daily  - Exercise: walks, stationary bike  - Loss of height: thinks ~5 inches  - Dental history: full dentures, went to see dentist about new dentures but wanted to do bone X ray to determine if there was too much bone loss to give her new dentures. Found out the process would be lengthy and costly so decided not to do it    - Prior treatment: denies despite diagnosis > 20 years ago  - Current treatment: prolia q6 mo for past ~3 years, last dose 2024. Scheduled for 10/23/24    Lives at [facility] independent living    2023  -------------------------------------------------------------------------  [facility]  Past Medical History:   Diagnosis Date    A-fib (CMS/[facility])     Accelerated hypertension     CHF (congestive heart failure) (CMS/HCC)     Macular degeneration     Thyroid nodule     Type 2 diabetes mellitus (CMS/[facility])      PSH  Past Surgical History   Procedure Laterality Date    Appendectomy      Hysterectomy      Joint replacement      Knee arthroplasty       Social  Social History     Socioeconomic History    Marital status:      Spouse name: Not on file    Number of children: Not on file     Years of education: Not on file    Highest education level: Not on file   Occupational History    Not on file   Tobacco Use    Smoking status: Never    Smokeless tobacco: Never   Substance and Sexual Activity    Alcohol use: Never    Drug use: Never    Sexual activity: Defer   Other Topics Concern    Not on file   Social History Narrative    Not on file     Social Drivers of Health     Financial Resource Strain: Not on file   Food Insecurity: No Food Insecurity (4/3/2024)    Hunger Vital Sign     Worried About Running Out of Food in the Last Year: Never true     Ran Out of Food in the Last Year: Never true   Transportation Needs: No Transportation Needs (4/4/2024)    PRAPARE - Transportation     Lack of Transportation (Medical): No     Lack of Transportation (Non-Medical): No   Physical Activity: Not on file   Stress: Not on file   Social Connections: Not on file   Intimate Partner Violence: Not on file   Housing Stability: Low Risk  (4/4/2024)    Housing Stability Vital Sign     Unable to Pay for Housing in the Last Year: No     Number of Places Lived in the Last Year: 1     Unstable Housing in the Last Year: No     Family hx  No family history on file.  Medications    Current Outpatient Medications:     apixaban (ELIQUIS) 2.5 mg tablet, Take 1 tablet (2.5 mg total) by mouth 2 (two) times a day Indications: treatment to prevent blood clots in chronic atrial fibrillation., Disp: 60 tablet, Rfl: 0    atorvastatin (LIPITOR) 20 mg tablet, Take 1 tablet (20 mg total) by mouth nightly., Disp: 90 tablet, Rfl: 3    calcium carbonate (OS-HERBERT) 500 mg calcium (1,250 mg) tablet, Take 1 tablet by mouth daily., Disp: , Rfl:     cholecalciferol, vitamin D3, 5,000 unit (125 mcg) tablet, Take 5,000 Units by mouth daily., Disp: , Rfl:     denosumab (PROLIA) 60 mg/mL syringe, Inject 60 mg under the skin every 6 (six) months., Disp: , Rfl:     furosemide (LASIX) 20 mg tablet, Take 1 tablet (20 mg total) by mouth daily., Disp: 90  tablet, Rfl: 1    gabapentin (NEURONTIN) 300 mg capsule, Take 1 capsule (300 mg total) by mouth 3 (three) times a day. (Patient taking differently: Take 300 mg by mouth 4 (four) times a day.), Disp: , Rfl:     hydrOXYzine (ATARAX) 25 mg tablet, Take 50 mg by mouth nightly., Disp: , Rfl:     lancets misc, 1 Lancet as needed (Diabetes). To check blood glucose, Disp: 100 each, Rfl: 3    metFORMIN (GLUCOPHAGE) 500 mg tablet, Take 2 tablets (1,000 mg total) by mouth 2 (two) times a day with meals., Disp: 360 tablet, Rfl: 1    pantoprazole (PROTONIX) 20 mg EC tablet, Take 1 tablet (20 mg total) by mouth daily., Disp: 90 tablet, Rfl: 1    polyethylene glycol (MIRALAX) 17 gram packet, Take 17 g by mouth daily. Hold for diarrhea., Disp: , Rfl:     propranoloL (INDERAL) 20 mg tablet, TAKE 1 TABLET TWICE A DAY, Disp: 180 tablet, Rfl: 2    senna (SENOKOT) 8.6 mg tablet, Take 2 tablets by mouth nightly. Hold for diarrhea. (Patient not taking: Reported on 10/14/2024), Disp: , Rfl:     traMADoL (ULTRAM) 50 mg tablet, Take 1 tablet (50 mg total) by mouth every 8 (eight) hours as needed for severe pain or moderate pain. (Patient taking differently: Take 50 mg by mouth 3 (three) times a day.), Disp: 10 tablet, Rfl: 0    vit A/vit C/vit E/zinc/copper (ICAPS AREDS ORAL), Take 1 tablet by mouth daily., Disp: , Rfl:    Allergies  Allergies   Allergen Reactions    Amiodarone Other (see comments)     Affected thyroid   thyroiditis       -------------------------------------------------------------------------  ROS: All other 10 point systems reviewed and negative except as mentioned in HPI    PHYSICAL EXAM  Visit Vitals  Ht 1.524 m (5')   Wt 60.8 kg (134 lb)   BMI 26.17 kg/m²         Wt Readings from Last 3 Encounters:   10/14/24 60.8 kg (134 lb)   08/22/24 63 kg (139 lb)   04/17/24 60.3 kg (133 lb)     2-way audio/video  Gen: well nourished, non-diaphoretic, no psychomotor agitation, no acute distress  HEENT: head- atraumatic,  normocephalic, no rashes noted; eyes- conjunctiva are not injected, no swelling or discharge, no proptosis; mouth - normal appearing dentition  Nose: no erythema, swelling, discharge, or crusting  Neck: no obvious thyromegaly  Pulmonary: No Cough, no use of accessory muscles, speaking in clear sentences  Psych: normal mood and affect, pleasant and cooperative     LABS REVIEWED  10/4/24  Alb 4.0  T bili 0.6  Ca 9.4  Cl 99  CO2 27.5  Cr 0.8  BG 89  ALP 38  K 4.3  TP 6.0  Na 136  ALT 19  AST 23  GFR 67.5  TSH 0.64  FT4 1.4  A1C 5.9%      Lab Results   Component Value Date    TSH 0.26 (L) 08/14/2024    TSH 0.85 02/08/2024    TSH 0.39 08/25/2023    FREET4 1.10 08/14/2024    FREET4 1.16 02/08/2024    [id number] 47 (L) 02/08/2024           Lab Results   Component Value Date    GLUCOSE 87 04/07/2024    BUN 24 04/07/2024    CREATININE 0.6 04/07/2024    EGFR >60.0 04/07/2024     04/07/2024    K 4.4 04/07/2024    CL 99 04/07/2024    CO2 33 (H) 04/07/2024    ALBUMIN 3.5 04/04/2024    BILITOT 0.4 04/04/2024    ALKPHOS 34 04/04/2024    ALT 17 04/04/2024    AST 20 04/04/2024     Lab Results   Component Value Date    WBC 5.92 04/06/2024    HGB 10.0 (L) 04/06/2024     04/06/2024     Hemoglobin A1C   Date Value Ref Range Status   03/24/2024 6.1 (H) <5.7 % Final     Comment:     In the absence of an established diagnosis of Diabetes Mellitus, HbA1c levels between 5.7% and 6.4% are indicative of increased risk for developing Diabetes(Pre-Diabetes). Levels of 6.5% or greater are diagnostic for Diabetes Mellitus.   02/08/2024 6.1 (H) <5.7 % Final     Comment:     In the absence of an established diagnosis of Diabetes Mellitus, HbA1c levels between 5.7% and 6.4% are indicative of increased risk for developing Diabetes(Pre-Diabetes). Levels of 6.5% or greater are diagnostic for Diabetes Mellitus.   08/25/2023 5.7 (H) <5.7 % Final     Comment:     In the absence of an established diagnosis of Diabetes Mellitus, HbA1c levels  "between 5.7% and 6.4% are indicative of increased risk for developing Diabetes(Pre-Diabetes). Levels of 6.5% or greater are diagnostic for Diabetes Mellitus.          No results found for: \"MCRALBCREAT\", \"ALBCRET\"          IMAGING REVIEWED  ULTRASOUND THYROID 8/14/24    Narrative  EXAM: US THYROID    CLINICAL HISTORY: E04.2: Nontoxic multinodular goiter    COMPARISON: Thyroid ultrasound August 25, 2023    PROCEDURE: A complete ultrasound examination of the thyroid and lateral cervical lymph node chains was done with grayscale and color flow Doppler imaging.    COMMENT:    THYROID  Right lobe: 2.0 cm x 2.0 cm x 5.3 cm  Left lobe: 3.3 cm x 3.0 cm x 7.1 cm  Isthmus: 0.4 cm  Echotexture: Normal in echotexture.    NODULES:    Right lobe: The right lobe contains multiple sonographically similar TR 1/TR 2 colloid cysts and, and mixed/spongiform thyroid nodules.    Left lobe: The left lobe contains multiple sonographically similar mixed and spongiform thyroid nodules as well as a previously biopsied mid to lower pole nodule as follows:    *  Left nodule 1  *  Location: Upper pole  *  Measurement: 1.2 cm x 1.1 cm x 1.5 cm  *  Prior measurement: 1.2 x 1.2 x 1.4 cm  *  Composition: Mixed cystic and solid  (1 point)  *  Echogenicity: Isoechoic (1 point)  *  Shape: Wider-than-tall (0 points)  *  Margin: Smooth (0 points)  *  Echogenic foci: None or comet-tail artifacts (0 points)  *  TI-RADS category: TR2 (2 points)    *  Left nodule 2  *  Location: Upper pole  *  Measurement: 1.4 (cm) x 2.4 (cm) x 2.3 (cm)  *  Prior measurement: 1.4 x 2.0 x 2.2 cm  *  Composition: Spongiform (0 points)  *  Shape: Wider-than-tall (0 points)  *  Margin: Smooth (0 points)  *  Echogenic foci: None or comet-tail artifacts (0 points)  *  TI-RADS category: TR1 (0 points)    *  Left nodule 3  *  Location: Lower pole  *  Measurement: 3.3 (cm) x 4.3 (cm) x 4.3 (cm)  *  Prior measurement: 3.6 x 4.9 x 4.3 cm  *  Composition: Solid or almost completely " solid (2 points)  *  Echogenicity: Isoechoic (1 point)  *  Shape: Wider-than-tall (0 points)  *  Margin: Smooth (0 points)  *  Echogenic foci: None or comet-tail artifacts (0 points)  *  TI-RADS category: This nodule has undergone prior biopsy, with cytology results noted above. Therefore, it does not receive repeat TI-RADS categorization.    LYMPH NODES: Survey of the bilateral lateral cervical lymph node chains revealed no suspicious appearing lymph nodes.    IMPRESSION:  Thyroid nodules, as described in the body of the report. Measured nodules are similar to the prior study including previously biopsied left lobe nodule. Multiple sonographically similar TR 1/TR 2 nodules in the right lobe.    No suspicious lymph nodes identified in the lateral neck.    I personally reviewed the images on 8/22/24. The following is my interpretation:   L nodule #1 measures 1.2 x 1.6 x 1.2 cm. Agree with description as per radiology report   L nodule #2 measures 2.2 x 2.2 x 1.5 cm. Agree with description as per radiology report   L mid to inferior pole nodule #3 measures 3.4 x 4.0 x 2.9 cm. It is mixed cystic/solid. Agree with remaining description as per radiology report     DEXA BONE DENSITY 10/9/23    Narrative  CLINICAL HISTORY: Postmenopausal, history of diabetes and left hip replacement, screening for osteoporosis, Z 78.0.    COMMENT:    Scans of the the right hip and the left forearm were obtained. The bone mineral density was calculated for all the levels scanned.  The patient's bone mineral density was referenced to both young normal patients (T-score) as well as age matched patients (Z-score).  For the evaluation of osteoporosis, it is recommended that the patient's bone mineral density be compared to young normal patients or the T-score.    Current World Health Organization criteria for the diagnosis of osteoporosis are as follows:  1.  Up to 1 standard deviation below normal compared to young normal patients -  normal.  2.  Between 1 and 2.5 standard deviations below normal compared to young normal patients - low bone mass or osteopenia.  3.  Greater than or equal to 2.5 standard deviations below normal compared to young normal patients - osteoporosis.    The DEXA was performed on [email or web address] equipment.    The lumbar spine was not able to be evaluated due to the placement of surgical devices.    Right hip replacement    AP right hip lowest T-score:  Femoral neck      -1.8  Corresponding bone mineral density:       0.792 g/cm2.    AP imaging of the non-dominant left forearm, 33% radius, demonstrates the T-score to measure -2.8  Corresponding bone mineral density of 0.632g/cm2.    IMPRESSION: Osteoporosis.    I personally reviewed the images on 8/22/24 and agree with the radiology report.     ULTRASOUND THYROID 8/25/23    Narrative  CLINICAL HISTORY: E05.90: Thyrotoxicosis, unspecified without thyrotoxic crisis  or storm  E04.2: Nontoxic multinodular goiter    COMMENT:    Comparison: None    Technique: Real-time high resolution sonography of the thyroid gland was performed.    Findings:  Right Lobe:  Size: Measures 5.1 x 2.1 x 1.7 cm.  Echotexture:  Heterogeneous.  Nodules: Present, with numerous colloid cysts and colloid nodules. Largest detailed below.    Nodule #right 1  Location: Lower pole  Size: 1 x 0.7 x 1 cm  Composition: Mixed cystic and solid  (1 point)  Echogenicity: isoechoic (1 point)  Shape: Wider-than-tall (0 points)  Margin: Ill-defined (0 points)  Echogenic foci: Punctate echogenic foci (3 points) (probably colloid but too small to generate ringdown)  Comparison: None available  TI-RADS category: TR4 (4-6 points): Moderately suspicious., Does not meet  criteria for biopsy. In one year recommended.    Nodule #right 2  Location: upper pole only seen on cine series  Size: 1.2 x 0.4 x 0.5 cm  Composition: Mixed cystic and solid  (1 point)  Echogenicity:  isoechoic (1 point)  Shape: Wider-than-tall  (0 points)  Margin: Smooth (0 points)  Echogenic foci:  large comet-tail artifacts (0 points)  Comparison: None available  TI-RADS category: TR2 (2 points): Not suspicious.    Other smaller cysts and colloid nodules noted elsewhere.    -    Left Lobe:  Size: Measures 9.3 x 3.7 x 4.2 cm, enlarged  Echotexture:  Heterogeneous.  Nodules: Present    Nodule #left 1  Location: Upper pole  Size: 1.2 x 1.2 x 1.4 cm  Composition: Mixed cystic and solid  (1 point)  Echogenicity: isoechoic (1 point)  Shape: Wider-than-tall (0 points)  Margin: Smooth (0 points)  Echogenic foci: None or large comet-tail artifacts (0 points)  Comparison: None available  TI-RADS category: TR2 (2 points): Not suspicious.    Nodule #left 2  Location: Interpolar/upper pole  Size:  2 x 1.4 x 2.2cm  Composition: Spongiform (0 points)  Echogenicity: isoechoic (1 point)  Shape: Wider-than-tall (0 points)  Margin: Smooth (0 points)  Echogenic foci: None or large comet-tail artifacts (0 points)  Comparison: None available  TI-RADS category: TR2 (2 points): Not suspicious.    Nodule #left 3  Location: Interpolar lower pole  Size: 4.9 x 3.6 x 4.3 cm  Composition: Spongiform (0 points)  Echogenicity:  isoechoic (1 point)  Shape: Wider-than-tall (0 points)  Margin: Ill-defined (0 points)  Echogenic foci: None or large comet-tail artifacts (0 points)  Comparison: None available  TI-RADS category: TR2 (2 points): Not suspicious.      -    Isthmus:  Mildly heterogeneous, measuring 0.4 cm. No nodules.    -  Other:  No suspicious lymph nodes in the visualized neck.    --    Impression  Enlarged multinodular goiter with multiple left-sided spongiform colloid nodules  and smaller right-sided nodules as discussed above.  Follow up in one year to document stability.    I personally reviewed the images on 9/7/23 and agree with the radiology report. The following is my interpretation:   Heterogeneous gland  R lower pole nodule measures 1.01 x 0.88 x 0.69 cm. There are no  calcifications. Agree with remaining description as per radiology report   R upper pole nodule not seen on still images  L upper pole nodule measures 1.19 x 1.41 x 1.15 cm. Agree with description as per radiology report   L upper/mid pole nodule measures 1.95 x 2.17 x 1.39 cm. Agree with description as per radiology report   L mid/lower pole nodule measures 2.8 x 4.6 x 3.57 cm. Agree with description as per radiology report      DXA 1/12/21  History: Postmenopausal. L1 compression fracture with spinal cord stimulator.   Left hip prosthesis..     Comparison: 10/18/2018. 6/28/2016.     Results: Evaluation of the right hip demonstrates a total bone mineral density   of 0.924 g/cm2 with a T-score of -0.7 (priors -0.8 and -1.1 in 2018 and 2016   respectively).. The W.H.O. classification is normal. The right femoral neck   demonstrates a total bone mineral density of 0.778 g/cm2 with a T-score of -1.9   (priors -2.0 and -2.5). The W.H.O. classification is osteopenia.     Evaluation of the total left radius demonstrates a total bone mineral density of   0.409 g/cm2 with a T score of -4.4 (priors -4.2 and -4.1). The W.H.O.   classification is osteoporosis. The total bone mineral density of the most   distal left radius is 0.249 g/cm2 with a T score of -4.8 (priors -4.9 and -5.3).   The W.H.O. classification is osteoporosis.     Based on the clinical information provided to the technical staff and the above   described measurements, the 10 year probability of a major osteoporotic fracture   is 21.3%. The 10 year probability of a hip fracture is 5.8%    Impression: Analysis of the right femoral neck and left radius demonstrate   diminished bone mineral density with evidence for increased fracture risk.       ASSESSMENT AND PLAN    1. Osteoporosis  - S/p L1 compression fracture after falling in shower at 57 yo. Reportedly not treated until started prolia ~2021  - DXA stable 10/2023  - Continue prolia q6 mo, scheduled for next  dose next week.   - Continue vitamin D and Ca supplementation  - Continue with weight bearing exercise  - Counseled on importance of fall prevention  - Repeat DXA 10/2025  - Check CMP, 25 vitamin D prior to next visit for monitoring    2. Amiodarone induced hyperthyroidism  - Off amiodarone since 7/2021 and off MMI since 9/2022  - Had resolved but TSH was low again 8/2024 but was clinically euthyroid. Repeat TFTs 10/2024 normal again  - Monitor TFTs prior to next visit for monitoring     3. MNG  - No prior biopsies as per pt, however US report comments on a prior biopsy  - Most recent US 8/2024 is stable from prior  - Given advanced age and pt previously deemed by surgeon to be poor surgical candidate for thyroidectomy recommend stopping US monitoring, unless pt develops new local neck sx, as it will not change medical management    4. DM2, controlled, without known complications  - A1C 5.9%, at goal < 8.0%.   - Counseled on importance of dietary changes such as eliminating sugar sweetened beverages, reducing intake of processed foods, and increasing intake of whole foods such as fruits and veggies, nuts, seeds, and legumes  - Recommend increasing physical activity to at least 150 min moderate aerobic exercise per week + resistance training 2x/week  - Decrease metformin to 500 mg BID. Will try to discontinue altogether at next visit if A1C remains <6.5%  - Counseled on risk of hypoglycemia and instructed pt to call for BG < 80  - Many elements of diabetes self management were reviewed including, but not limited to, the importance of blood sugar monitoring, symptoms and treatment of hypoglycemia, blood sugar/A1C goals, adherence with medications, lifestyle management, and surveillance of eyes and feet and need for routine follow-up with ophthalmology and podiatry.   - Pt is aware of alternatives of therapy, risks and benefits, and accepts risk of default.  - Retinopathy screening: up to date, sees retinal specialist  q2 mo for macular degeneration  - Nephropathy screening: due, check UACR  - Check A1C, CMP prior to next visit for monitoring     RTC 6 mo    I attest that this visit supports the complexity inherent to evaluation and management associated with medical care services that serve as the continuing focal point for all needed health care services and/or medical care services that are part of ongoing care related to this patient's single, serious condition or a complex condition.

## 2024-10-14 NOTE — PROGRESS NOTES
Verification of Patient Location:  The patient affirms they are currently located in the following state: [place]    Are you in your home or a private residence? Yes    Request for Consent:    Audio and Video Encounter     You and I are about to have a telemedicine check-in or visit because you have requested it.  This is a live video-conference.  I am a real person, speaking to you in real time.  There is no one else with me on the video-conference. I am not recording this conversation and I am asking you not to record it.  This telemedicine visit will be billed to your health insurance or you, if you are self-insured.  You understand you will be responsible for any copayments or coinsurances that apply to your telemedicine visit.  Communication platform used for this encounter:  [email or web address] Video Visit (Epic Video Client)       Before starting our telemedicine visit, I am required to get your consent for this virtual check-in or visit by telemedicine. Do you consent?    Patient Response to Request for Consent:  Yes

## 2024-10-17 ENCOUNTER — TELEPHONE (OUTPATIENT)
Dept: ENDOCRINOLOGY | Facility: CLINIC | Age: 88
End: 2024-10-17
Payer: MEDICARE

## 2024-10-17 NOTE — TELEPHONE ENCOUNTER
Needs new Vitamin D lab requisition (it was lost). Pls fax new one. Lab only draws on Fridays.    [facility]  Fax:[phone]

## 2024-10-23 ENCOUNTER — HOSPITAL ENCOUNTER (OUTPATIENT)
Dept: PREOP | Facility: HOSPITAL | Age: 88
Discharge: HOME | End: 2024-10-23
Attending: INTERNAL MEDICINE
Payer: MEDICARE

## 2024-10-23 VITALS
BODY MASS INDEX: 26.5 KG/M2 | HEIGHT: 60 IN | SYSTOLIC BLOOD PRESSURE: 131 MMHG | TEMPERATURE: 97 F | HEART RATE: 57 BPM | OXYGEN SATURATION: 97 % | DIASTOLIC BLOOD PRESSURE: 48 MMHG | RESPIRATION RATE: 20 BRPM | WEIGHT: 135 LBS

## 2024-10-23 DIAGNOSIS — M81.0 AGE-RELATED OSTEOPOROSIS WITHOUT CURRENT PATHOLOGICAL FRACTURE: Primary | ICD-10-CM

## 2024-10-23 PROCEDURE — 63600000 HC DRUGS/DETAIL CODE: Mod: JZ,TB | Performed by: INTERNAL MEDICINE

## 2024-10-23 PROCEDURE — 3E0130M INTRODUCTION OF MONOCLONAL ANTIBODY INTO SUBCUTANEOUS TISSUE, PERCUTANEOUS APPROACH: ICD-10-PCS | Performed by: STUDENT IN AN ORGANIZED HEALTH CARE EDUCATION/TRAINING PROGRAM

## 2024-10-23 PROCEDURE — 96372 THER/PROPH/DIAG INJ SC/IM: CPT

## 2024-10-23 RX ORDER — FAMOTIDINE 10 MG/ML
20 INJECTION INTRAVENOUS ONCE AS NEEDED
OUTPATIENT
Start: 2025-04-12

## 2024-10-23 RX ORDER — EPINEPHRINE 1 MG/ML
0.5 INJECTION, SOLUTION INTRAMUSCULAR; SUBCUTANEOUS ONCE AS NEEDED
OUTPATIENT
Start: 2025-04-12

## 2024-10-23 RX ORDER — DIPHENHYDRAMINE HCL 50 MG/ML
25 VIAL (ML) INJECTION ONCE AS NEEDED
OUTPATIENT
Start: 2025-04-12

## 2024-10-23 RX ADMIN — DENOSUMAB 60 MG: 60 INJECTION SUBCUTANEOUS at 10:44

## 2024-11-19 ENCOUNTER — APPOINTMENT (EMERGENCY)
Dept: RADIOLOGY | Facility: HOSPITAL | Age: 88
DRG: 866 | End: 2024-11-19
Payer: MEDICARE

## 2024-11-19 ENCOUNTER — HOSPITAL ENCOUNTER (INPATIENT)
Facility: HOSPITAL | Age: 88
LOS: 4 days | Discharge: HOME HEALTH | DRG: 866 | End: 2024-11-23
Attending: EMERGENCY MEDICINE | Admitting: HOSPITALIST
Payer: MEDICARE

## 2024-11-19 DIAGNOSIS — R09.02 HYPOXIA: Primary | ICD-10-CM

## 2024-11-19 DIAGNOSIS — R09.02 HYPOXEMIA: ICD-10-CM

## 2024-11-19 LAB
ALBUMIN SERPL-MCNC: 3.7 G/DL (ref 3.5–5.7)
ALP SERPL-CCNC: 37 IU/L (ref 34–125)
ALT SERPL-CCNC: 20 IU/L (ref 7–52)
ANION GAP SERPL CALC-SCNC: 7 MEQ/L (ref 3–15)
AST SERPL-CCNC: 20 IU/L (ref 13–39)
BASOPHILS # BLD: 0.01 K/UL (ref 0.01–0.1)
BASOPHILS NFR BLD: 0.2 %
BILIRUB SERPL-MCNC: 0.7 MG/DL (ref 0.3–1.2)
BNP SERPL-MCNC: 255 PG/ML
BUN SERPL-MCNC: 23 MG/DL (ref 7–25)
CALCIUM SERPL-MCNC: 8.4 MG/DL (ref 8.6–10.3)
CHLORIDE SERPL-SCNC: 98 MEQ/L (ref 98–107)
CO2 SERPL-SCNC: 30 MEQ/L (ref 21–31)
CREAT SERPL-MCNC: 0.8 MG/DL (ref 0.6–1.2)
DIFFERENTIAL METHOD BLD: ABNORMAL
EGFRCR SERPLBLD CKD-EPI 2021: >60 ML/MIN/1.73M*2
EOSINOPHIL # BLD: 0.06 K/UL (ref 0.04–0.36)
EOSINOPHIL NFR BLD: 1 %
ERYTHROCYTE [DISTWIDTH] IN BLOOD BY AUTOMATED COUNT: 13.8 % (ref 11.7–14.4)
FLUAV RNA SPEC QL NAA+PROBE: NEGATIVE
FLUBV RNA SPEC QL NAA+PROBE: NEGATIVE
GLUCOSE BLD-MCNC: 138 MG/DL (ref 70–99)
GLUCOSE BLD-MCNC: 162 MG/DL (ref 70–99)
GLUCOSE SERPL-MCNC: 144 MG/DL (ref 70–99)
HCT VFR BLD AUTO: 36.6 % (ref 35–45)
HGB BLD-MCNC: 12 G/DL (ref 11.8–15.7)
IMM GRANULOCYTES # BLD AUTO: 0.02 K/UL (ref 0–0.08)
IMM GRANULOCYTES NFR BLD AUTO: 0.3 %
LYMPHOCYTES # BLD: 0.3 K/UL (ref 1.2–3.5)
LYMPHOCYTES NFR BLD: 4.9 %
MCH RBC QN AUTO: 30.6 PG (ref 28–33.2)
MCHC RBC AUTO-ENTMCNC: 32.8 G/DL (ref 32.2–35.5)
MCV RBC AUTO: 93.4 FL (ref 83–98)
MONOCYTES # BLD: 0.29 K/UL (ref 0.28–0.8)
MONOCYTES NFR BLD: 4.7 %
NEUTROPHILS # BLD: 5.5 K/UL (ref 1.7–7)
NEUTS SEG NFR BLD: 88.9 %
NRBC BLD-RTO: 0 %
PLATELET # BLD AUTO: 170 K/UL (ref 150–369)
PMV BLD AUTO: 9.6 FL (ref 9.4–12.3)
POCT TEST: ABNORMAL
POCT TEST: ABNORMAL
POTASSIUM SERPL-SCNC: 3.8 MEQ/L (ref 3.5–5.1)
PROT SERPL-MCNC: 6 G/DL (ref 6–8.2)
RBC # BLD AUTO: 3.92 M/UL (ref 3.93–5.22)
RSV RNA SPEC QL NAA+PROBE: NEGATIVE
SARS-COV-2 RNA RESP QL NAA+PROBE: NEGATIVE
SODIUM SERPL-SCNC: 135 MEQ/L (ref 136–145)
TROPONIN I SERPL HS-MCNC: 8.6 PG/ML
TROPONIN I SERPL HS-MCNC: 9.3 PG/ML
TSH SERPL DL<=0.05 MIU/L-ACNC: 0.3 MIU/L (ref 0.34–5.6)
WBC # BLD AUTO: 6.18 K/UL (ref 3.8–10.5)

## 2024-11-19 PROCEDURE — 84484 ASSAY OF TROPONIN QUANT: CPT | Performed by: EMERGENCY MEDICINE

## 2024-11-19 PROCEDURE — 71046 X-RAY EXAM CHEST 2 VIEWS: CPT

## 2024-11-19 PROCEDURE — 72070 X-RAY EXAM THORAC SPINE 2VWS: CPT

## 2024-11-19 PROCEDURE — 99223 1ST HOSP IP/OBS HIGH 75: CPT | Performed by: HOSPITALIST

## 2024-11-19 PROCEDURE — 84484 ASSAY OF TROPONIN QUANT: CPT | Mod: 91 | Performed by: EMERGENCY MEDICINE

## 2024-11-19 PROCEDURE — 21400000 HC ROOM AND CARE CCU/INTERMEDIATE

## 2024-11-19 PROCEDURE — 63600000 HC DRUGS/DETAIL CODE: Performed by: HOSPITALIST

## 2024-11-19 PROCEDURE — 36415 COLL VENOUS BLD VENIPUNCTURE: CPT

## 2024-11-19 PROCEDURE — 84443 ASSAY THYROID STIM HORMONE: CPT | Performed by: HOSPITALIST

## 2024-11-19 PROCEDURE — 83880 ASSAY OF NATRIURETIC PEPTIDE: CPT | Performed by: PHYSICIAN ASSISTANT

## 2024-11-19 PROCEDURE — 93005 ELECTROCARDIOGRAM TRACING: CPT | Performed by: EMERGENCY MEDICINE

## 2024-11-19 PROCEDURE — 85025 COMPLETE CBC W/AUTO DIFF WBC: CPT | Performed by: EMERGENCY MEDICINE

## 2024-11-19 PROCEDURE — 85025 COMPLETE CBC W/AUTO DIFF WBC: CPT

## 2024-11-19 PROCEDURE — 63700000 HC SELF-ADMINISTRABLE DRUG: Performed by: HOSPITALIST

## 2024-11-19 PROCEDURE — 82040 ASSAY OF SERUM ALBUMIN: CPT | Performed by: EMERGENCY MEDICINE

## 2024-11-19 PROCEDURE — 63600105 HC IODINE BASED CONTRAST: Mod: JZ | Performed by: PHYSICIAN ASSISTANT

## 2024-11-19 PROCEDURE — 87637 SARSCOV2&INF A&B&RSV AMP PRB: CPT | Performed by: PHYSICIAN ASSISTANT

## 2024-11-19 PROCEDURE — 99285 EMERGENCY DEPT VISIT HI MDM: CPT | Mod: 25

## 2024-11-19 PROCEDURE — 71275 CT ANGIOGRAPHY CHEST: CPT

## 2024-11-19 PROCEDURE — 93005 ELECTROCARDIOGRAM TRACING: CPT

## 2024-11-19 RX ORDER — DEXTROMETHORPHAN HYDROBROMIDE, GUAIFENESIN 5; 100 MG/5ML; MG/5ML
650 LIQUID ORAL
COMMUNITY

## 2024-11-19 RX ORDER — TRAMADOL HYDROCHLORIDE 50 MG/1
50 TABLET ORAL EVERY 6 HOURS
Status: DISCONTINUED | OUTPATIENT
Start: 2024-11-19 | End: 2024-11-23 | Stop reason: HOSPADM

## 2024-11-19 RX ORDER — DEXTROSE 40 %
15-30 GEL (GRAM) ORAL AS NEEDED
Status: DISCONTINUED | OUTPATIENT
Start: 2024-11-19 | End: 2024-11-23 | Stop reason: HOSPADM

## 2024-11-19 RX ORDER — IOPAMIDOL 755 MG/ML
100 INJECTION, SOLUTION INTRAVASCULAR
Status: COMPLETED | OUTPATIENT
Start: 2024-11-19 | End: 2024-11-19

## 2024-11-19 RX ORDER — INSULIN LISPRO 100 [IU]/ML
2-10 INJECTION, SOLUTION INTRAVENOUS; SUBCUTANEOUS
Status: DISCONTINUED | OUTPATIENT
Start: 2024-11-19 | End: 2024-11-23 | Stop reason: HOSPADM

## 2024-11-19 RX ORDER — DEXTROSE 50 % IN WATER (D50W) INTRAVENOUS SYRINGE
25 AS NEEDED
Status: DISCONTINUED | OUTPATIENT
Start: 2024-11-19 | End: 2024-11-23 | Stop reason: HOSPADM

## 2024-11-19 RX ORDER — CHOLECALCIFEROL (VITAMIN D3) 25 MCG
125 TABLET ORAL DAILY
Status: DISCONTINUED | OUTPATIENT
Start: 2024-11-20 | End: 2024-11-23 | Stop reason: HOSPADM

## 2024-11-19 RX ORDER — PANTOPRAZOLE SODIUM 20 MG/1
20 TABLET, DELAYED RELEASE ORAL EVERY MORNING
COMMUNITY

## 2024-11-19 RX ORDER — TRAZODONE HYDROCHLORIDE 50 MG/1
50 TABLET ORAL NIGHTLY
COMMUNITY

## 2024-11-19 RX ORDER — GABAPENTIN 300 MG/1
300 CAPSULE ORAL EVERY 6 HOURS
Status: DISCONTINUED | OUTPATIENT
Start: 2024-11-19 | End: 2024-11-23 | Stop reason: HOSPADM

## 2024-11-19 RX ORDER — PANTOPRAZOLE SODIUM 20 MG/1
20 TABLET, DELAYED RELEASE ORAL DAILY
Status: DISCONTINUED | OUTPATIENT
Start: 2024-11-20 | End: 2024-11-23 | Stop reason: HOSPADM

## 2024-11-19 RX ORDER — FUROSEMIDE 20 MG/1
20 TABLET ORAL DAILY
Status: DISCONTINUED | OUTPATIENT
Start: 2024-11-20 | End: 2024-11-23 | Stop reason: HOSPADM

## 2024-11-19 RX ORDER — ATORVASTATIN CALCIUM 20 MG/1
20 TABLET, FILM COATED ORAL NIGHTLY
Status: DISCONTINUED | OUTPATIENT
Start: 2024-11-19 | End: 2024-11-23 | Stop reason: HOSPADM

## 2024-11-19 RX ORDER — IBUPROFEN 200 MG
16-32 TABLET ORAL AS NEEDED
Status: DISCONTINUED | OUTPATIENT
Start: 2024-11-19 | End: 2024-11-23 | Stop reason: HOSPADM

## 2024-11-19 RX ORDER — GABAPENTIN 300 MG/1
300 CAPSULE ORAL
COMMUNITY

## 2024-11-19 RX ORDER — TRAMADOL HYDROCHLORIDE 50 MG/1
50 TABLET ORAL
COMMUNITY

## 2024-11-19 RX ORDER — PROPRANOLOL HYDROCHLORIDE 10 MG/1
20 TABLET ORAL 2 TIMES DAILY
Status: DISCONTINUED | OUTPATIENT
Start: 2024-11-19 | End: 2024-11-23 | Stop reason: HOSPADM

## 2024-11-19 RX ORDER — TRAZODONE HYDROCHLORIDE 50 MG/1
50 TABLET ORAL NIGHTLY
Status: DISCONTINUED | OUTPATIENT
Start: 2024-11-19 | End: 2024-11-23 | Stop reason: HOSPADM

## 2024-11-19 RX ORDER — FUROSEMIDE 20 MG/1
20 TABLET ORAL EVERY MORNING
COMMUNITY

## 2024-11-19 RX ADMIN — ATORVASTATIN CALCIUM 20 MG: 20 TABLET, FILM COATED ORAL at 22:09

## 2024-11-19 RX ADMIN — TRAZODONE HYDROCHLORIDE 50 MG: 50 TABLET ORAL at 22:09

## 2024-11-19 RX ADMIN — TRAMADOL HYDROCHLORIDE 50 MG: 50 TABLET, COATED ORAL at 23:33

## 2024-11-19 RX ADMIN — TRAMADOL HYDROCHLORIDE 50 MG: 50 TABLET, COATED ORAL at 17:36

## 2024-11-19 RX ADMIN — GABAPENTIN 300 MG: 300 CAPSULE ORAL at 23:33

## 2024-11-19 RX ADMIN — PROPRANOLOL HYDROCHLORIDE 20 MG: 10 TABLET ORAL at 19:55

## 2024-11-19 RX ADMIN — APIXABAN 2.5 MG: 2.5 TABLET, FILM COATED ORAL at 19:55

## 2024-11-19 RX ADMIN — GABAPENTIN 300 MG: 300 CAPSULE ORAL at 17:36

## 2024-11-19 RX ADMIN — IOPAMIDOL 100 ML: 755 INJECTION, SOLUTION INTRAVENOUS at 13:03

## 2024-11-19 ASSESSMENT — ENCOUNTER SYMPTOMS
SHORTNESS OF BREATH: 1
EYE PAIN: 0
CHILLS: 0
AGITATION: 0
VOMITING: 1
SEIZURES: 0
HEMATURIA: 0
FEVER: 0
NAUSEA: 1
PALPITATIONS: 0
BACK PAIN: 0
ARTHRALGIAS: 0
SORE THROAT: 0
FATIGUE: 1
COUGH: 0
DYSURIA: 0
ABDOMINAL PAIN: 0
COLOR CHANGE: 0
CONFUSION: 0

## 2024-11-19 ASSESSMENT — COGNITIVE AND FUNCTIONAL STATUS - GENERAL
WALKING IN HOSPITAL ROOM: 2 - A LOT
CLIMB 3 TO 5 STEPS WITH RAILING: 1 - TOTAL
MOVING TO AND FROM BED TO CHAIR: 2 - A LOT
STANDING UP FROM CHAIR USING ARMS: 2 - A LOT

## 2024-11-19 NOTE — ASSESSMENT & PLAN NOTE
Mild  CAT scan of chest without pulmonary embolism, consolidation or congestive heart failure  Oxygen as needed  Monitor

## 2024-11-19 NOTE — ED ATTESTATION NOTE
I have personally seen and examined the patient.  I personally performed the key components of the encounter and provided medical decision making for this patient. I reviewed and agree with the PA/NP/Resident's assessment and plan of care, with any exceptions as documented below.    My focused history, examination, assessment, and plan of care of Rissa Erickson is as follows:    HPI: The patient is a 89 y.o. who comes to the ED for generalized weakness. Onset last night. Has had some weight gain recently. Advised by cards to double her lasix which she did yesterday but today with increased fatigue, not able to ambulate today. No clear dyspnea. Did have some vomiting earlier today. No abd pain. No recent illness.      I was provided additional history from: EMS, daughter  Independent source or record: prior records    Pertinent past medical history includes: HL, CHF, pAF, HTN, DM      Exam: Vital signs reviewed- wnl with exception of BP elevation. The oxygen saturation is SpO2: (!) 91 %  which is low      Awake, alert, frail, elderly. No resp distress. Abdomen soft, non-distended, non-tender. Mild bilat lower ext edema, no JVD      Impression/Plan/Medical decision making/ED course: Pt here with acute weakness. Daughter concern for CHF exacerbation however BNP minimally elevated, pt in no resp distress, not tachypneic and CXR without findings supportive of this. Pt was hypoxic however; PE less likely as pt on thinners however CTA chest done to further evaluate which was also unrevealing. Unclear cause of hypoxia. Will admit for further evaluation             ECG review:  ECG independently reviewed by me (ED physician).  Imaging:  Imaging independently reviewed by me (ED physician).    Disposition: Admit        Procedures              NOTE: Patient seen during a time of significantly increased volumes, increased boarding in the ED, decreased capacity and staff which may have contributed to lengthened stay in ED. Portion  of management and initial evaluation may have been done while in the waiting room because of this. We are also practicing during a time of national shortages of items such as blood cultures and intravenous fluids, which contribute to management issues. This document was created using dragon dictation software; there might be some typographical errors due to this technology. Extensive detailed charting also is limited secondary to high ED volumes and may not reflect all conversations and decisions made between patient and provider.          Monica June MD  11/19/24 [id number]

## 2024-11-19 NOTE — ASSESSMENT & PLAN NOTE
Reportedly gained 6 pounds over the weekend and took extra dose of Lasix yesterday  Clinically compensated  Continue Lasix 20 mg daily

## 2024-11-19 NOTE — ASSESSMENT & PLAN NOTE
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

## 2024-11-19 NOTE — H&P
CHIEF COMPLAINT   Generalized weakness     HISTORY OF PRESENT ILLNESS      Rissa Erickson is a 89 y.o. female with a past medical history of atrial fibrillation, congestive heart failure, valvular heart disease, hypertension, hyperlipidemia, diabetes mellitus, gastroesophageal reflux disease, multinodular goiter who presents to [facility] ER on 11/19/2024 with generalized weakness.      Available records have been reviewed and summarized below.  History is obtained from the patient and her daughter.    The patient lives in an independent living facility and walks with a walker several times a day.  She gained 6 pounds over the weekend and took extra dose of Lasix yesterday as per her cardiology recommendation.  She did not feel well at dinnertime.  Subsequently, she had dry heaves and small amount of vomiting.  Today, she was not able to walk that brought her to the emergency room.    The patient was noticed to be mildly hypoxemic in the emergency room.    The patient denies chest pain, cough, cold symptoms, abdominal pain, urinary symptoms.  She denies fever or chills.  She is no longer nauseated.    PAST MEDICAL AND SURGICAL HISTORY      Past Medical History:   Diagnosis Date    A-fib (CMS/[facility])     Hypertension     CHF (congestive heart failure) (CMS/[facility])     Hearing loss     Macular degeneration     Multinodular goiter     Type 2 diabetes mellitus (CMS/[facility])  Hyperlipidemia  Moderate mitral and tricuspid regurgitation  Gastroesophageal reflux disease  Osteoporosis        Past Surgical History   Procedure Laterality Date    Appendectomy      Hip surgery Left     Hysterectomy      Joint replacement      Knee arthroplasty         MEDICATIONS      Prior to Admission medications    Medication Sig Start Date End Date Taking? Authorizing Provider   gabapentin (NEURONTIN) 300 mg capsule Take 300 mg by mouth every 6 (six) hours.   Yes Provider, thomas Nunn MD   traMADoL (ULTRAM) 50 mg tablet Take 50 mg by mouth every  6 (six) hours.   Yes ProviderSugey MD   traZODone (DESYREL) 50 mg tablet Take 50 mg by mouth nightly.   Yes ProviderSugey MD   apixaban (ELIQUIS) 2.5 mg tablet Take 1 tablet (2.5 mg total) by mouth 2 (two) times a day Indications: treatment to prevent blood clots in chronic atrial fibrillation. 3/26/24 10/14/24  Kael Mathew MD   atorvastatin (LIPITOR) 20 mg tablet Take 1 tablet (20 mg total) by mouth nightly. 1/16/24   Sloan Maher MD   calcium carbonate (OS-HERBERT) 500 mg calcium (1,250 mg) tablet Take 1 tablet by mouth daily.    ProviderSugey MD   cholecalciferol, vitamin D3, 5,000 unit (125 mcg) tablet Take 5,000 Units by mouth daily.    ProviderSugey MD   denosumab (PROLIA) 60 mg/mL syringe Inject 60 mg under the skin every 6 (six) months.    Provider, Sugey Nunn MD   furosemide (LASIX) 20 mg tablet Take 1 tablet (20 mg total) by mouth daily. 1/16/24   Sloan Maher MD   lancets misc 1 Lancet as needed (Diabetes). To check blood glucose 9/8/23   Sloan Maher MD   metFORMIN (GLUCOPHAGE) 500 mg tablet Take 1 tablet (500 mg total) by mouth 2 (two) times a day with meals. 10/14/24   Lyric Perez MD   pantoprazole (PROTONIX) 20 mg EC tablet Take 1 tablet (20 mg total) by mouth daily. 9/8/23   Sloan Maher MD   propranoloL (INDERAL) 20 mg tablet TAKE 1 TABLET TWICE A DAY 6/14/24   Sloan Maher MD   vit A/vit C/vit E/zinc/copper (ICAPS AREDS ORAL) Take 1 tablet by mouth daily.    ProviderSugey MD                                       ALLERGIES      Amiodarone    FAMILY HISTORY      No family history on file.    SOCIAL HISTORY      Social History     Socioeconomic History    Marital status:    Tobacco Use    Smoking status: Never    Smokeless tobacco: Never   Substance and Sexual Activity    Alcohol use: Never    Drug use: Never    Sexual activity: Defer     Social Drivers of Health     Food Insecurity: No Food  Insecurity (11/19/2024)    Hunger Vital Sign     Worried About Running Out of Food in the Last Year: Never true     Ran Out of Food in the Last Year: Never true   Transportation Needs: No Transportation Needs (4/4/2024)    PRAPARE - Transportation     Lack of Transportation (Medical): No     Lack of Transportation (Non-Medical): No   Housing Stability: Low Risk  (4/4/2024)    Housing Stability Vital Sign     Unable to Pay for Housing in the Last Year: No     Number of Places Lived in the Last Year: 1     Unstable Housing in the Last Year: No       REVIEW OF SYSTEMS      All other systems reviewed and negative except as noted in HPI    PHYSICAL EXAMINATION      Temp:  [36.7 °C (98 °F)-37.1 °C (98.8 °F)] 36.7 °C (98 °F)  Heart Rate:  [75-87] 80  Resp:  [16-18] 17  BP: (117-152)/(56-67) 128/62  Body mass index is 27.77 kg/m².    GEN: well-developed and well-nourished; not in acute distress, answers questions appropriately  HEENT: normocephalic; atraumatic.  Pupils are equal, round, reactive to light. Sclerae are unnicteric. There is full range of extraocular motion.  Mucous membranes are moist.  NECK is supple, no JVD; no bruits  CARDIO: Heart rate is regular, S1-S2 normal, + murmur, no rub or gallop  RESP: clear to auscultation bilaterally  ABD: soft, normal bowel sounds, no tenderness masses or palpable organomegaly  EXT: no cyanosis, clubbing, or edema  SKIN: No breaks  NEURO: alert and oriented x 3; no gross focal neurological deficit  BEHAVIOR/EMOTIONAL: appropriate; cooperative    LABS / IMAGING / EKG        Labs  Lab Results   Component Value Date    GLUCOSE 144 (H) 11/19/2024    CALCIUM 8.4 (L) 11/19/2024     (L) 11/19/2024    K 3.8 11/19/2024    CO2 30 11/19/2024    CL 98 11/19/2024    BUN 23 11/19/2024    CREATININE 0.8 11/19/2024     Lab Results   Component Value Date    WBC 6.18 11/19/2024    HGB 12.0 11/19/2024    HCT 36.6 11/19/2024    MCV 93.4 11/19/2024     11/19/2024     Lab Results    Component Value Date    ALBUMIN 3.7 11/19/2024    BILITOT 0.7 11/19/2024    ALKPHOS 37 11/19/2024    AST 20 11/19/2024    ALT 20 11/19/2024    PROTEIN 6.0 11/19/2024       Imaging  CT ANGIOGRAPHY CHEST PULMONARY EMBOLISM WITH IV CONTRAST    Result Date: 11/19/2024  IMPRESSION: 1.  No pulmonary embolism or acute infiltrate. 2.  Other findings as detailed above.    X-RAY THORACIC SPINE 2 VIEWS    Result Date: 11/19/2024  IMPRESSION: See comment.    X-RAY CHEST 2 VIEWS    Result Date: 11/19/2024  IMPRESSION: No acute pulmonary disease seen.     ECG/Telemetry  Reviewed by me: Normal sinus rhythm, nonspecific ST-T changes    ASSESSMENT AND PLAN           Generalized weakness  Assessment & Plan  Unclear etiology  Possible left viral infection considering vomiting  Check urine  Will get PT/OT    * Hypoxia  Assessment & Plan  Mild  CAT scan of chest without pulmonary embolism, consolidation or congestive heart failure  Oxygen as needed  Monitor    Type 2 diabetes mellitus without complication, without long-term current use of insulin (CMS/[facility])  Assessment & Plan  Hold metformin  Will put on Accu-Cheks with coverage    Primary hypertension  Assessment & Plan  Blood pressure is controlled  Continue propranolol and Lasix    Postherpetic neuralgia  Assessment & Plan  Has stimulator  Continue gabapentin and tramadol around-the-clock    Paroxysmal atrial fibrillation (CMS/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)  Assessment & Plan  Reportedly gained 6 pounds over the weekend  and took extra dose of Lasix yesterday  Clinically compensated  Continue Lasix 20 mg daily starting tomorrow       Discussed with daughter at bedside  VTE Assessment: Padua    Code Status: Full Code  Estimated discharge date: 11/22/2024     Emily Way MD  11/19/2024

## 2024-11-20 LAB
ALBUMIN SERPL-MCNC: 3.2 G/DL (ref 3.5–5.7)
ALP SERPL-CCNC: 31 IU/L (ref 34–125)
ALT SERPL-CCNC: 19 IU/L (ref 7–52)
ANION GAP SERPL CALC-SCNC: 5 MEQ/L (ref 3–15)
AST SERPL-CCNC: 19 IU/L (ref 13–39)
ATRIAL RATE: 86
BACTERIA URNS QL MICRO: ABNORMAL /HPF
BASOPHILS # BLD: 0.01 K/UL (ref 0.01–0.1)
BASOPHILS NFR BLD: 0.2 %
BILIRUB DIRECT SERPL-MCNC: 0.1 MG/DL
BILIRUB SERPL-MCNC: 0.4 MG/DL (ref 0.3–1.2)
BILIRUB UR QL STRIP.AUTO: NEGATIVE MG/DL
BUN SERPL-MCNC: 21 MG/DL (ref 7–25)
CALCIUM SERPL-MCNC: 7.6 MG/DL (ref 8.6–10.3)
CHLORIDE SERPL-SCNC: 102 MEQ/L (ref 98–107)
CLARITY UR REFRACT.AUTO: CLEAR
CO2 SERPL-SCNC: 28 MEQ/L (ref 21–31)
COLOR UR AUTO: YELLOW
CREAT SERPL-MCNC: 0.7 MG/DL (ref 0.6–1.2)
DIFFERENTIAL METHOD BLD: ABNORMAL
EGFRCR SERPLBLD CKD-EPI 2021: >60 ML/MIN/1.73M*2
EOSINOPHIL # BLD: 0.03 K/UL (ref 0.04–0.36)
EOSINOPHIL NFR BLD: 0.7 %
ERYTHROCYTE [DISTWIDTH] IN BLOOD BY AUTOMATED COUNT: 13.9 % (ref 11.7–14.4)
GLUCOSE BLD-MCNC: 111 MG/DL (ref 70–99)
GLUCOSE BLD-MCNC: 127 MG/DL (ref 70–99)
GLUCOSE BLD-MCNC: 141 MG/DL (ref 70–99)
GLUCOSE BLD-MCNC: 146 MG/DL (ref 70–99)
GLUCOSE SERPL-MCNC: 120 MG/DL (ref 70–99)
GLUCOSE UR STRIP.AUTO-MCNC: NEGATIVE MG/DL
HCT VFR BLD AUTO: 33.6 % (ref 35–45)
HGB BLD-MCNC: 10.8 G/DL (ref 11.8–15.7)
HGB UR QL STRIP.AUTO: NEGATIVE
HYALINE CASTS #/AREA URNS LPF: ABNORMAL /LPF
IMM GRANULOCYTES # BLD AUTO: 0.01 K/UL (ref 0–0.08)
IMM GRANULOCYTES NFR BLD AUTO: 0.2 %
KETONES UR STRIP.AUTO-MCNC: NEGATIVE MG/DL
LEUKOCYTE ESTERASE UR QL STRIP.AUTO: NEGATIVE
LYMPHOCYTES # BLD: 0.79 K/UL (ref 1.2–3.5)
LYMPHOCYTES NFR BLD: 18.5 %
MAGNESIUM SERPL-MCNC: 1.8 MG/DL (ref 1.8–2.5)
MCH RBC QN AUTO: 30.2 PG (ref 28–33.2)
MCHC RBC AUTO-ENTMCNC: 32.1 G/DL (ref 32.2–35.5)
MCV RBC AUTO: 93.9 FL (ref 83–98)
MONOCYTES # BLD: 0.59 K/UL (ref 0.28–0.8)
MONOCYTES NFR BLD: 13.8 %
NEUTROPHILS # BLD: 2.83 K/UL (ref 1.7–7)
NEUTS SEG NFR BLD: 66.6 %
NITRITE UR QL STRIP.AUTO: NEGATIVE
NRBC BLD-RTO: 0 %
P AXIS: 86
PH UR STRIP.AUTO: 7.5 [PH]
PLATELET # BLD AUTO: 167 K/UL (ref 150–369)
PMV BLD AUTO: 9.5 FL (ref 9.4–12.3)
POCT TEST: ABNORMAL
POTASSIUM SERPL-SCNC: 3.8 MEQ/L (ref 3.5–5.1)
PR INTERVAL: 170
PROT SERPL-MCNC: 5.1 G/DL (ref 6–8.2)
PROT UR QL STRIP.AUTO: ABNORMAL
QRS DURATION: 86
QT INTERVAL: 368
QTC CALCULATION(BAZETT): 440
R AXIS: 22
RBC # BLD AUTO: 3.58 M/UL (ref 3.93–5.22)
RBC #/AREA URNS HPF: ABNORMAL /HPF
SODIUM SERPL-SCNC: 135 MEQ/L (ref 136–145)
SP GR UR REFRACT.AUTO: >1.035
SQUAMOUS URNS QL MICRO: ABNORMAL /HPF
T WAVE AXIS: 100
UROBILINOGEN UR STRIP-ACNC: 0.2 EU/DL
VENTRICULAR RATE: 86
WBC # BLD AUTO: 4.26 K/UL (ref 3.8–10.5)
WBC #/AREA URNS HPF: ABNORMAL /HPF

## 2024-11-20 PROCEDURE — 97166 OT EVAL MOD COMPLEX 45 MIN: CPT | Mod: GO

## 2024-11-20 PROCEDURE — 85025 COMPLETE CBC W/AUTO DIFF WBC: CPT | Performed by: HOSPITALIST

## 2024-11-20 PROCEDURE — 80048 BASIC METABOLIC PNL TOTAL CA: CPT | Performed by: HOSPITALIST

## 2024-11-20 PROCEDURE — 21400000 HC ROOM AND CARE CCU/INTERMEDIATE

## 2024-11-20 PROCEDURE — 63700000 HC SELF-ADMINISTRABLE DRUG: Performed by: HOSPITALIST

## 2024-11-20 PROCEDURE — 99233 SBSQ HOSP IP/OBS HIGH 50: CPT | Performed by: INTERNAL MEDICINE

## 2024-11-20 PROCEDURE — 80076 HEPATIC FUNCTION PANEL: CPT | Performed by: HOSPITALIST

## 2024-11-20 PROCEDURE — 87086 URINE CULTURE/COLONY COUNT: CPT | Performed by: HOSPITALIST

## 2024-11-20 PROCEDURE — 97162 PT EVAL MOD COMPLEX 30 MIN: CPT | Mod: GP

## 2024-11-20 PROCEDURE — 97535 SELF CARE MNGMENT TRAINING: CPT | Mod: GO

## 2024-11-20 PROCEDURE — 83735 ASSAY OF MAGNESIUM: CPT | Performed by: HOSPITALIST

## 2024-11-20 PROCEDURE — 36415 COLL VENOUS BLD VENIPUNCTURE: CPT | Performed by: HOSPITALIST

## 2024-11-20 PROCEDURE — 81001 URINALYSIS AUTO W/SCOPE: CPT | Performed by: PHYSICIAN ASSISTANT

## 2024-11-20 PROCEDURE — 97530 THERAPEUTIC ACTIVITIES: CPT | Mod: GP

## 2024-11-20 RX ORDER — ACETAMINOPHEN 325 MG/1
650 TABLET ORAL EVERY 4 HOURS PRN
Status: DISCONTINUED | OUTPATIENT
Start: 2024-11-20 | End: 2024-11-23 | Stop reason: HOSPADM

## 2024-11-20 RX ADMIN — PANTOPRAZOLE SODIUM 20 MG: 20 TABLET, DELAYED RELEASE ORAL at 09:13

## 2024-11-20 RX ADMIN — GABAPENTIN 300 MG: 300 CAPSULE ORAL at 05:36

## 2024-11-20 RX ADMIN — APIXABAN 2.5 MG: 2.5 TABLET, FILM COATED ORAL at 20:43

## 2024-11-20 RX ADMIN — APIXABAN 2.5 MG: 2.5 TABLET, FILM COATED ORAL at 09:13

## 2024-11-20 RX ADMIN — GABAPENTIN 300 MG: 300 CAPSULE ORAL at 23:17

## 2024-11-20 RX ADMIN — PROPRANOLOL HYDROCHLORIDE 20 MG: 10 TABLET ORAL at 09:13

## 2024-11-20 RX ADMIN — Medication 125 MCG: at 09:13

## 2024-11-20 RX ADMIN — TRAMADOL HYDROCHLORIDE 50 MG: 50 TABLET, COATED ORAL at 05:36

## 2024-11-20 RX ADMIN — GABAPENTIN 300 MG: 300 CAPSULE ORAL at 17:12

## 2024-11-20 RX ADMIN — TRAMADOL HYDROCHLORIDE 50 MG: 50 TABLET, COATED ORAL at 12:29

## 2024-11-20 RX ADMIN — FUROSEMIDE 20 MG: 20 TABLET ORAL at 09:13

## 2024-11-20 RX ADMIN — TRAMADOL HYDROCHLORIDE 50 MG: 50 TABLET, COATED ORAL at 17:12

## 2024-11-20 RX ADMIN — GABAPENTIN 300 MG: 300 CAPSULE ORAL at 12:29

## 2024-11-20 RX ADMIN — TRAZODONE HYDROCHLORIDE 50 MG: 50 TABLET ORAL at 22:23

## 2024-11-20 RX ADMIN — PROPRANOLOL HYDROCHLORIDE 20 MG: 10 TABLET ORAL at 20:43

## 2024-11-20 RX ADMIN — TRAMADOL HYDROCHLORIDE 50 MG: 50 TABLET, COATED ORAL at 23:17

## 2024-11-20 RX ADMIN — ATORVASTATIN CALCIUM 20 MG: 20 TABLET, FILM COATED ORAL at 22:23

## 2024-11-20 ASSESSMENT — COGNITIVE AND FUNCTIONAL STATUS - GENERAL
TOILETING: 3 - A LITTLE
STANDING UP FROM CHAIR USING ARMS: 3 - A LITTLE
STANDING UP FROM CHAIR USING ARMS: 3 - A LITTLE
MOVING TO AND FROM BED TO CHAIR: 3 - A LITTLE
AFFECT: FLAT/BLUNTED AFFECT
CLIMB 3 TO 5 STEPS WITH RAILING: 1 - TOTAL
AFFECT: FLAT/BLUNTED AFFECT
WALKING IN HOSPITAL ROOM: 2 - A LOT
HELP NEEDED FOR BATHING: 2 - A LOT
DRESSING REGULAR LOWER BODY CLOTHING: 2 - A LOT
EATING MEALS: 4 - NONE
MOVING TO AND FROM BED TO CHAIR: 3 - A LITTLE
WALKING IN HOSPITAL ROOM: 3 - A LITTLE
HELP NEEDED FOR PERSONAL GROOMING: 3 - A LITTLE
WALKING IN HOSPITAL ROOM: 2 - A LOT
CLIMB 3 TO 5 STEPS WITH RAILING: 2 - A LOT
MOVING TO AND FROM BED TO CHAIR: 2 - A LOT
DRESSING REGULAR UPPER BODY CLOTHING: 3 - A LITTLE
STANDING UP FROM CHAIR USING ARMS: 2 - A LOT
CLIMB 3 TO 5 STEPS WITH RAILING: 2 - A LOT

## 2024-11-20 NOTE — PLAN OF CARE
Problem: Adult Inpatient Plan of Care  Goal: Plan of Care Review  Outcome: Progressing  Flowsheets (Taken 11/20/2024 4563)  Progress: improving  Outcome Evaluation: VSS. Afebrile. AAOx4. NSR on tele monitor. echo pending. pt resting in bed comfortably with purewick in place. OOB x1 assist. cbwr  Plan of Care Reviewed With: patient  Goal: Patient-Specific Goal (Individualized)  Outcome: Progressing  Goal: Absence of Hospital-Acquired Illness or Injury  Outcome: Progressing  Goal: Optimal Comfort and Wellbeing  Outcome: Progressing  Goal: Readiness for Transition of Care  Outcome: Progressing     Problem: Fall Injury Risk  Goal: Absence of Fall and Fall-Related Injury  Outcome: Progressing     Problem: Skin Injury Risk Increased  Goal: Skin Health and Integrity  Outcome: Progressing     Problem: Mobility Impairment  Goal: Optimal Mobility  Outcome: Progressing     Problem: Self-Care Deficit  Goal: Improved Ability to Complete Activities of Daily Living  Outcome: Progressing   Plan of Care Review  Plan of Care Reviewed With: patient  Progress: improving  Outcome Evaluation: VSS. Afebrile. AAOx4. NSR on tele monitor. echo pending. pt resting in bed comfortably with purewick in place. OOB x1 assist. cbwr

## 2024-11-20 NOTE — CONSULTS
Nutrition Note    Nutrition Status Classification: Mild nutritional compromise     Clinical Course: Patient is a 89 y.o. female who was admitted on 11/19/2024 with a diagnosis of Hypoxia [R09.02].   Past Medical History:   Diagnosis Date    A-fib (CMS/[facility])     Accelerated hypertension     CHF (congestive heart failure) (CMS/[facility])     Hearing loss     Macular degeneration     Thyroid nodule     Type 2 diabetes mellitus (CMS/[facility])      Past Surgical History   Procedure Laterality Date    Appendectomy      Hip surgery Left     Hysterectomy      Joint replacement      Knee arthroplasty       Nutrition Interventions/Recommendations:   Continue cardiac diet as tolerated; add NCS prn for glycemic control    - monitor and document % intake    - fluid restriction per MD  2. Declines ONS  3. Labs/lytes - monitor and treat prn    - POC glucose </= 140 mg/dL  4. Daily weights and monitor I/O   - I/O: 0 L  5. Monitor GI function    - last BM 11/18 per  pt report      Reason for Assessment  Reason For Assessment: identified at risk by screening criteria, per organizational policy (MST >/=2)  Diagnosis:  (hypoxia)    [facility] Nutrition Screen Tool  Has patient lost weight without trying?: 1-->Yes, 2-13 lbs  Has patient been eating poorly due to decreased appetite?: 1-->Yes  [facility] Nutrition Screen Score: 2    Nutrition/Diet History  Typical Food/Fluid Intake: From ILF  Diet Prior to Admission: 3 meals/day, prepared by facility  Appetite Prior to Admission:  (decreased x 1-2 days)  Supplemental Drinks/Foods/Additives: none  Functional Status: ambulatory (with walker)  Food Allergies: no known food allergies  Factors Affecting Nutritional Intake: early satiety    Physical Findings  Overall Physical Appearance: well developed  Gastrointestinal:  (denies GI distress; vomiting PTA)  Last Bowel Movement: 11/18/24  Skin: intact    Last Bowel Movement: 11/18/24    Nutrition Order  Nutrition Order: meets nutritional requirements  Nutrition Order  Comments: cardiac, 1500 mL  Current TF/TPN Regimen: none    Anthropometrics  Height: 152.4 cm (5')    Admit Weight  Admit Weight Method: measured  Admit Weight: 65 kg (143 lb 4.8 oz)    Current Weight  Weight Method: Bed scale  Weight: 65 kg (143 lb 6.4 oz)    Ideal Body Weight (IBW)  Ideal Body Weight (IBW) (kg): 45.86  % Ideal Body Weight: 141.84    Usual Body Weight (UBW)  Usual Body Weight: 59.9 kg (132 lb)  Weight Loss:  (weight gain 2/2 fluid)    Body Mass Index (BMI)  BMI (Calculated): 28  BMI Assessment: BMI 25-29.9: overweight         Labs/Procedures/Meds  Lab Results Reviewed: reviewed, pertinent  Lab Results Comments: Na 135, POC glucose 111, 138, 162    Results from last 7 days   Lab Units 11/20/24  0543 11/19/24  1010   SODIUM mEQ/L 135* 135*   POTASSIUM mEQ/L 3.8 3.8   CHLORIDE mEQ/L 102 98   CO2 mEQ/L 28 30   BUN mg/dL 21 23   CREATININE mg/dL 0.7 0.8   GLUCOSE mg/dL 120* 144*   CALCIUM mg/dL 7.6* 8.4*      Results from last 7 days   Lab Units 11/20/24  0543 11/19/24  1010   ALK PHOS IU/L 31* 37   BILIRUBIN TOTAL mg/dL 0.4 0.7   BILIRUBIN DIRECT mg/dL 0.1  --    ALBUMIN g/dL 3.2* 3.7   ALT IU/L 19 20   AST IU/L 19 20       Lab Results   Component Value Date    HGBA1C 6.1 (H) 03/24/2024    HGBA1C 6.1 (H) 02/08/2024    HGBA1C 5.7 (H) 08/25/2023     Lab Results   Component Value Date    PMUAOEIM93 196 03/25/2024     Lab Results   Component Value Date    CALCIUM 7.6 (L) 11/20/2024    PHOS 3.3 03/25/2024     Results from last 7 days   Lab Units 11/20/24  0543 11/19/24  1010   WBC K/uL 4.26 6.18   HEMOGLOBIN g/dL 10.8* 12.0   HEMATOCRIT % 33.6* 36.6   PLATELETS K/uL 167 170      Results from last 7 days   Lab Units 11/20/24  0543   MAGNESIUM mg/dL 1.8       Latest Reference Range & Units 11/19/24 17:50 11/19/24 22:01 11/20/24 08:11 11/20/24 11:59   POCT Bedside Glucose 70 - 99 mg/dL 162 (H) 138 (H) 111 (H) 146 (H)   (H): Data is abnormally high    Diagnostic Tests/Procedures  Diagnostic Test/Procedure  Reviewed: reviewed, pertinent    Medications  Pertinent Medications Reviewed: reviewed, pertinent  Pertinent Medications Comments: lipitor, vit D3, lasix, humalog, protonix   apixaban  2.5 mg oral BID    atorvastatin  20 mg oral Nightly    cholecalciferol (vitamin D3)  125 mcg oral Daily    furosemide  20 mg oral Daily    gabapentin  300 mg oral q6h RACHELE    insulin lispro U-100  2-10 Units subcutaneous With meals & nightly    pantoprazole  20 mg oral Daily    propranoloL  20 mg oral BID    traMADoL  50 mg oral q6h RACHELE    traZODone  50 mg oral Nightly       Weights (last 7 days)       Date/Time Weight    11/19/24 1930 65 kg (143 lb 6.4 oz)    11/19/24 1005 64.5 kg (142 lb 3.2 oz)          10/23/2024 135 lb  135 lb 61.236 kg  61.236 kg -   10/14/2024 134 lb 60.782 kg -   8/22/2024 139 lb 63.05 kg -   4/17/2024 133 lb 60.328 kg -   4/8/2024 135 lb 61.236 kg Bed scale   4/7/2024 140 lb 63.504 kg Bed scale   4/6/2024 141 lb 5 oz 64.1 kg Bed scale   4/3/2024 129 lb 58.514 kg Bed scale   3/24/2024 131 lb 9.6 oz  132 lb 59.693 kg  59.875 kg Bed scale  Stated   3/15/2024 131 lb 59.421 kg Stated   2/22/2024 134 lb 60.782 kg -   2/4/2024 136 lb 61.689 kg Stated   10/13/2023 141 lb 63.957 kg -       Clinical Comments:  89 y.o. female admitted 11/19/2024 for hypoxia, screened for MST >/=2.  PMH includes A.fib, CHF, valvular heart disease, HTN, HLD, DM, GERD. Presented with generalized weakness.  Was noted to have 6 lb weight gain PTA, took increased dose of Lasix, but then experienced generalized weakness, vomiting and presented to ED.  On cardiac diet with 1500 mL fluid restriction.     Chart reviewed and patient seen OOB in chair w/ daughter present.  Patient reports her appetite was decreased x 1-2 days d/t not feeling well.  No intake in flowsheet, ordering lunch at time of RD visit.  Typically eats 3 meals/day prepared by facility. States she is on cardiac diet at facility and does not add salt at table.  Does not use ONS.   Denies difficulty chewing/swallowing or current c/o n/v/d/c. Last BM reported to be 11/18 + small amount of vomiting PTA.  Patient reports weighing self daily with usual dry weight of 132 lb.  CBW of 143 lb.  Receiving 20 mg Lasix daily.  RD discussed fluid restriction, 2gm sodium restriction and importance of weighing self daily. Reviewed foods recommended/foods to avoid, label reading, low sodium/sodium free seasoning tips. Pt with good understanding of topics discussed. Written educational attached to AVS. Labs/meds reviewed. POC glucose 162, 138, 111, 146 mg/dL.  A1c 6.1% (3/2024).  Na: 135 L.  Will continue to follow per policy.    Discussed with: patient                              Date: 11/20/24  Signature: RAND Al

## 2024-11-20 NOTE — PLAN OF CARE
Problem: Adult Inpatient Plan of Care  Goal: Plan of Care Review  Outcome: Progressing  Flowsheets (Taken 11/20/2024 8864)  Progress: no change  Outcome Evaluation: OT eval complete.  Plan of Care Reviewed With: patient     Problem: Self-Care Deficit  Goal: Improved Ability to Complete Activities of Daily Living  Outcome: Progressing

## 2024-11-20 NOTE — HOSPITAL COURSE
Rissa is a 89 y.o. female admitted on 11/19/2024 with Hypoxia [R09.02]. Principal problem is Hypoxia.    Past Medical History  Rissa has a past medical history of A-fib (CMS/HCC), Accelerated hypertension, CHF (congestive heart failure) (CMS/HCC), Hearing loss, Macular degeneration, Thyroid nodule, and Type 2 diabetes mellitus (CMS/HCC).    History of Present Illness  Per H&P: presents to [facility] ER on 11/19/2024 with generalized weakness. She gained 6 pounds over the weekend and took extra dose of Lasix yesterday as per her cardiology recommendation. She did not feel well at dinnertime. Subsequently, she had dry heaves and small amount of vomiting. Today, she was not able to walk that brought her to the emergency room.

## 2024-11-20 NOTE — PROGRESS NOTES
Physical Therapy -  Initial Evaluation     Patient: Rissa Erickson  Location: [facility] 3 Main 0321  MRN: [id number]  Today's date: 11/20/2024    HISTORY OF PRESENT ILLNESS     Rissa is a 89 y.o. female admitted on 11/19/2024 with Hypoxia [R09.02]. Principal problem is Hypoxia.    Past Medical History  Rissa has a past medical history of A-fib (CMS/[facility]), Accelerated hypertension, CHF (congestive heart failure) (CMS/[facility]), Hearing loss, Macular degeneration, Thyroid nodule, and Type 2 diabetes mellitus (CMS/[facility]).    History of Present Illness  Per H&P: presents to [facility] ER on 11/19/2024 with generalized weakness. She gained 6 pounds over the weekend and took extra dose of Lasix yesterday as per her cardiology recommendation. She did not feel well at dinnertime. Subsequently, she had dry heaves and small amount of vomiting. Today, she was not able to walk that brought her to the emergency room.   PRIOR LEVEL OF FUNCTION AND LIVING ENVIRONMENT     Prior Level of Function      Flowsheet Row Most Recent Value   Dominant Hand right   Ambulation assistive equipment   Transferring assistive equipment   Toileting independent   Bathing assistive equipment   Dressing independent   Driving/Transportation friends/family   Prior Level of Function Comment pt typically uses rollator for mobility. independent with ADLs. goes to dining ibrahim for lunch/dinner. per EMR, daughter assists with IADLs.             Prior Living Environment      Flowsheet Row Most Recent Value   Living Environment [place] access, walk in shower + SC and GB. has hospital bed + bed rail. standard toilet with grab bar.          VITALS AND PAIN     PT Vitals      Date/Time Pulse SpO2 Pt Activity O2 Therapy BP BP Method Pt Position Who   11/20/24 1112 63 95 % At rest None (Room air) 129/61 Automatic Lying DD   11/20/24 1135 68 94 % At rest None (Room air) 132/59 Automatic Sitting DD          PT Pain       Date/Time Pain Type Side/Orientation Location Rating: Rest Rating: Activity Interventions [facility]   11/20/24 1112 Pain Assessment left foot 0 - no pain 2 - mild pain care clustered DD             Objective   OBJECTIVE     Start time:  1112  End time:  1137  Session Length: 25 min  Mode of Treatment: co-treatment  Reason for co-treatment: dc planning    General Observations  Patient received in bed. She was agreeable to therapy, no issues or concerns identified by nurse prior to session.      Precautions: fall              PT Eval and Treat - 11/20/24 1112          Cognition    Orientation Status oriented x 3     Affect/Mental Status flat/blunted affect     Follows Commands follows one-step commands;repetition of directions required;verbal cues/prompting required     Cognitive Function safety deficit     Safety Deficit insight into deficits/self-awareness;awareness of need for assistance;safety precautions awareness;safety precautions follow-through/compliance;impulsivity     Comment, Cognition hard of hearing requiring repetition throughout session. cooperative, decreased safety awareness.        Vision Assessment/Intervention    Vision Assessment corrective lenses full-time        Hearing Assessment    Hearing Status hearing aid, bilateral;hearing impairment, augmentation device not available        Sensory Assessment    Sensory Assessment sensation intact, lower extremities        Lower Extremity Assessment    General Observations BLE ROM WFL        Lower Extremity Strength    Hip, Left (Strength) 3-/5 flexion     Knee, Left (Strength) at least 3/5 extension     Ankle, Left (Strength) WFL     Hip, Right (Strength) 3-/5 flexion     Knee, Right (Strength) at least 3/5 extension     Ankle, Right (Strength) WFL        Bed Mobility    Bed Mobility Activities supine to sit;right     [place] moderate assist (50-74% patient effort);1 person assist     Safety/Cues technique;sequencing     Assistive Device bed  rails;head of bed elevated     Comment requires increased time and assist at trunk to achieve EOB sitting.        Mobility Belt    Mobility Belt Used During Session yes        Sit/Stand Transfer    Surface edge of bed;chair with arm rests   toilet (also see OT note)    [place] minimum assist (75% or more patient effort);moderate assist (50-74% patient effort)     Safety/Cues technique;hand placement;sequencing     Assistive Device walker, front-wheeled     Transfer Comments min A to stand, mod A to sit. pulls up from RW to stand despite cues. demonstrates decreased eccentric control when sitting. pt sits impulsively, reaching UE for grab bar at toilet / arm rest of chair prior to reaching surface.        Gait Training    [place], Gait moderate assist (50-74% patient effort)     Safety/Cues technique;sequencing     Assistive Device walker, front-wheeled     Distance in Feet 12 feet   +5 ft    Pattern step-through     Deviations/Abnormal Patterns gait speed decreased;step length decreased;stride length decreased     Comment pt demonstrates forward posture and decreased step length / gait speed. unsteady requiring assist for management of RW. initially reported L foot pain (baseline), improved with mobility.        Balance    Static Sitting Balance mild impairment;sitting, edge of bed     Dynamic Sitting Balance mild impairment;sitting, edge of bed     Sit to Stand Dynamic Balance mild impairment;supported     Static Standing Balance mild impairment;supported     Dynamic Standing Balance moderate impairment;supported     Comment, Balance supported = RW        Impairments/Safety Issues    Impairments Affecting Function balance;cognition;strength;endurance/activity tolerance     Functional Endurance fatigues with activity                                    Education Documentation  Joint Mobility/Strength, taught by Augustina Dexter, PT at 11/20/2024 12:35 PM.  Learner: Patient  Readiness:  Acceptance  Method: Explanation  Response: Verbalizes Understanding  Comment: role of acute PT, PT POC, bed mobility, transfer technique, safety, proper use of RW, LE AROM to tolerance, use of call bell, continued mobility with staff    Assistive/Adaptive Devices, taught by Augustina Dexter, PT at 11/20/2024 12:35 PM.  Learner: Patient  Readiness: Acceptance  Method: Explanation  Response: Verbalizes Understanding  Comment: role of acute PT, PT POC, bed mobility, transfer technique, safety, proper use of RW, LE AROM to tolerance, use of call bell, continued mobility with staff        Session Outcome  Patient in chair at end of session, chair alarm on, all needs met, call light in reach, personal items in reach. Nursing notified about patient's performance and patient's position.    AM-PAC™ - Mobility (Current Function)     Turning form your back to your side while in flat bed without using bedrails 3 - A Little   Moving from lying on your back to sitting on the side of a flat bed without using bedrails 2 - A Lot   Moving to and from a bed to a chair 3 - A Little   Standing up from a chair using your arms 3 - A Little   To walk in a hospital room 2 - A Lot   Climbing 3-5 steps with a railing 2 - A Lot   AM-PAC™ Mobility Score 15      ASSESSMENT AND PLAN     Progress Summary  PT evaluation completed. pt requires mod A for bed mobility, min-mod A for transfers, and mod A for ambulation in room with RW. pt demonstrates forward posture and short step length. requires assist for management of assistive device. fatigues with activity. recommend continued skilled PT services to progress mobility and maximize independence. [facility] 15/24. recommend SNF.    Patient/Family Therapy Goals Statement: to feel better    PT Plan      Flowsheet Row Most Recent Value   Rehab Potential good, to achieve stated therapy goals at 11/20/2024 1112   Therapy Frequency 3 times/wk at 11/20/2024 1112   Planned Therapy Interventions balance  training, bed mobility training, gait training, patient/family education, strengthening, transfer training at 11/20/2024 1112            PT Discharge Recommendations      Flowsheet Row Most Recent Value   PT Recommended Discharge Disposition skilled nursing facility at 11/20/2024 1112   Anticipated Equipment Needs if Discharged Home (PT) none at 11/20/2024 1112                 PT Goals      Flowsheet Row Most Recent Value   Bed Mobility Goal 1    Activity/Assistive Device rolling to left, rolling to right, sit to supine/supine to sit at 11/20/2024 1112   [place] independent at 11/20/2024 1112   Time Frame by discharge at 11/20/2024 1112   Progress/Outcome goal ongoing at 11/20/2024 1112   Transfer Goal 1    Activity/Assistive Device sit-to-stand/stand-to-sit, bed-to-chair/chair-to-bed, walker, front-wheeled at 11/20/2024 1112   [place] modified independence at 11/20/2024 1112   Time Frame by discharge at 11/20/2024 1112   Progress/Outcome goal ongoing at 11/20/2024 1112   Gait Training Goal 1    Activity/Assistive Device gait (walking locomotion), walker, front-wheeled at 11/20/2024 1112   [place] modified independence at 11/20/2024 1112   Distance 100 at 11/20/2024 1112   Time Frame by discharge at 11/20/2024 1112   Progress/Outcome goal ongoing at 11/20/2024 1112

## 2024-11-20 NOTE — PROGRESS NOTES
Occupational Therapy -  Initial Evaluation     Patient: Rissa Erickson  Location: [facility] 3 Main 0321  MRN: [id number]  Today's date: 11/20/2024    HISTORY OF PRESENT ILLNESS     Rissa is a 89 y.o. female admitted on 11/19/2024 with Hypoxia [R09.02]. Principal problem is Hypoxia.    Past Medical History  Rissa has a past medical history of A-fib (CMS/[facility]), Accelerated hypertension, CHF (congestive heart failure) (CMS/[facility]), Hearing loss, Macular degeneration, Thyroid nodule, and Type 2 diabetes mellitus (CMS/[facility]).    History of Present Illness  Per H&P: presents to [facility] ER on 11/19/2024 with generalized weakness. She gained 6 pounds over the weekend and took extra dose of Lasix yesterday as per her cardiology recommendation. She did not feel well at dinnertime. Subsequently, she had dry heaves and small amount of vomiting. Today, she was not able to walk that brought her to the emergency room.   PRIOR LEVEL OF FUNCTION AND LIVING ENVIRONMENT     Prior Level of Function      Flowsheet Row Most Recent Value   Dominant Hand right   Ambulation assistive equipment   Transferring assistive equipment   Toileting independent   Bathing assistive equipment   Dressing independent   Driving/Transportation friends/family   Prior Level of Function Comment pt typically uses rollator for mobility. independent with ADLs. goes to dining ibrahim for lunch/dinner. per EMR, daughter assists with IADLs.             Prior Living Environment      Flowsheet Row Most Recent Value   Living Environment [place] access, walk in shower + SC and GB. has hospital bed + bed rail. standard toilet with grab bar.          Occupational Profile      Flowsheet Row Most Recent Value   Performance Patterns IND ADLs   Environmental Supports and Barriers ILF resident          VITALS AND PAIN     OT Vitals      Date/Time Pulse SpO2 Pt Activity O2 Therapy BP BP Method Pt Position Who   11/20/24 1112 63 95  % At rest None (Room air) 129/61 Automatic Lying DD   11/20/24 1135 68 94 % At rest None (Room air) 132/59 Automatic Sitting DD          OT Pain      Date/Time Pain Type Side/Orientation Location Rating: Rest Rating: Activity Interventions [facility]   11/20/24 1112 Pain Assessment foot left 0 - no pain 2 - mild pain care clustered DD             Objective   OBJECTIVE     Start time:  1111  End time:  1138  Session Length: 27 min  Mode of Treatment: co-treatment  Reason for co-treatment: d/c planning, OT assessed ADLs    General Observations  Patient received upright, in bed. She was agreeable to therapy, no issues or concerns identified by nurse prior to session.      Precautions: fall       Limitations/Impairments: safety/cognitive   Services  Do You Speak a Language Other Than English at Home?: no      OT Eval and Treat - 11/20/24 1111          Cognition    Orientation Status oriented x 3     Affect/Mental Status flat/blunted affect     Follows Commands follows one-step commands;repetition of directions required;verbal cues/prompting required     Cognitive Function safety deficit     Safety Deficit insight into deficits/self-awareness;awareness of need for assistance;safety precautions awareness;safety precautions follow-through/compliance;impulsivity     Comment, Cognition requires repetition of directions due to [place], decreased insight into deficits and need for assistance, poor safety with hand placement during all transfer requires repeated cues and education.        Vision Assessment/Intervention    Vision Assessment corrective lenses full-time        Hearing Assessment    Hearing Status hearing impairment, augmentation device not available        Sensory Assessment    Sensory Assessment sensation intact, upper extremities        Upper Extremity Assessment    General Observations assessed functionally, grossly 3+/5        Bed Mobility    Bed Mobility Activities supine to sit;right     [place]  moderate assist (50-74% patient effort);1 person assist     Safety/Cues increased time to complete;minimal;verbal cues;sequencing;technique     Assistive Device bed rails;head of bed elevated     Comment increased time and effort, requires assistance with trunk control to achieve upright posture at EOB        Mobility Belt    Mobility Belt Used During Session yes        Sit/Stand Transfer    Surface edge of bed;chair with arm rests     [place] minimum assist (75% or more patient effort);moderate assist (50-74% patient effort);1 person assist     Safety/Cues moderate;tactile cues;verbal cues;hand placement;sequencing;proper use of assistive device     Assistive Device walker, front-wheeled     Transfer Comments Min A to stand, Mod A to sit. pulls up from RW to complete stand despite education and cues. pt demonstrates decreased eccentric control and impulsive to sit/abandoning walker prior to reaching surface.        Toilet Transfer    Transfer Technique sit/stand     [place] minimum assist (75% or more patient effort);1 person assist     Safety/Cues moderate;verbal cues;tactile cues;hand placement;sequencing;proper use of assistive device     Assistive Device walker, front-wheeled;grab bars/safety frame     Comment cues for walker and hand placement        Functional Mobility    Distance in room/bathroom     Functional Mobility [place] moderate assist (50-74% patient effort);1 person assist     Safety/Cues increased time to complete;moderate;verbal cues;tactile cues;preparatory posture;sequencing;proper use of assistive device;maintaining center of gravity over base of support     Assistive Device walker, front-wheeled     Functional Mobility Comments flexed forward posture, cues for upright posture. physical assistance for walker management and maintaining safe distance from RW        Lower Body Dressing    Tasks don;doff;socks;underwear     [place] maximum assist (25-49% patient effort)      Safety/Cues moderate;impulsivity;problem-solving     Position unsupported sitting     Adaptive Equipment none        Toileting    Tasks perform bladder hygiene     [place] minimum assist (75% or more patient effort);1 person assist     Position supported standing     Adaptive Equipment grab bar/safety frame     Comment RUE on grab bar to stabilize, Min A to maintain balance and safety        Balance    Static Sitting Balance mild impairment;sitting, edge of bed     Dynamic Sitting Balance mild impairment;sitting, edge of bed     Sit to Stand Dynamic Balance mild impairment;moderate impairment;supported     Static Standing Balance mild impairment;supported     Dynamic Standing Balance moderate impairment;supported     Comment, Balance with RW        Impairments/Safety Issues    Impairments Affecting Function balance;cognition;strength;endurance/activity tolerance     Cognitive Impairments, Safety/Performance problem-solving/reasoning;awareness, need for assistance;insight into deficits/self-awareness;safety precaution follow-through;safety precaution awareness;impulsivity     Functional Endurance fair, fatigues with minimal activity                                    Education Documentation  Self-Care, taught by Savita Woodson OT at 11/20/2024  1:40 PM.  Learner: Patient  Readiness: Acceptance  Method: Explanation  Response: Verbalizes Understanding  Comment: Role of OT, POC, safety, fall risk prevention, activity pacing, use of RW, use of call bell, BADLs, discharge recommendations        Session Outcome  Patient reclined, in chair at end of session, chair alarm on, all needs met, call light in reach, personal items in reach. Nursing notified about patient's performance, patient's position, and patient's response to therapy/activity.    AM-PAC™ - ADL (Current Function)     Putting on/taking off regular lower body clothing 2 - A Lot   Bathing 2 - A Lot   Toileting 3 - A Little   Putting on/taking off  regular upper body clothing 3 - A Little   Help for taking care of personal grooming 3 - A Little   Eating meals 4 - None   AM-PAC™ ADL Score 17      ASSESSMENT AND PLAN     Progress Summary   OT evaluation complete, ADL AM-PAC score of 17. Patient demonstrates Mod A x1 supine to sit, Min-Mod A x1 sit<>stand, Mod A x1 ambulation with RW, min A toilet transfer, Max A LB dressing and Min A toilet hygiene. Patient demonstrates decreased activity tolerance/endurance, impaired static/dynamic standing balance, decreased safety awareness. Patient will continue to benefit from skilled OT services to maximize independence with self care and functional mobility. Recommend SNF once medically stable.      Patient/Family Therapy Goal Statement: return to PLOF    OT Plan      Flowsheet Row Most Recent Value   Rehab Potential fair, will monitor progress closely at 11/20/2024 1111   Therapy Frequency 3 times/wk at 11/20/2024 1111   Planned Therapy Interventions activity tolerance training, adaptive equipment training, BADL retraining, ROM/therapeutic exercise, occupation/activity based interventions, functional balance retraining, patient/caregiver education/training, transfer/mobility retraining at 11/20/2024 1111            OT Discharge Recommendations      Flowsheet Row Most Recent Value   OT Recommended Discharge Disposition skilled nursing facility at 11/20/2024 1111   Anticipated Equipment Needs if Discharged Home (OT) none at 11/20/2024 1111                 OT Goals      Flowsheet Row Most Recent Value   Bed Mobility Goal 1    Activity/Assistive Device bed mobility activities, all at 11/20/2024 1111   [place] supervision required at 11/20/2024 1111   Time Frame by discharge at 11/20/2024 1111   Progress/Outcome goal ongoing at 11/20/2024 1111   Transfer Goal 1    Activity/Assistive Device all transfers at 11/20/2024 1111   [place] supervision required at 11/20/2024 1111   Time Frame by discharge at 11/20/2024 1111    Progress/Outcome goal ongoing at 11/20/2024 1111   Dressing Goal 1    Activity/Adaptive Equipment dressing skills, all at 11/20/2024 1111   [place] supervision required at 11/20/2024 1111   Time Frame by discharge at 11/20/2024 1111   Progress/Outcome goal ongoing at 11/20/2024 1111   Toileting Goal 1    Activity/Assistive Device toileting skills, all at 11/20/2024 1111   [place] supervision required at 11/20/2024 1111   Time Frame by discharge at 11/20/2024 1111   Progress/Outcome goal ongoing at 11/20/2024 1111

## 2024-11-20 NOTE — PLAN OF CARE
Problem: Adult Inpatient Plan of Care  Goal: Plan of Care Review  Outcome: Progressing  Flowsheets (Taken 11/20/2024 1235)  Progress: no change  Outcome Evaluation: PT neetaal completed  Plan of Care Reviewed With: patient     Problem: Mobility Impairment  Goal: Optimal Mobility  Outcome: Progressing

## 2024-11-20 NOTE — PROGRESS NOTES
Patient with the [facility]. Patient identified as SIP (Seriously ill Population Tier 1 ).  Liaison to follow for home care, if appropriate.

## 2024-11-20 NOTE — PROGRESS NOTES
Hospital Medicine     Daily Progress Note       SUBJECTIVE   Interval History: Patient seen and examined this AM. States she is feeling better. Denies SOB, chest pain, urinary symptoms. Endorsing some nausea, no vomiting. States she has been tolerating meals here.      OBJECTIVE      Vital signs in last 24 hours:  Temp:  [36.7 °C (98 °F)-37.4 °C (99.3 °F)] 36.8 °C (98.2 °F)  Heart Rate:  [61-84] 61  Resp:  [16-18] 18  BP: (115-164)/(55-79) 130/65    Intake/Output Summary (Last 24 hours) at 11/20/2024 1208  Last data filed at 11/20/2024 1200  Gross per 24 hour   Intake 240 ml   Output 240 ml   Net 0 ml       PHYSICAL EXAMINATION      Physical Exam  Constitutional:       Appearance: Normal appearance.   HENT:      Head: Normocephalic.      Right Ear: External ear normal.      Left Ear: External ear normal.      Nose: Nose normal.      Mouth/Throat:      Pharynx: Oropharynx is clear.   Cardiovascular:      Rate and Rhythm: Normal rate and regular rhythm.   Musculoskeletal:         General: No swelling.      Cervical back: Neck supple.   Skin:     General: Skin is warm.   Neurological:      General: No focal deficit present.      Mental Status: She is alert.   Psychiatric:         Mood and Affect: Mood normal.            LINES, CATHETERS, DRAINS, AIRWAYS, AND WOUNDS   Lines, Drains, and Airways:  Wounds (agree with documentation and present on admission):  Peripheral IV (Adult) 11/19/24 Left Antecubital (Active)   Number of days: 1       External Urinary Catheter Female (one size) (Active)   Number of days: 1         Comments:    LABS / IMAGING / TELE      Labs  Lab Results   Component Value Date    WBC 4.26 11/20/2024    HGB 10.8 (L) 11/20/2024    HCT 33.6 (L) 11/20/2024    MCV 93.9 11/20/2024     11/20/2024     Lab Results   Component Value Date    GLUCOSE 120 (H) 11/20/2024    CALCIUM 7.6 (L) 11/20/2024     (L) 11/20/2024    K 3.8 11/20/2024    CO2 28 11/20/2024     11/20/2024    BUN 21  11/20/2024    CREATININE 0.7 11/20/2024           Imaging  CT ANGIOGRAPHY CHEST PULMONARY EMBOLISM WITH IV CONTRAST  Narrative: CLINICAL HISTORY: Dyspnea on exertion.    CT angiogram of the chest was performed after the administration of 100 cc of  Isovue-370 intravenous contrast.  Sagittal and coronal reformatted images were  obtained.  Coronal MIP reformatted images were obtained.  Automated exposure  control was used.    COMPARISON: Chest x-ray 11/19/2024    COMMENT:    There is no pulmonary embolism.  The main pulmonary artery is prominent which  can be seen with pulmonary arterial hypertension.  The heart is enlarged.  There  are atherosclerotic coronary artery calcifications.  The aorta is normal in  caliber with atherosclerotic calcifications.  There is an aberrant right  subclavian artery.    There is marked nodular enlargement of the thyroid with substernal extension,  not significantly changed.  This compresses the trachea and esophagus.    An enlarged 1.5 cm short axis subcarinal lymph node has not significantly  changed, nonspecific.  There are no additional pathologically enlarged  mediastinal, hilar or axillary lymph nodes by size criteria    The lungs and central airways are clear.    Limited evaluation of the upper abdomen demonstrates a splenic hypodensity, too  small to characterize, unchanged.    There is a partially imaged intrathecal lead.  There is thoracic  dextroscoliosis.  Impression: IMPRESSION:    1.  No pulmonary embolism or acute infiltrate.  2.  Other findings as detailed above.  X-RAY THORACIC SPINE 2 VIEWS  Narrative: CLINICAL HISTORY: atraumatic back pain   .    Comparison: Chest x-ray and CT 4/3/2024.    COMMENT: Three views of the thoracic spine were obtained. Bony demineralization  limits evaluation. Stable mild degenerative changes of the thoracic spine noted  with small multilevel marginal osteophytes and mild endplate degenerative  changes. No thoracic vertebral body  compression fracture is identified. There is  stable chronic compression deformity of the L1 vertebral body. Leads are again  visualized extending into the thoracic spinal canal region. The battery pack is  partially imaged in the region of the right hemiabdomen. Vascular calcifications  of the aortic arch noted.  Impression: IMPRESSION: See comment.  X-RAY CHEST 2 VIEWS  Narrative: CLINICAL HISTORY: weakness   .    Comparison: Chest x-ray and CT 4/3/2024.    COMMENT: 2 view chest x-ray was obtained.    Lungs: No consolidation or edema.  Pleura: No pneumothorax or effusion.  Cardiomediastinal/hilar silhouettes: Unremarkable.  Bones: No acute abnormality seen. A lead is again noted extending into the upper  thoracic spinal canal region.  Impression: IMPRESSION:    No acute pulmonary disease seen.       ECG/Telemetry  ECG 11/19  Normal sinus rhythm  Nonspecific ST and T wave abnormality  Abnormal ECG       ASSESSMENT AND PLAN        Assessment & Plan  Hypoxia  Mild  CAT scan of chest without pulmonary embolism, consolidation or congestive heart failure  Oxygen as needed  Monitor  Chronic diastolic CHF (congestive heart failure) (CMS/HCC)  Reportedly gained 6 pounds over the weekend and took extra dose of Lasix yesterday  Clinically compensated  Continue Lasix 20 mg daily  Generalized weakness  Unclear etiology  Possible left viral infection considering vomiting  UA ordered  PT/OT ordered  GERD (gastroesophageal reflux disease)  Continue PPI  Mixed hyperlipidemia  Continue statin  Multiple thyroid nodules  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, currently off  Also multinodular goiter  Seems to be stable  Follows by endocrinology  Paroxysmal atrial fibrillation (CMS/HCC)  In normal sinus rhythm  Continue propranolol and Eliquis  Postherpetic neuralgia  Has stimulator  Continue gabapentin and tramadol around-the-clock  Primary hypertension  Blood pressure is  controlled  Continue propranolol and Lasix  Type 2 diabetes mellitus without complication, without long-term current use of insulin (CMS/[facility])  Hold metformin  Will put on Accu-Cheks with coverage  Hypoxemia  Patient weaned to RA    VTE Assessment: [place]    VTE Prophylaxis:  Current anticoagulants:  apixaban (ELIQUIS) tablet 2.5 mg, oral, BID      Code Status: Full Code      Estimated Discharge Date: 11/21/2024   Disposition Planning: pending UA, PT/OT     Dang Esquivel MD  11/20/2024

## 2024-11-21 ENCOUNTER — APPOINTMENT (INPATIENT)
Dept: CARDIOLOGY | Facility: HOSPITAL | Age: 88
DRG: 866 | End: 2024-11-21
Attending: INTERNAL MEDICINE
Payer: MEDICARE

## 2024-11-21 LAB
ANION GAP SERPL CALC-SCNC: 3 MEQ/L (ref 3–15)
AORTIC ROOT ANNULUS: 2.9 CM
AORTIC VALVE MEAN VELOCITY: 1.59 M/S
AORTIC VALVE VELOCITY TIME INTEGRAL: 54.7 CM
ASCENDING AORTA: 3.3 CM
AV MEAN GRADIENT: 11 MMHG
AV PEAK GRADIENT: 17 MMHG
AV PEAK VELOCITY-S: 2.06 M/S
AV VALVE AREA INDEX: 0.89
AV VALVE AREA: 1.26 CM2
AV VELOCITY RATIO: 0.58
AVA (VTI): 1.47 CM2
BACTERIA UR CULT: NORMAL
BACTERIA UR CULT: NORMAL
BACTERIA URNS QL MICRO: 1 /HPF
BILIRUB UR QL STRIP.AUTO: NEGATIVE MG/DL
BSA FOR ECHO PROCEDURE: 1.66 M2
BUN SERPL-MCNC: 19 MG/DL (ref 7–25)
CALCIUM SERPL-MCNC: 7.7 MG/DL (ref 8.6–10.3)
CHLORIDE SERPL-SCNC: 102 MEQ/L (ref 98–107)
CLARITY UR REFRACT.AUTO: CLEAR
CO2 SERPL-SCNC: 30 MEQ/L (ref 21–31)
COLOR UR AUTO: YELLOW
CREAT SERPL-MCNC: 0.7 MG/DL (ref 0.6–1.2)
DOP CALC LVOT STROKE VOLUME: 80.37 CM3
E WAVE DECELERATION TIME: 239 MS
E/A RATIO: 3.2
E/E' RATIO: 23.3
E/LAT E' RATIO: 14
EDV (BP): 49.8 CM3
EF (A4C): 75.9 %
EF A2C: 78.5 %
EGFRCR SERPLBLD CKD-EPI 2021: >60 ML/MIN/1.73M*2
EJECTION FRACTION: 77.3 %
ERYTHROCYTE [DISTWIDTH] IN BLOOD BY AUTOMATED COUNT: 13.6 % (ref 11.7–14.4)
ESV (BP): 11.3 CM3
FRACTIONAL SHORTENING: 46.61 %
GLUCOSE BLD-MCNC: 108 MG/DL (ref 70–99)
GLUCOSE BLD-MCNC: 123 MG/DL (ref 70–99)
GLUCOSE BLD-MCNC: 128 MG/DL (ref 70–99)
GLUCOSE BLD-MCNC: 153 MG/DL (ref 70–99)
GLUCOSE SERPL-MCNC: 109 MG/DL (ref 70–99)
GLUCOSE UR STRIP.AUTO-MCNC: NEGATIVE MG/DL
HCT VFR BLD AUTO: 34.7 % (ref 35–45)
HEART RATE: 57 BPM
HGB BLD-MCNC: 11.1 G/DL (ref 11.8–15.7)
HGB UR QL STRIP.AUTO: NEGATIVE
HYALINE CASTS #/AREA URNS LPF: ABNORMAL /LPF
INTERVENTRICULAR SEPTUM: 1.72 CM
KETONES UR STRIP.AUTO-MCNC: NEGATIVE MG/DL
LA ESV (BP): 96.3 CM3
LA ESV INDEX (A2C): 58.01 CM3/M2
LA ESV INDEX (BP): 58.01 CM3/M2
LA/AORTA RATIO: 1.66
LAAS-AP2: 28.6 CM2
LAAS-AP4: 26.3 CM2
LAD 2D: 4.8 CM
LALD A4C: 6.48 CM
LALD A4C: 7.08 CM
LAV-S: 96.3 CM3
LEFT ATRIUM VOLUME INDEX: 53.55 CM3/M2
LEFT ATRIUM VOLUME: 88.9 CM3
LEFT INTERNAL DIMENSION IN SYSTOLE: 1.97 CM (ref 2.31–3.5)
LEFT VENTRICLE DIASTOLIC VOLUME INDEX: 32.05 CM3/M2
LEFT VENTRICLE DIASTOLIC VOLUME: 53.2 CM3
LEFT VENTRICLE SYSTOLIC VOLUME INDEX: 7.71 CM3/M2
LEFT VENTRICLE SYSTOLIC VOLUME: 12.8 CM3
LEFT VENTRICULAR INTERNAL DIMENSION IN DIASTOLE: 3.69 CM (ref 3.89–5.4)
LEFT VENTRICULAR POSTERIOR WALL IN END DIASTOLE: 0.9 CM (ref 0.5–0.94)
LEUKOCYTE ESTERASE UR QL STRIP.AUTO: 3
LV DIASTOLIC VOLUME: 46.4 CM3
LV ESV (APICAL 2 CHAMBER): 10 CM3
LVAD-AP2: 20.1 CM2
LVAD-AP4: 21.6 CM2
LVAS-AP2: 6.85 CM2
LVAS-AP4: 7.87 CM2
LVEDVI(A2C): 27.95 CM3/M2
LVEDVI(BP): 30 CM3/M2
LVESVI(A2C): 6.02 CM3/M2
LVESVI(BP): 6.81 CM3/M2
LVLD-AP2: 7.36 CM
LVLD-AP4: 7.23 CM
LVLS-AP2: 4.86 CM
LVLS-AP4: 4.98 CM
LVOT 2D: 1.8 CM
LVOT A: 2.54 CM2
LVOT MG: 4 MMHG
LVOT MV: 0.97 M/S
LVOT PEAK VELOCITY: 1.3 M/S
LVOT PG: 7 MMHG
LVOT STROKE VOLUME INDEX: 48.42 ML/M2
LVOT VTI: 31.6 CM
MCH RBC QN AUTO: 30.5 PG (ref 28–33.2)
MCHC RBC AUTO-ENTMCNC: 32 G/DL (ref 32.2–35.5)
MCV RBC AUTO: 95.3 FL (ref 83–98)
MLH CV ECHO AVA INDEX VELOCITY RATIO: 0.8
MUCOUS THREADS URNS QL MICRO: 1 /LPF
MV E'TISSUE VEL-LAT: 0.09 M/S
MV E'TISSUE VEL-MED: 0.05 M/S
MV PEAK A VEL: 0.39 M/S
MV PEAK E VEL: 1.23 M/S
MV STENOSIS PRESSURE HALF TIME: 70 MS
MV VALVE AREA P 1/2 METHOD: 3.14 CM2
NITRITE UR QL STRIP.AUTO: POSITIVE
PH UR STRIP.AUTO: 6.5 [PH]
PISA ALIAS VEL TV: 0.31 M/S
PISA TR RADIUS: 0.3 CM
PLATELET # BLD AUTO: 159 K/UL (ref 150–369)
PMV BLD AUTO: 9.1 FL (ref 9.4–12.3)
POCT TEST: ABNORMAL
POSTERIOR WALL: 0.9 CM
POTASSIUM SERPL-SCNC: 4 MEQ/L (ref 3.5–5.1)
PROT UR QL STRIP.AUTO: ABNORMAL
PULMONARY REGURGITATION LATE DIASTOLIC VELOCITY: 1.58 M/S
RBC # BLD AUTO: 3.64 M/UL (ref 3.93–5.22)
RBC #/AREA URNS HPF: ABNORMAL /HPF
SEPTAL TISSUE DOPPLER FREE WALL LATE DIA VELOCITY (APICAL 4 CHAMBER VIEW): 0.11 M/S
SODIUM SERPL-SCNC: 135 MEQ/L (ref 136–145)
SP GR UR REFRACT.AUTO: 1.02
SQUAMOUS URNS QL MICRO: ABNORMAL /HPF
TAPSE: 1.82 CM
TR MAX PG: 49.84 MMHG
TR VTI: 133 CM
TRICUSPID VALVE PEAK REGURGITATION VELOCITY: 3.53 M/S
TV AREA PISA: 0.05 CM2
TV REGURGITANT VOLUME: 6.6 CM3
UROBILINOGEN UR STRIP-ACNC: 0.2 EU/DL
WBC # BLD AUTO: 4.23 K/UL (ref 3.8–10.5)
WBC #/AREA URNS HPF: ABNORMAL /HPF
Z-SCORE OF LEFT VENTRICULAR DIMENSION IN END DIASTOLE: -2.17
Z-SCORE OF LEFT VENTRICULAR DIMENSION IN END SYSTOLE: -2.9
Z-SCORE OF LEFT VENTRICULAR POSTERIOR WALL IN END DIASTOLE: 1.42

## 2024-11-21 PROCEDURE — 93306 TTE W/DOPPLER COMPLETE: CPT

## 2024-11-21 PROCEDURE — 99232 SBSQ HOSP IP/OBS MODERATE 35: CPT | Performed by: STUDENT IN AN ORGANIZED HEALTH CARE EDUCATION/TRAINING PROGRAM

## 2024-11-21 PROCEDURE — 93356 MYOCRD STRAIN IMG SPCKL TRCK: CPT | Performed by: INTERNAL MEDICINE

## 2024-11-21 PROCEDURE — 85027 COMPLETE CBC AUTOMATED: CPT | Performed by: INTERNAL MEDICINE

## 2024-11-21 PROCEDURE — 21400000 HC ROOM AND CARE CCU/INTERMEDIATE

## 2024-11-21 PROCEDURE — 36415 COLL VENOUS BLD VENIPUNCTURE: CPT | Performed by: INTERNAL MEDICINE

## 2024-11-21 PROCEDURE — 63700000 HC SELF-ADMINISTRABLE DRUG: Performed by: HOSPITALIST

## 2024-11-21 PROCEDURE — 63700000 HC SELF-ADMINISTRABLE DRUG: Performed by: INTERNAL MEDICINE

## 2024-11-21 PROCEDURE — 93306 TTE W/DOPPLER COMPLETE: CPT | Mod: 26 | Performed by: INTERNAL MEDICINE

## 2024-11-21 PROCEDURE — 80048 BASIC METABOLIC PNL TOTAL CA: CPT | Performed by: INTERNAL MEDICINE

## 2024-11-21 PROCEDURE — 81003 URINALYSIS AUTO W/O SCOPE: CPT | Performed by: HOSPITALIST

## 2024-11-21 RX ADMIN — GABAPENTIN 300 MG: 300 CAPSULE ORAL at 17:40

## 2024-11-21 RX ADMIN — APIXABAN 2.5 MG: 2.5 TABLET, FILM COATED ORAL at 09:28

## 2024-11-21 RX ADMIN — FUROSEMIDE 20 MG: 20 TABLET ORAL at 09:28

## 2024-11-21 RX ADMIN — TRAMADOL HYDROCHLORIDE 50 MG: 50 TABLET, COATED ORAL at 05:30

## 2024-11-21 RX ADMIN — ACETAMINOPHEN 325MG 650 MG: 325 TABLET ORAL at 20:16

## 2024-11-21 RX ADMIN — Medication 125 MCG: at 09:28

## 2024-11-21 RX ADMIN — ATORVASTATIN CALCIUM 20 MG: 20 TABLET, FILM COATED ORAL at 21:12

## 2024-11-21 RX ADMIN — TRAMADOL HYDROCHLORIDE 50 MG: 50 TABLET, COATED ORAL at 12:44

## 2024-11-21 RX ADMIN — PROPRANOLOL HYDROCHLORIDE 20 MG: 10 TABLET ORAL at 09:28

## 2024-11-21 RX ADMIN — TRAMADOL HYDROCHLORIDE 50 MG: 50 TABLET, COATED ORAL at 17:40

## 2024-11-21 RX ADMIN — TRAZODONE HYDROCHLORIDE 50 MG: 50 TABLET ORAL at 21:12

## 2024-11-21 RX ADMIN — GABAPENTIN 300 MG: 300 CAPSULE ORAL at 05:30

## 2024-11-21 RX ADMIN — PROPRANOLOL HYDROCHLORIDE 20 MG: 10 TABLET ORAL at 20:16

## 2024-11-21 RX ADMIN — PANTOPRAZOLE SODIUM 20 MG: 20 TABLET, DELAYED RELEASE ORAL at 09:28

## 2024-11-21 RX ADMIN — APIXABAN 2.5 MG: 2.5 TABLET, FILM COATED ORAL at 20:17

## 2024-11-21 RX ADMIN — INSULIN LISPRO 2 UNITS: 100 INJECTION, SOLUTION INTRAVENOUS; SUBCUTANEOUS at 17:39

## 2024-11-21 RX ADMIN — GABAPENTIN 300 MG: 300 CAPSULE ORAL at 12:44

## 2024-11-21 ASSESSMENT — COGNITIVE AND FUNCTIONAL STATUS - GENERAL
MOVING TO AND FROM BED TO CHAIR: 3 - A LITTLE
STANDING UP FROM CHAIR USING ARMS: 3 - A LITTLE
CLIMB 3 TO 5 STEPS WITH RAILING: 2 - A LOT
WALKING IN HOSPITAL ROOM: 3 - A LITTLE

## 2024-11-21 NOTE — PLAN OF CARE
Plan of Care Review  Plan of Care Reviewed With: patient  Progress: no change  Outcome Evaluation: Patient with complaints of L arm pain, relieved with scheduled neurontin and tramadol. Echo (EF >75%). Ambulated 150 ft in hallway. No further complaints at this time.    Problem: Fall Injury Risk  Goal: Absence of Fall and Fall-Related Injury  Outcome: Progressing     Problem: Skin Injury Risk Increased  Goal: Skin Health and Integrity  Outcome: Progressing     Problem: Mobility Impairment  Goal: Optimal Mobility  Outcome: Progressing     Problem: Self-Care Deficit  Goal: Improved Ability to Complete Activities of Daily Living  Outcome: Progressing

## 2024-11-21 NOTE — PLAN OF CARE
Plan of Care Review  Plan of Care Reviewed With: patient  Progress: improving  Outcome Evaluation: Pain controlelled throughout shift with scheduled medications. Purewick in place. SR on tele. Bed alarm set.

## 2024-11-22 PROBLEM — R09.02 HYPOXIA: Status: RESOLVED | Noted: 2024-04-03 | Resolved: 2024-11-22

## 2024-11-22 LAB
ANION GAP SERPL CALC-SCNC: 5 MEQ/L (ref 3–15)
BUN SERPL-MCNC: 18 MG/DL (ref 7–25)
CALCIUM SERPL-MCNC: 7.7 MG/DL (ref 8.6–10.3)
CHLORIDE SERPL-SCNC: 104 MEQ/L (ref 98–107)
CO2 SERPL-SCNC: 27 MEQ/L (ref 21–31)
CREAT SERPL-MCNC: 0.6 MG/DL (ref 0.6–1.2)
EGFRCR SERPLBLD CKD-EPI 2021: >60 ML/MIN/1.73M*2
GLUCOSE BLD-MCNC: 101 MG/DL (ref 70–99)
GLUCOSE BLD-MCNC: 111 MG/DL (ref 70–99)
GLUCOSE BLD-MCNC: 152 MG/DL (ref 70–99)
GLUCOSE BLD-MCNC: 157 MG/DL (ref 70–99)
GLUCOSE SERPL-MCNC: 102 MG/DL (ref 70–99)
MAGNESIUM SERPL-MCNC: 1.9 MG/DL (ref 1.8–2.5)
POCT TEST: ABNORMAL
POTASSIUM SERPL-SCNC: 4 MEQ/L (ref 3.5–5.1)
SODIUM SERPL-SCNC: 136 MEQ/L (ref 136–145)

## 2024-11-22 PROCEDURE — 21400000 HC ROOM AND CARE CCU/INTERMEDIATE

## 2024-11-22 PROCEDURE — 36415 COLL VENOUS BLD VENIPUNCTURE: CPT | Performed by: STUDENT IN AN ORGANIZED HEALTH CARE EDUCATION/TRAINING PROGRAM

## 2024-11-22 PROCEDURE — 87086 URINE CULTURE/COLONY COUNT: CPT | Performed by: STUDENT IN AN ORGANIZED HEALTH CARE EDUCATION/TRAINING PROGRAM

## 2024-11-22 PROCEDURE — 63700000 HC SELF-ADMINISTRABLE DRUG: Performed by: INTERNAL MEDICINE

## 2024-11-22 PROCEDURE — 63700000 HC SELF-ADMINISTRABLE DRUG: Performed by: HOSPITALIST

## 2024-11-22 PROCEDURE — 97530 THERAPEUTIC ACTIVITIES: CPT | Mod: GO

## 2024-11-22 PROCEDURE — 63600000 HC DRUGS/DETAIL CODE: Mod: JZ | Performed by: STUDENT IN AN ORGANIZED HEALTH CARE EDUCATION/TRAINING PROGRAM

## 2024-11-22 PROCEDURE — 97535 SELF CARE MNGMENT TRAINING: CPT | Mod: GO

## 2024-11-22 PROCEDURE — 80048 BASIC METABOLIC PNL TOTAL CA: CPT | Performed by: STUDENT IN AN ORGANIZED HEALTH CARE EDUCATION/TRAINING PROGRAM

## 2024-11-22 PROCEDURE — 99233 SBSQ HOSP IP/OBS HIGH 50: CPT | Performed by: STUDENT IN AN ORGANIZED HEALTH CARE EDUCATION/TRAINING PROGRAM

## 2024-11-22 PROCEDURE — 97530 THERAPEUTIC ACTIVITIES: CPT | Mod: GP,CQ

## 2024-11-22 PROCEDURE — 83735 ASSAY OF MAGNESIUM: CPT | Performed by: STUDENT IN AN ORGANIZED HEALTH CARE EDUCATION/TRAINING PROGRAM

## 2024-11-22 RX ORDER — CEFTRIAXONE 1 G/50ML
1 INJECTION, SOLUTION INTRAVENOUS
Status: DISCONTINUED | OUTPATIENT
Start: 2024-11-22 | End: 2024-11-23

## 2024-11-22 RX ADMIN — GABAPENTIN 300 MG: 300 CAPSULE ORAL at 12:29

## 2024-11-22 RX ADMIN — TRAZODONE HYDROCHLORIDE 50 MG: 50 TABLET ORAL at 21:07

## 2024-11-22 RX ADMIN — GABAPENTIN 300 MG: 300 CAPSULE ORAL at 18:01

## 2024-11-22 RX ADMIN — APIXABAN 2.5 MG: 2.5 TABLET, FILM COATED ORAL at 08:24

## 2024-11-22 RX ADMIN — CEFTRIAXONE 1 G: 1 INJECTION, SOLUTION INTRAVENOUS at 12:29

## 2024-11-22 RX ADMIN — PROPRANOLOL HYDROCHLORIDE 20 MG: 10 TABLET ORAL at 21:07

## 2024-11-22 RX ADMIN — TRAMADOL HYDROCHLORIDE 50 MG: 50 TABLET, COATED ORAL at 23:41

## 2024-11-22 RX ADMIN — TRAMADOL HYDROCHLORIDE 50 MG: 50 TABLET, COATED ORAL at 00:55

## 2024-11-22 RX ADMIN — ACETAMINOPHEN 325MG 650 MG: 325 TABLET ORAL at 08:24

## 2024-11-22 RX ADMIN — TRAMADOL HYDROCHLORIDE 50 MG: 50 TABLET, COATED ORAL at 12:29

## 2024-11-22 RX ADMIN — PANTOPRAZOLE SODIUM 20 MG: 20 TABLET, DELAYED RELEASE ORAL at 08:24

## 2024-11-22 RX ADMIN — GABAPENTIN 300 MG: 300 CAPSULE ORAL at 00:55

## 2024-11-22 RX ADMIN — GABAPENTIN 300 MG: 300 CAPSULE ORAL at 23:41

## 2024-11-22 RX ADMIN — INSULIN LISPRO 2 UNITS: 100 INJECTION, SOLUTION INTRAVENOUS; SUBCUTANEOUS at 12:29

## 2024-11-22 RX ADMIN — ATORVASTATIN CALCIUM 20 MG: 20 TABLET, FILM COATED ORAL at 21:07

## 2024-11-22 RX ADMIN — TRAMADOL HYDROCHLORIDE 50 MG: 50 TABLET, COATED ORAL at 18:01

## 2024-11-22 RX ADMIN — FUROSEMIDE 20 MG: 20 TABLET ORAL at 08:24

## 2024-11-22 RX ADMIN — TRAMADOL HYDROCHLORIDE 50 MG: 50 TABLET, COATED ORAL at 05:58

## 2024-11-22 RX ADMIN — ACETAMINOPHEN 325MG 650 MG: 325 TABLET ORAL at 16:34

## 2024-11-22 RX ADMIN — INSULIN LISPRO 2 UNITS: 100 INJECTION, SOLUTION INTRAVENOUS; SUBCUTANEOUS at 22:14

## 2024-11-22 RX ADMIN — GABAPENTIN 300 MG: 300 CAPSULE ORAL at 05:58

## 2024-11-22 RX ADMIN — APIXABAN 2.5 MG: 2.5 TABLET, FILM COATED ORAL at 21:08

## 2024-11-22 RX ADMIN — Medication 125 MCG: at 08:24

## 2024-11-22 RX ADMIN — PROPRANOLOL HYDROCHLORIDE 20 MG: 10 TABLET ORAL at 08:24

## 2024-11-22 ASSESSMENT — COGNITIVE AND FUNCTIONAL STATUS - GENERAL
WALKING IN HOSPITAL ROOM: 3 - A LITTLE
DRESSING REGULAR UPPER BODY CLOTHING: 3 - A LITTLE
DRESSING REGULAR LOWER BODY CLOTHING: 3 - A LITTLE
HELP NEEDED FOR BATHING: 2 - A LOT
STANDING UP FROM CHAIR USING ARMS: 3 - A LITTLE
TOILETING: 3 - A LITTLE
CLIMB 3 TO 5 STEPS WITH RAILING: 2 - A LOT
EATING MEALS: 4 - NONE
AFFECT: FLAT/BLUNTED AFFECT
WALKING IN HOSPITAL ROOM: 3 - A LITTLE
CLIMB 3 TO 5 STEPS WITH RAILING: 1 - TOTAL
AFFECT: FLAT/BLUNTED AFFECT
HELP NEEDED FOR PERSONAL GROOMING: 3 - A LITTLE
STANDING UP FROM CHAIR USING ARMS: 3 - A LITTLE
MOVING TO AND FROM BED TO CHAIR: 3 - A LITTLE
MOVING TO AND FROM BED TO CHAIR: 3 - A LITTLE

## 2024-11-22 NOTE — PROGRESS NOTES
Occupational Therapy -  Daily Treatment/Progress Note     Patient: Rissa Erickson  Location: [facility] 3 Main 0321  MRN: [id number]  Today's date: 11/22/2024    HISTORY OF PRESENT ILLNESS     Rissa is a 89 y.o. female admitted on 11/19/2024 with Hypoxia [R09.02]. Principal problem is Hypoxia.    Past Medical History  Rissa has a past medical history of A-fib (CMS/[facility]), Accelerated hypertension, CHF (congestive heart failure) (CMS/[facility]), Hearing loss, Macular degeneration, Thyroid nodule, and Type 2 diabetes mellitus (CMS/[facility]).    History of Present Illness  Per H&P: presents to [facility] ER on 11/19/2024 with generalized weakness. She gained 6 pounds over the weekend and took extra dose of Lasix yesterday as per her cardiology recommendation. She did not feel well at dinnertime. Subsequently, she had dry heaves and small amount of vomiting. Today, she was not able to walk that brought her to the emergency room.   PRIOR LEVEL OF FUNCTION AND LIVING ENVIRONMENT     Prior Level of Function      Flowsheet Row Most Recent Value   Dominant Hand right   Ambulation assistive equipment   Transferring assistive equipment   Toileting independent   Bathing assistive equipment   Dressing independent   Driving/Transportation friends/family   Prior Level of Function Comment pt typically uses rollator for mobility. independent with ADLs. goes to dining [employer] for lunch/dinner. per EMR, daughter assists with IADLs.   Assistive Device Currently Used at Home walker, 4-wheeled, power chair, hospital bed, grab bar, shower chair, other (see comments)  [bed rails, lift recliner.]             Prior Living Environment      Flowsheet Row Most Recent Value   People in Home alone   Current Living Arrangements assisted living facility   Living Environment Comment pt lives at [place] apartment with elevator access, walk in shower + SC and GB. has hospital bed + bed rail. standard toilet with grab bar.          Occupational Profile       Flowsheet Row Most Recent Value   Performance Patterns IND ADLs   Environmental Supports and Barriers ILF resident          VITALS AND PAIN     OT Vitals      Date/Time Pulse SpO2 O2 Therapy BP BP Location BP Method Pt Position [facility]   11/22/24 0827 57 94 % None (Room air) 130/63 133/60 Left upper arm Automatic Sitting ABHI          OT Pain      Date/Time Pain Type Rating: Rest Rating: Activity [facility]   11/22/24 0827 Pain Assessment 0 0 ABHI             Objective   OBJECTIVE     Start time:  0826  End time:  0850  Session Length: 24 min  Mode of Treatment: co-treatment  Reason for co-treatment: priority for d/c planning, OT assessed ADLs    General Observations  Patient received reclined, in bed. She was agreeable to therapy, no issues or concerns identified by nurse prior to session.      Precautions: fall       Limitations/Impairments: safety/cognitive, hearing      OT Eval and Treat - 11/22/24 0826          Cognition    Orientation Status oriented x 3     Affect/Mental Status flat/blunted affect     Follows Commands follows one-step commands;repetition of directions required;verbal cues/prompting required     Cognitive Function safety deficit     Safety Deficit insight into deficits/self-awareness;awareness of need for assistance;safety precautions awareness;safety precautions follow-through/compliance;impulsivity     Comment, Cognition flat affect, receptive to cues and education, decreased safety awareness and carryover of instructions requiring repetition of direction, decreased insight into deficits and need for assistance        Bed Mobility    Bed Mobility Activities supine to sit;right     [facility] supervision     Safety/Cues increased time to complete     Assistive Device head of bed elevated;bed rails     Comment HOB elevated, has hospital bed at facility.        Mobility Belt    Mobility Belt Used During Session yes        Sit/Stand Transfer    Surface chair with arm rests;edge of bed     [facility]  close supervision;minimum assist (75% or more patient effort);1 person assist     Safety/Cues minimal;verbal cues;hand placement     Assistive Device walker, front-wheeled     Transfer Comments close supervision sit to stand, cues for hand placement. requires min A stand to sit, abandons walker and sits prior to reaching surface. cues for walker placement, preparatory position and hand placement, pt demonstrates poor carryover of safey instruction during additional trials.        Toilet Transfer    Transfer Technique sit/stand     [place] minimum assist (75% or more patient effort);1 person assist     Safety/Cues minimal;verbal cues;hand placement;preparatory posture;proper use of assistive device     Assistive Device grab bars/safety frame;walker, front-wheeled     Comment cues for walker and hand placement        Functional Mobility    Distance in room/bathroom;hallway     Functional Mobility [place] minimum assist (75% or more patient effort);1 person assist     Safety/Cues increased time to complete;moderate;verbal cues;preparatory posture;maintaining center of gravity over base of support;proper use of assistive device     Assistive Device walker, front-wheeled     Functional Mobility Comments requires cues to maintain safe distance from RW during ambulation. pt demonstrates significant forward flexion and L lateral lean requiring constant cues to correct for safety and balance. See PT note for full assessment        Upper Body Dressing    Tasks don;front-opening garment     [place] close supervision     Position edge of bed sitting     Adaptive Equipment none        Lower Body Dressing    Tasks don;shoes/slippers     [place] minimum assist (75% or more patient effort)     Safety/Cues minimal;verbal cues;problem-solving     Position unsupported sitting     Adaptive Equipment none     Comment Min A to don left shoe, difficulty achieving and maintaining figure 4 position.        Grooming     Tasks washes, rinses and dries hands     [place] close supervision;minimum assist (75% or more patient effort)     Safety/Cues minimal;verbal cues;problem-solving     Position supported standing     Adaptive Equipment none     Comment elbows resting on sink to maintain balance, close supervision-Min A for safety. cues to turn on water to wash hands        Balance    Static Sitting Balance WFL;sitting in chair     Dynamic Sitting Balance mild impairment;sitting in chair;sitting, edge of bed     Sit to Stand Dynamic Balance mild impairment;supported     Static Standing Balance mild impairment;supported     Dynamic Standing Balance mild impairment;supported     Comment, Balance with RW        Impairments/Safety Issues    Impairments Affecting Function balance;cognition;strength;endurance/activity tolerance     Cognitive Impairments, Safety/Performance problem-solving/reasoning;awareness, need for assistance;insight into deficits/self-awareness;safety precaution follow-through;safety precaution awareness;impulsivity     Functional Endurance Fair-                                  Session Outcome  Patient reclined, in chair at end of session, chair alarm on, all needs met, call light in reach, personal items in reach. Nursing notified about patient's performance, patient's position, and patient's response to therapy/activity.    AM-PAC™ - ADL (Current Function)     Putting on/taking off regular lower body clothing 3 - A Little   Bathing 2 - A Lot   Toileting 3 - A Little   Putting on/taking off regular upper body clothing 3 - A Little   Help for taking care of personal grooming 3 - A Little   Eating meals 4 - None   AM-PAC™ ADL Score 18      ASSESSMENT AND PLAN     Progress Summary   OT treat complete, ADL AM-PAC score of 18. Patient demonstrates supervision supine to sit, close supervision sit to stand, Min A stand to sit, Min A ambulation and toilet transfer, Min A LB dressing, close supervision UB dressing, close  supervision-Min A for grooming tasks. Patient demonstrates progress towards goals. Patient will continue to benefit from skilled OT services to maximize independence with self care and functional mobility. Recommend SNF once medically stable.      Patient/Family Therapy Goal Statement: d/c home    OT Plan      Flowsheet Row Most Recent Value   Rehab Potential fair, will monitor progress closely at 11/20/2024 1111   Therapy Frequency 3 times/wk at 11/20/2024 1111   Planned Therapy Interventions activity tolerance training, adaptive equipment training, BADL retraining, ROM/therapeutic exercise, occupation/activity based interventions, functional balance retraining, patient/caregiver education/training, transfer/mobility retraining at 11/20/2024 1111            OT Discharge Recommendations      Flowsheet Row Most Recent Value   OT Recommended Discharge Disposition skilled nursing facility at 11/20/2024 1111   Anticipated Equipment Needs if Discharged Home (OT) none at 11/20/2024 1111                 OT Goals      Flowsheet Row Most Recent Value   Bed Mobility Goal 1    Activity/Assistive Device bed mobility activities, all at 11/20/2024 1111   [place] supervision required at 11/20/2024 1111   Time Frame by discharge at 11/20/2024 1111   Progress/Outcome goal ongoing at 11/20/2024 1111   Transfer Goal 1    Activity/Assistive Device all transfers at 11/20/2024 1111   [place] supervision required at 11/20/2024 1111   Time Frame by discharge at 11/20/2024 1111   Progress/Outcome goal ongoing at 11/20/2024 1111   Dressing Goal 1    Activity/Adaptive Equipment dressing skills, all at 11/20/2024 1111   [place] supervision required at 11/20/2024 1111   Time Frame by discharge at 11/20/2024 1111   Progress/Outcome goal ongoing at 11/20/2024 1111   Toileting Goal 1    Activity/Assistive Device toileting skills, all at 11/20/2024 1111   [place] supervision required at 11/20/2024 1111   Time Frame by discharge  at 11/20/2024 1111   Progress/Outcome goal ongoing at 11/20/2024 1111

## 2024-11-22 NOTE — PROGRESS NOTES
Hospital Medicine     Daily Progress Note       SUBJECTIVE   Interval History: Seen and examined at bedside. Denies any complaints, wants to go home.     OBJECTIVE      Vital signs in last 24 hours:  Temp:  [36.3 °C (97.4 °F)-36.7 °C (98.1 °F)] 36.5 °C (97.7 °F)  Heart Rate:  [52-61] 55  Resp:  [16-18] 18  BP: ()/(51-61) 97/52    Intake/Output Summary (Last 24 hours) at 11/22/2024 0029  Last data filed at 11/21/2024 1741  Gross per 24 hour   Intake 720 ml   Output --   Net 720 ml       PHYSICAL EXAMINATION      Physical Exam  Constitutional:       Appearance: Normal appearance.   HENT:      Head: Normocephalic and atraumatic.      Nose: Nose normal.      Mouth/Throat:      Mouth: Mucous membranes are moist.   Eyes:      Extraocular Movements: Extraocular movements intact.      Pupils: Pupils are equal, round, and reactive to light.   Cardiovascular:      Rate and Rhythm: Normal rate and regular rhythm.   Pulmonary:      Breath sounds: Normal breath sounds.   Abdominal:      General: Bowel sounds are normal.      Palpations: Abdomen is soft.   Skin:     General: Skin is warm and dry.   Neurological:      General: No focal deficit present.      Mental Status: She is alert. Mental status is at baseline.            LINES, CATHETERS, DRAINS, AIRWAYS, AND WOUNDS   Lines, Drains, and Airways:  Wounds (agree with documentation and present on admission):  Peripheral IV (Adult) 11/21/24 Posterior;Right Forearm (Active)   Number of days: 1       External Urinary Catheter Female (one size) (Active)   Number of days: 3         Comments:    LABS / IMAGING / TELE      Labs  Lab Results   Component Value Date    WBC 4.23 11/21/2024    HGB 11.1 (L) 11/21/2024    HCT 34.7 (L) 11/21/2024    MCV 95.3 11/21/2024     11/21/2024     Lab Results   Component Value Date    GLUCOSE 109 (H) 11/21/2024    CALCIUM 7.7 (L) 11/21/2024     (L) 11/21/2024    K 4.0 11/21/2024    CO2 30 11/21/2024     11/21/2024    BUN 19  11/21/2024    CREATININE 0.7 11/21/2024           Imaging  Transthoracic Echo (TTE) Complete   Final Result      CT ANGIOGRAPHY CHEST PULMONARY EMBOLISM WITH IV CONTRAST   Final Result   IMPRESSION:      1.  No pulmonary embolism or acute infiltrate.   2.  Other findings as detailed above.      X-RAY CHEST 2 VIEWS   Final Result   IMPRESSION:      No acute pulmonary disease seen.      X-RAY THORACIC SPINE 2 VIEWS   Final Result   IMPRESSION: See comment.      ECG 12 lead   ED Interpretation   Rhythm: [nsr]  Rate: 86  P waves: [normal interval]  QRS: [normal QRS]  Axis: [normal]  ST Segments: [no obvious ST elevation or ischemia]  The study has been interpreted contemporaneously by me -DOROTHY        Final Result            ASSESSMENT AND PLAN        Assessment & Plan  Hypoxia (Resolved: 11/22/2024)  Mild  CAT scan of chest without pulmonary embolism, consolidation or congestive heart failure  Oxygen as needed  Monitor  Chronic diastolic CHF (congestive heart failure) (CMS/HCC)  - Reportedly gained 6 pounds over the weekend and took extra dose of Lasix yesterday  - Clinically compensated  - Continue Lasix 20 mg daily  Generalized weakness  - Unclear etiology  - Possible left viral infection considering vomiting  - Repeat UA+ today  - PT/OT ordered  GERD (gastroesophageal reflux disease)  - Continue PPI  Mixed hyperlipidemia  - Continue statin  Multiple thyroid nodules  - 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, currently off  - Also multinodular goiter  - Seems to be stable  - Follows by endocrinology  Paroxysmal atrial fibrillation (CMS/HCC)  - In normal sinus rhythm  - Continue propranolol and Eliquis  Postherpetic neuralgia  - Has stimulator  - Continue gabapentin and tramadol around-the-clock  Primary hypertension  - Blood pressure is controlled  - Continue propranolol and Lasix  Type 2 diabetes mellitus without complication, without long-term current use of insulin  (CMS/[facility])  - Hold metformin  - Continue Accu-Cheks with SSIoverage  Hypoxemia        - Patient weaned to RA    VTE Assessment: [place]    VTE Prophylaxis:  Current anticoagulants:  apixaban (ELIQUIS) tablet 2.5 mg, oral, BID      Code Status: Full Code      Estimated Discharge Date: 11/22/2024     Disposition Planning: Pending clinical improvement; urine culture, placement     Judi Hubbard,   11/22/2024

## 2024-11-22 NOTE — PROGRESS NOTES
Physical Therapy -  Daily Treatment/Progress Note     Patient: Rissa Erickson  Location: [facility] 3 Main 0321  MRN: [id number]  Today's date: 11/22/2024    HISTORY OF PRESENT ILLNESS     Rissa is a 89 y.o. female admitted on 11/19/2024 with Hypoxia [R09.02]. Principal problem is Hypoxia.    Past Medical History  Rissa has a past medical history of A-fib (CMS/LTAC, located within [facility]), Accelerated hypertension, CHF (congestive heart failure) (CMS/LTAC, located within [facility]), Hearing loss, Macular degeneration, Thyroid nodule, and Type 2 diabetes mellitus (CMS/LTAC, located within [facility]).    History of Present Illness  Per H&P: presents to [facility] ER on 11/19/2024 with generalized weakness. She gained 6 pounds over the weekend and took extra dose of Lasix yesterday as per her cardiology recommendation. She did not feel well at dinnertime. Subsequently, she had dry heaves and small amount of vomiting. Today, she was not able to walk that brought her to the emergency room.   PRIOR LEVEL OF FUNCTION AND LIVING ENVIRONMENT     Prior Level of Function      Flowsheet Row Most Recent Value   Dominant Hand right   Ambulation assistive equipment   Transferring assistive equipment   Toileting independent   Bathing assistive equipment   Dressing independent   Driving/Transportation friends/family   Prior Level of Function Comment pt typically uses rollator for mobility. independent with ADLs. goes to dining ibrahim for lunch/dinner. per EMR, daughter assists with IADLs.   Assistive Device Currently Used at Home walker, 4-wheeled, power chair, hospital bed, grab bar, shower chair, other (see comments)  [bed rails, lift recliner.]             Prior Living Environment      Flowsheet Row Most Recent Value   People in Home alone   Current Living Arrangements assisted living facility   Living Environment Comment pt lives at [place] apartment with elevator access, walk in shower + SC and GB. has hospital bed + bed rail. standard toilet with grab bar.          VITALS AND PAIN     PT Vitals       Date/Time Pulse SpO2 O2 Therapy BP BP Location BP Method Pt Position [facility]   11/22/24 0827 57 94 % None (Room air) 130/63 133/60 Left upper arm Automatic Sitting ABHI          PT Pain      Date/Time Pain Type Rating: Rest Rating: Activity [facility]   11/22/24 0827 Pain Assessment 0 0 ABHI             Objective   OBJECTIVE     Start time:  0827  End time:  0848  Session Length: 21 min  Mode of Treatment: co-treatment  Reason for co-treatment: D/C planning    General Observations  Patient received in bed, reclined. She was agreeable to therapy, no issues or concerns identified by nurse prior to session. HOB elevated awake    Precautions: fall       Limitations/Impairments: safety/cognitive, hearing      PT Eval and Treat - 11/22/24 0827          Cognition    Orientation Status oriented x 3     Affect/Mental Status flat/blunted affect     Follows Commands follows one-step commands;repetition of directions required;verbal cues/prompting required     Cognitive Function safety deficit     Safety Deficit insight into deficits/self-awareness;awareness of need for assistance;safety precautions awareness;safety precautions follow-through/compliance;impulsivity     Comment, Cognition hard of hearing requiring repetition throughout session. cooperative, decreased safety awareness.        Bed Mobility    Bed Mobility Activities supine to sit     Albert Lea supervision     Comment bed rail to sit up  asking for shoes due to foot discomfort.        Mobility Belt    Mobility Belt Used During Session yes        Sit/Stand Transfer    Surface chair with arm rests;edge of bed     Albert Lea close supervision;minimum assist (75% or more patient effort)     Transfer Comments Sit to stand close supervision  sitting to chair abandons RW siting to edge of chair not following cueing MIN A to sit and bring RW closer to chair, second time sitting required cotinual cueing for backing up to chair fully with RW and reach back to chair.        Gait  Training    [place], Gait minimum assist (75% or more patient effort)     Safety/Cues increased time to complete     Assistive Device walker, front-wheeled     Distance in Feet 70 feet   70  then 60' with RW    Pattern step-to     Deviations/Abnormal Patterns left sided deviations     Left Sided Gait Deviations foot drop/toe drag     Comment Pt with declining posture during gait, variable stride length on L and R  leans to L sigificant and gets further away from RW needs continual reminders for posture reverts to flexed posture after 10' of ambuation on 1st gait assessment. Second gait assessment  after education  improved staying closer to RW but needed reminders after 40' to step closer to RW and stand upright  catching L foot during gait limiting swing through effecting balance, possibly fatigueing with posture.        Balance    Static Sitting Balance WFL;supported     Dynamic Sitting Balance mild impairment;supported     Sit to Stand Dynamic Balance mild impairment;supported     Static Standing Balance mild impairment;supported     Dynamic Standing Balance mild impairment;supported     Comment, Balance RW  declining posture.        Lower Extremity (Therapeutic Exercise)    Exercise Position/Type AROM (active range of motion)     General Exercise ankle pumps     Reps and Sets x4     Comment no pain in foot this AM        Impairments/Safety Issues    Impairments Affecting Function balance;cognition;strength;endurance/activity tolerance     Cognitive Impairments, Safety/Performance problem-solving/reasoning;awareness, need for assistance;insight into deficits/self-awareness;safety precaution follow-through;safety precaution awareness;impulsivity     Functional Endurance Fair-                                    Education Documentation  Joint Mobility/Strength, taught by Gaudencio Gan PTA at 11/22/2024  9:13 AM.  Learner: Patient  Readiness: Acceptance  Method: Explanation, Demonstration  Response: Verbalizes  Understanding, Demonstrated Understanding  Comment: walker safety posture, call for assistance.        Session Outcome  Patient chair position at end of session, call light in reach, personal items in reach, chair alarm on. Nursing notified about patient's response to therapy/activity, patient's performance, and patient's position.    AM-PAC™ - Mobility (Current Function)     Turning form your back to your side while in flat bed without using bedrails 3 - A Little   Moving from lying on your back to sitting on the side of a flat bed without using bedrails 3 - A Little   Moving to and from a bed to a chair 3 - A Little   Standing up from a chair using your arms 3 - A Little   To walk in a hospital room 3 - A Little   Climbing 3-5 steps with a railing 1 - Total   AM-PAC™ Mobility Score 16      ASSESSMENT AND PLAN     Progress Summary  AMPAC16 Pt. close supervision  bed mobility to EOB, Transfers sit to stand close supervision, Sitting min A not following ceuing sitting prematurally abandons RW. GAit min A flexed declining posture variale gait. Pt. benefits from education before second gait assessment but still with declinging posture and safety. Pt reports declining going to SNF SW notified.  , Will benefit from cont. PT    Patient/Family Therapy Goals Statement: to feel better    PT Plan      Flowsheet Row Most Recent Value   Rehab Potential good, to achieve stated therapy goals at 11/22/2024 0827   Therapy Frequency 3 times/wk at 11/22/2024 0827   Planned Therapy Interventions balance training, bed mobility training, gait training, patient/family education, strengthening, transfer training at 11/22/2024 0827            PT Discharge Recommendations      Flowsheet Row Most Recent Value   PT Recommended Discharge Disposition skilled nursing facility at 11/22/2024 0827   Anticipated Equipment Needs if Discharged Home (PT) none at 11/22/2024 0827                 PT Goals      Flowsheet Row Most Recent Value   Bed  Mobility Goal 1    Activity/Assistive Device rolling to left, rolling to right, sit to supine/supine to sit at 11/20/2024 1112   [place] independent at 11/20/2024 1112   Time Frame by discharge at 11/20/2024 1112   Progress/Outcome goal ongoing at 11/20/2024 1112   Transfer Goal 1    Activity/Assistive Device sit-to-stand/stand-to-sit, bed-to-chair/chair-to-bed, walker, front-wheeled at 11/20/2024 1112   [place] modified independence at 11/20/2024 1112   Time Frame by discharge at 11/20/2024 1112   Progress/Outcome goal ongoing at 11/20/2024 1112   Gait Training Goal 1    Activity/Assistive Device gait (walking locomotion), walker, front-wheeled at 11/20/2024 1112   [place] modified independence at 11/20/2024 1112   Distance 100 at 11/20/2024 1112   Time Frame by discharge at 11/20/2024 1112   Progress/Outcome goal ongoing at 11/20/2024 1112

## 2024-11-22 NOTE — PLAN OF CARE
Plan of Care Review  Plan of Care Reviewed With: patient  Progress: improving  Outcome Evaluation: Pt resting in bed. complaints of L arm pain relieved with scheduled meds and prn tylenol. Pt started on rocephin for possible UTI. ambulates to bathroom with supervision and walker. vss.

## 2024-11-22 NOTE — ASSESSMENT & PLAN NOTE
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

## 2024-11-22 NOTE — PLAN OF CARE
Care Coordination Discharge Plan Summary    Admission Assessment Summary    General Information  Readmission Within the last 30 days: no previous admission in last 30 days  Does patient have a :    Patient-Specific Goals (include timeframe):      Living Arrangements  Arrived From: home  Current Living Arrangements: assisted living facility  People in Home: alone  Home Accessibility:    Living Arrangement Comments: Lives alone at [facility].  PLOF was ind w/ ADLS/IADLS.  Has own rollator, GB on toilet/shower, electric hospital bed, and two power chairs    Social Drivers of Health - Screenings  Housing Stability  In the last 12 months, was there a time when you were not able to pay the mortgage or rent on time?: No  Utility Access     Transportation Needs       Functional Status Prior to Admission  Assistive Device/Animal Currently Used at Home: walker, 4-wheeled, power chair, hospital bed, grab bar, shower chair, other (see comments) (bed rails, lift recliner.)  Functional Status Comments: Receives assistance with ADLS  IADL Comments: Receives assistance w/ IADLS.    Discharge Needs Assessment    Concerns to be Addressed: care coordination/care conferences, discharge planning  Current Discharge Risk:    Anticipated Changes Related to Illness:      Discharge Plan Summary    Patient Choice  Offered/Gave Vendor List: yes  Patient's Choice of Community Agency(s): Preference to return to [facility] pending therapy re-evaluation.  Patient and/or patient guardian/advocate was made aware of their right to choose a provider. A list of eligible providers was presented and reviewed with the patient and/or patient guardian/advocate in written and/or verbal form. The list delineates providers in the patient’s desired geographic area who are participating in the Medicare program and/or providers contracted with the patient’s primary insurance. The Medicare list and quality ratings were obtained from the  Medicare.gov [medicare.gov] website.    Concerns / Comments: SW contacted patient's daughter Cristina [phone] to evaluate for DC needs. Demographics verified. Lives alone at [facility].  PLOF was ind w/ ADLS/IADLS.  Has own rollator, GB on toilet/shower, electric hospital bed, and two power chairs.  PCP is [facility].   SNF recs, but requested re-eval to determine if able to return to [facility].  Plan is [facility] vs SNF pending therapy re-eval.  Hx of homecare at [facility].  Family or [facility] Transport to be arranged pending pt's progress.  *Eligible for H2H if dispo remains home    Discharge Plan:  Disposition/Destination: Home  / Assisted Living/Personal Care  Destination - Admitted Since 11/19/2024       Service Provider Services Address Phone Fax Patient Preferred Last Updated    [facility] [place] [address] [facility] [address] [phone] -- Zackery Villa MSW 11/21/2024 1856            Connection to Community     Community Resources:      Discharge Transportation:  Is Out of Hospital DNR needed at Discharge:    Does patient need discharge transport?

## 2024-11-22 NOTE — PLAN OF CARE
Care Coordination Discharge Plan Note     Discharge Needs Assessment  Concerns to be Addressed: care coordination/care conferences, discharge planning  Current Discharge Risk: lives alone, dependent with mobility/activities of daily living    Anticipated Discharge Plan  Anticipated Discharge Disposition: independent living facility       Patient Choice  Offered/Gave Vendor List: yes  Patient's Choice of Community Agency(s): SNF at [facility] or [facility] pending tour completed on 11/23 in the morning.    Patient and/or patient guardian/advocate was made aware of their right to choose a provider. A list of eligible providers was presented and reviewed with the patient and/or patient guardian/advocate in written and/or verbal form. The list delineates providers in the patient’s desired geographic area who are participating in the Medicare program and/or providers contracted with the patient’s primary insurance. The Medicare list and quality ratings were obtained from the Medicare.gov [medicare.gov] website.    ---------------------------------------------------------------------------------------------------------------------    Interdisciplinary Discharge Plan Review:  Participants:patient, physician, nursing, , physical therapy, family/lay caregiver    Concerns Comments: SW chart reviewed. Patient discussed in medical rounds. PT/OT re-evaluated and recs are for SNF. SHIMA updated pt and pt's daughter Cristina [phone]. Pt not agreeable, yet pt's daughter expressing concern for patient's safety. SW informed pt's daughter with pt having capacity patient can make poor judgement. SHIMA followed up with [facility] and confirmed patient able to return if refusing SNF, but family will need to coordinate 24/7 care prior to return. SHIMA updated pt's daughter. PT manager met with pt and pt's daughter at bedside to discuss recommendations and outline any concerns. Pt still undecided on dispo.  Daughter scheduled tours for 11/23 in the morning to visit [facility] and [place]. Weekend CM to follow-up.    Discharge Plan:   Disposition/Destination: Home  / Assisted Living/Personal Care  Discharge Facility:   Destination - Admitted Since 11/19/2024       Service Provider Services Address Phone Fax Patient Preferred Last Updated    [facility] Balderrama at [facility] [address] [place], [place] [address]n [facility] [address] [phone] -- Zackery Villa MSW 11/21/2024 0900          Community Resources:      Discharge Transportation:  Is Out of Hospital DNR needed at Discharge:    Does patient need discharge transport? Yes

## 2024-11-22 NOTE — PLAN OF CARE
Problem: Adult Inpatient Plan of Care  Goal: Plan of Care Review  Outcome: Progressing  Flowsheets (Taken 11/22/2024 0515)  Progress: improving  Plan of Care Reviewed With: patient  Goal: Patient-Specific Goal (Individualized)  Outcome: Progressing  Goal: Absence of Hospital-Acquired Illness or Injury  Outcome: Progressing  Goal: Optimal Comfort and Wellbeing  Outcome: Progressing  Goal: Readiness for Transition of Care  Outcome: Progressing     Problem: Fall Injury Risk  Goal: Absence of Fall and Fall-Related Injury  Outcome: Progressing     Problem: Skin Injury Risk Increased  Goal: Skin Health and Integrity  Outcome: Progressing     Problem: Mobility Impairment  Goal: Optimal Mobility  Outcome: Progressing     Problem: Self-Care Deficit  Goal: Improved Ability to Complete Activities of Daily Living  Outcome: Progressing

## 2024-11-22 NOTE — ASSESSMENT & PLAN NOTE
- Reportedly gained 6 pounds over the weekend and took extra dose of Lasix yesterday  - Clinically compensated  - Continue Lasix 20 mg daily

## 2024-11-22 NOTE — ASSESSMENT & PLAN NOTE
- Unclear etiology  - Possible left viral infection considering vomiting  - Repeat UA+ today  - PT/OT ordered

## 2024-11-23 VITALS
SYSTOLIC BLOOD PRESSURE: 135 MMHG | BODY MASS INDEX: 28.07 KG/M2 | TEMPERATURE: 98.5 F | OXYGEN SATURATION: 95 % | DIASTOLIC BLOOD PRESSURE: 63 MMHG | HEART RATE: 54 BPM | RESPIRATION RATE: 18 BRPM | HEIGHT: 60 IN | WEIGHT: 143 LBS

## 2024-11-23 LAB
BACTERIA UR CULT: NORMAL
BACTERIA UR CULT: NORMAL
GLUCOSE BLD-MCNC: 132 MG/DL (ref 70–99)
GLUCOSE BLD-MCNC: 182 MG/DL (ref 70–99)
POCT TEST: ABNORMAL
POCT TEST: ABNORMAL

## 2024-11-23 PROCEDURE — 63700000 HC SELF-ADMINISTRABLE DRUG: Performed by: INTERNAL MEDICINE

## 2024-11-23 PROCEDURE — 97116 GAIT TRAINING THERAPY: CPT | Mod: GP

## 2024-11-23 PROCEDURE — 97530 THERAPEUTIC ACTIVITIES: CPT | Mod: GP

## 2024-11-23 PROCEDURE — 99238 HOSP IP/OBS DSCHRG MGMT 30/<: CPT | Performed by: STUDENT IN AN ORGANIZED HEALTH CARE EDUCATION/TRAINING PROGRAM

## 2024-11-23 PROCEDURE — 63700000 HC SELF-ADMINISTRABLE DRUG: Performed by: HOSPITALIST

## 2024-11-23 RX ADMIN — GABAPENTIN 300 MG: 300 CAPSULE ORAL at 11:35

## 2024-11-23 RX ADMIN — INSULIN LISPRO 2 UNITS: 100 INJECTION, SOLUTION INTRAVENOUS; SUBCUTANEOUS at 09:19

## 2024-11-23 RX ADMIN — FUROSEMIDE 20 MG: 20 TABLET ORAL at 09:19

## 2024-11-23 RX ADMIN — Medication 125 MCG: at 09:19

## 2024-11-23 RX ADMIN — PANTOPRAZOLE SODIUM 20 MG: 20 TABLET, DELAYED RELEASE ORAL at 09:19

## 2024-11-23 RX ADMIN — GABAPENTIN 300 MG: 300 CAPSULE ORAL at 06:11

## 2024-11-23 RX ADMIN — PROPRANOLOL HYDROCHLORIDE 20 MG: 10 TABLET ORAL at 09:19

## 2024-11-23 RX ADMIN — ACETAMINOPHEN 325MG 650 MG: 325 TABLET ORAL at 09:20

## 2024-11-23 RX ADMIN — TRAMADOL HYDROCHLORIDE 50 MG: 50 TABLET, COATED ORAL at 11:35

## 2024-11-23 RX ADMIN — TRAMADOL HYDROCHLORIDE 50 MG: 50 TABLET, COATED ORAL at 06:11

## 2024-11-23 RX ADMIN — APIXABAN 2.5 MG: 2.5 TABLET, FILM COATED ORAL at 09:19

## 2024-11-23 ASSESSMENT — COGNITIVE AND FUNCTIONAL STATUS - GENERAL
CLIMB 3 TO 5 STEPS WITH RAILING: 2 - A LOT
WALKING IN HOSPITAL ROOM: 3 - A LITTLE
AFFECT: WFL
MOVING TO AND FROM BED TO CHAIR: 3 - A LITTLE
STANDING UP FROM CHAIR USING ARMS: 4 - NONE

## 2024-11-23 NOTE — PLAN OF CARE
Care Coordination Discharge Plan Note     Discharge Needs Assessment  Concerns to be Addressed: care coordination/care conferences, discharge planning  Current Discharge Risk: lives alone    Anticipated Discharge Plan  Anticipated Discharge Disposition: home with assistance, home with home health, independent living facility  Type of Home Care Services: home PT, nursing, home OT      Patient Choice  Offered/Gave Vendor List: yes  Patient's Choice of Community Agency(s): [facility]    Patient and/or patient guardian/advocate was made aware of their right to choose a provider. A list of eligible providers was presented and reviewed with the patient and/or patient guardian/advocate in written and/or verbal form. The list delineates providers in the patient’s desired geographic area who are participating in the Medicare program and/or providers contracted with the patient’s primary insurance. The Medicare list and quality ratings were obtained from the Medicare.gov [medicare.gov] website.    ---------------------------------------------------------------------------------------------------------------------    Interdisciplinary Discharge Plan Review:  Participants:social work/services, family/lay caregiver    Con[facility]ns Comments[facility] and informed her pt is rec'd for home with HH now from therapy and daughter in agreement chose [facility] PT, OT and RN. Daughter stated she will provide transport. She requested if SW can f/up with [facility]. Daughter also stated she will stay with pt over the weekend. She also confirmed pt has a scale at home.    [facility] and SW would need to coordinate care with pt and family and they do not coordinate care for ILF.    SW made [facility] referral via [facility] and spoke to Maribel and [facility] accepted.    SW added [facility] information to pt's AVS.    Discharge Plan:   Disposition/Destination: Home  / Home  Discharge  Facility:   Destination - Admitted Since 11/19/2024       Service Provider Services Address Phone Fax Patient Preferred Last Updated    Evans Balderrama at [address] [place] [address] [phone] -- Zackery Villa MSW 11/21/2024 4206          Community Resources:      Discharge Transportation:  Is Out of Hospital DNR needed at Discharge:    Does patient need discharge transport? No

## 2024-11-23 NOTE — ASSESSMENT & PLAN NOTE
- Unclear etiology  - Possible left viral infection considering vomiting  - Repeat UA, urine culture pending  - PT/OT rec SNF

## 2024-11-23 NOTE — PROGRESS NOTES
Hospital Medicine     Daily Progress Note       SUBJECTIVE   Interval History: Seen and examined seated in chair. We discussed PT/OT recs and patient is adamant she return home. She is aware her daughter wants her to follow recommendations for SNF, but says she has tried rehab before and it didn't rehabilitate her per pt. Otherwise patient denies any physical complaints and says she feels at her baseline     OBJECTIVE      Vital signs in last 24 hours:  Temp:  [36.3 °C (97.3 °F)-36.6 °C (97.9 °F)] 36.6 °C (97.9 °F)  Heart Rate:  [53-58] 57  Resp:  [16-18] 16  BP: ()/(52-65) 157/65    Intake/Output Summary (Last 24 hours) at 11/22/2024 2236  Last data filed at 11/22/2024 1300  Gross per 24 hour   Intake 480 ml   Output --   Net 480 ml       PHYSICAL EXAMINATION      Physical Exam  Constitutional:       Appearance: Normal appearance.   HENT:      Head: Normocephalic and atraumatic.      Nose: Nose normal.      Mouth/Throat:      Mouth: Mucous membranes are moist.   Eyes:      Extraocular Movements: Extraocular movements intact.      Pupils: Pupils are equal, round, and reactive to light.   Cardiovascular:      Rate and Rhythm: Normal rate and regular rhythm.   Pulmonary:      Breath sounds: Normal breath sounds.   Abdominal:      General: Bowel sounds are normal.      Palpations: Abdomen is soft.   Skin:     General: Skin is warm and dry.   Neurological:      General: No focal deficit present.      Mental Status: She is alert. Mental status is at baseline.      LINES, CATHETERS, DRAINS, AIRWAYS, AND WOUNDS   Lines, Drains, and Airways:  Wounds (agree with documentation and present on admission):  Peripheral IV (Adult) 11/21/24 Posterior;Right Forearm (Active)   Number of days: 1         Comments:    LABS / IMAGING / TELE      Labs  Lab Results   Component Value Date    WBC 4.23 11/21/2024    HGB 11.1 (L) 11/21/2024    HCT 34.7 (L) 11/21/2024    MCV 95.3 11/21/2024     11/21/2024     Lab Results    Component Value Date    GLUCOSE 102 (H) 11/22/2024    CALCIUM 7.7 (L) 11/22/2024     11/22/2024    K 4.0 11/22/2024    CO2 27 11/22/2024     11/22/2024    BUN 18 11/22/2024    CREATININE 0.6 11/22/2024           Imaging  Transthoracic Echo (TTE) Complete   Final Result      CT ANGIOGRAPHY CHEST PULMONARY EMBOLISM WITH IV CONTRAST   Final Result   IMPRESSION:      1.  No pulmonary embolism or acute infiltrate.   2.  Other findings as detailed above.      X-RAY CHEST 2 VIEWS   Final Result   IMPRESSION:      No acute pulmonary disease seen.      X-RAY THORACIC SPINE 2 VIEWS   Final Result   IMPRESSION: See comment.      ECG 12 lead   ED Interpretation   Rhythm: [nsr]  Rate: 86  P waves: [normal interval]  QRS: [normal QRS]  Axis: [normal]  ST Segments: [no obvious ST elevation or ischemia]  The study has been interpreted contemporaneously by maritza GIRARD        Final Result            ASSESSMENT AND PLAN        Assessment & Plan  Chronic diastolic CHF (congestive heart failure) (CMS/HCC)  - Reportedly gained 6 pounds over the weekend and took extra dose of Lasix   - Clinically compensated  - Continue Lasix 20 mg daily  Generalized weakness  - Unclear etiology  - Possible left viral infection considering vomiting  - Repeat UA, urine culture pending  - PT/OT rec SNF  GERD (gastroesophageal reflux disease)  - Continue PPI  Mixed hyperlipidemia  - Continue statin  Multiple thyroid nodules  - 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, currently off  - Also multinodular goiter  - Seems to be stable  - Follows by endocrinology  Paroxysmal atrial fibrillation (CMS/HCC)  - In normal sinus rhythm  - Continue propranolol and Eliquis  Postherpetic neuralgia  - Has stimulator  - Continue gabapentin and tramadol around-the-clock  Primary hypertension  - Blood pressure is controlled  - Continue propranolol and Lasix  Type 2 diabetes mellitus without complication, without  long-term current use of insulin (CMS/[facility])  - Hold metformin  - Continue Accu-Cheks with SSI coverage  Hypoxemia        - Patient weaned to RA    VTE Assessment: Pad[place]    VTE Prophylaxis:  Current anticoagulants:  apixaban (ELIQUIS) tablet 2.5 mg, oral, BID      Code Status: Full Code      Estimated Discharge Date: 11/23/2024   Disposition Planning:  Pending clinical improvement; urine culture, placement      Judi Hubbard,   11/22/2024

## 2024-11-23 NOTE — PROGRESS NOTES
Physical Therapy -  Daily Treatment/Progress Note     Patient: Rissa Erickson  Location: [facility] 3 Main 0321  MRN: [id number]  Today's date: 11/23/2024    HISTORY OF PRESENT ILLNESS     Rissa is a 89 y.o. female admitted on 11/19/2024 with Hypoxia [R09.02]. Principal problem is Hypoxia.    Past Medical History  Rissa has a past medical history of A-fib (CMS/[facility]), Accelerated hypertension, CHF (congestive heart failure) (CMS/[facility]), Hearing loss, Macular degeneration, Thyroid nodule, and Type 2 diabetes mellitus (CMS/[facility]).    History of Present Illness  Per H&P: presents to [facility] ER on 11/19/2024 with generalized weakness. She gained 6 pounds over the weekend and took extra dose of Lasix yesterday as per her cardiology recommendation. She did not feel well at dinnertime. Subsequently, she had dry heaves and small amount of vomiting. Today, she was not able to walk that brought her to the emergency room.   PRIOR LEVEL OF FUNCTION AND LIVING ENVIRONMENT     Prior Level of Function      Flowsheet Row Most Recent Value   Dominant Hand right   Ambulation assistive equipment   Transferring assistive equipment   Toileting independent   Bathing assistive equipment   Dressing independent   Driving/Transportation friends/family   Prior Level of Function Comment pt typically uses rollator for mobility. independent with ADLs. goes to dining [employer]ra[employer] for lunch/dinner. per EMR, daughter assists with IADLs.   Assistive Device Currently Used at Home walker, 4-wheeled, power chair, hospital bed, grab bar, shower chair, other (see comments)  [bed rails, lift recliner.]             Prior Living Environment      Flowsheet Row Most Recent Value   People in Home alone   Current Living Arrangements assisted living facility   Living Environment Comment pt lives at [place] apartment with elevator access, walk in shower + SC and GB. has hospital bed + bed rail. standard toilet with grab bar.          VITALS AND PAIN     PT Vitals       Date/Time Pulse SpO2 Pt Activity O2 Therapy BP BP Location BP Method Pt Position [facility]   11/23/24 0805 57 94 % At rest None (Room air) 155/69 Right upper arm Automatic Sitting MRM          PT Pain      Date/Time Pain Type Rating: Rest Rating: Activity [facility]   11/23/24 0805 Pain Assessment 0 - no pain 0 - no pain MRM             Objective   OBJECTIVE     Start time:  0754  End time:  0822  Session Length: 28 min       General Observations  Patient received in bed. She was agreeable to therapy, no issues or concerns identified by nurse prior to session. Awake, alert.  Agreeable to oob.    Precautions: fall       Limitations/Impairments: safety/cognitive, hearing      PT Eval and Treat - 11/23/24 0754          Cognition    Orientation Status oriented x 3     Affect/Mental Status WFL     Follows Commands WFL     Cognitive Function WFL        Bed Mobility    [place] supervision     Safety/Cues increased time to complete        Mobility Belt    Mobility Belt Used During Session yes        Sit/Stand Transfer    Surface edge of bed     [place] supervision        Toilet Transfer    Transfer Technique sit/stand     [place] supervision     Assistive Device grab bars/safety frame        Gait Training    [place], Gait close supervision     Safety/Cues verbal cues;preparatory posture;proper use of assistive device     Assistive Device walker, front-wheeled     Distance in Feet 80 feet   80' x 2, 20' x 1    Pattern step-through     Comment Req'd repeated verbal cues to keep RW close to body.  Maintained flexed posture throughout stance and ambulation.        Balance    Static Sitting Balance WFL;unsupported     Dynamic Sitting Balance WFL;unsupported     Sit to Stand Dynamic Balance WFL;supported     Static Standing Balance WFL;supported     Dynamic Standing Balance mild impairment;supported     Comment, Balance RW for support        Impairments/Safety Issues    Impairments Affecting Function  balance;endurance/activity tolerance     Cognitive Impairments, Safety/Performance safety precaution follow-through     Functional Endurance Good minus        Discharge Recommendations    PT Recommended Discharge Disposition home with home health                                    Education Documentation  Assistive/Adaptive Devices, taught by Janice Horn, PT at 11/23/2024  9:40 AM.  Learner: Patient  Readiness: Acceptance  Method: Explanation  Response: Verbalizes Understanding  Comment: Appropriate proximity of RW relative to body during stance and ambulation        Session Outcome  Patient upright, in chair at end of session, chair alarm on, all needs met, call light in reach, personal items in reach. Nursing notified about change in vital signs, patient's performance, patient's position, and patient's response to therapy/activity.    AM-PAC™ - Mobility (Current Function)     Turning form your back to your side while in flat bed without using bedrails 4 - None   Moving from lying on your back to sitting on the side of a flat bed without using bedrails 4 - None   Moving to and from a bed to a chair 3 - A Little   Standing up from a chair using your arms 4 - None   To walk in a hospital room 3 - A Little   Climbing 3-5 steps with a railing 2 - A Lot   AM-PAC™ Mobility Score 20      ASSESSMENT AND PLAN     Progress Summary  PT treatment session tolerated well.  AM-PAC 20/24.  Required supervision for bed mob. Supervision for tranfers from bed and from toilet.  Close supervision for gait via RW w/ verbal cues for posture and to keep RW at close proximity to body.  Endurance improved.  Mild balance deficits noted when RW begins to drift too far from body.  Corrected after verbal cues.  Pt would benefit from cont'd PT intervention and rec Home PT at facility to further address gait technique and endurance.  Will follow.    Patient/Family Therapy Goals Statement: Return to ILF    PT Plan      Flowsheet Row  Most Recent Value   Rehab Potential good, to achieve stated therapy goals at 11/22/2024 0827   Therapy Frequency 3 times/wk at 11/22/2024 0827   Planned Therapy Interventions balance training, bed mobility training, gait training, patient/family education, strengthening, transfer training at 11/23/2024 0754            PT Discharge Recommendations      Flowsheet Row Most Recent Value   PT Recommended Discharge Disposition home with home health at 11/23/2024 0754   Anticipated Equipment Needs if Discharged Home (PT) none at 11/23/2024 0754                 PT Goals      Flowsheet Row Most Recent Value   Bed Mobility Goal 1    Activity/Assistive Device rolling to left, rolling to right, sit to supine/supine to sit at 11/20/2024 1112   [place] independent at 11/20/2024 1112   Time Frame by discharge at 11/20/2024 1112   Progress/Outcome goal ongoing at 11/20/2024 1112   Transfer Goal 1    Activity/Assistive Device sit-to-stand/stand-to-sit, bed-to-chair/chair-to-bed, walker, front-wheeled at 11/20/2024 1112   [place] modified independence at 11/20/2024 1112   Time Frame by discharge at 11/20/2024 1112   Progress/Outcome goal ongoing at 11/20/2024 1112   Gait Training Goal 1    Activity/Assistive Device gait (walking locomotion), walker, front-wheeled at 11/20/2024 1112   [place] modified independence at 11/20/2024 1112   Distance 100 at 11/20/2024 1112   Time Frame by discharge at 11/20/2024 1112   Progress/Outcome goal ongoing at 11/20/2024 1112

## 2024-11-23 NOTE — PLAN OF CARE
Care Coordination Discharge Plan Summary    Admission Assessment Summary    General Information  Readmission Within the last 30 days: no previous admission in last 30 days  Does patient have a :    Patient-Specific Goals (include timeframe):      Living Arrangements  Arrived From: home  Current Living Arrangements: assisted living facility  People in Home: alone  Home Accessibility:    Living Arrangement Comments: Lives alone at [facility].  PLOF was ind w/ ADLS/IADLS.  Has own rollator, GB on toilet/shower, electric hospital bed, and two power chairs    Social Drivers of Health - Screenings  Housing Stability  In the last 12 months, was there a time when you were not able to pay the mortgage or rent on time?: No  Utility Access     Transportation Needs       Functional Status Prior to Admission  Assistive Device/Animal Currently Used at Home: walker, 4-wheeled, power chair, hospital bed, grab bar, shower chair, other (see comments) (bed rails, lift recliner.)  Functional Status Comments: Receives assistance with ADLS  IADL Comments: Receives assistance w/ IADLS.    Discharge Needs Assessment    Concerns to be Addressed: care coordination/care conferences, discharge planning  Current Discharge Risk: lives alone  Anticipated Changes Related to Illness: inability to care for self    Discharge Plan Summary    Patient Choice  Offered/Gave Vendor List: yes  Patient's Choice of Community Agency(s): [facility]  Patient and/or patient guardian/advocate was made aware of their right to choose a provider. A list of eligible providers was presented and reviewed with the patient and/or patient guardian/advocate in written and/or verbal form. The list delineates providers in the patient’s desired geographic area who are participating in the Medicare program and/or providers contracted with the patient’s primary insurance. The Medicare list and quality ratings were obtained from the Medicare.gov [medicare.gov]  website.    Concerns / Comments: Patient discharging home with [facility] PT, OT and RN. Daughter providing transport.    Discharge Plan:  Disposition/Destination: Home  / Home  Destination - Admitted Since 11/19/2024       Service Provider Services Address Phone Fax Patient Preferred Last Updated    [facility] at [address] [address] [phone] -- Zackery Villa MSW 11/21/2024 8268            Connection to Community     Community Resources:      Discharge Transportation:  Is Out of Hospital DNR needed at Discharge:    Does patient need discharge transport? No

## 2024-11-23 NOTE — ASSESSMENT & PLAN NOTE
- Reportedly gained 6 pounds over the weekend and took extra dose of Lasix   - Clinically compensated  - Continue Lasix 20 mg daily

## 2024-11-27 NOTE — ASSESSMENT & PLAN NOTE
- Unclear etiology  - Possible left viral infection considering vomiting  - Repeat UA, urine culture contaminant  - PT/OT rec SNF, repeat eval rec home

## 2024-11-27 NOTE — DISCHARGE SUMMARY
Hospital Medicine    Inpatient Discharge Summary        BRIEF OVERVIEW     Patient: Rissa Erickson  Admission Date: 2024   : 1935 Discharge Date: 2024       PCP: Sloan Maher MD  Disposition: Home     Destination: Home     Attending Provider: No att. providers found Attending phys phone: N/A    Code Status At Discharge: Prior        ASSESSMENT AND PLAN     Assessment & Plan  Chronic diastolic CHF (congestive heart failure) (CMS/HCC)  - Reportedly gained 6 pounds over the weekend and took extra dose of Lasix   - Clinically compensated  - Continue Lasix 20 mg daily  Generalized weakness  - Unclear etiology  - Possible left viral infection considering vomiting  - Repeat UA, urine culture contaminant  - PT/OT rec SNF, repeat eval rec home  GERD (gastroesophageal reflux disease)  - Continue PPI  Mixed hyperlipidemia  - Continue statin  Multiple thyroid nodules  - 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, currently off  - Also multinodular goiter  - Seems to be stable  - Follows by endocrinology  Paroxysmal atrial fibrillation (CMS/[facility])  - In normal sinus rhythm  - Continue propranolol and Eliquis  Postherpetic neuralgia  - Has stimulator  - Continue gabapentin and tramadol around-the-clock  Primary hypertension  - Blood pressure is controlled  - Continue propranolol and Lasix  Type 2 diabetes mellitus without complication, without long-term current use of insulin (CMS/[facility])  - Hold metformin  - Continue Accu-Cheks with SSI coverage  Hypoxemia        - Patient weaned to RA      Brief Hospital Course  This is a 89 y.o. year-old female admitted on 2024 with Hypoxia.    89 y.o. female with a past medical history of atrial fibrillation, congestive heart failure, valvular heart disease, hypertension, hyperlipidemia, diabetes mellitus, gastroesophageal reflux disease, multinodular goiter who presented to [facility] ER on 2024 with generalized  weakness.   Patient was noted to be hypoxic in the ED. She was admitted for further workup. CT chest was negative for PE, pneumonia, or other acute findings. PT/OT evaluated the patient and originally recommended SNF. Upon reeval, they recommended home with home health. Patient's symptoms improved. She was discharged to home in stable condition on 11/23/24.    Exam on Day of Discharge       Physical Exam  Constitutional:       Appearance: Normal appearance.   HENT:      Head: Normocephalic and atraumatic.      Nose: Nose normal.      Mouth/Throat:      Mouth: Mucous membranes are moist.   Eyes:      Extraocular Movements: Extraocular movements intact.      Pupils: Pupils are equal, round, and reactive to light.   Cardiovascular:      Rate and Rhythm: Normal rate and regular rhythm.   Pulmonary:      Breath sounds: Normal breath sounds.   Abdominal:      General: Bowel sounds are normal.      Palpations: Abdomen is soft.   Skin:     General: Skin is warm and dry.   Neurological:      General: No focal deficit present.      Mental Status: She is alert. Mental status is at baseline.  DISCHARGE MEDICATIONS         Medication List        CONTINUE taking these medications      apixaban 2.5 mg tablet  Commonly known as: ELIQUIS  Take 2.5 mg by mouth 2 (two) times a day.  Dose: 2.5 mg     atorvastatin 20 mg tablet  Commonly known as: LIPITOR  Take 1 tablet (20 mg total) by mouth nightly.  Dose: 20 mg     calcium (as carbonate) 500 mg calcium (1,250 mg) tablet  Commonly known as: OS-HERBERT  Take 1 tablet by mouth every morning.  Dose: 1 tablet     cholecalciferol (vitamin D3) 5,000 unit (125 mcg) tablet  Take 5,000 Units by mouth daily.  Dose: 5,000 Units     furosemide 20 mg tablet  Commonly known as: LASIX  Take 20 mg by mouth every morning.  Dose: 20 mg     gabapentin 300 mg capsule  Commonly known as: NEURONTIN  Take 300 mg by mouth 4 (four) times a day.  Dose: 300 mg     lancets misc  1 Lancet as needed (Diabetes). To  check blood glucose  Dose: 1 Lancet     metFORMIN 500 mg tablet  Commonly known as: GLUCOPHAGE  Take 1 tablet (500 mg total) by mouth 2 (two) times a day with meals.  Dose: 500 mg     pantoprazole 20 mg EC tablet  Commonly known as: PROTONIX  Take 20 mg by mouth every morning.  Dose: 20 mg     PRESERVISION AREDS ORAL  Take 1 capsule by mouth every morning.  Dose: 1 capsule     PROLIA 60 mg/mL syringe  Inject 60 mg under the skin every 6 (six) months.  Dose: 60 mg  Generic drug: denosumab     propranoloL 20 mg tablet  Commonly known as: INDERAL  TAKE 1 TABLET TWICE A DAY  Dose: 20 mg     traMADoL 50 mg tablet  Commonly known as: ULTRAM  Take 50 mg by mouth 4 (four) times a day.  Dose: 50 mg     traZODone 50 mg tablet  Commonly known as: DESYREL  Take 50 mg by mouth nightly.  Dose: 50 mg     TYLENOL 8 HOUR 650 mg 8 hr tablet  Take 650 mg by mouth 4 (four) times a day.  Dose: 650 mg  Generic drug: acetaminophen             INSTRUCTIONS AND FOLLOW-UP     Instructions for after discharge       Call provider for:  difficulty breathing      Call provider for:  extreme fatigue      Call provider for:  persistent dizziness or light-headedness, headache, or visual disturbances      Call provider for:  persistent nausea or vomiting      Call provider for:  redness, tenderness, or signs of infection (pain, swelling, redness, odor or green/yellow discharge around incision site)      Call provider for:  severe uncontrolled pain      Call provider for:  temperature >100.4      Call provider for: blood sugar change      Call provider for blood sugar less than 70 or greater than 350    Call provider for: increased shortness of breath; weight gain (3 pounds/1 day or 5 pounds/1 week); more swelling in legs, ankles, feet or belly; need to sleep with extra pillows or in a chair.      Discharge Diet      Diet Type / Texture: Regular    Fluid restriction dietary / 24h: 1500 mL Fluid    Review additional activity direction in  "\"instructions\" section                Scheduled Appointments            In 4 months Lyric Perez MD [facility] at [facility]    In 5 months  [facility] [place] MPU-AM/Flex Unit            Recommended Follow-up Visits   Follow-Up Providers       Sloan Maher MD   Specialty: Family Medicine   Relationship: PCP - General        Follow up in 3 day(s)              After Discharge Care                Destination       [facility] [place]   Assisted Living                  Home Medical Care       [facility]   Home Health Services                                Test Results Pending at Discharge  Pending Inpatient Labs       Order Current Status    [facility] Draw Panel In process            LABS AND IMAGING   Pertinent Labs  Lab Results   Component Value Date    WBC 4.23 11/21/2024    HGB 11.1 (L) 11/21/2024    HCT 34.7 (L) 11/21/2024    MCV 95.3 11/21/2024     11/21/2024     Lab Results   Component Value Date    GLUCOSE 102 (H) 11/22/2024    CALCIUM 7.7 (L) 11/22/2024     11/22/2024    K 4.0 11/22/2024    CO2 27 11/22/2024     11/22/2024    BUN 18 11/22/2024    CREATININE 0.6 11/22/2024       Pertinent Imaging  CT ANGIOGRAPHY CHEST PULMONARY EMBOLISM WITH IV CONTRAST    Result Date: 11/19/2024  IMPRESSION: 1.  No pulmonary embolism or acute infiltrate. 2.  Other findings as detailed above.    X-RAY THORACIC SPINE 2 VIEWS    Result Date: 11/19/2024  IMPRESSION: See comment.    X-RAY CHEST 2 VIEWS    Result Date: 11/19/2024  IMPRESSION: No acute pulmonary disease seen.   Cardiac Imaging    TRANSTHORACIC ECHO (TTE) COMPLETE 11/21/2024    Interpretation Summary    Left Ventricle: Small ventricle size. Sigmoid septum left ventricular hypertrophy. Preserved systolic function. Estimated EF >75%. No regional wall motion abnormalities. Grade III diastolic dysfunction. Global longitudinal strain normal.  -21%.    Right Ventricle: Normal " ventricle size. Normal systolic function. Normal regional wall motion.    Right Atrium: Normal sized atrium.    Aortic Valve: Normal structure.  Sclerotic leaflets. No regurgitation. No stenosis. Calculated dimensionless index = 0.58.    Mitral Valve: Normal leaflet structure. Normal leaflet motion. Moderate regurgitation. No stenosis.    Tricuspid Valve: Normal structure. Moderate regurgitation. No significant stenosis.    Pulmonic Valve: Valve not well visualized. Grossly normal structure. Trace regurgitation. No stenosis.    Aorta: Aortic root normal. Sinuses of Valsalva normal-sized. Ascending aorta normal-sized. Aortic arch normal-sized. Descending aorta normal-sized.    Pericardium: Normal structure. No evidence of pericardial effusion. No cardiac tamponade.    Left Atrium: Severely dilated atrium.    IVC/SVC: Inferior vena cava is a small caliber vessel (<1.7cm). Inferior vena cava collapses >50% during inspiration.      OTHER SERVICES PROVIDED         Thank you for allowing the Division of [facility] to care for your patient.

## 2025-01-17 ENCOUNTER — TELEPHONE (OUTPATIENT)
Dept: ENDOCRINOLOGY | Facility: CLINIC | Age: 89
End: 2025-01-17

## 2025-01-17 NOTE — TELEPHONE ENCOUNTER
Please fax patients lab script over to her Independent living facility, attention to Erica Amezcua:  fax [phone].

## 2025-04-07 ENCOUNTER — TELEPHONE (OUTPATIENT)
Dept: NEUROSURGERY | Facility: CLINIC | Age: 89
End: 2025-04-07
Payer: MEDICARE

## 2025-04-07 NOTE — TELEPHONE ENCOUNTER
New Patient:   Dr. Carter    *Cristina, daughter, [phone] contact for patient    Referring Provider:   Dr. Carranza     PCP: Dr. Sloan Maher    X-rays: Thoracic Spine 11/19/24 @ [facility]    Onset of sympotms/reason for visit:   Patient has an [employer] pain stimulator with [employer] battery pack for neuralgia and the wires are not working.     Insurance: Medicare

## 2025-04-11 ENCOUNTER — TELEPHONE (OUTPATIENT)
Dept: ENDOCRINOLOGY | Facility: CLINIC | Age: 89
End: 2025-04-11
Payer: MEDICARE

## 2025-04-11 NOTE — TELEPHONE ENCOUNTER
Test Order Request   Patient PCP: Sloan Maher MD  Next Office Visit: 4/16/2025  Preferred Lab: OTHER  Tests Requested: Thyroid labs need to be faxed to [facility] in [facility] at: [phone] Attn: Mary so they may be drawn by [facility] next Tuesday (4.15.25).  Thyroid labs were not included int the script that was last sent.  , MyChart, or US Mail?: Fax to: [phone] as noted above    The practice will reach out to discuss your request within 2 business days.

## 2025-04-15 ENCOUNTER — OFFICE VISIT (OUTPATIENT)
Dept: NEUROSURGERY | Facility: CLINIC | Age: 89
End: 2025-04-15
Payer: MEDICARE

## 2025-04-15 VITALS
WEIGHT: 134 LBS | SYSTOLIC BLOOD PRESSURE: 126 MMHG | BODY MASS INDEX: 26.31 KG/M2 | HEIGHT: 60 IN | DIASTOLIC BLOOD PRESSURE: 74 MMHG | TEMPERATURE: 98.3 F | RESPIRATION RATE: 16 BRPM | OXYGEN SATURATION: 98 % | HEART RATE: 59 BPM

## 2025-04-15 DIAGNOSIS — T85.192A MALFUNCTION OF SPINAL CORD STIMULATOR, INITIAL ENCOUNTER (CMS/HCC): Primary | ICD-10-CM

## 2025-04-15 PROCEDURE — 99203 OFFICE O/P NEW LOW 30 MIN: CPT | Performed by: NEUROLOGICAL SURGERY

## 2025-04-15 RX ORDER — FERROUS SULFATE 325(65) MG
65 TABLET ORAL
COMMUNITY

## 2025-04-15 RX ORDER — LANOLIN ALCOHOL/MO/W.PET/CERES
400 CREAM (GRAM) TOPICAL EVERY OTHER DAY
COMMUNITY

## 2025-04-15 NOTE — PROGRESS NOTES
"    Main [facility]  Medical Office Building [address]  [place] [address]  Phone: [phone]  Fax: [phone]      Patient ID: Rissa Erickson                              : 1935    Visit Date: 4/15/2025    Referring Provider: Dion Carranza MD    Chief Complaint / History of Present Illness:   Rissa Erickson is a pleasant 89 y.o. female history of Afib, CHF on eliquis, HLD, HTN seen today as a consult for spinal cord stimulator.  Rissa developed herpetic neuralgia about 20 years ago after a case of shingles. Had thoracic SCS placed with a Dr. Frazier in [facility] at the time with good improvement in her chest wall pain.   The stimulator pulse generator was revised with an [employer] device about 10 years ago.  It was then replaced with a [employer] pulse generator about 2 years ago. The leads have remained form her original surgery 20 years ago.  Her pain started to worsen a few months ago but with program adjustments it was reasonably controlled. 2 weeks ago her pain worsened without improvement and BS had difficulty communicating with the stimulator during a visit so she was referred to our office.    Today Rissa reports she has ongoing pain in the left chest wall in similar distribution to previous. Pain is moderate most days but can be severe at times.  It feels \"like an ice block is on by back\" with burning cold pain.   Stimulator provides relief sporadically.  She denies any focal increased back pain, no fevers or rashes.      Allergies:  Allergies   Allergen Reactions    Amiodarone Other (see comments)     Affected thyroid   thyroiditis         Medications:     Current Outpatient Medications:     acetaminophen (TYLENOL 8 HOUR) 650 mg 8 hr tablet, Take 650 mg by mouth 4 (four) times a day., Disp: , Rfl:     apixaban (ELIQUIS) 2.5 mg tablet, Take 2.5 mg by mouth 2 (two) times a day., Disp: , Rfl:     atorvastatin (LIPITOR) 20 mg tablet, Take 1 tablet (20 mg " total) by mouth nightly., Disp: 90 tablet, Rfl: 3    calcium carbonate (OS-HERBERT) 500 mg calcium (1,250 mg) tablet, Take 1 tablet by mouth every morning., Disp: , Rfl:     cholecalciferol, vitamin D3, 5,000 unit (125 mcg) tablet, Take 5,000 Units by mouth daily., Disp: , Rfl:     denosumab (PROLIA) 60 mg/mL syringe, Inject 60 mg under the skin every 6 (six) months., Disp: , Rfl:     ferrous sulfate 325 mg (65 mg iron) tablet, Take 65 mg by mouth daily with breakfast., Disp: , Rfl:     furosemide (LASIX) 20 mg tablet, Take 20 mg by mouth every morning., Disp: , Rfl:     gabapentin (NEURONTIN) 300 mg capsule, Take 300 mg by mouth 4 (four) times a day., Disp: , Rfl:     lancets misc, 1 Lancet as needed (Diabetes). To check blood glucose, Disp: 100 each, Rfl: 3    magnesium oxide (MAG-OX) 400 mg (241.3 mg magnesium) tablet, Take 400 mg by mouth daily., Disp: , Rfl:     metFORMIN (GLUCOPHAGE) 500 mg tablet, Take 1 tablet (500 mg total) by mouth 2 (two) times a day with meals., Disp: , Rfl:     multivitamin tablet, Take by mouth daily., Disp: , Rfl:     pantoprazole (PROTONIX) 20 mg EC tablet, Take 20 mg by mouth every morning., Disp: , Rfl:     propranoloL (INDERAL) 20 mg tablet, TAKE 1 TABLET TWICE A DAY, Disp: 180 tablet, Rfl: 2    traMADoL (ULTRAM) 50 mg tablet, Take 50 mg by mouth 4 (four) times a day., Disp: , Rfl:     traZODone (DESYREL) 50 mg tablet, Take 50 mg by mouth nightly., Disp: , Rfl:     vit A/vit C/vit E/zinc/copper (PRESERVISION AREDS ORAL), Take 1 capsule by mouth every morning., Disp: , Rfl:     zinc acetate 50 mg (zinc) capsule, Take by mouth., Disp: , Rfl:      Past Medical History:   Past Medical History:   Diagnosis Date    A-fib (CMS/HCC)     Accelerated hypertension     CHF (congestive heart failure) (CMS/HCC)     Hearing loss     Macular degeneration     Shingles     Thyroid nodule     Type 2 diabetes mellitus (CMS/HCC)        Past Surgical History:   Past Surgical History   Procedure Laterality  Date    Appendectomy      Hip surgery Left     Hysterectomy      Joint replacement      Knee arthroplasty         Family History:  No family history on file.    Social History:  Social History     Socioeconomic History    Marital status:      Spouse name: Not on file    Number of children: Not on file    Years of education: Not on file    Highest education level: Not on file   Occupational History    Not on file   Tobacco Use    Smoking status: Never    Smokeless tobacco: Never   Substance and Sexual Activity    Alcohol use: Never    Drug use: Never    Sexual activity: Defer   Other Topics Concern    Not on file   Social History Narrative    Not on file     Social Drivers of Health     Financial Resource Strain: Not on file   Food Insecurity: No Food Insecurity (11/19/2024)    Hunger Vital Sign     Worried About Running Out of Food in the Last Year: Never true     Ran Out of Food in the Last Year: Never true   Transportation Needs: No Transportation Needs (4/4/2024)    PRAPARE - Transportation     Lack of Transportation (Medical): No     Lack of Transportation (Non-Medical): No   Physical Activity: Not on file   Stress: Not on file   Social Connections: Not on file   Intimate Partner Violence: Not on file   Housing Stability: Low Risk  (4/4/2024)    Housing Stability Vital Sign     Unable to Pay for Housing in the Last Year: No     Number of Places Lived in the Last Year: 1     Unstable Housing in the Last Year: No       Vitals:   Vitals:    04/15/25 0947   BP: 126/74   Pulse: (!) 59   Resp: 16   Temp: 36.8 °C (98.3 °F)   SpO2: 98%       Review of Systems:  A complete 14 point review of systems was conducted and was deemed negative except what was documented in the HPI.    Physical Examination:  Physical Exam   Constitutional: Oriented to person, place, and time. Appears well-developed and well-nourished.   Head: Normocephalic and atraumatic.   Right Ear: External ear normal.   Left Ear: External ear normal.    Nose: Nose normal.   Mouth/Throat: Oropharynx is clear and moist.   Eyes: Conjunctivae and EOM are normal. Pupils are equal, round, and reactive to light. No scleral icterus.   Neck: Normal range of motion.   Cardiovascular: Normal rate.    Pulmonary/Chest: No respiratory distress.   Abdominal: Soft. Exhibits no distension.  Musculoskeletal: Normal range of motion. No edema or tenderness.   Neurological: Oriented to person, place, and time.   Skin: Skin is warm and dry. No rash noted. No erythema.   Psychiatric: Normal mood and affect. Speech is normal and behavior is normal. Thought content normal.     Vitals reviewed.    Neurological Examination:  Neurologic Exam     Mental Status   Oriented to person, place, and time.   Attention: normal.   Speech: speech is normal   Level of consciousness: alert    Cranial Nerves     CN II: Visual fields are full to confrontation.  Pupils are equal and briskly reactive to light.   CN III, IV, VI: Extra-occular muscles are intact  CN V: Facial sensation is intact and equal in V1-V3 distributions bilaterally.   CN VII: Face is symmetric with normal eye closure and smile.  CN VIII: Hearing is normal to rubbing fingers  CN IX, X: Palate elevates symmetrically. Phonation is normal.  CN XI: Head turning and shoulder shrug are intact  CN XII: Tongue is midline with normal movements and no atrophy.    Sensation    sensation intact to light touch throughout      Motor:     Deltoid Biceps Triceps Wrist ext Finger ext Hand Intrinsics Hip flexion Knee ext Dorsi-  flexion EHL Plantar Flexion   R 5 5 5 5 5 5 5 5 5 5 5   L 5 5 5 5 5 5 5 5 5 5 5     There is no pronator drift. Muscle bulk and tone are normal.         Data Review:    Lab Results:   Lab Results   Component Value Date    WBC 4.23 11/21/2024    HGB 11.1 (L) 11/21/2024    HCT 34.7 (L) 11/21/2024    MCV 95.3 11/21/2024     11/21/2024    RDW 13.6 11/21/2024      Lab Results   Component Value Date    GLUCOSE 102 (H) 11/22/2024     BUN 18 11/22/2024     11/22/2024    K 4.0 11/22/2024     11/22/2024    CO2 27 11/22/2024    ANIONGAP 5 11/22/2024        Imaging:   Independent review of all imaging was done by myself as well as review of the radiologists readings and comparison to prior films.     No new imaging         Assessment / Plan: In summary, Rissa Erickson is a 89 y.o. female seen today for evaluation of spinal cord stimulator malfunction.  According to the [employer] rep her lower lead has impedance issues and 7 of the 8 contacts wildly higher lead appears to be working well suggesting an issue with either the connection of the lead to the pulse generator or with the lead itself.  We will have the patient undergo a CT scan of the chest and lumbar spine to assess the location of the lead and to evaluate for any fractures versus possible disconnection from the pulse generator.  All of her previous imaging was performed before her most recent complaints.      Damaso Carter MD         Patient seen earlier today. Signature timestamp does not reflect patient encounter time.   More than 50% of the time is spent counseling the patient and family and coordinating care.

## 2025-04-16 ENCOUNTER — TELEMEDICINE (OUTPATIENT)
Dept: ENDOCRINOLOGY | Facility: CLINIC | Age: 89
End: 2025-04-16
Payer: MEDICARE

## 2025-04-16 VITALS — WEIGHT: 134 LBS | BODY MASS INDEX: 26.31 KG/M2 | HEIGHT: 60 IN

## 2025-04-16 DIAGNOSIS — E11.9 TYPE 2 DIABETES MELLITUS WITHOUT COMPLICATION, WITHOUT LONG-TERM CURRENT USE OF INSULIN (CMS/HCC): ICD-10-CM

## 2025-04-16 DIAGNOSIS — M81.0 AGE-RELATED OSTEOPOROSIS WITHOUT CURRENT PATHOLOGICAL FRACTURE: Primary | ICD-10-CM

## 2025-04-16 DIAGNOSIS — Z86.39 HISTORY OF HYPERTHYROIDISM: ICD-10-CM

## 2025-04-16 PROCEDURE — 200200 PR NO CHARGE: Mod: 95 | Performed by: INTERNAL MEDICINE

## 2025-04-16 NOTE — PROGRESS NOTES
HPI  89 y.o. female with PMH amiodarone-induced hyperthyroidism, MNG, A fib s/p cardioversion, DM2, osteoporosis, GERD, HFpEF, HTN, HLD, post herpetic neuralgia s/p pain stimulator presenting for follow up of thyroid nodules, DM2  Referred by: self    Initial history (9/7/23):  - Former pt of Dr. Stephens and Dr. Rowe at [facility], last visit 6/12/23. 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 Treated with high dose decadron and MMI with rapid improvement in symptoms, discharged on MMI + prednisone, then re-admitted for ADHF 6/2021.  Referred for total thyroidectomy 6/2021, then readmitted 1 week later for falls + hyponatremia. During this hospitalization she was seen by general surgery who felt she was too high risk for thyroidectomy. Off amiodarone since 7/2021. Stopped MMI 9/2022. Also with DM2 being managed by PCP with metformin monotherapy. Also with osteoporosis on prolia as per PCP. 8/30/23 Dr. Stephens contacted pt that thyroid US was stable. Recommended 1 year US    First visit 9/7/23  Last visit 10/14/24. Decreased MTF dose     Interval history summarized as per review of records:  - Prolia dose given 10/23  - Admitted to [place] 11/19-11/23/24 for ADHF. Diuresed  - Saw cardiology Dr. Mendelson 3/19 in follow up. Treatment plan continued   - Saw ortho Dr. Mohr 4/3 for L plantar fasciitis. Rx DM shoes  - Saw NSG Dr. Carter 4/15 for spinal cord stimulator. CT chest and L spine ordered    ***Pt's daughter, Cristina,  was present for the entire visit and helped provide elements of the HPI     Off amiodarone since 7/2021  Off MMI since 9/2022.     ***  Dysphagia/odynophagia: denies  Change in neck size/new neck masses: denies    Personal history of head/neck radiation: denies  Family history of thyroid disease or cancer: daughter - hypothyroidism/postpartum thyroiditis    DM2  Diagnosed: > 10 years ago    Medications:    ***1. Metformin 500 mg BID    Compliance: good    Previous medications: none    Other relevant medications:   Atorvastatin 20 mg daily  No ACEI/ARB  No steroids    SMBG per memory:   ***Checking once a week - most recently     ***Exercise: walking    Weight: stable since last visit   Wt Readings from Last 3 Encounters:   25 60.8 kg (134 lb)   04/15/25 60.8 kg (134 lb)   24 64.9 kg (143 lb)       Diabetic Complications:   Microvascular:   ***- Retinopathy? no; Last ophtho visit: q2 mo, getting injections for macular degeneration.   - Neuropathy? no; Last podiatry visit: 2024, Dr. Tho Turner  - Nephropathy? Negative UACR 2021    Macrovascular:  - CAD? no  - CHF? no  - CVA? no  - PVD? no  - Smoking? never    + DM in son    Osteoporosis  - Last DEXA: 10/2023  - Fractures: spine fracture at 55 yo after falling in the shower. Thinks this was time of initial diagnosis  ***- Falls: none recently  ***- Vitamin D intake: 5000 IU daily  - Dietary Ca intake: 1-2 servings per day  ***- Ca supplements: 500 mg daily  - Exercise: walks, stationary bike  - Loss of height: thinks ~5 inches  - Dental history: full dentures, went to see dentist about new dentures but wanted to do bone X ray to determine if there was too much bone loss to give her new dentures. Found out the process would be lengthy and costly so decided not to do it    - Prior treatment: denies despite diagnosis > 20 years ago  ***- Current treatment: prolia q6 mo for past ~3 years, last dose 10/2024. Scheduled for ***    Lives at [facility] independent living    2023  -------------------------------------------------------------------------  PM  Past Medical History:   Diagnosis Date    A-fib (CMS/[facility])     Accelerated hypertension     CHF (congestive heart failure) (CMS/[facility])     Hearing loss     Macular degeneration     Shingles     Thyroid nodule     Type 2 diabetes mellitus (CMS/HCC)      PSH  Past Surgical History   Procedure  Laterality Date    Appendectomy      Hip surgery Left     Hysterectomy      Joint replacement      Knee arthroplasty       Social  Social History     Socioeconomic History    Marital status:      Spouse name: Not on file    Number of children: Not on file    Years of education: Not on file    Highest education level: Not on file   Occupational History    Not on file   Tobacco Use    Smoking status: Never    Smokeless tobacco: Never   Substance and Sexual Activity    Alcohol use: Never    Drug use: Never    Sexual activity: Defer   Other Topics Concern    Not on file   Social History Narrative    Not on file     Social Drivers of Health     Financial Resource Strain: Not on file   Food Insecurity: No Food Insecurity (11/19/2024)    Hunger Vital Sign     Worried About Running Out of Food in the Last Year: Never true     Ran Out of Food in the Last Year: Never true   Transportation Needs: No Transportation Needs (4/4/2024)    PRAPARE - Transportation     Lack of Transportation (Medical): No     Lack of Transportation (Non-Medical): No   Physical Activity: Not on file   Stress: Not on file   Social Connections: Not on file   Intimate Partner Violence: Not on file   Housing Stability: Low Risk  (4/4/2024)    Housing Stability Vital Sign     Unable to Pay for Housing in the Last Year: No     Number of Places Lived in the Last Year: 1     Unstable Housing in the Last Year: No     Family hx  No family history on file.  Medications    Current Outpatient Medications:     acetaminophen (TYLENOL 8 HOUR) 650 mg 8 hr tablet, Take 650 mg by mouth 4 (four) times a day., Disp: , Rfl:     apixaban (ELIQUIS) 2.5 mg tablet, Take 2.5 mg by mouth 2 (two) times a day., Disp: , Rfl:     atorvastatin (LIPITOR) 20 mg tablet, Take 1 tablet (20 mg total) by mouth nightly., Disp: 90 tablet, Rfl: 3    calcium carbonate (OS-HERBERT) 500 mg calcium (1,250 mg) tablet, Take 1 tablet by mouth every morning., Disp: , Rfl:     cholecalciferol,  vitamin D3, 5,000 unit (125 mcg) tablet, Take 5,000 Units by mouth daily., Disp: , Rfl:     denosumab (PROLIA) 60 mg/mL syringe, Inject 60 mg under the skin every 6 (six) months., Disp: , Rfl:     ferrous sulfate 325 mg (65 mg iron) tablet, Take 65 mg by mouth daily with breakfast., Disp: , Rfl:     furosemide (LASIX) 20 mg tablet, Take 20 mg by mouth every morning., Disp: , Rfl:     gabapentin (NEURONTIN) 300 mg capsule, Take 300 mg by mouth 4 (four) times a day., Disp: , Rfl:     lancets misc, 1 Lancet as needed (Diabetes). To check blood glucose, Disp: 100 each, Rfl: 3    magnesium oxide (MAG-OX) 400 mg (241.3 mg magnesium) tablet, Take 400 mg by mouth daily., Disp: , Rfl:     metFORMIN (GLUCOPHAGE) 500 mg tablet, Take 1 tablet (500 mg total) by mouth 2 (two) times a day with meals., Disp: , Rfl:     multivitamin tablet, Take by mouth daily., Disp: , Rfl:     pantoprazole (PROTONIX) 20 mg EC tablet, Take 20 mg by mouth every morning., Disp: , Rfl:     propranoloL (INDERAL) 20 mg tablet, TAKE 1 TABLET TWICE A DAY, Disp: 180 tablet, Rfl: 2    traMADoL (ULTRAM) 50 mg tablet, Take 50 mg by mouth 4 (four) times a day., Disp: , Rfl:     traZODone (DESYREL) 50 mg tablet, Take 50 mg by mouth nightly., Disp: , Rfl:     vit A/vit C/vit E/zinc/copper (PRESERVISION AREDS ORAL), Take 1 capsule by mouth every morning., Disp: , Rfl:     zinc acetate 50 mg (zinc) capsule, Take by mouth., Disp: , Rfl:    Allergies  Allergies   Allergen Reactions    Amiodarone Other (see comments)     Affected thyroid   thyroiditis       -------------------------------------------------------------------------  ROS: All other 10 point systems reviewed and negative except as mentioned in HPI    PHYSICAL EXAM  Visit Vitals  Ht 1.524 m (5')   Wt 60.8 kg (134 lb)   BMI 26.17 kg/m²       Wt Readings from Last 3 Encounters:   04/16/25 60.8 kg (134 lb)   04/15/25 60.8 kg (134 lb)   11/21/24 64.9 kg (143 lb)     ***2-way audio/video  Gen: well nourished,  non-diaphoretic, no psychomotor agitation, no acute distress  HEENT: head- atraumatic, normocephalic, no rashes noted; eyes- conjunctiva are not injected, no swelling or discharge, no proptosis; mouth - normal appearing dentition  Nose: no erythema, swelling, discharge, or crusting  Neck: no obvious thyromegaly  Pulmonary: No Cough, no use of accessory muscles, speaking in clear sentences  Psych: normal mood and affect, pleasant and cooperative     LABS REVIEWED  ***    10/4/24  Alb 4.0  T bili 0.6  Ca 9.4  Cl 99  CO2 27.5  Cr 0.8  BG 89  ALP 38  K 4.3  TP 6.0  Na 136  ALT 19  AST 23  GFR 67.5  TSH 0.64  FT4 1.4  A1C 5.9%      Lab Results   Component Value Date    TSH 0.30 (L) 11/19/2024    TSH 0.26 (L) 08/14/2024    TSH 0.85 02/08/2024    FREET4 1.10 08/14/2024    FREET4 1.16 02/08/2024    V6BEERC 47 (L) 02/08/2024           Lab Results   Component Value Date    GLUCOSE 102 (H) 11/22/2024    BUN 18 11/22/2024    CREATININE 0.6 11/22/2024    EGFR >60.0 11/22/2024     11/22/2024    K 4.0 11/22/2024     11/22/2024    CO2 27 11/22/2024    ALBUMIN 3.2 (L) 11/20/2024    BILITOT 0.4 11/20/2024    ALKPHOS 31 (L) 11/20/2024    ALT 19 11/20/2024    AST 19 11/20/2024     Lab Results   Component Value Date    WBC 4.23 11/21/2024    HGB 11.1 (L) 11/21/2024     11/21/2024     Hemoglobin A1C   Date Value Ref Range Status   03/24/2024 6.1 (H) <5.7 % Final     Comment:     In the absence of an established diagnosis of Diabetes Mellitus, HbA1c levels between 5.7% and 6.4% are indicative of increased risk for developing Diabetes(Pre-Diabetes). Levels of 6.5% or greater are diagnostic for Diabetes Mellitus.   02/08/2024 6.1 (H) <5.7 % Final     Comment:     In the absence of an established diagnosis of Diabetes Mellitus, HbA1c levels between 5.7% and 6.4% are indicative of increased risk for developing Diabetes(Pre-Diabetes). Levels of 6.5% or greater are diagnostic for Diabetes Mellitus.   08/25/2023 5.7 (H) <5.7 %  "Final     Comment:     In the absence of an established diagnosis of Diabetes Mellitus, HbA1c levels between 5.7% and 6.4% are indicative of increased risk for developing Diabetes(Pre-Diabetes). Levels of 6.5% or greater are diagnostic for Diabetes Mellitus.          No results found for: \"MCRALBCREAT\", \"ALBCRET\"          IMAGING REVIEWED  ULTRASOUND THYROID 8/14/24    Narrative  EXAM: US THYROID    CLINICAL HISTORY: E04.2: Nontoxic multinodular goiter    COMPARISON: Thyroid ultrasound August 25, 2023    PROCEDURE: A complete ultrasound examination of the thyroid and lateral cervical lymph node chains was done with grayscale and color flow Doppler imaging.    COMMENT:    THYROID  Right lobe: 2.0 cm x 2.0 cm x 5.3 cm  Left lobe: 3.3 cm x 3.0 cm x 7.1 cm  Isthmus: 0.4 cm  Echotexture: Normal in echotexture.    NODULES:    Right lobe: The right lobe contains multiple sonographically similar TR 1/TR 2 colloid cysts and, and mixed/spongiform thyroid nodules.    Left lobe: The left lobe contains multiple sonographically similar mixed and spongiform thyroid nodules as well as a previously biopsied mid to lower pole nodule as follows:    *  Left nodule 1  *  Location: Upper pole  *  Measurement: 1.2 cm x 1.1 cm x 1.5 cm  *  Prior measurement: 1.2 x 1.2 x 1.4 cm  *  Composition: Mixed cystic and solid  (1 point)  *  Echogenicity: Isoechoic (1 point)  *  Shape: Wider-than-tall (0 points)  *  Margin: Smooth (0 points)  *  Echogenic foci: None or comet-tail artifacts (0 points)  *  TI-RADS category: TR2 (2 points)    *  Left nodule 2  *  Location: Upper pole  *  Measurement: 1.4 (cm) x 2.4 (cm) x 2.3 (cm)  *  Prior measurement: 1.4 x 2.0 x 2.2 cm  *  Composition: Spongiform (0 points)  *  Shape: Wider-than-tall (0 points)  *  Margin: Smooth (0 points)  *  Echogenic foci: None or comet-tail artifacts (0 points)  *  TI-RADS category: TR1 (0 points)    *  Left nodule 3  *  Location: Lower pole  *  Measurement: 3.3 (cm) x 4.3 (cm) " x 4.3 (cm)  *  Prior measurement: 3.6 x 4.9 x 4.3 cm  *  Composition: Solid or almost completely solid (2 points)  *  Echogenicity: Isoechoic (1 point)  *  Shape: Wider-than-tall (0 points)  *  Margin: Smooth (0 points)  *  Echogenic foci: None or comet-tail artifacts (0 points)  *  TI-RADS category: This nodule has undergone prior biopsy, with cytology results noted above. Therefore, it does not receive repeat TI-RADS categorization.    LYMPH NODES: Survey of the bilateral lateral cervical lymph node chains revealed no suspicious appearing lymph nodes.    IMPRESSION:  Thyroid nodules, as described in the body of the report. Measured nodules are similar to the prior study including previously biopsied left lobe nodule. Multiple sonographically similar TR 1/TR 2 nodules in the right lobe.    No suspicious lymph nodes identified in the lateral neck.    I personally reviewed the images on 8/22/24. The following is my interpretation:   L nodule #1 measures 1.2 x 1.6 x 1.2 cm. Agree with description as per radiology report   L nodule #2 measures 2.2 x 2.2 x 1.5 cm. Agree with description as per radiology report   L mid to inferior pole nodule #3 measures 3.4 x 4.0 x 2.9 cm. It is mixed cystic/solid. Agree with remaining description as per radiology report     DEXA BONE DENSITY 10/9/23    Narrative  CLINICAL HISTORY: Postmenopausal, history of diabetes and left hip replacement, screening for osteoporosis, Z 78.0.    COMMENT:    Scans of the the right hip and the left forearm were obtained. The bone mineral density was calculated for all the levels scanned.  The patient's bone mineral density was referenced to both young normal patients (T-score) as well as age matched patients (Z-score).  For the evaluation of osteoporosis, it is recommended that the patient's bone mineral density be compared to young normal patients or the T-score.    Current World Health Organization criteria for the diagnosis of osteoporosis are as  follows:  1.  Up to 1 standard deviation below normal compared to young normal patients - normal.  2.  Between 1 and 2.5 standard deviations below normal compared to young normal patients - low bone mass or osteopenia.  3.  Greater than or equal to 2.5 standard deviations below normal compared to young normal patients - osteoporosis.    The DEXA was performed on [employer] equipment.    The lumbar spine was not able to be evaluated due to the placement of surgical devices.    Right hip replacement    AP right hip lowest T-score:  Femoral neck      -1.8  Corresponding bone mineral density:       0.792 g/cm2.    AP imaging of the non-dominant left forearm, 33% radius, demonstrates the T-score to measure -2.8  Corresponding bone mineral density of 0.632g/cm2.    IMPRESSION: Osteoporosis.    I personally reviewed the images on 8/22/24 and agree with the radiology report.     ULTRASOUND THYROID 8/25/23    Narrative  CLINICAL HISTORY: E05.90: Thyrotoxicosis, unspecified without thyrotoxic crisis  or storm  E04.2: Nontoxic multinodular goiter    COMMENT:    Comparison: None    Technique: Real-time high resolution sonography of the thyroid gland was performed.    Findings:  Right Lobe:  Size: Measures 5.1 x 2.1 x 1.7 cm.  Echotexture:  Heterogeneous.  Nodules: Present, with numerous colloid cysts and colloid nodules. Largest detailed below.    Nodule #right 1  Location: Lower pole  Size: 1 x 0.7 x 1 cm  Composition: Mixed cystic and solid  (1 point)  Echogenicity: isoechoic (1 point)  Shape: Wider-than-tall (0 points)  Margin: Ill-defined (0 points)  Echogenic foci: Punctate echogenic foci (3 points) (probably colloid but too small to generate ringdown)  Comparison: None available  TI-RADS category: TR4 (4-6 points): Moderately suspicious., Does not meet  criteria for biopsy. In one year recommended.    Nodule #right 2  Location: upper pole only seen on cine series  Size: 1.2 x 0.4 x 0.5 cm  Composition: Mixed  cystic and solid  (1 point)  Echogenicity:  isoechoic (1 point)  Shape: Wider-than-tall (0 points)  Margin: Smooth (0 points)  Echogenic foci:  large comet-tail artifacts (0 points)  Comparison: None available  TI-RADS category: TR2 (2 points): Not suspicious.    Other smaller cysts and colloid nodules noted elsewhere.    -    Left Lobe:  Size: Measures 9.3 x 3.7 x 4.2 cm, enlarged  Echotexture:  Heterogeneous.  Nodules: Present    Nodule #left 1  Location: Upper pole  Size: 1.2 x 1.2 x 1.4 cm  Composition: Mixed cystic and solid  (1 point)  Echogenicity: isoechoic (1 point)  Shape: Wider-than-tall (0 points)  Margin: Smooth (0 points)  Echogenic foci: None or large comet-tail artifacts (0 points)  Comparison: None available  TI-RADS category: TR2 (2 points): Not suspicious.    Nodule #left 2  Location: Interpolar/upper pole  Size:  2 x 1.4 x 2.2cm  Composition: Spongiform (0 points)  Echogenicity: isoechoic (1 point)  Shape: Wider-than-tall (0 points)  Margin: Smooth (0 points)  Echogenic foci: None or large comet-tail artifacts (0 points)  Comparison: None available  TI-RADS category: TR2 (2 points): Not suspicious.    Nodule #left 3  Location: Interpolar lower pole  Size: 4.9 x 3.6 x 4.3 cm  Composition: Spongiform (0 points)  Echogenicity:  isoechoic (1 point)  Shape: Wider-than-tall (0 points)  Margin: Ill-defined (0 points)  Echogenic foci: None or large comet-tail artifacts (0 points)  Comparison: None available  TI-RADS category: TR2 (2 points): Not suspicious.      -    Isthmus:  Mildly heterogeneous, measuring 0.4 cm. No nodules.    -  Other:  No suspicious lymph nodes in the visualized neck.    --    Impression  Enlarged multinodular goiter with multiple left-sided spongiform colloid nodules  and smaller right-sided nodules as discussed above.  Follow up in one year to document stability.    I personally reviewed the images on 9/7/23 and agree with the radiology report. The following is my interpretation:    Heterogeneous gland  R lower pole nodule measures 1.01 x 0.88 x 0.69 cm. There are no calcifications. Agree with remaining description as per radiology report   R upper pole nodule not seen on still images  L upper pole nodule measures 1.19 x 1.41 x 1.15 cm. Agree with description as per radiology report   L upper/mid pole nodule measures 1.95 x 2.17 x 1.39 cm. Agree with description as per radiology report   L mid/lower pole nodule measures 2.8 x 4.6 x 3.57 cm. Agree with description as per radiology report      DXA 1/12/21  History: Postmenopausal. L1 compression fracture with spinal cord stimulator.   Left hip prosthesis..     Comparison: 10/18/2018. 6/28/2016.     Results: Evaluation of the right hip demonstrates a total bone mineral density   of 0.924 g/cm2 with a T-score of -0.7 (priors -0.8 and -1.1 in 2018 and 2016   respectively).. The W.H.O. classification is normal. The right femoral neck   demonstrates a total bone mineral density of 0.778 g/cm2 with a T-score of -1.9   (priors -2.0 and -2.5). The W.H.O. classification is osteopenia.     Evaluation of the total left radius demonstrates a total bone mineral density of   0.409 g/cm2 with a T score of -4.4 (priors -4.2 and -4.1). The W.H.O.   classification is osteoporosis. The total bone mineral density of the most   distal left radius is 0.249 g/cm2 with a T score of -4.8 (priors -4.9 and -5.3).   The W.H.O. classification is osteoporosis.     Based on the clinical information provided to the technical staff and the above   described measurements, the 10 year probability of a major osteoporotic fracture   is 21.3%. The 10 year probability of a hip fracture is 5.8%    Impression: Analysis of the right femoral neck and left radius demonstrate   diminished bone mineral density with evidence for increased fracture risk.       ASSESSMENT AND PLAN    - prolia due 4/23/25  - rtc 6 mo w/ dxa  - plan pending labs      ***  1. Osteoporosis  - S/p L1 compression  fracture after falling in shower at 57 yo. Reportedly not treated until started prolia ~2021  - DXA stable 10/2023  - Continue prolia q6 mo, scheduled for next dose next week.   - Continue vitamin D and Ca supplementation  - Continue with weight bearing exercise  - Counseled on importance of fall prevention  - Repeat DXA 10/2025  - Check CMP, 25 vitamin D prior to next visit for monitoring    2. Amiodarone induced hyperthyroidism  - Off amiodarone since 7/2021 and off MMI since 9/2022  - Had resolved but TSH was low again 8/2024 but was clinically euthyroid. Repeat TFTs 10/2024 normal again  - Monitor TFTs prior to next visit for monitoring     3. MNG  - No prior biopsies as per pt, however US report comments on a prior biopsy  - Most recent US 8/2024 is stable from prior  - Given advanced age and pt previously deemed by surgeon to be poor surgical candidate for thyroidectomy recommend stopping US monitoring, unless pt develops new local neck sx, as it will not change medical management    4. DM2, controlled, without known complications  - A1C 5.9%, at goal < 8.0%.   - Counseled on importance of dietary changes such as eliminating sugar sweetened beverages, reducing intake of processed foods, and increasing intake of whole foods such as fruits and veggies, nuts, seeds, and legumes  - Recommend increasing physical activity to at least 150 min moderate aerobic exercise per week + resistance training 2x/week  - Decrease metformin to 500 mg BID. Will try to discontinue altogether at next visit if A1C remains <6.5%  - Counseled on risk of hypoglycemia and instructed pt to call for BG < 80  - Many elements of diabetes self management were reviewed including, but not limited to, the importance of blood sugar monitoring, symptoms and treatment of hypoglycemia, blood sugar/A1C goals, adherence with medications, lifestyle management, and surveillance of eyes and feet and need for routine follow-up with ophthalmology and  podiatry.   - Pt is aware of alternatives of therapy, risks and benefits, and accepts risk of default.  - Retinopathy screening: up to date, sees retinal specialist q2 mo for macular degeneration  - Nephropathy screening: due, check UACR  - Check A1C, CMP prior to next visit for monitoring     RTC ***  Total time spent on the day of the visit, including record review, face to face time, and documentation: ***     I attest that this visit supports the complexity inherent to evaluation and management associated with medical care services that serve as the continuing focal point for all needed health care services and/or medical care services that are part of ongoing care related to this patient's single, serious condition or a complex condition.

## 2025-04-17 ENCOUNTER — HOSPITAL ENCOUNTER (OUTPATIENT)
Dept: RADIOLOGY | Facility: HOSPITAL | Age: 89
Discharge: HOME | End: 2025-04-17
Attending: PHYSICIAN ASSISTANT
Payer: MEDICARE

## 2025-04-17 ENCOUNTER — APPOINTMENT (OUTPATIENT)
Dept: LAB | Facility: HOSPITAL | Age: 89
End: 2025-04-17
Attending: INTERNAL MEDICINE
Payer: MEDICARE

## 2025-04-17 ENCOUNTER — OFFICE VISIT (OUTPATIENT)
Dept: ENDOCRINOLOGY | Facility: CLINIC | Age: 89
End: 2025-04-17
Payer: MEDICARE

## 2025-04-17 VITALS
DIASTOLIC BLOOD PRESSURE: 64 MMHG | OXYGEN SATURATION: 94 % | BODY MASS INDEX: 27.37 KG/M2 | HEIGHT: 60 IN | WEIGHT: 139.4 LBS | HEART RATE: 75 BPM | SYSTOLIC BLOOD PRESSURE: 132 MMHG

## 2025-04-17 DIAGNOSIS — T85.192A MALFUNCTION OF SPINAL CORD STIMULATOR, INITIAL ENCOUNTER (CMS/HCC): ICD-10-CM

## 2025-04-17 DIAGNOSIS — M81.0 AGE-RELATED OSTEOPOROSIS WITHOUT CURRENT PATHOLOGICAL FRACTURE: ICD-10-CM

## 2025-04-17 DIAGNOSIS — E11.9 TYPE 2 DIABETES MELLITUS WITHOUT COMPLICATION, WITHOUT LONG-TERM CURRENT USE OF INSULIN (CMS/HCC): ICD-10-CM

## 2025-04-17 DIAGNOSIS — E04.2 MULTIPLE THYROID NODULES: ICD-10-CM

## 2025-04-17 DIAGNOSIS — M81.0 AGE-RELATED OSTEOPOROSIS WITHOUT CURRENT PATHOLOGICAL FRACTURE: Primary | ICD-10-CM

## 2025-04-17 DIAGNOSIS — Z86.39 HISTORY OF HYPERTHYROIDISM: ICD-10-CM

## 2025-04-17 LAB
EXPIRATION DATE: 0
GLUCOSE BLOOD, POC: 142
Lab: 0
POCT MANUFACTURER: 0
T4 FREE SERPL-MCNC: 1.04 NG/DL (ref 0.58–1.64)
TSH SERPL DL<=0.05 MIU/L-ACNC: 0.37 MIU/L (ref 0.34–5.6)

## 2025-04-17 PROCEDURE — 72131 CT LUMBAR SPINE W/O DYE: CPT

## 2025-04-17 PROCEDURE — 72128 CT CHEST SPINE W/O DYE: CPT

## 2025-04-17 PROCEDURE — 82962 GLUCOSE BLOOD TEST: CPT | Performed by: INTERNAL MEDICINE

## 2025-04-17 PROCEDURE — 84443 ASSAY THYROID STIM HORMONE: CPT

## 2025-04-17 PROCEDURE — 84439 ASSAY OF FREE THYROXINE: CPT

## 2025-04-17 PROCEDURE — 99214 OFFICE O/P EST MOD 30 MIN: CPT | Performed by: INTERNAL MEDICINE

## 2025-04-17 PROCEDURE — 36415 COLL VENOUS BLD VENIPUNCTURE: CPT

## 2025-04-17 RX ORDER — METFORMIN HYDROCHLORIDE 500 MG/1
500 TABLET ORAL
Start: 2025-04-17

## 2025-04-17 NOTE — PROGRESS NOTES
HPI  89 y.o. female with PMH MNG, A fib s/p cardioversion, DM2, osteoporosis, GERD, HFpEF, HTN, HLD, post herpetic neuralgia s/p pain stimulator presenting for follow up   Referred by: self    Initial history (9/7/23):  - Former pt of Dr. Stephens and Dr. Rowe at [facility], last visit 6/12/23. 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 Treated with high dose decadron and MMI with rapid improvement in symptoms, discharged on MMI + prednisone, then re-admitted for ADHF 6/2021.  Referred for total thyroidectomy 6/2021, then readmitted 1 week later for falls + hyponatremia. During this hospitalization she was seen by general surgery who felt she was too high risk for thyroidectomy. Off amiodarone since 7/2021. Stopped MMI 9/2022. Also with DM2 being managed by PCP with metformin monotherapy. Also with osteoporosis on prolia as per PCP. 8/30/23 Dr. Stephens contacted pt that thyroid US was stable. Recommended 1 year US    First visit 9/7/23  Last visit 10/14/24. Decreased metformin dose    Interval history summarized as per review of records:  - Got prolia 10/23  - Admitted to [facility] 11/19-11/23/24 for CHF  - Saw cardiology Dr. Mendelson 3/19 in follow up. Treatment plan continued   - Saw NSG Dr. Carter 4/15 for spinal cord stimulator. CT chest and L spine ordered    Pt's daughter, Cristina, was present for the entire visit and helped provide elements of the HPI     Off amiodarone since 7/2021  Off MMI since 9/2022.     Dysphagia/odynophagia: denies  Change in neck size/new neck masses: denies    Personal history of head/neck radiation: denies  Family history of thyroid disease or cancer: daughter - hypothyroidism/postpartum thyroiditis    TFTs not run with most recent labs, but had drawn today    DM2  Diagnosed: > 10 years ago    Medications:   1. Metformin 500 mg BID    Compliance: good    Previous medications: none    Other  relevant medications:   Atorvastatin 20 mg daily  No ACEI/ARB  No steroids    SMBG per memory:   FBG in 90s    Today (random - ate candy):   Lab Results   Component Value Date    POCGLU 142 2025        Exercise: walking, stationary bike, light weights    Weight: gained another 5 lb since last visit   Wt Readings from Last 3 Encounters:   25 63.2 kg (139 lb 6.4 oz)   25 60.8 kg (134 lb)   04/15/25 60.8 kg (134 lb)       Diabetic Complications:   Microvascular:   - Retinopathy? no; Last ophtho visit: q2 mo, getting injections for macular degeneration.   - Neuropathy? no; Last podiatry visit: 2024, Dr. Tho Turner  - Nephropathy? Negative UACR 2021    Macrovascular:  - CAD? no  - CHF? no  - CVA? no  - PVD? no  - Smoking? never    + DM in son    Osteoporosis  - Last DEXA: 10/2023  - Fractures: spine fracture at 55 yo after falling in the shower. Thinks this was time of initial diagnosis  - Falls: none recently  - Vitamin D intake: 5000 IU daily  - Dietary Ca intake: 1-2 servings per day  - Ca supplements: 500 mg daily  - Exercise: walks, stationary bike  - Loss of height: thinks ~5 inches  - Dental history: full dentures, went to see dentist about new dentures but wanted to do bone X ray to determine if there was too much bone loss to give her new dentures. Found out the process would be lengthy and costly so decided not to do it    - Prior treatment: denies despite diagnosis > 20 years ago  - Current treatment: prolia q6 mo for past ~3 years, last dose 10/23/24. Scheduled for next week    Lives at [facility] independent living    2023  -------------------------------------------------------------------------  [facility]  Past Medical History:   Diagnosis Date    A-fib (CMS/[facility])     Accelerated hypertension     CHF (congestive heart failure) (CMS/[facility])     Hearing loss     Macular degeneration     Shingles     Thyroid nodule     Type 2 diabetes mellitus (CMS/HCC)      PSH  Past Surgical History    Procedure Laterality Date    Appendectomy      Hip surgery Left     Hysterectomy      Joint replacement      Knee arthroplasty       Social  Social History     Socioeconomic History    Marital status:      Spouse name: Not on file    Number of children: Not on file    Years of education: Not on file    Highest education level: Not on file   Occupational History    Not on file   Tobacco Use    Smoking status: Never    Smokeless tobacco: Never   Substance and Sexual Activity    Alcohol use: Never    Drug use: Never    Sexual activity: Defer   Other Topics Concern    Not on file   Social History Narrative    Not on file     Social Drivers of Health     Financial Resource Strain: Not on file   Food Insecurity: No Food Insecurity (11/19/2024)    Hunger Vital Sign     Worried About Running Out of Food in the Last Year: Never true     Ran Out of Food in the Last Year: Never true   Transportation Needs: No Transportation Needs (4/4/2024)    PRAPARE - Transportation     Lack of Transportation (Medical): No     Lack of Transportation (Non-Medical): No   Physical Activity: Not on file   Stress: Not on file   Social Connections: Not on file   Intimate Partner Violence: Not on file   Housing Stability: Low Risk  (4/4/2024)    Housing Stability Vital Sign     Unable to Pay for Housing in the Last Year: No     Number of Places Lived in the Last Year: 1     Unstable Housing in the Last Year: No     Family hx  No family history on file.  Medications    Current Outpatient Medications:     acetaminophen (TYLENOL 8 HOUR) 650 mg 8 hr tablet, Take 650 mg by mouth 4 (four) times a day., Disp: , Rfl:     apixaban (ELIQUIS) 2.5 mg tablet, Take 2.5 mg by mouth 2 (two) times a day., Disp: , Rfl:     atorvastatin (LIPITOR) 20 mg tablet, Take 1 tablet (20 mg total) by mouth nightly., Disp: 90 tablet, Rfl: 3    calcium carbonate (OS-HERBERT) 500 mg calcium (1,250 mg) tablet, Take 1 tablet by mouth every morning., Disp: , Rfl:      cholecalciferol, vitamin D3, 5,000 unit (125 mcg) tablet, Take 5,000 Units by mouth daily., Disp: , Rfl:     denosumab (PROLIA) 60 mg/mL syringe, Inject 60 mg under the skin every 6 (six) months., Disp: , Rfl:     ferrous sulfate 325 mg (65 mg iron) tablet, Take 65 mg by mouth daily with breakfast., Disp: , Rfl:     furosemide (LASIX) 20 mg tablet, Take 20 mg by mouth every morning., Disp: , Rfl:     gabapentin (NEURONTIN) 300 mg capsule, Take 300 mg by mouth 4 (four) times a day., Disp: , Rfl:     lancets misc, 1 Lancet as needed (Diabetes). To check blood glucose, Disp: 100 each, Rfl: 3    magnesium oxide (MAG-OX) 400 mg (241.3 mg magnesium) tablet, Take 400 mg by mouth daily., Disp: , Rfl:     metFORMIN (GLUCOPHAGE) 500 mg tablet, Take 1 tablet (500 mg total) by mouth 2 (two) times a day with meals., Disp: , Rfl:     multivitamin tablet, Take by mouth daily., Disp: , Rfl:     pantoprazole (PROTONIX) 20 mg EC tablet, Take 20 mg by mouth every morning., Disp: , Rfl:     propranoloL (INDERAL) 20 mg tablet, TAKE 1 TABLET TWICE A DAY, Disp: 180 tablet, Rfl: 2    traMADoL (ULTRAM) 50 mg tablet, Take 50 mg by mouth 4 (four) times a day., Disp: , Rfl:     traZODone (DESYREL) 50 mg tablet, Take 50 mg by mouth nightly., Disp: , Rfl:     vit A/vit C/vit E/zinc/copper (PRESERVISION AREDS ORAL), Take 1 capsule by mouth every morning., Disp: , Rfl:     zinc acetate 50 mg (zinc) capsule, Take by mouth., Disp: , Rfl:    Allergies  Allergies   Allergen Reactions    Amiodarone Other (see comments)     Affected thyroid   thyroiditis       -------------------------------------------------------------------------  ROS: All other 10 point systems reviewed and negative except as mentioned in HPI    PHYSICAL EXAM  Visit Vitals  /64 (BP Location: Left upper arm, Patient Position: Sitting)   Pulse 75   Ht 1.524 m (5')   Wt 63.2 kg (139 lb 6.4 oz)   SpO2 94%   BMI 27.22 kg/m²           Wt Readings from Last 3 Encounters:   04/17/25  63.2 kg (139 lb 6.4 oz)   04/16/25 60.8 kg (134 lb)   04/15/25 60.8 kg (134 lb)     Gen: well nourished, no acute distress  Eyes: no proptosis, normal conjunctiva  Neck (8/22/24): enlarged irregular thyroid with L sided predominance  Pulm: no use of accessory muscles, on room air  Abd: non distended  Neuro: AAOx3  MSK: ambulating with walker, no tremor of outstretched hands  Psych: normal mood, affect    LABS REVIEWED  4/1/25  A1C 6.2%  T chol 107  TG 77  HDL 50  LDL 41  B12 412  Alb 4.4  T bili 0.6  Ca 9.9  Cl 105  CO2 31.3  Cr 0.7  BG 94  ALP 36  K 4.1  TP 6.3  Na 142  ALT 25  AST 18  GFR 78.7  25 vitamin D 91    10/4/24  Alb 4.0  T bili 0.6  Ca 9.4  Cl 99  CO2 27.5  Cr 0.8  BG 89  ALP 38  K 4.3  TP 6.0  Na 136  ALT 19  AST 23  GFR 67.5  TSH 0.64  FT4 1.4  A1C 5.9%      Lab Results   Component Value Date    TSH 0.30 (L) 11/19/2024    TSH 0.26 (L) 08/14/2024    TSH 0.85 02/08/2024    FREET4 1.10 08/14/2024    FREET4 1.16 02/08/2024    [id number] 47 (L) 02/08/2024           Lab Results   Component Value Date    GLUCOSE 102 (H) 11/22/2024    BUN 18 11/22/2024    CREATININE 0.6 11/22/2024    EGFR >60.0 11/22/2024     11/22/2024    K 4.0 11/22/2024     11/22/2024    CO2 27 11/22/2024    ALBUMIN 3.2 (L) 11/20/2024    BILITOT 0.4 11/20/2024    ALKPHOS 31 (L) 11/20/2024    ALT 19 11/20/2024    AST 19 11/20/2024     Lab Results   Component Value Date    WBC 4.23 11/21/2024    HGB 11.1 (L) 11/21/2024     11/21/2024     Hemoglobin A1C   Date Value Ref Range Status   03/24/2024 6.1 (H) <5.7 % Final     Comment:     In the absence of an established diagnosis of Diabetes Mellitus, HbA1c levels between 5.7% and 6.4% are indicative of increased risk for developing Diabetes(Pre-Diabetes). Levels of 6.5% or greater are diagnostic for Diabetes Mellitus.   02/08/2024 6.1 (H) <5.7 % Final     Comment:     In the absence of an established diagnosis of Diabetes Mellitus, HbA1c levels between 5.7% and 6.4% are indicative  "of increased risk for developing Diabetes(Pre-Diabetes). Levels of 6.5% or greater are diagnostic for Diabetes Mellitus.   08/25/2023 5.7 (H) <5.7 % Final     Comment:     In the absence of an established diagnosis of Diabetes Mellitus, HbA1c levels between 5.7% and 6.4% are indicative of increased risk for developing Diabetes(Pre-Diabetes). Levels of 6.5% or greater are diagnostic for Diabetes Mellitus.          No results found for: \"MCRALBCREAT\", \"ALBCRET\"          IMAGING REVIEWED  ULTRASOUND THYROID 8/14/24    Narrative  EXAM: US THYROID    CLINICAL HISTORY: E04.2: Nontoxic multinodular goiter    COMPARISON: Thyroid ultrasound August 25, 2023    PROCEDURE: A complete ultrasound examination of the thyroid and lateral cervical lymph node chains was done with grayscale and color flow Doppler imaging.    COMMENT:    THYROID  Right lobe: 2.0 cm x 2.0 cm x 5.3 cm  Left lobe: 3.3 cm x 3.0 cm x 7.1 cm  Isthmus: 0.4 cm  Echotexture: Normal in echotexture.    NODULES:    Right lobe: The right lobe contains multiple sonographically similar TR 1/TR 2 colloid cysts and, and mixed/spongiform thyroid nodules.    Left lobe: The left lobe contains multiple sonographically similar mixed and spongiform thyroid nodules as well as a previously biopsied mid to lower pole nodule as follows:    *  Left nodule 1  *  Location: Upper pole  *  Measurement: 1.2 cm x 1.1 cm x 1.5 cm  *  Prior measurement: 1.2 x 1.2 x 1.4 cm  *  Composition: Mixed cystic and solid  (1 point)  *  Echogenicity: Isoechoic (1 point)  *  Shape: Wider-than-tall (0 points)  *  Margin: Smooth (0 points)  *  Echogenic foci: None or comet-tail artifacts (0 points)  *  TI-RADS category: TR2 (2 points)    *  Left nodule 2  *  Location: Upper pole  *  Measurement: 1.4 (cm) x 2.4 (cm) x 2.3 (cm)  *  Prior measurement: 1.4 x 2.0 x 2.2 cm  *  Composition: Spongiform (0 points)  *  Shape: Wider-than-tall (0 points)  *  Margin: Smooth (0 points)  *  Echogenic foci: None or " comet-tail artifacts (0 points)  *  TI-RADS category: TR1 (0 points)    *  Left nodule 3  *  Location: Lower pole  *  Measurement: 3.3 (cm) x 4.3 (cm) x 4.3 (cm)  *  Prior measurement: 3.6 x 4.9 x 4.3 cm  *  Composition: Solid or almost completely solid (2 points)  *  Echogenicity: Isoechoic (1 point)  *  Shape: Wider-than-tall (0 points)  *  Margin: Smooth (0 points)  *  Echogenic foci: None or comet-tail artifacts (0 points)  *  TI-RADS category: This nodule has undergone prior biopsy, with cytology results noted above. Therefore, it does not receive repeat TI-RADS categorization.    LYMPH NODES: Survey of the bilateral lateral cervical lymph node chains revealed no suspicious appearing lymph nodes.    IMPRESSION:  Thyroid nodules, as described in the body of the report. Measured nodules are similar to the prior study including previously biopsied left lobe nodule. Multiple sonographically similar TR 1/TR 2 nodules in the right lobe.    No suspicious lymph nodes identified in the lateral neck.    I personally reviewed the images on 8/22/24. The following is my interpretation:   L nodule #1 measures 1.2 x 1.6 x 1.2 cm. Agree with description as per radiology report   L nodule #2 measures 2.2 x 2.2 x 1.5 cm. Agree with description as per radiology report   L mid to inferior pole nodule #3 measures 3.4 x 4.0 x 2.9 cm. It is mixed cystic/solid. Agree with remaining description as per radiology report     DEXA BONE DENSITY 10/9/23    Narrative  CLINICAL HISTORY: Postmenopausal, history of diabetes and left hip replacement, screening for osteoporosis, Z 78.0.    COMMENT:    Scans of the the right hip and the left forearm were obtained. The bone mineral density was calculated for all the levels scanned.  The patient's bone mineral density was referenced to both young normal patients (T-score) as well as age matched patients (Z-score).  For the evaluation of osteoporosis, it is recommended that the patient's bone mineral  density be compared to young normal patients or the T-score.    Current World Health Organization criteria for the diagnosis of osteoporosis are as follows:  1.  Up to 1 standard deviation below normal compared to young normal patients - normal.  2.  Between 1 and 2.5 standard deviations below normal compared to young normal patients - low bone mass or osteopenia.  3.  Greater than or equal to 2.5 standard deviations below normal compared to young normal patients - osteoporosis.    The DEXA was performed on smsPREP equipment.    The lumbar spine was not able to be evaluated due to the placement of surgical devices.    Right hip replacement    AP right hip lowest T-score:  Femoral neck      -1.8  Corresponding bone mineral density:       0.792 g/cm2.    AP imaging of the non-dominant left forearm, 33% radius, demonstrates the T-score to measure -2.8  Corresponding bone mineral density of 0.632g/cm2.    IMPRESSION: Osteoporosis.    I personally reviewed the images on 8/22/24 and agree with the radiology report.     ULTRASOUND THYROID 8/25/23    Narrative  CLINICAL HISTORY: E05.90: Thyrotoxicosis, unspecified without thyrotoxic crisis  or storm  E04.2: Nontoxic multinodular goiter    COMMENT:    Comparison: None    Technique: Real-time high resolution sonography of the thyroid gland was performed.    Findings:  Right Lobe:  Size: Measures 5.1 x 2.1 x 1.7 cm.  Echotexture:  Heterogeneous.  Nodules: Present, with numerous colloid cysts and colloid nodules. Largest detailed below.    Nodule #right 1  Location: Lower pole  Size: 1 x 0.7 x 1 cm  Composition: Mixed cystic and solid  (1 point)  Echogenicity: isoechoic (1 point)  Shape: Wider-than-tall (0 points)  Margin: Ill-defined (0 points)  Echogenic foci: Punctate echogenic foci (3 points) (probably colloid but too small to generate ringdown)  Comparison: None available  TI-RADS category: TR4 (4-6 points): Moderately suspicious., Does not meet  criteria  for biopsy. In one year recommended.    Nodule #right 2  Location: upper pole only seen on cine series  Size: 1.2 x 0.4 x 0.5 cm  Composition: Mixed cystic and solid  (1 point)  Echogenicity:  isoechoic (1 point)  Shape: Wider-than-tall (0 points)  Margin: Smooth (0 points)  Echogenic foci:  large comet-tail artifacts (0 points)  Comparison: None available  TI-RADS category: TR2 (2 points): Not suspicious.    Other smaller cysts and colloid nodules noted elsewhere.    -    Left Lobe:  Size: Measures 9.3 x 3.7 x 4.2 cm, enlarged  Echotexture:  Heterogeneous.  Nodules: Present    Nodule #left 1  Location: Upper pole  Size: 1.2 x 1.2 x 1.4 cm  Composition: Mixed cystic and solid  (1 point)  Echogenicity: isoechoic (1 point)  Shape: Wider-than-tall (0 points)  Margin: Smooth (0 points)  Echogenic foci: None or large comet-tail artifacts (0 points)  Comparison: None available  TI-RADS category: TR2 (2 points): Not suspicious.    Nodule #left 2  Location: Interpolar/upper pole  Size:  2 x 1.4 x 2.2cm  Composition: Spongiform (0 points)  Echogenicity: isoechoic (1 point)  Shape: Wider-than-tall (0 points)  Margin: Smooth (0 points)  Echogenic foci: None or large comet-tail artifacts (0 points)  Comparison: None available  TI-RADS category: TR2 (2 points): Not suspicious.    Nodule #left 3  Location: Interpolar lower pole  Size: 4.9 x 3.6 x 4.3 cm  Composition: Spongiform (0 points)  Echogenicity:  isoechoic (1 point)  Shape: Wider-than-tall (0 points)  Margin: Ill-defined (0 points)  Echogenic foci: None or large comet-tail artifacts (0 points)  Comparison: None available  TI-RADS category: TR2 (2 points): Not suspicious.      -    Isthmus:  Mildly heterogeneous, measuring 0.4 cm. No nodules.    -  Other:  No suspicious lymph nodes in the visualized neck.    --    Impression  Enlarged multinodular goiter with multiple left-sided spongiform colloid nodules  and smaller right-sided nodules as discussed above.  Follow up in  one year to document stability.    I personally reviewed the images on 9/7/23 and agree with the radiology report. The following is my interpretation:   Heterogeneous gland  R lower pole nodule measures 1.01 x 0.88 x 0.69 cm. There are no calcifications. Agree with remaining description as per radiology report   R upper pole nodule not seen on still images  L upper pole nodule measures 1.19 x 1.41 x 1.15 cm. Agree with description as per radiology report   L upper/mid pole nodule measures 1.95 x 2.17 x 1.39 cm. Agree with description as per radiology report   L mid/lower pole nodule measures 2.8 x 4.6 x 3.57 cm. Agree with description as per radiology report      DXA 1/12/21  History: Postmenopausal. L1 compression fracture with spinal cord stimulator.   Left hip prosthesis..     Comparison: 10/18/2018. 6/28/2016.     Results: Evaluation of the right hip demonstrates a total bone mineral density   of 0.924 g/cm2 with a T-score of -0.7 (priors -0.8 and -1.1 in 2018 and 2016   respectively).. The W.H.O. classification is normal. The right femoral neck   demonstrates a total bone mineral density of 0.778 g/cm2 with a T-score of -1.9   (priors -2.0 and -2.5). The W.H.O. classification is osteopenia.     Evaluation of the total left radius demonstrates a total bone mineral density of   0.409 g/cm2 with a T score of -4.4 (priors -4.2 and -4.1). The W.H.O.   classification is osteoporosis. The total bone mineral density of the most   distal left radius is 0.249 g/cm2 with a T score of -4.8 (priors -4.9 and -5.3).   The W.H.O. classification is osteoporosis.     Based on the clinical information provided to the technical staff and the above   described measurements, the 10 year probability of a major osteoporotic fracture   is 21.3%. The 10 year probability of a hip fracture is 5.8%    Impression: Analysis of the right femoral neck and left radius demonstrate   diminished bone mineral density with evidence for increased  fracture risk.       ASSESSMENT AND PLAN    1. Osteoporosis  - S/p L1 compression fracture after falling in shower at 55 yo. Reportedly not treated until started prolia ~2021  - DXA stable 10/2023  - Continue prolia q6 mo, scheduled for next dose next week.   - Continue vitamin D and Ca supplementation  - Continue with weight bearing exercise  - Counseled on importance of fall prevention  - Repeat DXA 10/2025  - Check CMP, 25 vitamin D prior to next visit for monitoring    2. Amiodarone induced hyperthyroidism  - Off amiodarone since 7/2021 and off MMI since 9/2022  - Had resolved but TSH was low again 8/2024 but was clinically euthyroid. Repeat TFTs 10/2024 normal again  - Repeat TFTs pending from today, will f/u and contact pt with results    3. DM2, controlled, without known complications  - A1C 6.2%, at goal < 8.0%, slightly higher from before but still well controlled with less metformin  - Counseled on importance of dietary changes such as eliminating sugar sweetened beverages, reducing intake of processed foods, and increasing intake of whole foods such as fruits and veggies, nuts, seeds, and legumes  - Recommend increasing physical activity to at least 150 min moderate aerobic exercise per week + resistance training 2x/week  - Decrease metformin to 500 mg daily. Will try to discontinue altogether at next visit if A1C remains <6.5-7.0% to minimize pill burden at almost 89 yo  - Counseled on risk of hypoglycemia and instructed pt to call for BG < 80  - Many elements of diabetes self management were reviewed including, but not limited to, the importance of blood sugar monitoring, symptoms and treatment of hypoglycemia, blood sugar/A1C goals, adherence with medications, lifestyle management, and surveillance of eyes and feet and need for routine follow-up with ophthalmology and podiatry.   - Pt is aware of alternatives of therapy, risks and benefits, and accepts risk of default.  - Retinopathy screening: up to  date, sees retinal specialist q2 mo for macular degeneration  - Check A1C, CMP prior to next visit for monitoring     4. MNG  - No prior biopsies as per pt, however US report comments on a prior biopsy  - Most recent US 8/2024 is stable from prior  - Given advanced age and pt previously deemed by surgeon to be poor surgical candidate for thyroidectomy recommend stopping US monitoring, unless pt develops new local neck sx, as it will not change medical management    RTC 6 mo    I attest that this visit supports the complexity inherent to evaluation and management associated with medical care services that serve as the continuing focal point for all needed health care services and/or medical care services that are part of ongoing care related to this patient's single, serious condition or a complex condition.

## 2025-04-21 ENCOUNTER — OFFICE VISIT (OUTPATIENT)
Dept: NEUROSURGERY | Facility: CLINIC | Age: 89
End: 2025-04-21
Payer: MEDICARE

## 2025-04-21 VITALS
HEIGHT: 60 IN | DIASTOLIC BLOOD PRESSURE: 62 MMHG | RESPIRATION RATE: 16 BRPM | WEIGHT: 135 LBS | OXYGEN SATURATION: 98 % | BODY MASS INDEX: 26.5 KG/M2 | HEART RATE: 65 BPM | SYSTOLIC BLOOD PRESSURE: 120 MMHG

## 2025-04-21 DIAGNOSIS — M54.6 PAIN IN THORACIC SPINE: ICD-10-CM

## 2025-04-21 DIAGNOSIS — T85.192A MALFUNCTION OF SPINAL CORD STIMULATOR, INITIAL ENCOUNTER (CMS/HCC): Primary | ICD-10-CM

## 2025-04-21 PROCEDURE — 99214 OFFICE O/P EST MOD 30 MIN: CPT | Performed by: NEUROLOGICAL SURGERY

## 2025-04-21 NOTE — H&P (VIEW-ONLY)
"    [facility]  Medical Office Building [address]  [place] [address]  Phone: [phone]  Fax: [phone]      Patient ID: Rissa Erickson                              : 1935    Visit Date: 2025    Referring Provider: No ref. provider found    Chief Complaint / History of Present Illness:   Rissa Erickson is a pleasant 89 y.o. female history of Afib, CHF on eliquis, HLD, HTN seen today as a consult for spinal cord stimulator.  Rissa developed herpetic neuralgia about 20 years ago after a case of shingles. Had thoracic SCS placed with a Dr. Frazier in [facility] at the time with good improvement in her chest wall pain.   The stimulator pulse generator was revised with an [employer] device about 10 years ago.  It was then replaced with a [employer] pulse generator about 2 years ago. The leads have remained form her original surgery 20 years ago.  Her pain started to worsen a few months ago but with program adjustments it was reasonably controlled. 2 weeks ago her pain worsened without improvement and BS had difficulty communicating with the stimulator during a visit so she was referred to our office.  We ordered CT lumbar to evaluate the leads within the stimulator system and she presents today to review.    Today Rissa reports she has ongoing pain in the left chest wall in similar distribution to previous. Pain is moderate most days but can be severe at times.  It feels \"like an ice block is on by back\" with burning cold pain.   Stimulator provides relief sporadically.  She denies any focal increased back pain, no fevers or rashes.  CT does not show and focal discontinuity but the leads are quite high in the thoracic spine.  Difficulty to ascertain which lead is in the upper and lower hubs on the pulse generator.     Allergies:  Allergies   Allergen Reactions    Amiodarone Other (see comments)     Affected thyroid   thyroiditis         Medications:     Current " Outpatient Medications:     acetaminophen (TYLENOL 8 HOUR) 650 mg 8 hr tablet, Take 650 mg by mouth 4 (four) times a day., Disp: , Rfl:     apixaban (ELIQUIS) 2.5 mg tablet, Take 2.5 mg by mouth 2 (two) times a day., Disp: , Rfl:     atorvastatin (LIPITOR) 20 mg tablet, Take 1 tablet (20 mg total) by mouth nightly., Disp: 90 tablet, Rfl: 3    calcium carbonate (OS-HERBERT) 500 mg calcium (1,250 mg) tablet, Take 1 tablet by mouth every morning., Disp: , Rfl:     cholecalciferol, vitamin D3, 5,000 unit (125 mcg) tablet, Take 5,000 Units by mouth daily., Disp: , Rfl:     denosumab (PROLIA) 60 mg/mL syringe, Inject 60 mg under the skin every 6 (six) months., Disp: , Rfl:     ferrous sulfate 325 mg (65 mg iron) tablet, Take 65 mg by mouth daily with breakfast., Disp: , Rfl:     furosemide (LASIX) 20 mg tablet, Take 20 mg by mouth every morning., Disp: , Rfl:     gabapentin (NEURONTIN) 300 mg capsule, Take 300 mg by mouth 4 (four) times a day., Disp: , Rfl:     lancets misc, 1 Lancet as needed (Diabetes). To check blood glucose, Disp: 100 each, Rfl: 3    magnesium oxide (MAG-OX) 400 mg (241.3 mg magnesium) tablet, Take 400 mg by mouth daily., Disp: , Rfl:     metFORMIN (GLUCOPHAGE) 500 mg tablet, Take 1 tablet (500 mg total) by mouth daily with breakfast., Disp: , Rfl:     multivitamin tablet, Take by mouth daily., Disp: , Rfl:     pantoprazole (PROTONIX) 20 mg EC tablet, Take 20 mg by mouth every morning., Disp: , Rfl:     propranoloL (INDERAL) 20 mg tablet, TAKE 1 TABLET TWICE A DAY, Disp: 180 tablet, Rfl: 2    traMADoL (ULTRAM) 50 mg tablet, Take 50 mg by mouth 4 (four) times a day., Disp: , Rfl:     traZODone (DESYREL) 50 mg tablet, Take 50 mg by mouth nightly., Disp: , Rfl:     vit A/vit C/vit E/zinc/copper (PRESERVISION AREDS ORAL), Take 1 capsule by mouth every morning., Disp: , Rfl:     zinc acetate 50 mg (zinc) capsule, Take by mouth., Disp: , Rfl:      Past Medical History:   Past Medical History:   Diagnosis Date     A-fib (CMS/[facility])     Accelerated hypertension     CHF (congestive heart failure) (CMS/[facility])     Hearing loss     Macular degeneration     Shingles     Thyroid nodule     Type 2 diabetes mellitus (CMS/[facility])        Past Surgical History:   Past Surgical History   Procedure Laterality Date    Appendectomy      Hip surgery Left     Hysterectomy      Joint replacement      Knee arthroplasty         Family History:  No family history on file.    Social History:  Social History     Socioeconomic History    Marital status:      Spouse name: Not on file    Number of children: Not on file    Years of education: Not on file    Highest education level: Not on file   Occupational History    Not on file   Tobacco Use    Smoking status: Never    Smokeless tobacco: Never   Substance and Sexual Activity    Alcohol use: Never    Drug use: Never    Sexual activity: Defer   Other Topics Concern    Not on file   Social History Narrative    Not on file     Social Drivers of Health     Financial Resource Strain: Not on file   Food Insecurity: No Food Insecurity (11/19/2024)    Hunger Vital Sign     Worried About Running Out of Food in the Last Year: Never true     Ran Out of Food in the Last Year: Never true   Transportation Needs: No Transportation Needs (4/4/2024)    PRAPARE - Transportation     Lack of Transportation (Medical): No     Lack of Transportation (Non-Medical): No   Physical Activity: Not on file   Stress: Not on file   Social Connections: Not on file   Intimate Partner Violence: Not on file   Housing Stability: Low Risk  (4/4/2024)    Housing Stability Vital Sign     Unable to Pay for Housing in the Last Year: No     Number of Places Lived in the Last Year: 1     Unstable Housing in the Last Year: No       Vitals:   Vitals:    04/21/25 1130   BP: 120/62   Pulse: 65   Resp: 16   SpO2: 98%       Review of Systems:  A complete 14 point review of systems was conducted and was deemed negative except what was documented in  the HPI.    Physical Examination:  Physical Exam   Constitutional: Oriented to person, place, and time. Appears well-developed and well-nourished.   Head: Normocephalic and atraumatic.   Right Ear: External ear normal.   Left Ear: External ear normal.   Nose: Nose normal.   Mouth/Throat: Oropharynx is clear and moist.   Eyes: Conjunctivae and EOM are normal. Pupils are equal, round, and reactive to light. No scleral icterus.   Neck: Normal range of motion.   Cardiovascular: Normal rate.    Pulmonary/Chest: No respiratory distress.   Abdominal: Soft. Exhibits no distension.  Musculoskeletal: Normal range of motion. No edema or tenderness.   Neurological: Oriented to person, place, and time.   Skin: Skin is warm and dry. No rash noted. No erythema.   Psychiatric: Normal mood and affect. Speech is normal and behavior is normal. Thought content normal.     Vitals reviewed.    Neurological Examination:  Neurologic Exam     Mental Status   Oriented to person, place, and time.   Attention: normal.   Speech: speech is normal   Level of consciousness: alert    Cranial Nerves     CN II: Visual fields are full to confrontation.  Pupils are equal and briskly reactive to light.   CN III, IV, VI: Extra-occular muscles are intact  CN V: Facial sensation is intact and equal in V1-V3 distributions bilaterally.   CN VII: Face is symmetric with normal eye closure and smile.  CN VIII: Hearing is normal to rubbing fingers  CN IX, X: Palate elevates symmetrically. Phonation is normal.  CN XI: Head turning and shoulder shrug are intact  CN XII: Tongue is midline with normal movements and no atrophy.    Sensation    sensation intact to light touch throughout      Motor:     Deltoid Biceps Triceps Wrist ext Finger ext Hand Intrinsics Hip flexion Knee ext Dorsi-  flexion EHL Plantar Flexion   R 5 5 5 5 5 5 5 5 5 5 5   L 5 5 5 5 5 5 5 5 5 5 5     There is no pronator drift. Muscle bulk and tone are normal.         Data Review:    Lab Results:    Lab Results   Component Value Date    WBC 4.23 11/21/2024    HGB 11.1 (L) 11/21/2024    HCT 34.7 (L) 11/21/2024    MCV 95.3 11/21/2024     11/21/2024    RDW 13.6 11/21/2024      Lab Results   Component Value Date    GLUCOSE 102 (H) 11/22/2024    BUN 18 11/22/2024     11/22/2024    K 4.0 11/22/2024     11/22/2024    CO2 27 11/22/2024    ANIONGAP 5 11/22/2024        Imaging:   Independent review of all imaging was done by myself as well as review of the radiologists readings and comparison to prior films.       CT T/L  MPRESSION:  1. Thoracic spinal cord stimulator, as described.  2. Chronic L1 compression fracture and scoliosis. Degenerative changes most  severe along the inward of the scoliotic curvature.     COMMENT:  Technique:  Axial multislice CT of the thoracic and lumbar spine was  performed following standard protocol without contrast.  Sagittal and coronal  reformations were rendered.     CT DOSE:  One or more dose reduction techniques (e.g. automated exposure  control, adjustment of the mA and/or kV according to patient size, use of  iterative reconstruction technique) utilized for this examination.     Comparison: CT chest 11/19/2024. CT the lumbar spine 12/13/2023.     Findings:  Neurostimulator electrodes enter the central canal posteriorly at the T11-T12  level and ascend in the central canal with the electrodes terminating at the mid  T3 level streak artifact from these devices limits evaluation of nearby tissues.  Generator is posterior in the right lower back posterior to L3-4.     Alignment and curvature:  Levoscoliosis with apex at L1-2. Compensatory  dextrocurvature apex at L4-5. Normal thoracic alignment. Grade 1 retrolisthesis  of L1 on L2 and grade 1 anterolistheses of L4 on L5 and L5 on S1.     Vertebral body height:  Normal in the thoracic spine.  Moderate chronic L1 compression deformity.  Endplate sclerotic changes and marginal osteophytes most pronounced along  the  inward convexity of the scoliotic curvature.     Visualized paraspinal tissues:  Aortic atherosclerosis and coronary artery  calcifications.  ------------------------------------------  Evaluation of the central canal is limited on this nonmyelographic study.  ------------------------------------------  Axial images through the disc spaces show the following:  THORACIC SPINE:  T11-12: Mild disc osteophyte complex. No significant stenosis.     LUMBAR SPINE:  T12-L1: Right eccentric Disc osteophyte complex. Right greater than left facet  arthropathy. Moderate right foraminal stenosis.     L1-2: Right eccentric disc osteophyte complex. Right greater than left facet  arthropathy. Severe right foraminal stenosis.     L2-3: Disc osteophyte complex eccentric to the right. Right greater than left  facet arthropathy. Moderate right foraminal stenosis.     L3-4: Disc osteophyte complex, eccentric to the left. Left greater than right  facet arthropathy. Moderate right and severe left foraminal stenosis.     L4-5: Disc bulge with uncovering of the disc from listhesis. Facet arthropathy.  Mild right and severe left foraminal stenosis.     L5-S1: Disc bulge. Facet arthropathy. Mild left foraminal stenosis.        Assessment / Plan: In summary, Rissa Erickson is a 89 y.o. female seen today for evaluation of spinal cord stimulator malfunction.  The patient has 2 percutaneous leads including 1 placed at the T3-T5 level eccentric to the left.  This corresponds with the location of her dermatomal pain.  She has a second centrally located lead from approximately T7-T9.  Both sets of leads were placed through the T11-T12 interspace.  I do not see any clear evidence of lead fracture or displacement from the pulse generator.     At this point I will need to clarify with [employer] again which leads were actually stimulated in the past.  If the  T3-T5 lead was being utilized replacement of this lead would be I think more  "difficult.  Certainly it would  be challenging to place a lead in that location especially from the thoracolumbar junction . The patient has a significant deformity of the spine as well as calcification of the ligament flavum and at multiple levels which would make placement of a percutaneous lead more difficult except from a very low level in the spine.  Snaking a percutaneous lead into the same location from the thoracolumbar junction in the setting of a multiple previous leads with resulting scar tissue would be difficult.  I would also be hesitant to do so without some sense of the size of the spinal cord in relationship to the canal.  I do not have any old MRIs available to me.  The current system is not MRI compatible.  Options include a CT myelogram or removing the hardware and then performing an MRI.  Another option includes a percutaneous peripheral nerve stimulator at that same level.  I myself do not place percutaneous peripheral nerve stimulators.  Would  recommend Dr. Mychal Arora.       If on the other hand [facility] can confirm that the lead at the T7- T9 level is in fact the active lead then the situation becomes significantly easier because the lead is midline and substantially closer to open/noncalcified interlaminar spaces.  I would still favor a CT myelogram prior to replacement but \"more readily expose the T11-T12 level over a superior level and place either a paddle or percutaneous lead.    In either case the patient would require a CT myelogram.  She is currently on Eliquis and will need to get cardiac clearance to hold the Eliquis for the myelogram.        Damaso Carter MD    Addendum: Spoke to uQinten Mancera from [facility] who confirmed that the inferior lead is in fact the lead that was being used and was effectively controlling her pain until recently. We will therefore move towards getting Mrs. Erickson a CT myelogram and potentially schedule her for surgery assuming she " can be medically cleared by her primary and cardiology.        I spent  35 minutes on this date of service performing the following activities: obtaining history, performing examination, entering orders, documenting, preparing for visit, obtaining / reviewing records, providing counseling and education, independently reviewing study/studies, communicating results, and coordinating care.

## 2025-04-21 NOTE — PROGRESS NOTES
"    [facility]  Medical Office Building [address]  [place] [address]  Phone: [phone]  Fax: [phone]      Patient ID: Rissa Erickson                              : 1935    Visit Date: 2025    Referring Provider: No ref. provider found    Chief Complaint / History of Present Illness:   Rissa Erickson is a pleasant 89 y.o. female history of Afib, CHF on eliquis, HLD, HTN seen today as a consult for spinal cord stimulator.  Rissa developed herpetic neuralgia about 20 years ago after a case of shingles. Had thoracic SCS placed with a Dr. Frazier in [facility] at the time with good improvement in her chest wall pain.   The stimulator pulse generator was revised with an Abbot device about 10 years ago.  It was then replaced with a Neonode pulse generator about 2 years ago. The leads have remained form her original surgery 20 years ago.  Her pain started to worsen a few months ago but with program adjustments it was reasonably controlled. 2 weeks ago her pain worsened without improvement and BS had difficulty communicating with the stimulator during a visit so she was referred to our office.  We ordered CT lumbar to evaluate the leads within the stimulator system and she presents today to review.    Today Rissa reports she has ongoing pain in the left chest wall in similar distribution to previous. Pain is moderate most days but can be severe at times.  It feels \"like an ice block is on by back\" with burning cold pain.   Stimulator provides relief sporadically.  She denies any focal increased back pain, no fevers or rashes.  CT does not show and focal discontinuity but the leads are quite high in the thoracic spine.  Difficulty to ascertain which lead is in the upper and lower hubs on the pulse generator.     Allergies:  Allergies   Allergen Reactions    Amiodarone Other (see comments)     Affected thyroid   thyroiditis         Medications:     Current " Outpatient Medications:     acetaminophen (TYLENOL 8 HOUR) 650 mg 8 hr tablet, Take 650 mg by mouth 4 (four) times a day., Disp: , Rfl:     apixaban (ELIQUIS) 2.5 mg tablet, Take 2.5 mg by mouth 2 (two) times a day., Disp: , Rfl:     atorvastatin (LIPITOR) 20 mg tablet, Take 1 tablet (20 mg total) by mouth nightly., Disp: 90 tablet, Rfl: 3    calcium carbonate (OS-HERBERT) 500 mg calcium (1,250 mg) tablet, Take 1 tablet by mouth every morning., Disp: , Rfl:     cholecalciferol, vitamin D3, 5,000 unit (125 mcg) tablet, Take 5,000 Units by mouth daily., Disp: , Rfl:     denosumab (PROLIA) 60 mg/mL syringe, Inject 60 mg under the skin every 6 (six) months., Disp: , Rfl:     ferrous sulfate 325 mg (65 mg iron) tablet, Take 65 mg by mouth daily with breakfast., Disp: , Rfl:     furosemide (LASIX) 20 mg tablet, Take 20 mg by mouth every morning., Disp: , Rfl:     gabapentin (NEURONTIN) 300 mg capsule, Take 300 mg by mouth 4 (four) times a day., Disp: , Rfl:     lancets misc, 1 Lancet as needed (Diabetes). To check blood glucose, Disp: 100 each, Rfl: 3    magnesium oxide (MAG-OX) 400 mg (241.3 mg magnesium) tablet, Take 400 mg by mouth daily., Disp: , Rfl:     metFORMIN (GLUCOPHAGE) 500 mg tablet, Take 1 tablet (500 mg total) by mouth daily with breakfast., Disp: , Rfl:     multivitamin tablet, Take by mouth daily., Disp: , Rfl:     pantoprazole (PROTONIX) 20 mg EC tablet, Take 20 mg by mouth every morning., Disp: , Rfl:     propranoloL (INDERAL) 20 mg tablet, TAKE 1 TABLET TWICE A DAY, Disp: 180 tablet, Rfl: 2    traMADoL (ULTRAM) 50 mg tablet, Take 50 mg by mouth 4 (four) times a day., Disp: , Rfl:     traZODone (DESYREL) 50 mg tablet, Take 50 mg by mouth nightly., Disp: , Rfl:     vit A/vit C/vit E/zinc/copper (PRESERVISION AREDS ORAL), Take 1 capsule by mouth every morning., Disp: , Rfl:     zinc acetate 50 mg (zinc) capsule, Take by mouth., Disp: , Rfl:      Past Medical History:   Past Medical History:   Diagnosis Date     A-fib (CMS/[facility])     Accelerated hypertension     CHF (congestive heart failure) (CMS/[facility])     Hearing loss     Macular degeneration     Shingles     Thyroid nodule     Type 2 diabetes mellitus (CMS/[facility])        Past Surgical History:   Past Surgical History   Procedure Laterality Date    Appendectomy      Hip surgery Left     Hysterectomy      Joint replacement      Knee arthroplasty         Family History:  No family history on file.    Social History:  Social History     Socioeconomic History    Marital status:      Spouse name: Not on file    Number of children: Not on file    Years of education: Not on file    Highest education level: Not on file   Occupational History    Not on file   Tobacco Use    Smoking status: Never    Smokeless tobacco: Never   Substance and Sexual Activity    Alcohol use: Never    Drug use: Never    Sexual activity: Defer   Other Topics Concern    Not on file   Social History Narrative    Not on file     Social Drivers of Health     Financial Resource Strain: Not on file   Food Insecurity: No Food Insecurity (11/19/2024)    Hunger Vital Sign     Worried About Running Out of Food in the Last Year: Never true     Ran Out of Food in the Last Year: Never true   Transportation Needs: No Transportation Needs (4/4/2024)    PRAPARE - Transportation     Lack of Transportation (Medical): No     Lack of Transportation (Non-Medical): No   Physical Activity: Not on file   Stress: Not on file   Social Connections: Not on file   Intimate Partner Violence: Not on file   Housing Stability: Low Risk  (4/4/2024)    Housing Stability Vital Sign     Unable to Pay for Housing in the Last Year: No     Number of Places Lived in the Last Year: 1     Unstable Housing in the Last Year: No       Vitals:   Vitals:    04/21/25 1130   BP: 120/62   Pulse: 65   Resp: 16   SpO2: 98%       Review of Systems:  A complete 14 point review of systems was conducted and was deemed negative except what was documented in  the HPI.    Physical Examination:  Physical Exam   Constitutional: Oriented to person, place, and time. Appears well-developed and well-nourished.   Head: Normocephalic and atraumatic.   Right Ear: External ear normal.   Left Ear: External ear normal.   Nose: Nose normal.   Mouth/Throat: Oropharynx is clear and moist.   Eyes: Conjunctivae and EOM are normal. Pupils are equal, round, and reactive to light. No scleral icterus.   Neck: Normal range of motion.   Cardiovascular: Normal rate.    Pulmonary/Chest: No respiratory distress.   Abdominal: Soft. Exhibits no distension.  Musculoskeletal: Normal range of motion. No edema or tenderness.   Neurological: Oriented to person, place, and time.   Skin: Skin is warm and dry. No rash noted. No erythema.   Psychiatric: Normal mood and affect. Speech is normal and behavior is normal. Thought content normal.     Vitals reviewed.    Neurological Examination:  Neurologic Exam     Mental Status   Oriented to person, place, and time.   Attention: normal.   Speech: speech is normal   Level of consciousness: alert    Cranial Nerves     CN II: Visual fields are full to confrontation.  Pupils are equal and briskly reactive to light.   CN III, IV, VI: Extra-occular muscles are intact  CN V: Facial sensation is intact and equal in V1-V3 distributions bilaterally.   CN VII: Face is symmetric with normal eye closure and smile.  CN VIII: Hearing is normal to rubbing fingers  CN IX, X: Palate elevates symmetrically. Phonation is normal.  CN XI: Head turning and shoulder shrug are intact  CN XII: Tongue is midline with normal movements and no atrophy.    Sensation    sensation intact to light touch throughout      Motor:     Deltoid Biceps Triceps Wrist ext Finger ext Hand Intrinsics Hip flexion Knee ext Dorsi-  flexion EHL Plantar Flexion   R 5 5 5 5 5 5 5 5 5 5 5   L 5 5 5 5 5 5 5 5 5 5 5     There is no pronator drift. Muscle bulk and tone are normal.         Data Review:    Lab Results:    Lab Results   Component Value Date    WBC 4.23 11/21/2024    HGB 11.1 (L) 11/21/2024    HCT 34.7 (L) 11/21/2024    MCV 95.3 11/21/2024     11/21/2024    RDW 13.6 11/21/2024      Lab Results   Component Value Date    GLUCOSE 102 (H) 11/22/2024    BUN 18 11/22/2024     11/22/2024    K 4.0 11/22/2024     11/22/2024    CO2 27 11/22/2024    ANIONGAP 5 11/22/2024        Imaging:   Independent review of all imaging was done by myself as well as review of the radiologists readings and comparison to prior films.       CT T/L  MPRESSION:  1. Thoracic spinal cord stimulator, as described.  2. Chronic L1 compression fracture and scoliosis. Degenerative changes most  severe along the inward of the scoliotic curvature.     COMMENT:  Technique:  Axial multislice CT of the thoracic and lumbar spine was  performed following standard protocol without contrast.  Sagittal and coronal  reformations were rendered.     CT DOSE:  One or more dose reduction techniques (e.g. automated exposure  control, adjustment of the mA and/or kV according to patient size, use of  iterative reconstruction technique) utilized for this examination.     Comparison: CT chest 11/19/2024. CT the lumbar spine 12/13/2023.     Findings:  Neurostimulator electrodes enter the central canal posteriorly at the T11-T12  level and ascend in the central canal with the electrodes terminating at the mid  T3 level streak artifact from these devices limits evaluation of nearby tissues.  Generator is posterior in the right lower back posterior to L3-4.     Alignment and curvature:  Levoscoliosis with apex at L1-2. Compensatory  dextrocurvature apex at L4-5. Normal thoracic alignment. Grade 1 retrolisthesis  of L1 on L2 and grade 1 anterolistheses of L4 on L5 and L5 on S1.     Vertebral body height:  Normal in the thoracic spine.  Moderate chronic L1 compression deformity.  Endplate sclerotic changes and marginal osteophytes most pronounced along  the  inward convexity of the scoliotic curvature.     Visualized paraspinal tissues:  Aortic atherosclerosis and coronary artery  calcifications.  ------------------------------------------  Evaluation of the central canal is limited on this nonmyelographic study.  ------------------------------------------  Axial images through the disc spaces show the following:  THORACIC SPINE:  T11-12: Mild disc osteophyte complex. No significant stenosis.     LUMBAR SPINE:  T12-L1: Right eccentric Disc osteophyte complex. Right greater than left facet  arthropathy. Moderate right foraminal stenosis.     L1-2: Right eccentric disc osteophyte complex. Right greater than left facet  arthropathy. Severe right foraminal stenosis.     L2-3: Disc osteophyte complex eccentric to the right. Right greater than left  facet arthropathy. Moderate right foraminal stenosis.     L3-4: Disc osteophyte complex, eccentric to the left. Left greater than right  facet arthropathy. Moderate right and severe left foraminal stenosis.     L4-5: Disc bulge with uncovering of the disc from listhesis. Facet arthropathy.  Mild right and severe left foraminal stenosis.     L5-S1: Disc bulge. Facet arthropathy. Mild left foraminal stenosis.        Assessment / Plan: In summary, Rissa Erickson is a 89 y.o. female seen today for evaluation of spinal cord stimulator malfunction.  The patient has 2 percutaneous leads including 1 placed at the T3-T5 level eccentric to the left.  This corresponds with the location of her dermatomal pain.  She has a second centrally located lead from approximately T7-T9.  Both sets of leads were placed through the T11-T12 interspace.  I do not see any clear evidence of lead fracture or displacement from the pulse generator.     At this point I will need to clarify with [employer] again which leads were actually stimulated in the past.  If the  T3-T5 lead was being utilized replacement of this lead would be I think more  "difficult.  Certainly it would  be challenging to place a lead in that location especially from the thoracolumbar junction . The patient has a significant deformity of the spine as well as calcification of the ligament flavum and at multiple levels which would make placement of a percutaneous lead more difficult except from a very low level in the spine.  Snaking a percutaneous lead into the same location from the thoracolumbar junction in the setting of a multiple previous leads with resulting scar tissue would be difficult.  I would also be hesitant to do so without some sense of the size of the spinal cord in relationship to the canal.  I do not have any old MRIs available to me.  The current system is not MRI compatible.  Options include a CT myelogram or removing the hardware and then performing an MRI.  Another option includes a percutaneous peripheral nerve stimulator at that same level.  I myself do not place percutaneous peripheral nerve stimulators.  Would  recommend Dr. Mychal Arora.       If on the other hand [facility] can confirm that the lead at the T7- T9 level is in fact the active lead then the situation becomes significantly easier because the lead is midline and substantially closer to open/noncalcified interlaminar spaces.  I would still favor a CT myelogram prior to replacement but \"more readily expose the T11-T12 level over a superior level and place either a paddle or percutaneous lead.    In either case the patient would require a CT myelogram.  She is currently on Eliquis and will need to get cardiac clearance to hold the Eliquis for the myelogram.        Damaso Carter MD    Addendum: Spoke to Quinten Mancera from [employer] who confirmed that the inferior lead is in fact the lead that was being used and was effectively controlling her pain until recently. We will therefore move towards getting Mrs. Erickson a CT myelogram and potentially schedule her for surgery assuming she " can be medically cleared by her primary and cardiology.        I spent  35 minutes on this date of service performing the following activities: obtaining history, performing examination, entering orders, documenting, preparing for visit, obtaining / reviewing records, providing counseling and education, independently reviewing study/studies, communicating results, and coordinating care.

## 2025-04-22 DIAGNOSIS — B02.29 POSTHERPETIC NEURALGIA: Primary | ICD-10-CM

## 2025-04-24 ENCOUNTER — RESULTS FOLLOW-UP (OUTPATIENT)
Dept: ENDOCRINOLOGY | Facility: CLINIC | Age: 89
End: 2025-04-24

## 2025-04-24 NOTE — RESULT ENCOUNTER NOTE
TFTs normal, continue without medications. Monitor annually. Will inform pt via [employer] message

## 2025-04-25 ENCOUNTER — HOSPITAL ENCOUNTER (OUTPATIENT)
Dept: PREOP | Facility: HOSPITAL | Age: 89
Setting detail: INFUSION SERIES
Discharge: HOME | End: 2025-04-25
Attending: INTERNAL MEDICINE
Payer: MEDICARE

## 2025-04-25 VITALS
RESPIRATION RATE: 20 BRPM | OXYGEN SATURATION: 96 % | SYSTOLIC BLOOD PRESSURE: 141 MMHG | WEIGHT: 134 LBS | BODY MASS INDEX: 26.31 KG/M2 | HEART RATE: 65 BPM | TEMPERATURE: 97.2 F | DIASTOLIC BLOOD PRESSURE: 50 MMHG | HEIGHT: 60 IN

## 2025-04-25 DIAGNOSIS — M81.0 AGE-RELATED OSTEOPOROSIS WITHOUT CURRENT PATHOLOGICAL FRACTURE: Primary | ICD-10-CM

## 2025-04-25 PROCEDURE — 63600000 HC DRUGS/DETAIL CODE: Mod: JZ,TB | Performed by: INTERNAL MEDICINE

## 2025-04-25 PROCEDURE — 3E013GC INTRODUCTION OF OTHER THERAPEUTIC SUBSTANCE INTO SUBCUTANEOUS TISSUE, PERCUTANEOUS APPROACH: ICD-10-PCS | Performed by: INTERNAL MEDICINE

## 2025-04-25 PROCEDURE — 96372 THER/PROPH/DIAG INJ SC/IM: CPT

## 2025-04-25 RX ORDER — FAMOTIDINE 10 MG/ML
20 INJECTION, SOLUTION INTRAVENOUS ONCE AS NEEDED
OUTPATIENT
Start: 2025-10-18

## 2025-04-25 RX ORDER — DIPHENHYDRAMINE HCL 50 MG/ML
25 VIAL (ML) INJECTION ONCE AS NEEDED
Status: DISCONTINUED | OUTPATIENT
Start: 2025-04-25 | End: 2025-04-26 | Stop reason: HOSPADM

## 2025-04-25 RX ORDER — EPINEPHRINE 1 MG/ML
0.5 INJECTION, SOLUTION INTRAMUSCULAR; SUBCUTANEOUS ONCE AS NEEDED
Status: DISCONTINUED | OUTPATIENT
Start: 2025-04-25 | End: 2025-04-26 | Stop reason: HOSPADM

## 2025-04-25 RX ORDER — DIPHENHYDRAMINE HCL 50 MG/ML
25 VIAL (ML) INJECTION ONCE AS NEEDED
OUTPATIENT
Start: 2025-10-18

## 2025-04-25 RX ORDER — FAMOTIDINE 10 MG/ML
20 INJECTION, SOLUTION INTRAVENOUS ONCE AS NEEDED
Status: DISCONTINUED | OUTPATIENT
Start: 2025-04-25 | End: 2025-04-26 | Stop reason: HOSPADM

## 2025-04-25 RX ORDER — EPINEPHRINE 1 MG/ML
0.5 INJECTION, SOLUTION INTRAMUSCULAR; SUBCUTANEOUS ONCE AS NEEDED
OUTPATIENT
Start: 2025-10-18

## 2025-04-25 RX ADMIN — DENOSUMAB 60 MG: 60 INJECTION SUBCUTANEOUS at 10:04

## 2025-04-29 ENCOUNTER — HOSPITAL ENCOUNTER (OUTPATIENT)
Dept: RADIOLOGY | Facility: HOSPITAL | Age: 89
Discharge: HOME | End: 2025-04-29
Attending: PHYSICIAN ASSISTANT
Payer: MEDICARE

## 2025-04-29 ENCOUNTER — HOSPITAL ENCOUNTER (OUTPATIENT)
Dept: RADIOLOGY | Facility: HOSPITAL | Age: 89
Discharge: HOME | End: 2025-04-29
Attending: PHYSICIAN ASSISTANT | Admitting: RADIOLOGY
Payer: MEDICARE

## 2025-04-29 VITALS
OXYGEN SATURATION: 95 % | HEART RATE: 76 BPM | SYSTOLIC BLOOD PRESSURE: 182 MMHG | RESPIRATION RATE: 16 BRPM | DIASTOLIC BLOOD PRESSURE: 88 MMHG

## 2025-04-29 DIAGNOSIS — B02.29 POSTHERPETIC NEURALGIA: ICD-10-CM

## 2025-04-29 PROCEDURE — 36100345 IR LUMBAR PUNCTURE FOR MYELOGRAM

## 2025-04-29 PROCEDURE — 71000011 HC PACU PHASE 1 EA ADDL MIN

## 2025-04-29 PROCEDURE — 77003 FLUOROGUIDE FOR SPINE INJECT: CPT

## 2025-04-29 PROCEDURE — B01B1ZZ FLUOROSCOPY OF SPINAL CORD USING LOW OSMOLAR CONTRAST: ICD-10-PCS | Performed by: RADIOLOGY

## 2025-04-29 PROCEDURE — 77012 CT SCAN FOR NEEDLE BIOPSY: CPT

## 2025-04-29 PROCEDURE — 25000000 HC PHARMACY GENERAL: Performed by: PHYSICIAN ASSISTANT

## 2025-04-29 PROCEDURE — 63600105 HC IODINE BASED CONTRAST: Performed by: PHYSICIAN ASSISTANT

## 2025-04-29 PROCEDURE — 71000001 HC PACU PHASE 1 INITIAL 30MIN

## 2025-04-29 RX ORDER — LIDOCAINE HYDROCHLORIDE 10 MG/ML
INJECTION, SOLUTION INFILTRATION; PERINEURAL
Status: COMPLETED | OUTPATIENT
Start: 2025-04-29 | End: 2025-04-29

## 2025-04-29 RX ORDER — IOPAMIDOL 408 MG/ML
10 INJECTION, SOLUTION INTRATHECAL
Status: COMPLETED | OUTPATIENT
Start: 2025-04-29 | End: 2025-04-29

## 2025-04-29 RX ADMIN — IOPAMIDOL 10 ML: 408 INJECTION, SOLUTION INTRATHECAL at 09:16

## 2025-04-29 RX ADMIN — LIDOCAINE HYDROCHLORIDE 10 ML: 10 INJECTION, SOLUTION INFILTRATION; PERINEURAL at 08:44

## 2025-04-29 NOTE — Clinical Note
Patient placed on procedure table in prone position. Positioning devices: all pressure points padded, arm board under arms and safety strap applied.

## 2025-04-29 NOTE — OR SURGEON
Pre-Procedure patient identification:  I am the primary operating surgeon/proceduralist and I have reviewed the applicable pathology reports and radiology studies for this procedure. I have identified the patient on 04/29/25 at 8:15 AM AMMON Munoz

## 2025-04-29 NOTE — DISCHARGE INSTRUCTIONS
Lumbar Puncture     DISCHARGE INSTRUCTIONS   You have had a lumbar puncture.   You may resume your normal diet. Increase your fluid intake.   You may resume your normal medications.   Limit your activity for the next 24 hours. After that, you may resume normal activity as tolerated.   If you develop a slight headache, you may take Tylenol to ease the pain.   Notify your primary physician and/or Interventional Radiologist IMMEDIATELY if any of the following occur:   · Fever of 101° F (38 ° C) or chills   · Swelling and or redness in the area of the puncture site   · Worsening pain   · Difficulty walking/numbness or tingling in legs/feet   · Severe headache, unable to be eased with Tylenol - PLEASE CALL THE PHYSICIAN WHO ORDERED YOUR STUDY    Physician Contact Information   · If you have a problem that you believe requires immediate attention, you should go to the Emergency Room, either at [facility] or the closest hospital. If you believe that your problem can safely wait until you speak to a physician, you should contact your doctor or Interventional Radiologist.   · It is usually easier to contact your own physician by calling the office, or after hours through the answering service. If you do not know the number, the hospital  can connect you by calling [phone].     · If you have concerns that need to be answered by the Interventional Radiologist, you can reach him/ her as follows:   o During regular weekday hours (8AM to 5PM), call [phone] and ask the technologist to connect you with the Interventional Radiologist.   o During off hours for emergencies call [phone] and ask the hospital  to contact the Interventional Radiologist on-call.     [facility] strongly recommends that you visit a Primary Care Provider (PCP) regularly. Your PCP can help you implement the recommendations we gave you today, coordinate care among your specialists, as well as  make sure you are up to date with wellness exams, immunizations and preventive screenings. Your PCP can also help when you are feeling sick, potentially avoiding the need for urgent care or emergency department visits. For these reasons, it is important that you follow up with your PCP at least annually or more often based upon your medical conditions. If you do not have a PCP, please call [phone] ([phone]) or go to Find a Doctor for help with finding one.

## 2025-04-29 NOTE — POST-PROCEDURE NOTE
Interventional Radiology Brief Postprocedure Note    Rissa Erickson     Attending: AMMON Munoz Dr., MD      Assistant: Judi Taylor PA-C    Diagnosis: Postherpetic neuralgia, back pain     Description of procedure: lumbar puncture for myelogram CT    Contrast: isovue M-200 15 mL      Anesthesia:  Local (Lidocaine)    Volume of Lidocaine Utilized (ml): 5 mL     Medications: none      Complications: None      Estimated Blood Loss: Estimated Blood Loss: None    Anticoagulation: may resume eliquis at next dose    Specimens: none    Findings: successful flouroscopy guided lumbar puncture at L4-L5 in prone position, confirmed by return of CSF, injected isovue M-200 15 mL and patient placed in trendelenburg, vss, pt tolerated procedure well    4/29/2025 9:17 AM

## 2025-05-07 ENCOUNTER — OFFICE VISIT (OUTPATIENT)
Dept: NEUROSURGERY | Facility: CLINIC | Age: 89
End: 2025-05-07
Payer: MEDICARE

## 2025-05-07 VITALS
HEIGHT: 60 IN | OXYGEN SATURATION: 98 % | SYSTOLIC BLOOD PRESSURE: 124 MMHG | HEART RATE: 59 BPM | RESPIRATION RATE: 16 BRPM | WEIGHT: 132 LBS | BODY MASS INDEX: 25.91 KG/M2 | DIASTOLIC BLOOD PRESSURE: 78 MMHG

## 2025-05-07 DIAGNOSIS — B02.29 POSTHERPETIC NEURALGIA: Primary | ICD-10-CM

## 2025-05-07 PROCEDURE — 99213 OFFICE O/P EST LOW 20 MIN: CPT | Performed by: NEUROLOGICAL SURGERY

## 2025-05-08 ENCOUNTER — APPOINTMENT (OUTPATIENT)
Dept: LAB | Facility: HOSPITAL | Age: 89
End: 2025-05-08
Attending: PHYSICIAN ASSISTANT
Payer: MEDICARE

## 2025-05-08 DIAGNOSIS — E11.618: ICD-10-CM

## 2025-05-08 DIAGNOSIS — M54.6 PAIN IN THORACIC SPINE: ICD-10-CM

## 2025-05-08 DIAGNOSIS — T14.90XA INJURY: ICD-10-CM

## 2025-05-08 DIAGNOSIS — T85.192A MALFUNCTION OF SPINAL CORD STIMULATOR, INITIAL ENCOUNTER (CMS/HCC): ICD-10-CM

## 2025-05-08 DIAGNOSIS — B02.29 POSTHERPETIC NEURALGIA: ICD-10-CM

## 2025-05-08 DIAGNOSIS — B02.29 POSTHERPETIC NEURALGIA: Primary | ICD-10-CM

## 2025-05-08 LAB
ABO + RH BLD: NORMAL
ALBUMIN SERPL-MCNC: 4 G/DL (ref 3.5–5.7)
ALP SERPL-CCNC: 52 IU/L (ref 34–125)
ALT SERPL-CCNC: 12 IU/L (ref 7–52)
ANION GAP SERPL CALC-SCNC: 5 MEQ/L (ref 3–15)
APTT PPP: 29 SEC (ref 23–35)
AST SERPL-CCNC: 15 IU/L (ref 13–39)
BASOPHILS # BLD: 0.03 K/UL (ref 0.01–0.1)
BASOPHILS NFR BLD: 0.5 %
BILIRUB SERPL-MCNC: 0.5 MG/DL (ref 0.3–1.2)
BLD GP AB SCN SERPL QL: NEGATIVE
BLOOD BANK CMNT PATIENT-IMP: NORMAL
BUN SERPL-MCNC: 23 MG/DL (ref 7–25)
CALCIUM SERPL-MCNC: 9 MG/DL (ref 8.6–10.3)
CHLORIDE SERPL-SCNC: 102 MEQ/L (ref 98–107)
CO2 SERPL-SCNC: 32 MEQ/L (ref 21–31)
CREAT SERPL-MCNC: 0.8 MG/DL (ref 0.6–1.2)
D AG BLD QL: POSITIVE
DIFFERENTIAL METHOD BLD: ABNORMAL
EGFRCR SERPLBLD CKD-EPI 2021: >60 ML/MIN/1.73M*2
EOSINOPHIL # BLD: 0.18 K/UL (ref 0.04–0.36)
EOSINOPHIL NFR BLD: 3 %
ERYTHROCYTE [DISTWIDTH] IN BLOOD BY AUTOMATED COUNT: 14.8 % (ref 11.7–14.4)
GLUCOSE SERPL-MCNC: 178 MG/DL (ref 70–99)
HCT VFR BLD AUTO: 35.8 % (ref 35–45)
HGB BLD-MCNC: 11.3 G/DL (ref 11.8–15.7)
IMM GRANULOCYTES # BLD AUTO: 0.01 K/UL (ref 0–0.08)
IMM GRANULOCYTES NFR BLD AUTO: 0.2 %
INR PPP: 1.1
LABORATORY COMMENT REPORT: NORMAL
LYMPHOCYTES # BLD: 2.6 K/UL (ref 1.2–3.5)
LYMPHOCYTES NFR BLD: 43 %
MCH RBC QN AUTO: 30.4 PG (ref 28–33.2)
MCHC RBC AUTO-ENTMCNC: 31.6 G/DL (ref 32.2–35.5)
MCV RBC AUTO: 96.2 FL (ref 83–98)
MONOCYTES # BLD: 0.51 K/UL (ref 0.28–0.8)
MONOCYTES NFR BLD: 8.4 %
NEUTROPHILS # BLD: 2.71 K/UL (ref 1.7–7)
NEUTS SEG NFR BLD: 44.9 %
NRBC BLD-RTO: 0 %
PLATELET # BLD AUTO: 221 K/UL (ref 150–369)
PMV BLD AUTO: 9.4 FL (ref 9.4–12.3)
POTASSIUM SERPL-SCNC: 4.4 MEQ/L (ref 3.5–5.1)
PROT SERPL-MCNC: 6 G/DL (ref 6–8.2)
PROTHROMBIN TIME: 14.3 SEC (ref 12.2–14.5)
RBC # BLD AUTO: 3.72 M/UL (ref 3.93–5.22)
SODIUM SERPL-SCNC: 139 MEQ/L (ref 136–145)
SPECIMEN EXP DATE BLD: NORMAL
WBC # BLD AUTO: 6.04 K/UL (ref 3.8–10.5)

## 2025-05-08 PROCEDURE — 80053 COMPREHEN METABOLIC PANEL: CPT

## 2025-05-08 PROCEDURE — 85730 THROMBOPLASTIN TIME PARTIAL: CPT

## 2025-05-08 PROCEDURE — 85025 COMPLETE CBC W/AUTO DIFF WBC: CPT

## 2025-05-08 PROCEDURE — 83036 HEMOGLOBIN GLYCOSYLATED A1C: CPT

## 2025-05-08 PROCEDURE — 36415 COLL VENOUS BLD VENIPUNCTURE: CPT

## 2025-05-08 PROCEDURE — 85610 PROTHROMBIN TIME: CPT

## 2025-05-08 PROCEDURE — 86901 BLOOD TYPING SEROLOGIC RH(D): CPT

## 2025-05-09 LAB
EST. AVERAGE GLUCOSE BLD GHB EST-MCNC: 123 MG/DL
HBA1C MFR BLD: 5.9 %

## 2025-05-13 ENCOUNTER — PRE-ADMISSION TESTING (OUTPATIENT)
Dept: PREADMISSION TESTING | Age: 89
End: 2025-05-13
Payer: MEDICARE

## 2025-05-13 ENCOUNTER — ANESTHESIA EVENT (OUTPATIENT)
Dept: OPERATING ROOM | Facility: HOSPITAL | Age: 89
Setting detail: HOSPITAL OUTPATIENT SURGERY
DRG: 029 | End: 2025-05-13
Payer: MEDICARE

## 2025-05-13 VITALS — WEIGHT: 132 LBS | HEIGHT: 60 IN | BODY MASS INDEX: 25.91 KG/M2

## 2025-05-15 ENCOUNTER — HOSPITAL ENCOUNTER (INPATIENT)
Facility: HOSPITAL | Age: 89
LOS: 5 days | Discharge: NURSING, REHAB OR LONG-TERM CARE | DRG: 029 | End: 2025-05-20
Attending: NEUROLOGICAL SURGERY | Admitting: NEUROLOGICAL SURGERY
Payer: MEDICARE

## 2025-05-15 ENCOUNTER — APPOINTMENT (INPATIENT)
Dept: RADIOLOGY | Facility: HOSPITAL | Age: 89
DRG: 029 | End: 2025-05-15
Attending: NEUROLOGICAL SURGERY
Payer: MEDICARE

## 2025-05-15 ENCOUNTER — ANESTHESIA (OUTPATIENT)
Dept: OPERATING ROOM | Facility: HOSPITAL | Age: 89
Setting detail: HOSPITAL OUTPATIENT SURGERY
DRG: 029 | End: 2025-05-15
Payer: MEDICARE

## 2025-05-15 DIAGNOSIS — B02.29 POSTHERPETIC NEURALGIA: ICD-10-CM

## 2025-05-15 LAB
ABO + RH BLD: NORMAL
D AG BLD QL: POSITIVE
GLUCOSE BLD-MCNC: 156 MG/DL (ref 70–99)
GLUCOSE BLD-MCNC: 159 MG/DL (ref 70–99)
GLUCOSE BLD-MCNC: 163 MG/DL (ref 70–99)
GLUCOSE BLD-MCNC: 96 MG/DL (ref 70–99)
POCT TEST: ABNORMAL
POCT TEST: NORMAL

## 2025-05-15 PROCEDURE — 36415 COLL VENOUS BLD VENIPUNCTURE: CPT | Performed by: NEUROLOGICAL SURGERY

## 2025-05-15 PROCEDURE — 25800000 HC PHARMACY IV SOLUTIONS: Performed by: NURSE ANESTHETIST, CERTIFIED REGISTERED

## 2025-05-15 PROCEDURE — 0JWT0MZ REVISION OF STIMULATOR GENERATOR IN TRUNK SUBCUTANEOUS TISSUE AND FASCIA, OPEN APPROACH: ICD-10-PCS | Performed by: NEUROLOGICAL SURGERY

## 2025-05-15 PROCEDURE — 25800000 HC PHARMACY IV SOLUTIONS: Performed by: PHYSICIAN ASSISTANT

## 2025-05-15 PROCEDURE — 37000001 HC ANESTHESIA GENERAL: Performed by: NEUROLOGICAL SURGERY

## 2025-05-15 PROCEDURE — 25800000 HC PHARMACY IV SOLUTIONS: Performed by: NEUROLOGICAL SURGERY

## 2025-05-15 PROCEDURE — 25000000 HC PHARMACY GENERAL: Performed by: NURSE ANESTHETIST, CERTIFIED REGISTERED

## 2025-05-15 PROCEDURE — 63600000 HC DRUGS/DETAIL CODE: Mod: JZ | Performed by: PHYSICIAN ASSISTANT

## 2025-05-15 PROCEDURE — 00PU0MZ REMOVAL OF NEUROSTIMULATOR LEAD FROM SPINAL CANAL, OPEN APPROACH: ICD-10-PCS | Performed by: NEUROLOGICAL SURGERY

## 2025-05-15 PROCEDURE — 63600000 HC DRUGS/DETAIL CODE: Performed by: NURSE ANESTHETIST, CERTIFIED REGISTERED

## 2025-05-15 PROCEDURE — 63700000 HC SELF-ADMINISTRABLE DRUG: Performed by: PHYSICIAN ASSISTANT

## 2025-05-15 PROCEDURE — 63700000 HC SELF-ADMINISTRABLE DRUG: Performed by: NEUROLOGICAL SURGERY

## 2025-05-15 PROCEDURE — 27200000 HC STERILE SUPPLY: Performed by: NEUROLOGICAL SURGERY

## 2025-05-15 PROCEDURE — 63600000 HC DRUGS/DETAIL CODE: Mod: JZ | Performed by: NEUROLOGICAL SURGERY

## 2025-05-15 PROCEDURE — 12000000 HC ROOM AND CARE MED/SURG

## 2025-05-15 PROCEDURE — 27800000 HC SUPPLY/IMPLANTS: Performed by: NEUROLOGICAL SURGERY

## 2025-05-15 PROCEDURE — 63664 REVISE SPINE ELTRD PLATE: CPT | Performed by: NEUROLOGICAL SURGERY

## 2025-05-15 PROCEDURE — C1778 LEAD, NEUROSTIMULATOR: HCPCS | Performed by: NEUROLOGICAL SURGERY

## 2025-05-15 PROCEDURE — 63688 REV/RMV IMP SP NPG/R DTCH CN: CPT | Performed by: NEUROLOGICAL SURGERY

## 2025-05-15 PROCEDURE — C1713 ANCHOR/SCREW BN/BN,TIS/BN: HCPCS | Performed by: NEUROLOGICAL SURGERY

## 2025-05-15 PROCEDURE — 36000012 HC OR LEVEL 2 EA ADDL MIN: Performed by: NEUROLOGICAL SURGERY

## 2025-05-15 PROCEDURE — 97162 PT EVAL MOD COMPLEX 30 MIN: CPT | Mod: GP

## 2025-05-15 PROCEDURE — 88300 SURGICAL PATH GROSS: CPT | Performed by: NEUROLOGICAL SURGERY

## 2025-05-15 PROCEDURE — 71000011 HC PACU PHASE 1 EA ADDL MIN: Performed by: NEUROLOGICAL SURGERY

## 2025-05-15 PROCEDURE — 71000001 HC PACU PHASE 1 INITIAL 30MIN: Performed by: NEUROLOGICAL SURGERY

## 2025-05-15 PROCEDURE — 63600000 HC DRUGS/DETAIL CODE: Mod: JZ | Performed by: ANESTHESIOLOGY

## 2025-05-15 PROCEDURE — 63664 REVISE SPINE ELTRD PLATE: CPT | Mod: AS | Performed by: PHYSICIAN ASSISTANT

## 2025-05-15 PROCEDURE — 00HU0MZ INSERTION OF NEUROSTIMULATOR LEAD INTO SPINAL CANAL, OPEN APPROACH: ICD-10-PCS | Performed by: NEUROLOGICAL SURGERY

## 2025-05-15 PROCEDURE — 25000000 HC PHARMACY GENERAL: Performed by: NEUROLOGICAL SURGERY

## 2025-05-15 PROCEDURE — 200200 PR NO CHARGE: Performed by: NEUROLOGICAL SURGERY

## 2025-05-15 PROCEDURE — 36000002 HC OR LEVEL 2 INITIAL 30MIN: Performed by: NEUROLOGICAL SURGERY

## 2025-05-15 PROCEDURE — 4A11X4G MONITORING OF PERIPHERAL NERVOUS ELECTRICAL ACTIVITY, INTRAOPERATIVE, EXTERNAL APPROACH: ICD-10-PCS | Performed by: NEUROLOGICAL SURGERY

## 2025-05-15 DEVICE — IMPLANTABLE DEVICE: Type: IMPLANTABLE DEVICE | Site: SPINE THORACIC | Status: FUNCTIONAL

## 2025-05-15 RX ORDER — ONDANSETRON HYDROCHLORIDE 2 MG/ML
4 INJECTION, SOLUTION INTRAVENOUS EVERY 8 HOURS PRN
Status: DISCONTINUED | OUTPATIENT
Start: 2025-05-15 | End: 2025-05-20 | Stop reason: HOSPADM

## 2025-05-15 RX ORDER — ATORVASTATIN CALCIUM 20 MG/1
20 TABLET, FILM COATED ORAL NIGHTLY
Status: DISCONTINUED | OUTPATIENT
Start: 2025-05-15 | End: 2025-05-20 | Stop reason: HOSPADM

## 2025-05-15 RX ORDER — INSULIN LISPRO 100 [IU]/ML
3-5 INJECTION, SOLUTION INTRAVENOUS; SUBCUTANEOUS
Status: DISCONTINUED | OUTPATIENT
Start: 2025-05-15 | End: 2025-05-20 | Stop reason: HOSPADM

## 2025-05-15 RX ORDER — DEXTROSE 50 % IN WATER (D50W) INTRAVENOUS SYRINGE
25 AS NEEDED
Status: DISCONTINUED | OUTPATIENT
Start: 2025-05-15 | End: 2025-05-20 | Stop reason: HOSPADM

## 2025-05-15 RX ORDER — OXYCODONE HYDROCHLORIDE 5 MG/1
5 TABLET ORAL EVERY 6 HOURS PRN
Refills: 0 | Status: DISCONTINUED | OUTPATIENT
Start: 2025-05-15 | End: 2025-05-20 | Stop reason: HOSPADM

## 2025-05-15 RX ORDER — SENNOSIDES 8.6 MG/1
1 TABLET ORAL 2 TIMES DAILY PRN
Status: DISCONTINUED | OUTPATIENT
Start: 2025-05-15 | End: 2025-05-20 | Stop reason: HOSPADM

## 2025-05-15 RX ORDER — PANTOPRAZOLE SODIUM 20 MG/1
20 TABLET, DELAYED RELEASE ORAL EVERY MORNING
Status: DISCONTINUED | OUTPATIENT
Start: 2025-05-16 | End: 2025-05-20 | Stop reason: HOSPADM

## 2025-05-15 RX ORDER — HEPARIN SODIUM 5000 [USP'U]/ML
5000 INJECTION, SOLUTION INTRAVENOUS; SUBCUTANEOUS EVERY 8 HOURS
Status: DISCONTINUED | OUTPATIENT
Start: 2025-05-16 | End: 2025-05-20 | Stop reason: HOSPADM

## 2025-05-15 RX ORDER — FUROSEMIDE 20 MG/1
20 TABLET ORAL EVERY MORNING
Status: DISCONTINUED | OUTPATIENT
Start: 2025-05-15 | End: 2025-05-20 | Stop reason: HOSPADM

## 2025-05-15 RX ORDER — HYDROMORPHONE HYDROCHLORIDE 1 MG/ML
0.5 INJECTION, SOLUTION INTRAMUSCULAR; INTRAVENOUS; SUBCUTANEOUS
Status: DISCONTINUED | OUTPATIENT
Start: 2025-05-15 | End: 2025-05-15 | Stop reason: HOSPADM

## 2025-05-15 RX ORDER — ADHESIVE BANDAGE 7/8"
15-30 BANDAGE TOPICAL AS NEEDED
Status: DISCONTINUED | OUTPATIENT
Start: 2025-05-15 | End: 2025-05-20 | Stop reason: HOSPADM

## 2025-05-15 RX ORDER — LIDOCAINE HYDROCHLORIDE 10 MG/ML
INJECTION, SOLUTION INFILTRATION; PERINEURAL AS NEEDED
Status: DISCONTINUED | OUTPATIENT
Start: 2025-05-15 | End: 2025-05-15 | Stop reason: SURG

## 2025-05-15 RX ORDER — TRAZODONE HYDROCHLORIDE 50 MG/1
50 TABLET ORAL NIGHTLY
Status: DISCONTINUED | OUTPATIENT
Start: 2025-05-15 | End: 2025-05-20 | Stop reason: HOSPADM

## 2025-05-15 RX ORDER — ACETAMINOPHEN AND CODEINE PHOSPHATE 300; 30 MG/1; MG/1
1 TABLET ORAL ONCE AS NEEDED
COMMUNITY
End: 2025-05-20 | Stop reason: HOSPADM

## 2025-05-15 RX ORDER — SODIUM CHLORIDE 9 MG/ML
INJECTION, SOLUTION INTRAVENOUS CONTINUOUS
Status: ACTIVE | OUTPATIENT
Start: 2025-05-15 | End: 2025-05-16

## 2025-05-15 RX ORDER — PHENYLEPHRINE HCL IN 0.9% NACL 50MG/250ML
0-400 PLASTIC BAG, INJECTION (ML) INTRAVENOUS
Status: DISCONTINUED | OUTPATIENT
Start: 2025-05-15 | End: 2025-05-15

## 2025-05-15 RX ORDER — PHENYLEPHRINE HYDROCHLORIDE 10 MG/ML
INJECTION INTRAVENOUS AS NEEDED
Status: DISCONTINUED | OUTPATIENT
Start: 2025-05-15 | End: 2025-05-15 | Stop reason: SURG

## 2025-05-15 RX ORDER — CHOLECALCIFEROL (VITAMIN D3) 25 MCG
125 TABLET ORAL EVERY MORNING
Status: DISCONTINUED | OUTPATIENT
Start: 2025-05-15 | End: 2025-05-20 | Stop reason: HOSPADM

## 2025-05-15 RX ORDER — PROPRANOLOL HYDROCHLORIDE 20 MG/1
20 TABLET ORAL 2 TIMES DAILY
Status: DISCONTINUED | OUTPATIENT
Start: 2025-05-15 | End: 2025-05-20 | Stop reason: HOSPADM

## 2025-05-15 RX ORDER — SODIUM CHLORIDE 9 MG/ML
INJECTION, SOLUTION INTRAVENOUS CONTINUOUS
Status: DISCONTINUED | OUTPATIENT
Start: 2025-05-15 | End: 2025-05-15

## 2025-05-15 RX ORDER — ONDANSETRON HYDROCHLORIDE 2 MG/ML
4 INJECTION, SOLUTION INTRAVENOUS
Status: DISCONTINUED | OUTPATIENT
Start: 2025-05-15 | End: 2025-05-15 | Stop reason: HOSPADM

## 2025-05-15 RX ORDER — FERROUS SULFATE 325(65) MG
325 TABLET ORAL
Status: DISCONTINUED | OUTPATIENT
Start: 2025-05-16 | End: 2025-05-20 | Stop reason: HOSPADM

## 2025-05-15 RX ORDER — PROPOFOL 10 MG/ML
0-80 INJECTION, EMULSION INTRAVENOUS
Status: DISCONTINUED | OUTPATIENT
Start: 2025-05-15 | End: 2025-05-15

## 2025-05-15 RX ORDER — BACITRACIN 500 UNIT/G
OINTMENT (GRAM) TOPICAL
Status: DISCONTINUED | OUTPATIENT
Start: 2025-05-15 | End: 2025-05-15 | Stop reason: HOSPADM

## 2025-05-15 RX ORDER — FENTANYL CITRATE 50 UG/ML
INJECTION, SOLUTION INTRAMUSCULAR; INTRAVENOUS AS NEEDED
Status: DISCONTINUED | OUTPATIENT
Start: 2025-05-15 | End: 2025-05-15 | Stop reason: SURG

## 2025-05-15 RX ORDER — HYDROMORPHONE HYDROCHLORIDE 1 MG/ML
0.5 INJECTION, SOLUTION INTRAMUSCULAR; INTRAVENOUS; SUBCUTANEOUS
Status: DISCONTINUED | OUTPATIENT
Start: 2025-05-15 | End: 2025-05-20 | Stop reason: HOSPADM

## 2025-05-15 RX ORDER — GABAPENTIN 300 MG/1
300 CAPSULE ORAL
Status: DISCONTINUED | OUTPATIENT
Start: 2025-05-15 | End: 2025-05-20 | Stop reason: HOSPADM

## 2025-05-15 RX ORDER — DEXAMETHASONE SODIUM PHOSPHATE 4 MG/ML
INJECTION, SOLUTION INTRA-ARTICULAR; INTRALESIONAL; INTRAMUSCULAR; INTRAVENOUS; SOFT TISSUE AS NEEDED
Status: DISCONTINUED | OUTPATIENT
Start: 2025-05-15 | End: 2025-05-15 | Stop reason: SURG

## 2025-05-15 RX ORDER — DEXTROSE 40 %
15-30 GEL (GRAM) ORAL AS NEEDED
Status: DISCONTINUED | OUTPATIENT
Start: 2025-05-15 | End: 2025-05-20 | Stop reason: HOSPADM

## 2025-05-15 RX ORDER — CALCIUM CARBONATE 500(1250)
500 TABLET ORAL EVERY MORNING
Status: DISCONTINUED | OUTPATIENT
Start: 2025-05-15 | End: 2025-05-20 | Stop reason: HOSPADM

## 2025-05-15 RX ORDER — BUPIVACAINE HYDROCHLORIDE AND EPINEPHRINE 5; 5 MG/ML; UG/ML
INJECTION, SOLUTION EPIDURAL; INTRACAUDAL; PERINEURAL
Status: DISCONTINUED | OUTPATIENT
Start: 2025-05-15 | End: 2025-05-15 | Stop reason: HOSPADM

## 2025-05-15 RX ORDER — FENTANYL CITRATE 50 UG/ML
25 INJECTION, SOLUTION INTRAMUSCULAR; INTRAVENOUS EVERY 5 MIN PRN
Status: DISCONTINUED | OUTPATIENT
Start: 2025-05-15 | End: 2025-05-15 | Stop reason: HOSPADM

## 2025-05-15 RX ORDER — ACETAMINOPHEN 325 MG/1
975 TABLET ORAL EVERY 6 HOURS PRN
Status: DISCONTINUED | OUTPATIENT
Start: 2025-05-15 | End: 2025-05-20 | Stop reason: HOSPADM

## 2025-05-15 RX ORDER — DOCUSATE SODIUM 100 MG/1
100 CAPSULE, LIQUID FILLED ORAL 2 TIMES DAILY
Status: DISCONTINUED | OUTPATIENT
Start: 2025-05-15 | End: 2025-05-20 | Stop reason: HOSPADM

## 2025-05-15 RX ADMIN — DEXAMETHASONE SODIUM PHOSPHATE 10 MG: 4 INJECTION, SOLUTION INTRA-ARTICULAR; INTRALESIONAL; INTRAMUSCULAR; INTRAVENOUS; SOFT TISSUE at 08:00

## 2025-05-15 RX ADMIN — Medication 125 MCG: at 16:03

## 2025-05-15 RX ADMIN — FENTANYL CITRATE 50 MCG: 50 INJECTION, SOLUTION INTRAMUSCULAR; INTRAVENOUS at 08:34

## 2025-05-15 RX ADMIN — ACETAMINOPHEN 975 MG: 325 TABLET, FILM COATED ORAL at 13:44

## 2025-05-15 RX ADMIN — FUROSEMIDE 20 MG: 20 TABLET ORAL at 16:03

## 2025-05-15 RX ADMIN — TRAZODONE HYDROCHLORIDE 50 MG: 50 TABLET ORAL at 22:38

## 2025-05-15 RX ADMIN — PROPRANOLOL HYDROCHLORIDE 20 MG: 20 TABLET ORAL at 19:51

## 2025-05-15 RX ADMIN — REMIFENTANIL HYDROCHLORIDE 0.1 MCG/KG/MIN: 1 INJECTION, POWDER, LYOPHILIZED, FOR SOLUTION INTRAVENOUS at 07:55

## 2025-05-15 RX ADMIN — PHENYLEPHRINE HYDROCHLORIDE 100 MCG: 10 INJECTION INTRAVENOUS at 07:58

## 2025-05-15 RX ADMIN — PROPOFOL 70 MG: 10 INJECTION, EMULSION INTRAVENOUS at 07:49

## 2025-05-15 RX ADMIN — CALCIUM 500 MG OF ELEMENTAL CALCIUM: 500 TABLET ORAL at 16:03

## 2025-05-15 RX ADMIN — ATORVASTATIN CALCIUM 20 MG: 20 TABLET, FILM COATED ORAL at 22:38

## 2025-05-15 RX ADMIN — Medication 10 MCG/MIN: at 07:56

## 2025-05-15 RX ADMIN — FENTANYL CITRATE 25 MCG: 50 INJECTION INTRAMUSCULAR; INTRAVENOUS at 11:42

## 2025-05-15 RX ADMIN — DOCUSATE SODIUM 100 MG: 100 CAPSULE, LIQUID FILLED ORAL at 19:51

## 2025-05-15 RX ADMIN — ACETAMINOPHEN 975 MG: 325 TABLET, FILM COATED ORAL at 19:50

## 2025-05-15 RX ADMIN — LIDOCAINE HYDROCHLORIDE 5 ML: 10 INJECTION, SOLUTION INFILTRATION; PERINEURAL at 07:49

## 2025-05-15 RX ADMIN — FENTANYL CITRATE 25 MCG: 50 INJECTION INTRAMUSCULAR; INTRAVENOUS at 11:47

## 2025-05-15 RX ADMIN — FENTANYL CITRATE 50 MCG: 50 INJECTION, SOLUTION INTRAMUSCULAR; INTRAVENOUS at 09:13

## 2025-05-15 RX ADMIN — FENTANYL CITRATE 25 MCG: 50 INJECTION INTRAMUSCULAR; INTRAVENOUS at 12:29

## 2025-05-15 RX ADMIN — FENTANYL CITRATE 50 MCG: 50 INJECTION, SOLUTION INTRAMUSCULAR; INTRAVENOUS at 07:49

## 2025-05-15 RX ADMIN — GABAPENTIN 300 MG: 300 CAPSULE ORAL at 19:50

## 2025-05-15 RX ADMIN — SUCCINYLCHOLINE CHLORIDE 60 MG: 20 INJECTION, SOLUTION INTRAMUSCULAR; INTRAVENOUS at 07:49

## 2025-05-15 RX ADMIN — SODIUM CHLORIDE 40 ML/HR: 9 INJECTION, SOLUTION INTRAVENOUS at 07:12

## 2025-05-15 RX ADMIN — OXYCODONE HYDROCHLORIDE 5 MG: 5 TABLET ORAL at 13:44

## 2025-05-15 RX ADMIN — GABAPENTIN 300 MG: 300 CAPSULE ORAL at 16:03

## 2025-05-15 RX ADMIN — CEFAZOLIN 2 G: 2 INJECTION, POWDER, FOR SOLUTION INTRAMUSCULAR; INTRAVENOUS at 16:03

## 2025-05-15 RX ADMIN — SODIUM CHLORIDE: 9 INJECTION, SOLUTION INTRAVENOUS at 12:38

## 2025-05-15 RX ADMIN — CEFAZOLIN 2 G: 2 INJECTION, POWDER, FOR SOLUTION INTRAMUSCULAR; INTRAVENOUS at 07:50

## 2025-05-15 RX ADMIN — OXYCODONE HYDROCHLORIDE 5 MG: 5 TABLET ORAL at 22:38

## 2025-05-16 LAB
ANION GAP SERPL CALC-SCNC: 5 MEQ/L (ref 3–15)
BUN SERPL-MCNC: 27 MG/DL (ref 7–25)
CALCIUM SERPL-MCNC: 8.1 MG/DL (ref 8.6–10.3)
CASE RPRT: NORMAL
CHLORIDE SERPL-SCNC: 109 MEQ/L (ref 98–107)
CLINICAL INFO: NORMAL
CO2 SERPL-SCNC: 28 MEQ/L (ref 21–31)
CREAT SERPL-MCNC: 1 MG/DL (ref 0.6–1.2)
EGFRCR SERPLBLD CKD-EPI 2021: 54 ML/MIN/1.73M*2
ERYTHROCYTE [DISTWIDTH] IN BLOOD BY AUTOMATED COUNT: 14.8 % (ref 11.7–14.4)
GLUCOSE BLD-MCNC: 148 MG/DL (ref 70–99)
GLUCOSE BLD-MCNC: 208 MG/DL (ref 70–99)
GLUCOSE BLD-MCNC: 210 MG/DL (ref 70–99)
GLUCOSE BLD-MCNC: 92 MG/DL (ref 70–99)
GLUCOSE SERPL-MCNC: 97 MG/DL (ref 70–99)
HCT VFR BLD AUTO: 31.3 % (ref 35–45)
HGB BLD-MCNC: 10.1 G/DL (ref 11.8–15.7)
MCH RBC QN AUTO: 30.5 PG (ref 28–33.2)
MCHC RBC AUTO-ENTMCNC: 32.3 G/DL (ref 32.2–35.5)
MCV RBC AUTO: 94.6 FL (ref 83–98)
PATH REPORT.FINAL DX SPEC: NORMAL
PATH REPORT.GROSS SPEC: NORMAL
PLATELET # BLD AUTO: 172 K/UL (ref 150–369)
PMV BLD AUTO: 9.3 FL (ref 9.4–12.3)
POCT TEST: ABNORMAL
POCT TEST: NORMAL
POTASSIUM SERPL-SCNC: 4.2 MEQ/L (ref 3.5–5.1)
RBC # BLD AUTO: 3.31 M/UL (ref 3.93–5.22)
SODIUM SERPL-SCNC: 142 MEQ/L (ref 136–145)
WBC # BLD AUTO: 10.91 K/UL (ref 3.8–10.5)

## 2025-05-16 PROCEDURE — 12000000 HC ROOM AND CARE MED/SURG

## 2025-05-16 PROCEDURE — 80048 BASIC METABOLIC PNL TOTAL CA: CPT | Performed by: PHYSICIAN ASSISTANT

## 2025-05-16 PROCEDURE — 99024 POSTOP FOLLOW-UP VISIT: CPT | Performed by: NEUROLOGICAL SURGERY

## 2025-05-16 PROCEDURE — 36415 COLL VENOUS BLD VENIPUNCTURE: CPT | Performed by: PHYSICIAN ASSISTANT

## 2025-05-16 PROCEDURE — 97116 GAIT TRAINING THERAPY: CPT | Mod: GP

## 2025-05-16 PROCEDURE — 85027 COMPLETE CBC AUTOMATED: CPT | Performed by: PHYSICIAN ASSISTANT

## 2025-05-16 PROCEDURE — 25800000 HC PHARMACY IV SOLUTIONS: Performed by: PHYSICIAN ASSISTANT

## 2025-05-16 PROCEDURE — 97530 THERAPEUTIC ACTIVITIES: CPT | Mod: GP

## 2025-05-16 PROCEDURE — 97535 SELF CARE MNGMENT TRAINING: CPT | Mod: GO

## 2025-05-16 PROCEDURE — 63700000 HC SELF-ADMINISTRABLE DRUG: Performed by: PHYSICIAN ASSISTANT

## 2025-05-16 PROCEDURE — 63600000 HC DRUGS/DETAIL CODE: Performed by: PHYSICIAN ASSISTANT

## 2025-05-16 PROCEDURE — 97166 OT EVAL MOD COMPLEX 45 MIN: CPT | Mod: GO

## 2025-05-16 RX ORDER — OXYCODONE HYDROCHLORIDE 5 MG/1
5 TABLET ORAL EVERY 6 HOURS PRN
Qty: 20 TABLET | Refills: 0 | Status: SHIPPED | OUTPATIENT
Start: 2025-05-16 | End: 2025-05-20

## 2025-05-16 RX ORDER — SENNOSIDES 8.6 MG/1
1 TABLET ORAL 2 TIMES DAILY PRN
Qty: 7 TABLET | Refills: 0 | Status: SHIPPED | OUTPATIENT
Start: 2025-05-16 | End: 2025-05-30

## 2025-05-16 RX ORDER — DOCUSATE SODIUM 100 MG/1
100 CAPSULE, LIQUID FILLED ORAL 2 TIMES DAILY
Qty: 14 CAPSULE | Refills: 0 | Status: SHIPPED | OUTPATIENT
Start: 2025-05-16 | End: 2025-05-30

## 2025-05-16 RX ADMIN — OXYCODONE HYDROCHLORIDE 5 MG: 5 TABLET ORAL at 10:18

## 2025-05-16 RX ADMIN — Medication 125 MCG: at 08:44

## 2025-05-16 RX ADMIN — CALCIUM 500 MG OF ELEMENTAL CALCIUM: 500 TABLET ORAL at 08:45

## 2025-05-16 RX ADMIN — TRAZODONE HYDROCHLORIDE 50 MG: 50 TABLET ORAL at 23:32

## 2025-05-16 RX ADMIN — GABAPENTIN 300 MG: 300 CAPSULE ORAL at 16:09

## 2025-05-16 RX ADMIN — GABAPENTIN 300 MG: 300 CAPSULE ORAL at 12:01

## 2025-05-16 RX ADMIN — PROPRANOLOL HYDROCHLORIDE 20 MG: 20 TABLET ORAL at 21:16

## 2025-05-16 RX ADMIN — ATORVASTATIN CALCIUM 20 MG: 20 TABLET, FILM COATED ORAL at 21:16

## 2025-05-16 RX ADMIN — INSULIN LISPRO 3 UNITS: 100 INJECTION, SOLUTION INTRAVENOUS; SUBCUTANEOUS at 16:40

## 2025-05-16 RX ADMIN — OXYCODONE HYDROCHLORIDE 5 MG: 5 TABLET ORAL at 03:08

## 2025-05-16 RX ADMIN — OXYCODONE HYDROCHLORIDE 5 MG: 5 TABLET ORAL at 22:41

## 2025-05-16 RX ADMIN — HEPARIN SODIUM 5000 UNITS: 5000 INJECTION INTRAVENOUS; SUBCUTANEOUS at 21:16

## 2025-05-16 RX ADMIN — INSULIN LISPRO 3 UNITS: 100 INJECTION, SOLUTION INTRAVENOUS; SUBCUTANEOUS at 12:01

## 2025-05-16 RX ADMIN — DOCUSATE SODIUM 100 MG: 100 CAPSULE, LIQUID FILLED ORAL at 21:16

## 2025-05-16 RX ADMIN — GABAPENTIN 300 MG: 300 CAPSULE ORAL at 21:16

## 2025-05-16 RX ADMIN — PROPRANOLOL HYDROCHLORIDE 20 MG: 20 TABLET ORAL at 08:45

## 2025-05-16 RX ADMIN — ACETAMINOPHEN 975 MG: 325 TABLET, FILM COATED ORAL at 10:18

## 2025-05-16 RX ADMIN — GABAPENTIN 300 MG: 300 CAPSULE ORAL at 08:45

## 2025-05-16 RX ADMIN — ACETAMINOPHEN 975 MG: 325 TABLET, FILM COATED ORAL at 21:15

## 2025-05-16 RX ADMIN — FERROUS SULFATE TAB 325 MG (65 MG ELEMENTAL FE) 325 MG: 325 (65 FE) TAB at 08:45

## 2025-05-16 RX ADMIN — DOCUSATE SODIUM 100 MG: 100 CAPSULE, LIQUID FILLED ORAL at 08:45

## 2025-05-16 RX ADMIN — ACETAMINOPHEN 975 MG: 325 TABLET, FILM COATED ORAL at 01:40

## 2025-05-16 RX ADMIN — PANTOPRAZOLE SODIUM 20 MG: 20 TABLET, DELAYED RELEASE ORAL at 08:45

## 2025-05-16 RX ADMIN — HEPARIN SODIUM 5000 UNITS: 5000 INJECTION INTRAVENOUS; SUBCUTANEOUS at 10:20

## 2025-05-16 RX ADMIN — FUROSEMIDE 20 MG: 20 TABLET ORAL at 08:45

## 2025-05-16 RX ADMIN — CEFAZOLIN 2 G: 2 INJECTION, POWDER, FOR SOLUTION INTRAMUSCULAR; INTRAVENOUS at 00:11

## 2025-05-17 LAB
GLUCOSE BLD-MCNC: 118 MG/DL (ref 70–99)
GLUCOSE BLD-MCNC: 143 MG/DL (ref 70–99)
GLUCOSE BLD-MCNC: 145 MG/DL (ref 70–99)
GLUCOSE BLD-MCNC: 97 MG/DL (ref 70–99)
POCT TEST: ABNORMAL
POCT TEST: NORMAL

## 2025-05-17 PROCEDURE — 99024 POSTOP FOLLOW-UP VISIT: CPT | Performed by: NEUROLOGICAL SURGERY

## 2025-05-17 PROCEDURE — 97535 SELF CARE MNGMENT TRAINING: CPT | Mod: GO

## 2025-05-17 PROCEDURE — 12000000 HC ROOM AND CARE MED/SURG

## 2025-05-17 PROCEDURE — 63600000 HC DRUGS/DETAIL CODE: Mod: JZ | Performed by: PHYSICIAN ASSISTANT

## 2025-05-17 PROCEDURE — 63700000 HC SELF-ADMINISTRABLE DRUG: Performed by: PHYSICIAN ASSISTANT

## 2025-05-17 RX ADMIN — SENNOSIDES 1 TABLET: 8.6 TABLET, FILM COATED ORAL at 19:54

## 2025-05-17 RX ADMIN — ACETAMINOPHEN 975 MG: 325 TABLET, FILM COATED ORAL at 18:36

## 2025-05-17 RX ADMIN — PROPRANOLOL HYDROCHLORIDE 20 MG: 20 TABLET ORAL at 19:54

## 2025-05-17 RX ADMIN — OXYCODONE HYDROCHLORIDE 5 MG: 5 TABLET ORAL at 14:28

## 2025-05-17 RX ADMIN — HEPARIN SODIUM 5000 UNITS: 5000 INJECTION INTRAVENOUS; SUBCUTANEOUS at 22:17

## 2025-05-17 RX ADMIN — DOCUSATE SODIUM 100 MG: 100 CAPSULE, LIQUID FILLED ORAL at 08:18

## 2025-05-17 RX ADMIN — TRAZODONE HYDROCHLORIDE 50 MG: 50 TABLET ORAL at 22:17

## 2025-05-17 RX ADMIN — FUROSEMIDE 20 MG: 20 TABLET ORAL at 08:19

## 2025-05-17 RX ADMIN — GABAPENTIN 300 MG: 300 CAPSULE ORAL at 12:24

## 2025-05-17 RX ADMIN — DOCUSATE SODIUM 100 MG: 100 CAPSULE, LIQUID FILLED ORAL at 19:54

## 2025-05-17 RX ADMIN — CALCIUM 500 MG OF ELEMENTAL CALCIUM: 500 TABLET ORAL at 08:18

## 2025-05-17 RX ADMIN — ATORVASTATIN CALCIUM 20 MG: 20 TABLET, FILM COATED ORAL at 22:17

## 2025-05-17 RX ADMIN — PROPRANOLOL HYDROCHLORIDE 20 MG: 20 TABLET ORAL at 08:18

## 2025-05-17 RX ADMIN — HEPARIN SODIUM 5000 UNITS: 5000 INJECTION INTRAVENOUS; SUBCUTANEOUS at 07:07

## 2025-05-17 RX ADMIN — ACETAMINOPHEN 975 MG: 325 TABLET, FILM COATED ORAL at 07:07

## 2025-05-17 RX ADMIN — HEPARIN SODIUM 5000 UNITS: 5000 INJECTION INTRAVENOUS; SUBCUTANEOUS at 14:26

## 2025-05-17 RX ADMIN — FERROUS SULFATE TAB 325 MG (65 MG ELEMENTAL FE) 325 MG: 325 (65 FE) TAB at 08:18

## 2025-05-17 RX ADMIN — Medication 125 MCG: at 08:18

## 2025-05-17 RX ADMIN — GABAPENTIN 300 MG: 300 CAPSULE ORAL at 16:32

## 2025-05-17 RX ADMIN — PANTOPRAZOLE SODIUM 20 MG: 20 TABLET, DELAYED RELEASE ORAL at 08:18

## 2025-05-17 RX ADMIN — OXYCODONE HYDROCHLORIDE 5 MG: 5 TABLET ORAL at 20:36

## 2025-05-17 RX ADMIN — OXYCODONE HYDROCHLORIDE 5 MG: 5 TABLET ORAL at 08:18

## 2025-05-17 RX ADMIN — GABAPENTIN 300 MG: 300 CAPSULE ORAL at 19:54

## 2025-05-17 RX ADMIN — GABAPENTIN 300 MG: 300 CAPSULE ORAL at 08:19

## 2025-05-18 LAB
GLUCOSE BLD-MCNC: 106 MG/DL (ref 70–99)
GLUCOSE BLD-MCNC: 120 MG/DL (ref 70–99)
GLUCOSE BLD-MCNC: 122 MG/DL (ref 70–99)
GLUCOSE BLD-MCNC: 130 MG/DL (ref 70–99)
POCT TEST: ABNORMAL

## 2025-05-18 PROCEDURE — 12000000 HC ROOM AND CARE MED/SURG

## 2025-05-18 PROCEDURE — 99024 POSTOP FOLLOW-UP VISIT: CPT | Performed by: NEUROLOGICAL SURGERY

## 2025-05-18 PROCEDURE — 63600000 HC DRUGS/DETAIL CODE: Mod: JZ | Performed by: PHYSICIAN ASSISTANT

## 2025-05-18 PROCEDURE — 63700000 HC SELF-ADMINISTRABLE DRUG: Performed by: PHYSICIAN ASSISTANT

## 2025-05-18 RX ADMIN — PROPRANOLOL HYDROCHLORIDE 20 MG: 20 TABLET ORAL at 08:17

## 2025-05-18 RX ADMIN — GABAPENTIN 300 MG: 300 CAPSULE ORAL at 19:49

## 2025-05-18 RX ADMIN — ACETAMINOPHEN 975 MG: 325 TABLET, FILM COATED ORAL at 05:32

## 2025-05-18 RX ADMIN — TRAZODONE HYDROCHLORIDE 50 MG: 50 TABLET ORAL at 21:35

## 2025-05-18 RX ADMIN — HEPARIN SODIUM 5000 UNITS: 5000 INJECTION INTRAVENOUS; SUBCUTANEOUS at 06:17

## 2025-05-18 RX ADMIN — HEPARIN SODIUM 5000 UNITS: 5000 INJECTION INTRAVENOUS; SUBCUTANEOUS at 14:50

## 2025-05-18 RX ADMIN — DOCUSATE SODIUM 100 MG: 100 CAPSULE, LIQUID FILLED ORAL at 19:49

## 2025-05-18 RX ADMIN — GABAPENTIN 300 MG: 300 CAPSULE ORAL at 12:10

## 2025-05-18 RX ADMIN — SENNOSIDES 1 TABLET: 8.6 TABLET, FILM COATED ORAL at 19:49

## 2025-05-18 RX ADMIN — OXYCODONE HYDROCHLORIDE 5 MG: 5 TABLET ORAL at 02:29

## 2025-05-18 RX ADMIN — CALCIUM 500 MG OF ELEMENTAL CALCIUM: 500 TABLET ORAL at 08:17

## 2025-05-18 RX ADMIN — Medication 125 MCG: at 08:17

## 2025-05-18 RX ADMIN — OXYCODONE HYDROCHLORIDE 5 MG: 5 TABLET ORAL at 08:24

## 2025-05-18 RX ADMIN — OXYCODONE HYDROCHLORIDE 5 MG: 5 TABLET ORAL at 16:32

## 2025-05-18 RX ADMIN — FERROUS SULFATE TAB 325 MG (65 MG ELEMENTAL FE) 325 MG: 325 (65 FE) TAB at 08:17

## 2025-05-18 RX ADMIN — OXYCODONE HYDROCHLORIDE 5 MG: 5 TABLET ORAL at 22:27

## 2025-05-18 RX ADMIN — PROPRANOLOL HYDROCHLORIDE 20 MG: 20 TABLET ORAL at 19:49

## 2025-05-18 RX ADMIN — PANTOPRAZOLE SODIUM 20 MG: 20 TABLET, DELAYED RELEASE ORAL at 08:17

## 2025-05-18 RX ADMIN — DOCUSATE SODIUM 100 MG: 100 CAPSULE, LIQUID FILLED ORAL at 08:17

## 2025-05-18 RX ADMIN — ATORVASTATIN CALCIUM 20 MG: 20 TABLET, FILM COATED ORAL at 21:35

## 2025-05-18 RX ADMIN — HEPARIN SODIUM 5000 UNITS: 5000 INJECTION INTRAVENOUS; SUBCUTANEOUS at 21:35

## 2025-05-18 RX ADMIN — FUROSEMIDE 20 MG: 20 TABLET ORAL at 08:17

## 2025-05-18 RX ADMIN — GABAPENTIN 300 MG: 300 CAPSULE ORAL at 08:17

## 2025-05-18 RX ADMIN — SENNOSIDES 1 TABLET: 8.6 TABLET, FILM COATED ORAL at 08:17

## 2025-05-18 RX ADMIN — ACETAMINOPHEN 975 MG: 325 TABLET, FILM COATED ORAL at 19:50

## 2025-05-19 LAB
GLUCOSE BLD-MCNC: 112 MG/DL (ref 70–99)
GLUCOSE BLD-MCNC: 138 MG/DL (ref 70–99)
GLUCOSE BLD-MCNC: 162 MG/DL (ref 70–99)
GLUCOSE BLD-MCNC: 98 MG/DL (ref 70–99)
POCT TEST: ABNORMAL
POCT TEST: NORMAL

## 2025-05-19 PROCEDURE — 63700000 HC SELF-ADMINISTRABLE DRUG: Performed by: PHYSICIAN ASSISTANT

## 2025-05-19 PROCEDURE — 12000000 HC ROOM AND CARE MED/SURG

## 2025-05-19 PROCEDURE — 97116 GAIT TRAINING THERAPY: CPT | Mod: GP

## 2025-05-19 PROCEDURE — 97535 SELF CARE MNGMENT TRAINING: CPT | Mod: GO

## 2025-05-19 PROCEDURE — 99024 POSTOP FOLLOW-UP VISIT: CPT | Performed by: NEUROLOGICAL SURGERY

## 2025-05-19 PROCEDURE — 63600000 HC DRUGS/DETAIL CODE: Mod: JZ | Performed by: PHYSICIAN ASSISTANT

## 2025-05-19 RX ORDER — ADHESIVE BANDAGE
30 BANDAGE TOPICAL 2 TIMES DAILY PRN
Status: DISCONTINUED | OUTPATIENT
Start: 2025-05-19 | End: 2025-05-20 | Stop reason: HOSPADM

## 2025-05-19 RX ORDER — BISACODYL 5 MG
5 TABLET, DELAYED RELEASE (ENTERIC COATED) ORAL ONCE
Status: COMPLETED | OUTPATIENT
Start: 2025-05-19 | End: 2025-05-19

## 2025-05-19 RX ORDER — HYDROMORPHONE HYDROCHLORIDE 2 MG/1
2 TABLET ORAL ONCE
Refills: 0 | Status: ACTIVE | OUTPATIENT
Start: 2025-05-19 | End: 2025-05-20

## 2025-05-19 RX ADMIN — MAGNESIUM HYDROXIDE 30 ML: 400 SUSPENSION ORAL at 22:04

## 2025-05-19 RX ADMIN — ACETAMINOPHEN 975 MG: 325 TABLET, FILM COATED ORAL at 19:59

## 2025-05-19 RX ADMIN — OXYCODONE HYDROCHLORIDE 5 MG: 5 TABLET ORAL at 16:19

## 2025-05-19 RX ADMIN — GABAPENTIN 300 MG: 300 CAPSULE ORAL at 08:41

## 2025-05-19 RX ADMIN — FERROUS SULFATE TAB 325 MG (65 MG ELEMENTAL FE) 325 MG: 325 (65 FE) TAB at 08:41

## 2025-05-19 RX ADMIN — ACETAMINOPHEN 975 MG: 325 TABLET, FILM COATED ORAL at 14:31

## 2025-05-19 RX ADMIN — OXYCODONE HYDROCHLORIDE 5 MG: 5 TABLET ORAL at 04:39

## 2025-05-19 RX ADMIN — MAGNESIUM HYDROXIDE 30 ML: 400 SUSPENSION ORAL at 10:23

## 2025-05-19 RX ADMIN — HEPARIN SODIUM 5000 UNITS: 5000 INJECTION INTRAVENOUS; SUBCUTANEOUS at 14:07

## 2025-05-19 RX ADMIN — SENNOSIDES 1 TABLET: 8.6 TABLET, FILM COATED ORAL at 16:21

## 2025-05-19 RX ADMIN — PROPRANOLOL HYDROCHLORIDE 20 MG: 20 TABLET ORAL at 08:41

## 2025-05-19 RX ADMIN — ATORVASTATIN CALCIUM 20 MG: 20 TABLET, FILM COATED ORAL at 22:02

## 2025-05-19 RX ADMIN — GABAPENTIN 300 MG: 300 CAPSULE ORAL at 11:23

## 2025-05-19 RX ADMIN — CALCIUM 500 MG OF ELEMENTAL CALCIUM: 500 TABLET ORAL at 08:40

## 2025-05-19 RX ADMIN — Medication 125 MCG: at 08:41

## 2025-05-19 RX ADMIN — PROPRANOLOL HYDROCHLORIDE 20 MG: 20 TABLET ORAL at 19:58

## 2025-05-19 RX ADMIN — ACETAMINOPHEN 975 MG: 325 TABLET, FILM COATED ORAL at 08:40

## 2025-05-19 RX ADMIN — ACETAMINOPHEN 975 MG: 325 TABLET, FILM COATED ORAL at 02:30

## 2025-05-19 RX ADMIN — SENNOSIDES 1 TABLET: 8.6 TABLET, FILM COATED ORAL at 06:09

## 2025-05-19 RX ADMIN — BISACODYL 5 MG: 5 TABLET, COATED ORAL at 10:23

## 2025-05-19 RX ADMIN — GABAPENTIN 300 MG: 300 CAPSULE ORAL at 19:58

## 2025-05-19 RX ADMIN — HEPARIN SODIUM 5000 UNITS: 5000 INJECTION INTRAVENOUS; SUBCUTANEOUS at 06:09

## 2025-05-19 RX ADMIN — HEPARIN SODIUM 5000 UNITS: 5000 INJECTION INTRAVENOUS; SUBCUTANEOUS at 22:02

## 2025-05-19 RX ADMIN — DOCUSATE SODIUM 100 MG: 100 CAPSULE, LIQUID FILLED ORAL at 08:41

## 2025-05-19 RX ADMIN — FUROSEMIDE 20 MG: 20 TABLET ORAL at 08:51

## 2025-05-19 RX ADMIN — TRAZODONE HYDROCHLORIDE 50 MG: 50 TABLET ORAL at 22:02

## 2025-05-19 RX ADMIN — OXYCODONE HYDROCHLORIDE 5 MG: 5 TABLET ORAL at 22:06

## 2025-05-19 RX ADMIN — GABAPENTIN 300 MG: 300 CAPSULE ORAL at 16:18

## 2025-05-19 RX ADMIN — DOCUSATE SODIUM 100 MG: 100 CAPSULE, LIQUID FILLED ORAL at 19:58

## 2025-05-19 RX ADMIN — OXYCODONE HYDROCHLORIDE 5 MG: 5 TABLET ORAL at 10:25

## 2025-05-19 RX ADMIN — PANTOPRAZOLE SODIUM 20 MG: 20 TABLET, DELAYED RELEASE ORAL at 08:41

## 2025-05-20 VITALS
HEART RATE: 62 BPM | TEMPERATURE: 97.9 F | BODY MASS INDEX: 25.72 KG/M2 | RESPIRATION RATE: 16 BRPM | OXYGEN SATURATION: 97 % | HEIGHT: 60 IN | DIASTOLIC BLOOD PRESSURE: 69 MMHG | WEIGHT: 131 LBS | SYSTOLIC BLOOD PRESSURE: 157 MMHG

## 2025-05-20 LAB
GLUCOSE BLD-MCNC: 109 MG/DL (ref 70–99)
GLUCOSE BLD-MCNC: 208 MG/DL (ref 70–99)
POCT TEST: ABNORMAL
POCT TEST: ABNORMAL

## 2025-05-20 PROCEDURE — 63700000 HC SELF-ADMINISTRABLE DRUG: Performed by: PHYSICIAN ASSISTANT

## 2025-05-20 PROCEDURE — 99024 POSTOP FOLLOW-UP VISIT: CPT | Performed by: NEUROLOGICAL SURGERY

## 2025-05-20 PROCEDURE — 63600000 HC DRUGS/DETAIL CODE: Mod: JZ | Performed by: PHYSICIAN ASSISTANT

## 2025-05-20 RX ORDER — OXYCODONE HYDROCHLORIDE 5 MG/1
5 TABLET ORAL EVERY 6 HOURS PRN
Qty: 20 TABLET | Refills: 0 | Status: SHIPPED | OUTPATIENT
Start: 2025-05-20 | End: 2025-05-25

## 2025-05-20 RX ORDER — BISACODYL 10 MG/1
10 SUPPOSITORY RECTAL DAILY
Status: DISCONTINUED | OUTPATIENT
Start: 2025-05-20 | End: 2025-05-20 | Stop reason: HOSPADM

## 2025-05-20 RX ADMIN — FERROUS SULFATE TAB 325 MG (65 MG ELEMENTAL FE) 325 MG: 325 (65 FE) TAB at 08:38

## 2025-05-20 RX ADMIN — ACETAMINOPHEN 975 MG: 325 TABLET, FILM COATED ORAL at 02:18

## 2025-05-20 RX ADMIN — CALCIUM 500 MG OF ELEMENTAL CALCIUM: 500 TABLET ORAL at 08:39

## 2025-05-20 RX ADMIN — SENNOSIDES 1 TABLET: 8.6 TABLET, FILM COATED ORAL at 08:39

## 2025-05-20 RX ADMIN — OXYCODONE HYDROCHLORIDE 5 MG: 5 TABLET ORAL at 04:05

## 2025-05-20 RX ADMIN — ACETAMINOPHEN 975 MG: 325 TABLET, FILM COATED ORAL at 08:42

## 2025-05-20 RX ADMIN — Medication 125 MCG: at 08:38

## 2025-05-20 RX ADMIN — PROPRANOLOL HYDROCHLORIDE 20 MG: 20 TABLET ORAL at 08:38

## 2025-05-20 RX ADMIN — GABAPENTIN 300 MG: 300 CAPSULE ORAL at 08:38

## 2025-05-20 RX ADMIN — OXYCODONE HYDROCHLORIDE 5 MG: 5 TABLET ORAL at 11:17

## 2025-05-20 RX ADMIN — HEPARIN SODIUM 5000 UNITS: 5000 INJECTION INTRAVENOUS; SUBCUTANEOUS at 06:21

## 2025-05-20 RX ADMIN — GABAPENTIN 300 MG: 300 CAPSULE ORAL at 11:19

## 2025-05-20 RX ADMIN — INSULIN LISPRO 3 UNITS: 100 INJECTION, SOLUTION INTRAVENOUS; SUBCUTANEOUS at 12:38

## 2025-05-20 RX ADMIN — PANTOPRAZOLE SODIUM 20 MG: 20 TABLET, DELAYED RELEASE ORAL at 08:39

## 2025-05-20 RX ADMIN — DOCUSATE SODIUM 100 MG: 100 CAPSULE, LIQUID FILLED ORAL at 08:39

## 2025-05-20 RX ADMIN — FUROSEMIDE 20 MG: 20 TABLET ORAL at 08:38

## 2025-05-20 RX ADMIN — BISACODYL 10 MG: 10 SUPPOSITORY RECTAL at 09:21

## 2025-05-21 PROCEDURE — 99497 ADVNCD CARE PLAN 30 MIN: CPT | Performed by: INTERNAL MEDICINE

## 2025-05-21 PROCEDURE — 99306 1ST NF CARE HIGH MDM 50: CPT | Performed by: INTERNAL MEDICINE

## 2025-05-26 PROCEDURE — 99310 SBSQ NF CARE HIGH MDM 45: CPT | Performed by: INTERNAL MEDICINE

## 2025-05-28 ENCOUNTER — TELEPHONE (OUTPATIENT)
Dept: PRIMARY CARE | Facility: CLINIC | Age: 89
End: 2025-05-28
Payer: MEDICARE

## 2025-05-28 DIAGNOSIS — B02.29 POSTHERPETIC NEURALGIA: Primary | ICD-10-CM

## 2025-05-28 PROBLEM — R50.9 FEVER: Status: RESOLVED | Noted: 2024-04-03 | Resolved: 2025-05-28

## 2025-05-28 PROBLEM — R09.02 HYPOXEMIA: Status: RESOLVED | Noted: 2024-11-19 | Resolved: 2025-05-28

## 2025-05-28 PROCEDURE — 99316 NF DSCHRG MGMT 30 MIN+: CPT | Performed by: REGISTERED NURSE

## 2025-05-28 RX ORDER — OXYCODONE HYDROCHLORIDE 5 MG/1
5 TABLET ORAL EVERY 6 HOURS PRN
Qty: 10 TABLET | Refills: 0 | Status: SHIPPED | OUTPATIENT
Start: 2025-05-28 | End: 2025-06-04

## 2025-05-30 ENCOUNTER — OFFICE VISIT (OUTPATIENT)
Dept: NEUROSURGERY | Facility: CLINIC | Age: 89
End: 2025-05-30
Payer: MEDICARE

## 2025-05-30 VITALS
OXYGEN SATURATION: 95 % | DIASTOLIC BLOOD PRESSURE: 76 MMHG | BODY MASS INDEX: 26.11 KG/M2 | RESPIRATION RATE: 16 BRPM | TEMPERATURE: 97.1 F | WEIGHT: 133 LBS | HEIGHT: 60 IN | SYSTOLIC BLOOD PRESSURE: 126 MMHG | HEART RATE: 67 BPM

## 2025-05-30 DIAGNOSIS — B02.29 POSTHERPETIC NEURALGIA: Primary | ICD-10-CM

## 2025-05-30 RX ORDER — TRAMADOL HYDROCHLORIDE 50 MG/1
50 TABLET ORAL EVERY 8 HOURS PRN
COMMUNITY

## 2025-06-26 ENCOUNTER — OFFICE VISIT (OUTPATIENT)
Dept: NEUROSURGERY | Facility: CLINIC | Age: 89
End: 2025-06-26
Payer: MEDICARE

## 2025-06-26 VITALS
DIASTOLIC BLOOD PRESSURE: 62 MMHG | HEART RATE: 63 BPM | HEIGHT: 60 IN | RESPIRATION RATE: 16 BRPM | BODY MASS INDEX: 26.11 KG/M2 | OXYGEN SATURATION: 95 % | TEMPERATURE: 98.1 F | WEIGHT: 133 LBS | SYSTOLIC BLOOD PRESSURE: 104 MMHG

## 2025-06-26 DIAGNOSIS — B02.29 POSTHERPETIC NEURALGIA: Primary | ICD-10-CM

## 2025-06-26 DIAGNOSIS — T85.192A MALFUNCTION OF SPINAL CORD STIMULATOR, INITIAL ENCOUNTER (CMS/HCC): ICD-10-CM

## 2025-06-26 PROCEDURE — 99024 POSTOP FOLLOW-UP VISIT: CPT | Performed by: NEUROLOGICAL SURGERY

## 2025-06-26 NOTE — PROGRESS NOTES
[facility]  Medical Office [address], [address]  [place] [address]  Phone: [phone]  Fax: [phone]      Patient ID: Rissa Erickson                              : 1935    Visit Date: 2025    Chief Complaint:  Surgical follow up    History of Present Illness:   Rissa Erickson is a 89 y.o. female who presents for surgical follow up. The patient underwent SCS revision with previous pulse generator left in place but leads replaced on 5/15/25.  She tolerated surgery well and was discharged to home on postop day 5. At her 2 week follow up she was recovering well with improving pain and left chest wall pain.  Her stimulator pulse generator was bulging a bit from the left buttocks so asked to return to follow up.      Today Rissa is feeling much better.  Her chest wall pain has declined ~75% and her incision pain has resolved. She remains on her initial program with great improvement.     Medical History:  has a past medical history of A-fib (CMS/Formerly [facility]), Accelerated hypertension, CHF (congestive heart failure) (CMS/Formerly [facility]), GERD (gastroesophageal reflux disease), Hearing loss, Lipid disorder, Lumbar scoliosis, Macular degeneration, Osteoporosis, Shingles, Thyroid nodule, and Type 2 diabetes mellitus (CMS/Formerly [facility]).    Surgical History:  has a past surgical history that includes Appendectomy; Joint replacement; Hysterectomy (); Knee Arthroplasty (Left, 2013); Total knee arthroplasty (Left, 2014); Total hip arthroplasty (Left, 2014); Exploratory laparotomy (); and Spinal cord stimulator implant ().    Family History: family history includes Pneumonia (age of onset: 25) in her biological mother.    Social History:   Social History     Socioeconomic History    Marital status:    Tobacco Use    Smoking status: Never    Smokeless tobacco: Never   Substance and Sexual Activity    Alcohol use: Never    Drug use: Never    Sexual activity: Defer     Social Drivers  of Health     Food Insecurity: No Food Insecurity (5/15/2025)    Hunger Vital Sign     Worried About Running Out of Food in the Last Year: Never true     Ran Out of Food in the Last Year: Never true   Transportation Needs: No Transportation Needs (5/15/2025)    PRAPARE - Transportation     Lack of Transportation (Medical): No     Lack of Transportation (Non-Medical): No   Housing Stability: Unknown (5/15/2025)    Housing Stability Vital Sign     Unable to Pay for Housing in the Last Year: No     Homeless in the Last Year: No        Allergies:   Allergies   Allergen Reactions    Amiodarone Other (see comments)     Affected thyroid   thyroiditis         Medications:   Current Outpatient Medications   Medication Sig Dispense Refill    acetaminophen (TYLENOL 8 HOUR) 650 mg 8 hr tablet Take 650 mg by mouth 4 (four) times a day.      atorvastatin (LIPITOR) 20 mg tablet Take 1 tablet (20 mg total) by mouth nightly. 90 tablet 3    calcium carbonate (OS-HERBERT) 500 mg calcium (1,250 mg) tablet Take 1 tablet by mouth every morning.      cholecalciferol, vitamin D3, 5,000 unit (125 mcg) tablet Take 5,000 Units by mouth every morning.      denosumab (PROLIA) 60 mg/mL syringe Inject 60 mg under the skin every 6 (six) months. Last dose 4/25/2025      docusate sodium (COLACE) 100 mg capsule Take 1 capsule (100 mg total) by mouth 2 (two) times a day for 7 days. 14 capsule 0    ferrous sulfate 325 mg (65 mg iron) tablet Take 65 mg by mouth daily with breakfast.      furosemide (LASIX) 20 mg tablet Take 20 mg by mouth every morning.      gabapentin (NEURONTIN) 300 mg capsule Take 300 mg by mouth 4 (four) times a day.      lancets misc 1 Lancet as needed (Diabetes). To check blood glucose 100 each 3    magnesium oxide (MAG-OX) 400 mg (241.3 mg magnesium) tablet Take 400 mg by mouth every other day.      metFORMIN (GLUCOPHAGE) 500 mg tablet Take 1 tablet (500 mg total) by mouth daily with breakfast. (Patient taking differently: Take 500  mg by mouth 2 (two) times a day with breakfast and dinner.)      multivitamin tablet Take 1 tablet by mouth daily.      pantoprazole (PROTONIX) 20 mg EC tablet Take 20 mg by mouth every morning.      propranoloL (INDERAL) 20 mg tablet TAKE 1 TABLET TWICE A  tablet 2    senna (SENOKOT) 8.6 mg tablet Take 1 tablet by mouth 2 (two) times a day as needed for constipation for up to 7 days. 7 tablet 0    traMADoL (ULTRAM) 50 mg tablet Take 50 mg by mouth every 8 (eight) hours as needed for pain.      traZODone (DESYREL) 50 mg tablet Take 50 mg by mouth nightly.      vit A/vit C/vit E/zinc/copper (PRESERVISION AREDS ORAL) Take 1 capsule by mouth every morning.      zinc acetate 50 mg (zinc) capsule Take 50 mg by mouth every morning.       No current facility-administered medications for this visit.       Review of Systems: A 14 point review of systems was performed and aside from what is mentioned above is otherwise negative.    Vital Signs:  Vitals:    06/26/25 1248   BP: 104/62   Pulse: 63   Resp: 16   Temp: 36.7 °C (98.1 °F)   TempSrc: Temporal   SpO2: 95%   Weight: 60.3 kg (133 lb)   Height: 1.524 m (5')       Physical Exam:  Well appearing female in no acute distress.    The patient is awake, alert, oriented x 3, with fluent speech, appropriate attention span, concentration and fund of knowledge.  Remote and recent memory are normal.    Cranial nerve examination reveals full visual fields to confrontation, pupils are equal round reactive to light, extra occular muscles are intact, there is no facial asymmetry and tongue protrudes midline.    On motor examination, the patient is 5/5 throughout all 4 extremities without pronator drift.      Sensation in intact and equal to light and sharp touch throughout all 4 extremities.    Her surgical site is clean, dry, and intact without erythema, warmth, or discharge.     Data Review:  No new imaging.        Assessment and Plan:  In summary, Rissa Erickson is a 89 y.o.  female s/p SCS revision with previous pulse generator left in place but leads replaced on 5/15/25. Patient is recovering well from surgery with improvements in incisional pain. Spinal precautions and activity limitations were reviewed.  Her new battery site was protruding somewhat at the last visit.  At this point though the area looks like it is healed very nicely.  She is very happy overall with her progress.  She plans to continue on the current program.  She will follow-up with us in approximately 4 months.  She knows to call with any questions or concerns in the interval.    Damaso Carter MD

## (undated) DEVICE — SUTURE POLYSORB 2-0 UNDYED 5X18 GS-10 D-TACH

## (undated) DEVICE — PACK RFID LAMINECTOMY PMH

## (undated) DEVICE — PAD GROUND ELECTROSURGICAL W/CORD

## (undated) DEVICE — DRAPE IOBAN

## (undated) DEVICE — GLOVE SZ 8.5 LINER PROTEXIS PI BL

## (undated) DEVICE — Device

## (undated) DEVICE — BLADE SURGICAL #10 371110

## (undated) DEVICE — GLOVE SZ 8.5 PROTEXIS PI

## (undated) DEVICE — PENEVAC1 NONSTICK SMOKE EVAC

## (undated) DEVICE — STAPLER SKIN

## (undated) DEVICE — SUTURE MONOSOF 4-0 BLACK 1X18 C-13

## (undated) DEVICE — PRECISION PASSING ELEVATOR

## (undated) DEVICE — DRAPE LAPAROTOMY POUCH ST 12/CS

## (undated) DEVICE — TIP BOVIE BLADE COATED 2.5IN

## (undated) DEVICE — SLEEVE SCD COMFORT KNEE LENGTH MED

## (undated) DEVICE — SPONGE NEUROSURGICAL W0.5XL1IN WHITE COTTON HIGH QUALITY FIB

## (undated) DEVICE — NEEDLE HYPO 23G 1.5 IN

## (undated) DEVICE — DRAPE STERI TOWEL PLASTIC 17X23

## (undated) DEVICE — SOLN IRRIG .9%SOD 1000ML

## (undated) DEVICE — CORD BI-POLAR DISP STERILE

## (undated) DEVICE — BLADE SCALPEL #11

## (undated) DEVICE — SPONGE SURGIFOAM ABSORBABLE GELATIN 100 8.5CM X 12CM X 10MM

## (undated) DEVICE — SHEET DRAPE 3/4 REVERSEFOLD 53X77

## (undated) DEVICE — SEALANT SURGICAL FLOSEAL 10ML STERILE PREP